# Patient Record
Sex: MALE | Race: WHITE | Employment: OTHER | URBAN - NONMETROPOLITAN AREA
[De-identification: names, ages, dates, MRNs, and addresses within clinical notes are randomized per-mention and may not be internally consistent; named-entity substitution may affect disease eponyms.]

---

## 2017-11-17 ENCOUNTER — APPOINTMENT (OUTPATIENT)
Dept: GENERAL RADIOLOGY | Age: 62
DRG: 291 | End: 2017-11-17
Payer: COMMERCIAL

## 2017-11-17 ENCOUNTER — APPOINTMENT (OUTPATIENT)
Dept: CT IMAGING | Age: 62
DRG: 291 | End: 2017-11-17
Payer: COMMERCIAL

## 2017-11-17 ENCOUNTER — HOSPITAL ENCOUNTER (INPATIENT)
Age: 62
LOS: 6 days | Discharge: HOME | DRG: 291 | End: 2017-11-23
Attending: EMERGENCY MEDICINE | Admitting: INTERNAL MEDICINE
Payer: COMMERCIAL

## 2017-11-17 DIAGNOSIS — R51.9 ACUTE NONINTRACTABLE HEADACHE, UNSPECIFIED HEADACHE TYPE: ICD-10-CM

## 2017-11-17 DIAGNOSIS — R09.02 HYPOXEMIA: Primary | ICD-10-CM

## 2017-11-17 DIAGNOSIS — R77.8 ELEVATED TROPONIN: ICD-10-CM

## 2017-11-17 DIAGNOSIS — R07.89 ATYPICAL CHEST PAIN: ICD-10-CM

## 2017-11-17 PROBLEM — J96.01 ACUTE RESPIRATORY FAILURE WITH HYPOXIA AND HYPERCAPNIA (HCC): Status: ACTIVE | Noted: 2017-11-17

## 2017-11-17 PROBLEM — J96.02 ACUTE RESPIRATORY FAILURE WITH HYPOXIA AND HYPERCAPNIA (HCC): Status: ACTIVE | Noted: 2017-11-17

## 2017-11-17 LAB
ALBUMIN SERPL-MCNC: 3.4 G/DL (ref 3.5–5.2)
ALP BLD-CCNC: 87 U/L (ref 40–130)
ALT SERPL-CCNC: 25 U/L (ref 5–41)
ANION GAP SERPL CALCULATED.3IONS-SCNC: 4 MMOL/L (ref 7–19)
APTT: 27.3 SEC (ref 26–36.2)
AST SERPL-CCNC: 23 U/L (ref 5–40)
BASE EXCESS ARTERIAL: 3.3 MMOL/L (ref -2–2)
BASOPHILS ABSOLUTE: 0.1 K/UL (ref 0–0.2)
BASOPHILS RELATIVE PERCENT: 0.5 % (ref 0–1)
BILIRUB SERPL-MCNC: <0.2 MG/DL (ref 0.2–1.2)
BUN BLDV-MCNC: 22 MG/DL (ref 8–23)
CALCIUM SERPL-MCNC: 9.4 MG/DL (ref 8.8–10.2)
CARBOXYHEMOGLOBIN ARTERIAL: 2.5 % (ref 0–5)
CHLORIDE BLD-SCNC: 96 MMOL/L (ref 98–111)
CO2: 36 MMOL/L (ref 22–29)
CREAT SERPL-MCNC: 1.3 MG/DL (ref 0.5–1.2)
EOSINOPHILS ABSOLUTE: 0.1 K/UL (ref 0–0.6)
EOSINOPHILS RELATIVE PERCENT: 0.7 % (ref 0–5)
GFR NON-AFRICAN AMERICAN: 56
GLUCOSE BLD-MCNC: 185 MG/DL (ref 74–109)
GLUCOSE BLD-MCNC: 203 MG/DL (ref 70–99)
HCO3 ARTERIAL: 34.8 MMOL/L (ref 22–26)
HCT VFR BLD CALC: 61.5 % (ref 42–52)
HEMOGLOBIN, ART, EXTENDED: 17.8 G/DL (ref 14–18)
HEMOGLOBIN: 17.9 G/DL (ref 14–18)
INR BLD: 0.93 (ref 0.88–1.18)
LYMPHOCYTES ABSOLUTE: 1.4 K/UL (ref 1.1–4.5)
LYMPHOCYTES RELATIVE PERCENT: 13.4 % (ref 20–40)
MCH RBC QN AUTO: 25.9 PG (ref 27–31)
MCHC RBC AUTO-ENTMCNC: 29.1 G/DL (ref 33–37)
MCV RBC AUTO: 88.9 FL (ref 80–94)
METHEMOGLOBIN ARTERIAL: 0.8 %
MONOCYTES ABSOLUTE: 1 K/UL (ref 0–0.9)
MONOCYTES RELATIVE PERCENT: 9.3 % (ref 0–10)
NEUTROPHILS ABSOLUTE: 7.9 K/UL (ref 1.5–7.5)
NEUTROPHILS RELATIVE PERCENT: 75.5 % (ref 50–65)
O2 CONTENT ARTERIAL: 21.1 ML/DL
O2 SAT, ARTERIAL: 84.6 %
O2 THERAPY: ABNORMAL
PCO2 ARTERIAL: 83 MMHG (ref 35–45)
PDW BLD-RTO: 18.5 % (ref 11.5–14.5)
PERFORMED ON: ABNORMAL
PH ARTERIAL: 7.23 (ref 7.35–7.45)
PLATELET # BLD: 228 K/UL (ref 130–400)
PMV BLD AUTO: 10 FL (ref 9.4–12.4)
PO2 ARTERIAL: 56 MMHG (ref 80–100)
POC TROPONIN I: 0.15 NG/ML (ref 0–0.08)
POC TROPONIN I: 0.17 NG/ML (ref 0–0.08)
POTASSIUM SERPL-SCNC: 5.1 MMOL/L (ref 3.5–5)
POTASSIUM, WHOLE BLOOD: 4.9
PRO-BNP: 1959 PG/ML (ref 0–900)
PROTHROMBIN TIME: 12.4 SEC (ref 12–14.6)
RBC # BLD: 6.92 M/UL (ref 4.7–6.1)
SODIUM BLD-SCNC: 136 MMOL/L (ref 136–145)
TOTAL PROTEIN: 6.9 G/DL (ref 6.6–8.7)
TROPONIN: 0.02 NG/ML (ref 0–0.03)
TROPONIN: 0.04 NG/ML (ref 0–0.03)
WBC # BLD: 10.5 K/UL (ref 4.8–10.8)

## 2017-11-17 PROCEDURE — 94640 AIRWAY INHALATION TREATMENT: CPT

## 2017-11-17 PROCEDURE — 99285 EMERGENCY DEPT VISIT HI MDM: CPT

## 2017-11-17 PROCEDURE — 80053 COMPREHEN METABOLIC PANEL: CPT

## 2017-11-17 PROCEDURE — 71010 XR CHEST PORTABLE: CPT

## 2017-11-17 PROCEDURE — 36415 COLL VENOUS BLD VENIPUNCTURE: CPT

## 2017-11-17 PROCEDURE — 82803 BLOOD GASES ANY COMBINATION: CPT

## 2017-11-17 PROCEDURE — 6370000000 HC RX 637 (ALT 250 FOR IP): Performed by: EMERGENCY MEDICINE

## 2017-11-17 PROCEDURE — 6370000000 HC RX 637 (ALT 250 FOR IP): Performed by: INTERNAL MEDICINE

## 2017-11-17 PROCEDURE — 85025 COMPLETE CBC W/AUTO DIFF WBC: CPT

## 2017-11-17 PROCEDURE — 83880 ASSAY OF NATRIURETIC PEPTIDE: CPT

## 2017-11-17 PROCEDURE — 94660 CPAP INITIATION&MGMT: CPT

## 2017-11-17 PROCEDURE — 2700000000 HC OXYGEN THERAPY PER DAY

## 2017-11-17 PROCEDURE — 2580000003 HC RX 258: Performed by: EMERGENCY MEDICINE

## 2017-11-17 PROCEDURE — 82948 REAGENT STRIP/BLOOD GLUCOSE: CPT

## 2017-11-17 PROCEDURE — 93005 ELECTROCARDIOGRAM TRACING: CPT

## 2017-11-17 PROCEDURE — 85610 PROTHROMBIN TIME: CPT

## 2017-11-17 PROCEDURE — 99285 EMERGENCY DEPT VISIT HI MDM: CPT | Performed by: EMERGENCY MEDICINE

## 2017-11-17 PROCEDURE — 6360000002 HC RX W HCPCS: Performed by: INTERNAL MEDICINE

## 2017-11-17 PROCEDURE — 6360000002 HC RX W HCPCS: Performed by: EMERGENCY MEDICINE

## 2017-11-17 PROCEDURE — 85730 THROMBOPLASTIN TIME PARTIAL: CPT

## 2017-11-17 PROCEDURE — 70450 CT HEAD/BRAIN W/O DYE: CPT

## 2017-11-17 PROCEDURE — 99255 IP/OBS CONSLTJ NEW/EST HI 80: CPT | Performed by: INTERNAL MEDICINE

## 2017-11-17 PROCEDURE — 96374 THER/PROPH/DIAG INJ IV PUSH: CPT

## 2017-11-17 PROCEDURE — 2580000003 HC RX 258: Performed by: INTERNAL MEDICINE

## 2017-11-17 PROCEDURE — 84132 ASSAY OF SERUM POTASSIUM: CPT

## 2017-11-17 PROCEDURE — 2100000000 HC CCU R&B

## 2017-11-17 PROCEDURE — 84484 ASSAY OF TROPONIN QUANT: CPT

## 2017-11-17 PROCEDURE — 36600 WITHDRAWAL OF ARTERIAL BLOOD: CPT

## 2017-11-17 RX ORDER — ACETAMINOPHEN 325 MG/1
650 TABLET ORAL EVERY 4 HOURS PRN
Status: DISCONTINUED | OUTPATIENT
Start: 2017-11-17 | End: 2017-11-23 | Stop reason: HOSPADM

## 2017-11-17 RX ORDER — MAGNESIUM SULFATE IN WATER 40 MG/ML
2 INJECTION, SOLUTION INTRAVENOUS ONCE
Status: COMPLETED | OUTPATIENT
Start: 2017-11-17 | End: 2017-11-17

## 2017-11-17 RX ORDER — NICOTINE POLACRILEX 4 MG
15 LOZENGE BUCCAL PRN
Status: DISCONTINUED | OUTPATIENT
Start: 2017-11-17 | End: 2017-11-23 | Stop reason: HOSPADM

## 2017-11-17 RX ORDER — FUROSEMIDE 10 MG/ML
40 INJECTION INTRAMUSCULAR; INTRAVENOUS 2 TIMES DAILY
Status: DISCONTINUED | OUTPATIENT
Start: 2017-11-17 | End: 2017-11-20

## 2017-11-17 RX ORDER — ASPIRIN 81 MG/1
81 TABLET ORAL DAILY
Status: DISCONTINUED | OUTPATIENT
Start: 2017-11-17 | End: 2017-11-17

## 2017-11-17 RX ORDER — NITROGLYCERIN 0.4 MG/1
0.4 TABLET SUBLINGUAL EVERY 5 MIN PRN
Status: DISCONTINUED | OUTPATIENT
Start: 2017-11-17 | End: 2017-11-23 | Stop reason: HOSPADM

## 2017-11-17 RX ORDER — INSULIN GLARGINE 100 [IU]/ML
0.15 INJECTION, SOLUTION SUBCUTANEOUS NIGHTLY
Status: DISCONTINUED | OUTPATIENT
Start: 2017-11-17 | End: 2017-11-23 | Stop reason: HOSPADM

## 2017-11-17 RX ORDER — IPRATROPIUM BROMIDE AND ALBUTEROL SULFATE 2.5; .5 MG/3ML; MG/3ML
1 SOLUTION RESPIRATORY (INHALATION) EVERY 4 HOURS PRN
Status: DISCONTINUED | OUTPATIENT
Start: 2017-11-17 | End: 2017-11-19

## 2017-11-17 RX ORDER — DEXTROSE MONOHYDRATE 50 MG/ML
100 INJECTION, SOLUTION INTRAVENOUS PRN
Status: DISCONTINUED | OUTPATIENT
Start: 2017-11-17 | End: 2017-11-23 | Stop reason: HOSPADM

## 2017-11-17 RX ORDER — ASPIRIN 81 MG/1
81 TABLET ORAL DAILY
Status: DISCONTINUED | OUTPATIENT
Start: 2017-11-18 | End: 2017-11-23 | Stop reason: HOSPADM

## 2017-11-17 RX ORDER — ASPIRIN 81 MG/1
324 TABLET, CHEWABLE ORAL ONCE
Status: COMPLETED | OUTPATIENT
Start: 2017-11-17 | End: 2017-11-17

## 2017-11-17 RX ORDER — 0.9 % SODIUM CHLORIDE 0.9 %
1000 INTRAVENOUS SOLUTION INTRAVENOUS ONCE
Status: COMPLETED | OUTPATIENT
Start: 2017-11-17 | End: 2017-11-17

## 2017-11-17 RX ORDER — SODIUM CHLORIDE 0.9 % (FLUSH) 0.9 %
10 SYRINGE (ML) INJECTION PRN
Status: DISCONTINUED | OUTPATIENT
Start: 2017-11-17 | End: 2017-11-23 | Stop reason: HOSPADM

## 2017-11-17 RX ORDER — DEXAMETHASONE SODIUM PHOSPHATE 10 MG/ML
10 INJECTION, SOLUTION INTRAMUSCULAR; INTRAVENOUS ONCE
Status: COMPLETED | OUTPATIENT
Start: 2017-11-17 | End: 2017-11-17

## 2017-11-17 RX ORDER — PREDNISONE 20 MG/1
40 TABLET ORAL DAILY
Status: COMPLETED | OUTPATIENT
Start: 2017-11-18 | End: 2017-11-21

## 2017-11-17 RX ORDER — ASPIRIN 81 MG/1
TABLET, CHEWABLE ORAL
Status: DISCONTINUED
Start: 2017-11-17 | End: 2017-11-17

## 2017-11-17 RX ORDER — SODIUM CHLORIDE 0.9 % (FLUSH) 0.9 %
10 SYRINGE (ML) INJECTION EVERY 12 HOURS SCHEDULED
Status: DISCONTINUED | OUTPATIENT
Start: 2017-11-17 | End: 2017-11-23 | Stop reason: HOSPADM

## 2017-11-17 RX ORDER — IPRATROPIUM BROMIDE AND ALBUTEROL SULFATE 2.5; .5 MG/3ML; MG/3ML
1 SOLUTION RESPIRATORY (INHALATION) 4 TIMES DAILY
Status: DISCONTINUED | OUTPATIENT
Start: 2017-11-17 | End: 2017-11-17

## 2017-11-17 RX ORDER — DEXTROSE MONOHYDRATE 25 G/50ML
12.5 INJECTION, SOLUTION INTRAVENOUS PRN
Status: DISCONTINUED | OUTPATIENT
Start: 2017-11-17 | End: 2017-11-23 | Stop reason: HOSPADM

## 2017-11-17 RX ORDER — PREDNISONE 20 MG/1
40 TABLET ORAL DAILY
Status: DISCONTINUED | OUTPATIENT
Start: 2017-11-17 | End: 2017-11-17

## 2017-11-17 RX ORDER — ONDANSETRON 2 MG/ML
4 INJECTION INTRAMUSCULAR; INTRAVENOUS EVERY 6 HOURS PRN
Status: DISCONTINUED | OUTPATIENT
Start: 2017-11-17 | End: 2017-11-23 | Stop reason: HOSPADM

## 2017-11-17 RX ADMIN — MAGNESIUM SULFATE HEPTAHYDRATE 2 G: 40 INJECTION, SOLUTION INTRAVENOUS at 15:39

## 2017-11-17 RX ADMIN — ENOXAPARIN SODIUM 135 MG: 150 INJECTION SUBCUTANEOUS at 17:38

## 2017-11-17 RX ADMIN — DEXAMETHASONE SODIUM PHOSPHATE 10 MG: 10 INJECTION, SOLUTION INTRAMUSCULAR; INTRAVENOUS at 15:26

## 2017-11-17 RX ADMIN — ACETAMINOPHEN 650 MG: 325 TABLET ORAL at 22:41

## 2017-11-17 RX ADMIN — Medication 10 ML: at 20:26

## 2017-11-17 RX ADMIN — FUROSEMIDE 40 MG: 10 INJECTION, SOLUTION INTRAMUSCULAR; INTRAVENOUS at 20:26

## 2017-11-17 RX ADMIN — ASPIRIN 81 MG CHEWABLE TABLET 324 MG: 81 TABLET CHEWABLE at 15:25

## 2017-11-17 RX ADMIN — INSULIN GLARGINE 20 UNITS: 100 INJECTION, SOLUTION SUBCUTANEOUS at 22:42

## 2017-11-17 RX ADMIN — SODIUM CHLORIDE 1000 ML: 9 INJECTION, SOLUTION INTRAVENOUS at 15:26

## 2017-11-17 ASSESSMENT — ENCOUNTER SYMPTOMS
NAUSEA: 0
COUGH: 0
DOUBLE VISION: 0
VOMITING: 0
SORE THROAT: 0
WHEEZING: 0
COUGH: 1
SPUTUM PRODUCTION: 1
HEARTBURN: 0
DIARRHEA: 0
ABDOMINAL DISTENTION: 0
SHORTNESS OF BREATH: 1
CONSTIPATION: 0
TROUBLE SWALLOWING: 0
BLURRED VISION: 0
RHINORRHEA: 0
CHEST TIGHTNESS: 0
HEMOPTYSIS: 0
ABDOMINAL PAIN: 0
BLOOD IN STOOL: 0
BACK PAIN: 0

## 2017-11-17 ASSESSMENT — PAIN SCALES - GENERAL
PAINLEVEL_OUTOF10: 0
PAINLEVEL_OUTOF10: 7
PAINLEVEL_OUTOF10: 0
PAINLEVEL_OUTOF10: 6

## 2017-11-17 NOTE — ED NOTES
Pt put on 4L [facility], [facility]0 [facility]  11/17/17 2333

## 2017-11-17 NOTE — ED PROVIDER NOTES
[address] UNIT  eMERGENCY dEPARTMENT eNCOUnter      Pt Name: Allie aMnuel  MRN: [id number]  [place] 1955  Date of evaluation: 11/17/2017  Provider: Haylei Francisco MD    CHIEF COMPLAINT       Chief Complaint   Patient presents with    Headache    Shoulder Pain       HISTORY OF PRESENT ILLNESS   (Location/Symptom, Timing/Onset, Context/Setting, Quality, Duration, Modifying Factors, Severity)  Note limiting factors. Allie Manuel is a 65 y.o. male who presents to the emergency department For indigestion, intermittent headaches, and shoulder numbness and arm numbness. The onset of the patient's symptoms were last night when he was trying to go to bed. States that he felt very severe indigestion. He then started having episodes of burping with his indigestion and he would have sharp intermittent headaches that would come and go on their own there were \"as if someone was hitting me with a hammer\", they would come and go on its own, they were diffuse, nothing the patient did made it better or worse. They seem to be associated with indigestion. He also states that he has had numbness going to both of his shoulders and down his left arm and every portion of his left arm but his left pinky. Patient does has a history of hypertension and diabetes and was previously on inhalers. Patient had stopped all of his medications by himself. As per his wife he has been having difficulty breathing for weeks to even months. She feels that the patient is minimizing his symptoms. Nursing Notes were reviewed. REVIEW OF SYSTEMS    (2-9 systems for level 4, 10 or more for level 5)     Review of Systems   Constitutional: Negative for activity change, appetite change, chills and fever. HENT: Negative for congestion, ear pain, nosebleeds, rhinorrhea, sore throat and trouble swallowing. Respiratory: Positive for shortness of breath. Negative for cough and chest tightness.     Cardiovascular: Negative for chest proximally distally, no dysmetria. Skin: Skin is warm and dry. No rash noted. Psychiatric: He has a normal mood and affect. His behavior is normal. Judgment and thought content normal.   Nursing note and vitals reviewed. DIAGNOSTIC RESULTS     EKG: All EKG's are interpreted by the Emergency Department Physician who either signs or Co-signs this chart in the absence of a cardiologist.    1341: Sinus tachycardia, rate 122 beats per minute, ST elevations in aVR, V1, ST depressions in V4 through V6,  ms, QRS 93 ms,  ms. Repeat EKG was normal sinus rhythm without any ST elevations or depressions. RADIOLOGY:   Non-plain film images such as CT, Ultrasound and MRI are read by the radiologist. Plain radiographic images are visualized and preliminarily interpreted by the emergency physician with the below findings:      CT Head WO Contrast   Final Result   A normal unenhanced CT scan of the head. Above findings are recorded on a digital voice clip in PACS. Signed by Dr Lizbeth Westbrook on 11/17/2017 2:57 PM      XR Chest Portable   Final Result   A limited diagnostic study due to patient's body habitus   and a soft exposure. No active infiltrate or pelvic congestion. Scarring and discoid atelectasis in the lower lung bilaterally. The above findings are recorded on a digital voice clip in PACS.    Signed by Dr Lizbeth Westbrook on 11/17/2017 2:05 PM          LABS:  Labs Reviewed   BLOOD GAS, ARTERIAL - Abnormal; Notable for the following:        Result Value    pH, Arterial 7.230 (*)     pCO2, Arterial 83.0 (*)     pO2, Arterial 56.0 (*)     HCO3, Arterial 34.8 (*)     Base Excess, Arterial 3.3 (*)     O2 Sat, Arterial 84.6 (*)     All other components within normal limits    Narrative:     CALL  [facility] tel. ,  Dr. Mark Wong, 11/17/2017 14:13, by HANNAH   TROPONIN - Abnormal; Notable for the following:     Troponin 0.04 (*)     All other components within normal limits   COMPREHENSIVE METABOLIC findings of elevations in aVR and V1 with ST depressions in V4, V5, and V6. Spoke with Dr. Isidro Garza from cardiology. States that elevations in aVR are not criteria to be a STEMI. States that hypoxia should be treated and he will come and take a look at the EKG for evaluation. 1406: Dr. Isidro Garza  in the ER evaluating patient EKG. 1457: Spoken with Dr. Isidro Garza, would recommend a dose of Lovenox for anticoagulation, recommending admission to hospitalist given the multiple medical problems going on including the pulmonary issues with the hypoxemia and the wheezing and difficulty breathing. Patient reexamined breathing better after nebulizer treatment, satting better, moving air better now with respiratory next week for a wheezes. Case discussed with Harsh Boyd from medicine for admission. History, presentation, workup and results at this point in time discussed. We'll admit patient to PCU/stepdown unit for further monitoring and care. CONSULTS:  IP CONSULT TO CARDIOLOGY  IP CONSULT TO HOSPITALIST    PROCEDURES:  Unless otherwise noted below, none     Procedures    FINAL IMPRESSION      1. Hypoxemia    2. Elevated troponin    3. Atypical chest pain    4. Acute nonintractable headache, unspecified headache type          DISPOSITION/PLAN   DISPOSITION     PATIENT REFERRED TO:  No follow-up provider specified.     DISCHARGE MEDICATIONS:  Current Discharge Medication List             (Please note that portions of this note were completed with a voice recognition program.  Efforts were made to edit the dictations but occasionally words are mis-transcribed.)    Norma Osei MD (electronically signed)  Attending Emergency Physician          Norma Osei MD  11/18/17 9760

## 2017-11-17 NOTE — CONSULTS
Cardiology Consult Note    ADMISSION DAY:  11/17/2017  1:31 PM   Consult Date:  11/17/2017 2:38 PM    Reason for Consultation: abnormal EKG    History of Present Illness:   He is a 79-year-old man who has a history of coronary artery disease and has been followed by Dr. Yue Albrecht who has not been back to our office regularly. He was last seen in our office several years ago. The note at that time said:  The patient had a subendocardial MI on 9/23/11 with subsequent drug-eluting stent placement to the obtuse marginal branch of the circumflex. The patient is no longer taking cardiac related medications other than aspirin. He was actually being seen for evaluation for hip replacement in early 2014. He has not been back to our clinic since then. He and his wife give the history. He says that he doesn't do very much and his wife says that he actually does less than he says. 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 He doesn't do any exertional activities and no real yard work or housework. He spends most of his time on the computer. Reportedly has some degree of underlying COPD and has had inhalers in the past as well as home oxygen although he uses neither now. He has been having some increased shortness of breath for months according to his wife. He also has had some issues with edema which he downplays that she thinks has been worse as well for several months. She notes that he snores loudly and has apneic episodes. He has a chronic cough productive of yellow to clear sputum that has been somewhat worse of late. He has not had fevers or chills. Back when he had his heart attack, he had severe chest pain. He has not had any of that type of symptom since that episode. He does not describe PND or orthopnea. Last night he started having problems with a headache and some joint pains particularly of both elbows and shoulders.   Those are the

## 2017-11-18 LAB
ANION GAP SERPL CALCULATED.3IONS-SCNC: 5 MMOL/L (ref 7–19)
BASOPHILS ABSOLUTE: 0 K/UL (ref 0–0.2)
BASOPHILS RELATIVE PERCENT: 0.4 % (ref 0–1)
BUN BLDV-MCNC: 20 MG/DL (ref 8–23)
CALCIUM SERPL-MCNC: 8.7 MG/DL (ref 8.8–10.2)
CHLORIDE BLD-SCNC: 91 MMOL/L (ref 98–111)
CO2: 38 MMOL/L (ref 22–29)
CREAT SERPL-MCNC: 1.2 MG/DL (ref 0.5–1.2)
EOSINOPHILS ABSOLUTE: 0 K/UL (ref 0–0.6)
EOSINOPHILS RELATIVE PERCENT: 0 % (ref 0–5)
GFR NON-AFRICAN AMERICAN: >60
GLUCOSE BLD-MCNC: 162 MG/DL (ref 70–99)
GLUCOSE BLD-MCNC: 182 MG/DL (ref 70–99)
GLUCOSE BLD-MCNC: 182 MG/DL (ref 70–99)
GLUCOSE BLD-MCNC: 183 MG/DL (ref 70–99)
GLUCOSE BLD-MCNC: 347 MG/DL (ref 74–109)
HBA1C MFR BLD: 7.9 %
HCT VFR BLD CALC: 59 % (ref 42–52)
HEMOGLOBIN: 17.3 G/DL (ref 14–18)
LV EF: 50 %
LVEF MODALITY: NORMAL
LYMPHOCYTES ABSOLUTE: 0.5 K/UL (ref 1.1–4.5)
LYMPHOCYTES RELATIVE PERCENT: 5.8 % (ref 20–40)
MCH RBC QN AUTO: 26.2 PG (ref 27–31)
MCHC RBC AUTO-ENTMCNC: 29.3 G/DL (ref 33–37)
MCV RBC AUTO: 89.4 FL (ref 80–94)
MONOCYTES ABSOLUTE: 0.3 K/UL (ref 0–0.9)
MONOCYTES RELATIVE PERCENT: 3.6 % (ref 0–10)
NEUTROPHILS ABSOLUTE: 7.6 K/UL (ref 1.5–7.5)
NEUTROPHILS RELATIVE PERCENT: 89.4 % (ref 50–65)
PDW BLD-RTO: 18.6 % (ref 11.5–14.5)
PERFORMED ON: ABNORMAL
PLATELET # BLD: 203 K/UL (ref 130–400)
PMV BLD AUTO: 10.3 FL (ref 9.4–12.4)
POTASSIUM SERPL-SCNC: 5.4 MMOL/L (ref 3.5–5)
POTASSIUM SERPL-SCNC: 5.5 MMOL/L (ref 3.5–5)
POTASSIUM SERPL-SCNC: 6.1 MMOL/L (ref 3.5–5)
RBC # BLD: 6.6 M/UL (ref 4.7–6.1)
SODIUM BLD-SCNC: 134 MMOL/L (ref 136–145)
TROPONIN: 0.02 NG/ML (ref 0–0.03)
WBC # BLD: 8.5 K/UL (ref 4.8–10.8)

## 2017-11-18 PROCEDURE — 93306 TTE W/DOPPLER COMPLETE: CPT

## 2017-11-18 PROCEDURE — 2580000003 HC RX 258: Performed by: INTERNAL MEDICINE

## 2017-11-18 PROCEDURE — 94660 CPAP INITIATION&MGMT: CPT

## 2017-11-18 PROCEDURE — 99233 SBSQ HOSP IP/OBS HIGH 50: CPT | Performed by: INTERNAL MEDICINE

## 2017-11-18 PROCEDURE — 84484 ASSAY OF TROPONIN QUANT: CPT

## 2017-11-18 PROCEDURE — 83036 HEMOGLOBIN GLYCOSYLATED A1C: CPT

## 2017-11-18 PROCEDURE — 6370000000 HC RX 637 (ALT 250 FOR IP): Performed by: INTERNAL MEDICINE

## 2017-11-18 PROCEDURE — 85025 COMPLETE CBC W/AUTO DIFF WBC: CPT

## 2017-11-18 PROCEDURE — 36415 COLL VENOUS BLD VENIPUNCTURE: CPT

## 2017-11-18 PROCEDURE — 80048 BASIC METABOLIC PNL TOTAL CA: CPT

## 2017-11-18 PROCEDURE — 82948 REAGENT STRIP/BLOOD GLUCOSE: CPT

## 2017-11-18 PROCEDURE — 6360000002 HC RX W HCPCS: Performed by: INTERNAL MEDICINE

## 2017-11-18 PROCEDURE — 84132 ASSAY OF SERUM POTASSIUM: CPT

## 2017-11-18 PROCEDURE — 2100000000 HC CCU R&B

## 2017-11-18 PROCEDURE — 2700000000 HC OXYGEN THERAPY PER DAY

## 2017-11-18 RX ORDER — HYDRALAZINE HYDROCHLORIDE 10 MG/1
10 TABLET, FILM COATED ORAL EVERY 8 HOURS SCHEDULED
Status: DISCONTINUED | OUTPATIENT
Start: 2017-11-18 | End: 2017-11-23 | Stop reason: HOSPADM

## 2017-11-18 RX ORDER — DEXTROSE MONOHYDRATE 25 G/50ML
25 INJECTION, SOLUTION INTRAVENOUS ONCE
Status: COMPLETED | OUTPATIENT
Start: 2017-11-18 | End: 2017-11-18

## 2017-11-18 RX ORDER — SODIUM POLYSTYRENE SULFONATE 15 G/60ML
30 SUSPENSION ORAL; RECTAL ONCE
Status: COMPLETED | OUTPATIENT
Start: 2017-11-18 | End: 2017-11-18

## 2017-11-18 RX ADMIN — HYDRALAZINE HYDROCHLORIDE 10 MG: 10 TABLET, FILM COATED ORAL at 14:14

## 2017-11-18 RX ADMIN — DEXTROSE MONOHYDRATE 25 G: 25 INJECTION, SOLUTION INTRAVENOUS at 14:52

## 2017-11-18 RX ADMIN — METOPROLOL TARTRATE 25 MG: 25 TABLET ORAL at 20:19

## 2017-11-18 RX ADMIN — INSULIN GLARGINE 20 UNITS: 100 INJECTION, SOLUTION SUBCUTANEOUS at 20:16

## 2017-11-18 RX ADMIN — Medication 10 ML: at 20:16

## 2017-11-18 RX ADMIN — PREDNISONE 40 MG: 20 TABLET ORAL at 08:38

## 2017-11-18 RX ADMIN — Medication 10 ML: at 08:38

## 2017-11-18 RX ADMIN — SODIUM POLYSTYRENE SULFONATE 30 G: 15 SUSPENSION ORAL; RECTAL at 14:14

## 2017-11-18 RX ADMIN — METOPROLOL TARTRATE 25 MG: 25 TABLET ORAL at 10:22

## 2017-11-18 RX ADMIN — INSULIN LISPRO 7 UNITS: 100 INJECTION, SOLUTION INTRAVENOUS; SUBCUTANEOUS at 16:48

## 2017-11-18 RX ADMIN — ASPIRIN 81 MG: 81 TABLET, COATED ORAL at 08:38

## 2017-11-18 RX ADMIN — ENOXAPARIN SODIUM 135 MG: 150 INJECTION SUBCUTANEOUS at 08:38

## 2017-11-18 RX ADMIN — FUROSEMIDE 40 MG: 10 INJECTION, SOLUTION INTRAMUSCULAR; INTRAVENOUS at 08:38

## 2017-11-18 RX ADMIN — INSULIN LISPRO 1 UNITS: 100 INJECTION, SOLUTION INTRAVENOUS; SUBCUTANEOUS at 20:17

## 2017-11-18 RX ADMIN — HYDRALAZINE HYDROCHLORIDE 10 MG: 10 TABLET, FILM COATED ORAL at 21:34

## 2017-11-18 RX ADMIN — INSULIN HUMAN 10 UNITS: 100 INJECTION, SOLUTION PARENTERAL at 14:51

## 2017-11-18 RX ADMIN — INSULIN LISPRO 7 UNITS: 100 INJECTION, SOLUTION INTRAVENOUS; SUBCUTANEOUS at 12:22

## 2017-11-18 RX ADMIN — INSULIN LISPRO 7 UNITS: 100 INJECTION, SOLUTION INTRAVENOUS; SUBCUTANEOUS at 08:38

## 2017-11-18 RX ADMIN — FUROSEMIDE 40 MG: 10 INJECTION, SOLUTION INTRAMUSCULAR; INTRAVENOUS at 17:27

## 2017-11-18 ASSESSMENT — PAIN SCALES - GENERAL
PAINLEVEL_OUTOF10: 0

## 2017-11-18 NOTE — PROGRESS NOTES
 glucagon (rDNA) injection 1 mg  1 mg Intramuscular PRN Elen Mckeon MD        dextrose 5 % solution  100 mL/hr Intravenous PRN Elen Mckeon MD        insulin glargine (LANTUS) injection vial 20 Units  0.15 Units/kg Subcutaneous Nightly Elen Mckeon MD   20 Units at 11/17/17 2242    insulin lispro (HUMALOG) injection vial 7 Units  0.05 Units/kg Subcutaneous TID  Elen Mckeon MD   7 Units at 11/18/17 1222    insulin lispro (HUMALOG) injection vial 0-6 Units  0-6 Units Subcutaneous TID  Elen Mckeon MD        insulin lispro (HUMALOG) injection vial 0-3 Units  0-3 Units Subcutaneous Nightly Elen Mckeon MD        ipratropium-albuterol (DUONEB) nebulizer solution 1 ampule  1 ampule Inhalation Q4H PRN Elen Mckeon MD        predniSONE (DELTASONE) tablet 40 mg  40 mg Oral Daily Elen Mckeon MD   40 mg at 11/18/17 5552    furosemide (LASIX) injection 40 mg  40 mg Intravenous BID Elen Mckeon MD   40 mg at 11/18/17 0838    aspirin EC tablet 81 mg  81 mg Oral Daily Elen Mckeon, MD   81 mg at 11/18/17 6845    acetaminophen (TYLENOL) tablet 650 mg  650 mg Oral Q4H PRN Elen Mckeon MD   650 mg at 11/17/17 2241        Labs:     Recent Labs      11/17/17   1343  11/18/17   0014   WBC  10.5  8.5   RBC  6.92*  6.60*   HGB  17.9  17.3   HCT  61.5*  59.0*   MCV  88.9  89.4   MCH  25.9*  26.2*   MCHC  29.1*  29.3*   PLT  228  203     Recent Labs      11/17/17   1343  11/17/17   1411  11/18/17   0014   NA  136   --   134*   K  5.1*  4.9  5.4*   ANIONGAP  4*   --   5*   CL  96*   --   91*   CO2  36*   --   38*   BUN  22   --   20   CREATININE  1.3*   --   1.2   GLUCOSE  185*   --   347*   CALCIUM  9.4   --   8.7*     No results for input(s): MG, PHOS in the last 72 hours.   Recent Labs      11/17/17   1343   AST  23   ALT  25   BILITOT  <0.2   ALKPHOS  87     ABGs:  Recent Labs      11/17/17   1411   PHART  7.230*   [id number]  83.0*   PO2ART  56.0*   [id number] ischemic w/u at some point. Defer to cards      Hypertension - bp stable      Chronic diastolic congestive heart failure ([facility] - w/ likely acute on chronic chf exacerbation. 2d echo ordered. Continue iv lasix bid at this time.        Type 2 diabetes mellitus ([facility] - getting lantus, premeal, iss      Keep in CCU at this time      Advance Directive: Full Code          Electronically signed by Helen Galloway DO on 11/18/2017 at 1:13 PM

## 2017-11-18 NOTE — PROGRESS NOTES
Pt has been dozing intermittently this morning. Have assisted to stand several time to void. Pt tolerating activity fairly well. Currently sitting on side of the bed eating lunch. No other needs at this time.

## 2017-11-18 NOTE — PROGRESS NOTES
Patient o2 sats on cpap decreased to 85%. o2 bleed into cpap had been increased to 15 litters. Patient changed back to nasel cannula with o2 at 6 litters. o2 sats improved to 90%.  Will continue to monitor

## 2017-11-18 NOTE — PROGRESS NOTES
Kanika Bender : 1955, Male, 65 y.o.     CC: CHF, troponin bump  History:  He is feeling some bettter but still having headache and finger numbness on his left hand.     [Allergies/Contraindications:  Crestor [rosuvastatin]]     Medications  Current Facility-Administered Medications   Medication Dose Route Frequency Provider Last Rate Last Dose    nitroGLYCERIN (NITROSTAT) SL tablet 0.4 mg  0.4 mg Sublingual Q5 Min PRN Krishan Sultana MD        nitroglycerin (NITRO-BID) 2 % ointment 0.5 inch  0.5 inch Topical 4 times per day Marlow Babinski, MD        sodium chloride flush 0.9 % injection 10 mL  10 mL Intravenous 2 times per day Craa Wyman MD   10 mL at 17 0953    sodium chloride flush 0.9 % injection 10 mL  10 mL Intravenous PRN Cara Wyman MD        magnesium hydroxide (MILK OF MAGNESIA) 400 MG/5ML suspension 30 mL  30 mL Oral Daily PRN Cara Wyman MD        ondansetron (ZOFRAN) injection 4 mg  4 mg Intravenous Q6H PRN Cara Wyman MD        glucose (GLUTOSE) 40 % oral gel 15 g  15 g Oral PRN Cara Wyman MD        dextrose 50 % solution 12.5 g  12.5 g Intravenous PRN Cara Wyman MD        glucagon (rDNA) injection 1 mg  1 mg Intramuscular PRN Cara Wyman MD        dextrose 5 % solution  100 mL/hr Intravenous PRN Cara Wyman MD        enoxaparin (LOVENOX) injection 135 mg  1 mg/kg Subcutaneous BID Cara Wyman MD   135 mg at 17 0838    insulin glargine (LANTUS) injection vial 20 Units  0.15 Units/kg Subcutaneous Nightly Cara Wyman MD   20 Units at 17 2242    insulin lispro (HUMALOG) injection vial 7 Units  0.05 Units/kg Subcutaneous TID ARIADNA Wyman MD   7 Units at 17 0838    insulin lispro (HUMALOG) injection vial 0-6 Units  0-6 Units Subcutaneous TID ARIADNA Wyman MD        insulin lispro (HUMALOG) injection vial 0-3 Units  0-3 Units Subcutaneous Nightly Cara Wyman he needs full dose lovenox. Katelyn Penny. Hetal Sarah M.D.   [facility]

## 2017-11-18 NOTE — PROGRESS NOTES
Pt assessment completed. Pt w/o c/o pain or distress. He does report feeling better this morning. Assisted to sit on side of bed and set up breakfast tray. No other needs at this time.

## 2017-11-18 NOTE — H&P
Lady Lin - History & Physical    [id number]  PCP: No primary care provider on file. Date of Admission: 11/17/2017   Date of Service: Pt seen/examined on11/17/2017 and Admitted to Inpatient with expected LOS greater than two midnights due to medical therapy. Chief Complaint:  Mini-headaches    History Of Present Illness: The patient is a 65 y.o. male who presented complaining of \"mini-headaches\" and b/l shoulder pain. Pt was found to be hypoxic in the ED which was his main reason for admission. Pt states he has been progressively SOB for about 1 year. Worse with minimal exertion. Pt reports he becomes SOB after walking only about 5 feet. He admits to having an occasional cough with production of clear sputum, but has not increased as of late. He is also having increased LE edema. Pt is known to the cardiology team and appears to be fairly noncompliant with his medications. At time of interview, pt reports his SOB has improved. Denies any recent sick contacts. Denies any current CP. Of note, pt has over a 35yr hx of smoking, quit in 2011 and has home O2, but is noncompliant. He denies any recent wheezing. His wife at bedside states he snores and she occasionally hears him \"stop breathing\" in his sleep. Pt also has daytime sleepiness. Review of Systems   Constitutional: Negative for chills and fever. HENT: Negative for congestion, hearing loss and sore throat. Eyes: Negative for blurred vision and double vision. Respiratory: Positive for cough, sputum production and shortness of breath. Negative for hemoptysis and wheezing. Cardiovascular: Positive for leg swelling. Negative for chest pain and palpitations. Gastrointestinal: Negative for abdominal pain, heartburn, nausea and vomiting. Genitourinary: Negative for dysuria, frequency and urgency. Musculoskeletal: Positive for joint pain. Negative for falls. Skin: Negative for itching and rash. Neurological: Positive for headaches. Negative for dizziness, sensory change, focal weakness and loss of consciousness. Endo/Heme/Allergies: Does not bruise/bleed easily. Psychiatric/Behavioral: Negative for depression. Past Medical History:        Diagnosis Date    Atherosclerosis     cardiovascular disease     CAD (coronary artery disease)     CHF (congestive heart failure) (HCC)     Degenerative joint disease     right hip    Diabetes mellitus ([facility]     Hyperlipidemia     Hypertension     Myocardial infarction     subenodcardial    Obesity     Presence of stent in coronary artery     9/23/11 BALJINDER placement 0MB of circumflex       Past Surgical History:        Procedure Laterality Date    BACK SURGERY      CARDIAC CATHETERIZATION  9/23/11    with stenting of OMB of left circumflex.  CORONARY ANGIOPLASTY WITH STENT PLACEMENT      HERNIA REPAIR      JOINT REPLACEMENT      LUMBAR LAMINECTOMY         Home Medications:  Prior to Admission medications    Medication Sig Start Date End Date Taking? Authorizing Provider   aspirin 81 MG EC tablet Take 81 mg by mouth daily. Yes Historical Provider, MD   ranitidine (ZANTAC) 150 MG tablet Take 150 mg by mouth 2 times daily. Historical Provider, MD       Allergies:    Crestor [rosuvastatin]    Social History:    The patient currently lives at home with wife  Tobacco:   reports that he quit smoking about 6 years ago. His smoking use included Cigarettes. He has never used smokeless tobacco.  Alcohol:   reports that he drinks alcohol. Illicit Drugs: denies    Family History:  History reviewed. No pertinent family history.       Physical Examination:  /72   Pulse 85   Temp 98.9 °F (37.2 °C) (Temporal)   Resp 17   Ht 5' 8\" (1.727 m)   Wt 283 lb 14.4 oz (128.8 kg)   SpO2 92%   BMI 43.17 kg/m²   General appearance: alert, cooperative and no distress  Head: Normocephalic, without obvious abnormality, atraumatic  Eyes: conjunctivae/corneas clear. PERRL, EOM's intact. Ears: normal external ears  Neck: no adenopathy, no carotid bruit, no JVD, supple, symmetrical, trachea midline   Lungs: Decreased air entry, faint coarse bibasilar bs, no wheeze or rhonchi appreciated   Heart: Difficult to assess due to body habitus, distant heat sounds, but regular rate and rhythm, S1, S2 normal, no murmur appreciated. Abdomen:soft, non-tender; distended normal bowel sounds no masses, no organomegaly  Extremities:Pedal edema 2+  Homans sign is negative, no sign of DVT  Skin: Skin color, texture, turgor normal. No rashes or lesions  Lymphatic: No palpable lymph node enlargment  Neurologic: Alert and oriented X 3, Cranial nerves:II-XII Grossly intact Sensory: normal Motor:grossly normal  Psychiatric:        Diagnostic Data:    CBC:  Recent Labs      11/17/17   1343   WBC  10.5   HGB  17.9   HCT  61.5*   PLT  228     BMP:  Recent Labs      11/17/17   1343  11/17/17   1411   NA  136   --    K  5.1*  4.9   CL  96*   --    CO2  36*   --    BUN  22   --    CREATININE  1.3*   --    CALCIUM  9.4   --      Recent Labs      11/17/17   1343   AST  23   ALT  25   BILITOT  <0.2   ALKPHOS  87     Coag Panel:   Recent Labs      11/17/17   1343   INR  0.93   PROTIME  12.4   APTT  27.3     Cardiac Enzymes:   Recent Labs      11/17/17   1343  11/17/17   1347   TROPONINI  0.04*  0.17*     ABGs:  Lab Results   Component Value Date    PHART 7.230 11/17/2017    PO2ART 56.0 11/17/2017    PCN3OTP 83.0 11/17/2017     Urinalysis:  Lab Results   Component Value Date    NITRU NEGATIVE 09/05/2015    GLUCOSEU NEGATIVE 09/05/2015     CT Head WO Contrast   Final Result   A normal unenhanced CT scan of the head.       XR Chest Portable   Final Result   A limited diagnostic study due to patient's body habitus   and a soft exposure. No active infiltrate or pelvic congestion. Scarring and discoid atelectasis in the lower lung bilaterally.          Active Hospital Problems Diagnosis Date Noted    Acute respiratory failure with hypoxia and hypercapnia (HCC) [J96.01, J96.02] 11/17/2017    Type 2 diabetes mellitus ([facility] [E11.9] 09/17/2015    Chronic diastolic congestive heart failure ([facility] [I50.32] 09/17/2015    CAD (coronary artery disease) [I25.10]     Hypertension [I10]        IMPRESSION   Principal Problem:    Acute respiratory failure with hypoxia and hypercapnia (HCC)  Active Problems:    CAD (coronary artery disease)    Hypertension    Chronic diastolic congestive heart failure (HCC)    Type 2 diabetes mellitus (HCC)      PLAN:  1) Possibly related to fluid overload state in pt with underlying COPD. No increase in sputum production will hold abx. Cont Pred and Duonebs. 2) Diuresis with lasix. Echo in am.   3) Trending trops, started on anticoagulation in ED per Cards.  Appreciate further cardiology recs  4) Lantus, lispro, and SSI with POCs  5) CPAP QHS    Elen Mckeon MD  11/17/2017

## 2017-11-18 NOTE — FLOWSHEET NOTE
11/18/17 1754   Encounter Summary   Services provided to: Patient   [facility]   Support System Unknown   Complexity of Encounter Low   Length of Encounter 15 minutes   Spiritual/Faith   Type Spiritual support   Assessment Approachable   Intervention Prayer;Explored feelings, thoughts, concerns   Outcome Expressed gratitude

## 2017-11-19 LAB
ANION GAP SERPL CALCULATED.3IONS-SCNC: 4 MMOL/L (ref 7–19)
BUN BLDV-MCNC: 24 MG/DL (ref 8–23)
CALCIUM SERPL-MCNC: 8.5 MG/DL (ref 8.8–10.2)
CHLORIDE BLD-SCNC: 94 MMOL/L (ref 98–111)
CO2: 41 MMOL/L (ref 22–29)
CREAT SERPL-MCNC: 1 MG/DL (ref 0.5–1.2)
GFR NON-AFRICAN AMERICAN: >60
GLUCOSE BLD-MCNC: 154 MG/DL (ref 70–99)
GLUCOSE BLD-MCNC: 172 MG/DL (ref 74–109)
GLUCOSE BLD-MCNC: 249 MG/DL (ref 70–99)
GLUCOSE BLD-MCNC: 263 MG/DL (ref 70–99)
HCT VFR BLD CALC: 59.9 % (ref 42–52)
HEMOGLOBIN: 17.3 G/DL (ref 14–18)
LV EF: 45 %
LVEF MODALITY: NORMAL
MAGNESIUM: 2.2 MG/DL (ref 1.6–2.4)
MCH RBC QN AUTO: 25.5 PG (ref 27–31)
MCHC RBC AUTO-ENTMCNC: 28.9 G/DL (ref 33–37)
MCV RBC AUTO: 88.3 FL (ref 80–94)
PDW BLD-RTO: 17.8 % (ref 11.5–14.5)
PERFORMED ON: ABNORMAL
PLATELET # BLD: 209 K/UL (ref 130–400)
PMV BLD AUTO: 10.1 FL (ref 9.4–12.4)
POTASSIUM SERPL-SCNC: 4.8 MMOL/L (ref 3.5–5)
RBC # BLD: 6.78 M/UL (ref 4.7–6.1)
SODIUM BLD-SCNC: 139 MMOL/L (ref 136–145)
WBC # BLD: 12 K/UL (ref 4.8–10.8)

## 2017-11-19 PROCEDURE — 6370000000 HC RX 637 (ALT 250 FOR IP): Performed by: INTERNAL MEDICINE

## 2017-11-19 PROCEDURE — 2580000003 HC RX 258: Performed by: INTERNAL MEDICINE

## 2017-11-19 PROCEDURE — 82948 REAGENT STRIP/BLOOD GLUCOSE: CPT

## 2017-11-19 PROCEDURE — 99233 SBSQ HOSP IP/OBS HIGH 50: CPT | Performed by: INTERNAL MEDICINE

## 2017-11-19 PROCEDURE — 83735 ASSAY OF MAGNESIUM: CPT

## 2017-11-19 PROCEDURE — 85027 COMPLETE CBC AUTOMATED: CPT

## 2017-11-19 PROCEDURE — 2140000000 HC CCU INTERMEDIATE R&B

## 2017-11-19 PROCEDURE — 36415 COLL VENOUS BLD VENIPUNCTURE: CPT

## 2017-11-19 PROCEDURE — 6360000002 HC RX W HCPCS: Performed by: INTERNAL MEDICINE

## 2017-11-19 PROCEDURE — 2700000000 HC OXYGEN THERAPY PER DAY

## 2017-11-19 PROCEDURE — 80048 BASIC METABOLIC PNL TOTAL CA: CPT

## 2017-11-19 PROCEDURE — 6360000004 HC RX CONTRAST MEDICATION: Performed by: INTERNAL MEDICINE

## 2017-11-19 PROCEDURE — 99232 SBSQ HOSP IP/OBS MODERATE 35: CPT | Performed by: INTERNAL MEDICINE

## 2017-11-19 PROCEDURE — 94660 CPAP INITIATION&MGMT: CPT

## 2017-11-19 PROCEDURE — 94640 AIRWAY INHALATION TREATMENT: CPT

## 2017-11-19 RX ORDER — IPRATROPIUM BROMIDE AND ALBUTEROL SULFATE 2.5; .5 MG/3ML; MG/3ML
1 SOLUTION RESPIRATORY (INHALATION)
Status: DISCONTINUED | OUTPATIENT
Start: 2017-11-19 | End: 2017-11-23 | Stop reason: HOSPADM

## 2017-11-19 RX ORDER — FAMOTIDINE 20 MG/1
20 TABLET, FILM COATED ORAL DAILY
Status: DISCONTINUED | OUTPATIENT
Start: 2017-11-19 | End: 2017-11-23 | Stop reason: HOSPADM

## 2017-11-19 RX ORDER — ALBUTEROL SULFATE 2.5 MG/3ML
2.5 SOLUTION RESPIRATORY (INHALATION) EVERY 6 HOURS PRN
Status: DISCONTINUED | OUTPATIENT
Start: 2017-11-19 | End: 2017-11-23 | Stop reason: HOSPADM

## 2017-11-19 RX ORDER — SODIUM CHLORIDE 0.9 % (FLUSH) 0.9 %
10 SYRINGE (ML) INJECTION PRN
Status: ACTIVE | OUTPATIENT
Start: 2017-11-19 | End: 2017-11-20

## 2017-11-19 RX ADMIN — FUROSEMIDE 40 MG: 10 INJECTION, SOLUTION INTRAMUSCULAR; INTRAVENOUS at 20:39

## 2017-11-19 RX ADMIN — INSULIN LISPRO 1 UNITS: 100 INJECTION, SOLUTION INTRAVENOUS; SUBCUTANEOUS at 20:38

## 2017-11-19 RX ADMIN — PREDNISONE 40 MG: 20 TABLET ORAL at 08:28

## 2017-11-19 RX ADMIN — IPRATROPIUM BROMIDE AND ALBUTEROL SULFATE 1 AMPULE: .5; 3 SOLUTION RESPIRATORY (INHALATION) at 19:35

## 2017-11-19 RX ADMIN — FUROSEMIDE 40 MG: 10 INJECTION, SOLUTION INTRAMUSCULAR; INTRAVENOUS at 08:28

## 2017-11-19 RX ADMIN — METOPROLOL TARTRATE 25 MG: 25 TABLET ORAL at 08:28

## 2017-11-19 RX ADMIN — Medication 10 ML: at 10:01

## 2017-11-19 RX ADMIN — PERFLUTREN 2.2 MG: 6.52 INJECTION, SUSPENSION INTRAVENOUS at 10:00

## 2017-11-19 RX ADMIN — INSULIN LISPRO 3 UNITS: 100 INJECTION, SOLUTION INTRAVENOUS; SUBCUTANEOUS at 17:37

## 2017-11-19 RX ADMIN — ENOXAPARIN SODIUM 40 MG: 40 INJECTION SUBCUTANEOUS at 08:40

## 2017-11-19 RX ADMIN — ASPIRIN 81 MG: 81 TABLET, COATED ORAL at 08:28

## 2017-11-19 RX ADMIN — IPRATROPIUM BROMIDE AND ALBUTEROL SULFATE 1 AMPULE: .5; 3 SOLUTION RESPIRATORY (INHALATION) at 11:17

## 2017-11-19 RX ADMIN — INSULIN LISPRO 7 UNITS: 100 INJECTION, SOLUTION INTRAVENOUS; SUBCUTANEOUS at 07:59

## 2017-11-19 RX ADMIN — INSULIN LISPRO 7 UNITS: 100 INJECTION, SOLUTION INTRAVENOUS; SUBCUTANEOUS at 17:39

## 2017-11-19 RX ADMIN — FAMOTIDINE 20 MG: 20 TABLET ORAL at 09:51

## 2017-11-19 RX ADMIN — INSULIN LISPRO 7 UNITS: 100 INJECTION, SOLUTION INTRAVENOUS; SUBCUTANEOUS at 12:07

## 2017-11-19 RX ADMIN — METOPROLOL TARTRATE 25 MG: 25 TABLET ORAL at 20:28

## 2017-11-19 RX ADMIN — Medication 10 ML: at 08:28

## 2017-11-19 RX ADMIN — HYDRALAZINE HYDROCHLORIDE 10 MG: 10 TABLET, FILM COATED ORAL at 14:05

## 2017-11-19 RX ADMIN — HYDRALAZINE HYDROCHLORIDE 10 MG: 10 TABLET, FILM COATED ORAL at 06:19

## 2017-11-19 RX ADMIN — IPRATROPIUM BROMIDE AND ALBUTEROL SULFATE 1 AMPULE: .5; 3 SOLUTION RESPIRATORY (INHALATION) at 14:54

## 2017-11-19 RX ADMIN — HYDRALAZINE HYDROCHLORIDE 10 MG: 10 TABLET, FILM COATED ORAL at 20:28

## 2017-11-19 RX ADMIN — INSULIN GLARGINE 20 UNITS: 100 INJECTION, SOLUTION SUBCUTANEOUS at 20:27

## 2017-11-19 RX ADMIN — Medication 10 ML: at 20:33

## 2017-11-19 ASSESSMENT — PAIN SCALES - GENERAL
PAINLEVEL_OUTOF10: 0

## 2017-11-19 NOTE — PROGRESS NOTES
209     Recent Labs      11/17/17   1343   11/18/17   0014  11/18/17   1321  11/18/17   1625  11/19/17   0138   NA  136   --   134*   --    --   139   K  5.1*   < >  5.4*  6.1*  5.5*  4.8   ANIONGAP  4*   --   5*   --    --   4*   CL  96*   --   91*   --    --   94*   CO2  36*   --   38*   --    --   41*   BUN  22   --   20   --    --   24*   CREATININE  1.3*   --   1.2   --    --   1.0   GLUCOSE  185*   --   347*   --    --   172*   CALCIUM  9.4   --   8.7*   --    --   8.5*    < > = values in this interval not displayed.      Recent Labs      11/19/17   0138   MG  2.2     Recent Labs      11/17/17   1343   AST  23   ALT  25   BILITOT  <0.2   ALKPHOS  87     ABGs:  Recent Labs      11/17/17   1411   PHART  7.230*   [id number]  83.0*   PO2ART  56.0*   [id number]  34.8*   BEART  3.3*   HGBAE  17.8   X2JCQYTR  84.6*   CARBOXHGBART  2.5   02THERAPY  Unknown     HgBA1c:   Recent Labs      11/18/17   0014   LABA1C  7.9*     FLP:    Lab Results   Component Value Date    TRIG 105 09/06/2015    HDL 28 09/06/2015    LDLCALC 164 09/24/2011    LDLDIRECT 85 09/06/2015     TSH:    Lab Results   Component Value Date    TSH 1.83 09/06/2015     Troponin T:   Recent Labs      11/17/17   1602  11/17/17   2015  11/18/17   0014   TROPONINI  0.15*  0.02  0.02     INR:   Recent Labs      11/17/17   1343   INR  0.93       Objective:   Vitals: BP (!) 149/92   Pulse 66   Temp 97.4 °F (36.3 °C) (Temporal)   Resp 20   Ht 5' 8\" (1.727 m)   Wt 284 lb 14.4 oz (129.2 kg)   SpO2 (!) 87%   BMI 43.32 kg/m²   24HR INTAKE/OUTPUT:      Intake/Output Summary (Last 24 hours) at 11/19/17 1136  Last data filed at 11/19/17 1039   Gross per 24 hour   Intake             1150 ml   Output             2725 ml   Net            -1575 ml     General appearance: NAD, alert and cooperative with exam  HEENT: atraumatic, PERRLA, EOMI, anicteric, trachea midline  Lungs: no increased work of breathing, tight bs today, end exp wheezing  Heart: RRR, +s1/s2, no

## 2017-11-19 NOTE — PROGRESS NOTES
At start of shift, pt on 5L NC, Spo2 93-94%. Decreased O2 to 4L NC. Will wean as tolerated. PM meds administered. Pt denies needs/complaints at this time. Will continue to monitor.  Electronically signed by Rosa Pizano on 11/18/2017 at 9:46 PM

## 2017-11-19 NOTE — PLAN OF CARE
Problem: Risk for Impaired Skin Integrity  Goal: Tissue integrity - skin and mucous membranes  Structural intactness and normal physiological function of skin and  mucous membranes.    Outcome: Met This Shift      Problem: Falls - Risk of  Goal: Absence of falls  Outcome: Met This Shift

## 2017-11-19 NOTE — PROGRESS NOTES
MD Lexie   7 Units at 11/19/17 0759    insulin lispro (HUMALOG) injection vial 0-6 Units  0-6 Units Subcutaneous TID WC Kaylan Zavala MD        insulin lispro (HUMALOG) injection vial 0-3 Units  0-3 Units Subcutaneous Nightly Kaylan Zavala MD   1 Units at 11/18/17 2017    ipratropium-albuterol (DUONEB) nebulizer solution 1 ampule  1 ampule Inhalation Q4H PRN Kaylan Zavala MD        predniSONE (DELTASONE) tablet 40 mg  40 mg Oral Daily Kaylan Zavala MD   40 mg at 11/19/17 0828    furosemide (LASIX) injection 40 mg  40 mg Intravenous BID Kaylan Zavala MD   40 mg at 11/19/17 0828    aspirin EC tablet 81 mg  81 mg Oral Daily Kaylan Zavala MD   81 mg at 11/19/17 0828    acetaminophen (TYLENOL) tablet 650 mg  650 mg Oral Q4H PRN Kaylan Zavala MD   650 mg at 11/17/17 2241      dextrose         Physical Examination  BP (!) 149/92   Pulse 66   Temp 97.4 °F (36.3 °C) (Temporal)   Resp 20   Ht 5' 8\" (1.727 m)   Wt 284 lb 14.4 oz (129.2 kg)   SpO2 (!) 87%   BMI 43.32 kg/m²       He appears comfortable sitting up in a chair with unlabored respirations. Lungs have diminished breath sounds but no wheezes or rales. Cardiovascular diminished heart sounds without murmur abdomen is protuberant and soft normal bowel sounds extremities are warm with minimal edema.      Data  Recent Labs      11/17/17   1343   11/18/17   0014  11/18/17   1321  11/18/17   1625  11/19/17   0138   NA  136   --   134*   --    --   139   K  5.1*   < >  5.4*  6.1*  5.5*  4.8   CL  96*   --   91*   --    --   94*   CO2  36*   --   38*   --    --   41*   BUN  22   --   20   --    --   24*   CREATININE  1.3*   --   1.2   --    --   1.0   GLUCOSE  185*   --   347*   --    --   172*   CALCIUM  9.4   --   8.7*   --    --   8.5*   PROT  6.9   --    --    --    --    --    LABALBU  3.4*   --    --    --    --    --    BILITOT  <0.2   --    --    --    --    --    ALKPHOS  87   --    --    --    --    --    AST 23   --    --    --    --    --    ALT  25   --    --    --    --    --     < > = values in this interval not displayed. Lab Results   Component Value Date    TROPONINI 0.02 11/18/2017       Telemetry--  Sinus rhythm with occasional PVCs  I/O last 3 completed shifts: In: 80 [P.O.:930]  Out: 3100 [Urine:3100]    Impression/Plan      Chronic dyspnea progressively worse over months  Severely hypoxic on presentation with wheezes  Coronary artery disease, prior MI and stent without clear angina presently  Small bump in troponin most likely secondary to severe stress/hypoxia  Morbid obesity  Acute on chronic diastolic heart failure  Essential hypertension  Polycythemia  Hyperkalemia, resolved     He has dramatically improved since admission. Unfortunately his echocardiographic images were suboptimal secondary to his pulmonary disease and body habitus. Nevertheless, there did not appear to be any significant valvular abnormality and left ventricular systolic function appeared to be preserved although portions of the endocardium were not seen. At this point I would conclude that he has diastolic heart failure. It's certainly possible that he's had progression of his coronary artery disease. His blood pressure has been under relatively good control since he's been here. It is reasonable to continue the current dose of Lasix, his creatinine actually has improved. I agree with his current medical regimen. Dr. Snehal Garcia will be seeing him tomorrow. At some point further evaluation of his coronary artery disease would probably be warranted. Stacey Doe. Bethena Favre, M.D.   [facility]

## 2017-11-19 NOTE — PROGRESS NOTES
Pt in new room. Sitting up in chair at this time. Call light within reach. No other needs at this time.

## 2017-11-19 NOTE — PROGRESS NOTES
Pt maintaining saturation above 90% while resting. He does exhibit dyspnea upon exertion, with saturation dropping as low as 87%; but it quickly returns to 90s when resting. Oxygen currently at 3L NC and tolerating well. Will continue to monitor.  Electronically signed by Lon Alpers on 11/19/2017 at 12:51 AM

## 2017-11-20 LAB
ANION GAP SERPL CALCULATED.3IONS-SCNC: 2 MMOL/L (ref 7–19)
BUN BLDV-MCNC: 25 MG/DL (ref 8–23)
CALCIUM SERPL-MCNC: 8.7 MG/DL (ref 8.8–10.2)
CHLORIDE BLD-SCNC: 91 MMOL/L (ref 98–111)
CO2: 49 MMOL/L (ref 22–29)
CREAT SERPL-MCNC: 1.1 MG/DL (ref 0.5–1.2)
EKG P AXIS: 65 DEGREES
EKG P AXIS: 65 DEGREES
EKG P-R INTERVAL: 136 MS
EKG P-R INTERVAL: 142 MS
EKG Q-T INTERVAL: 276 MS
EKG Q-T INTERVAL: 334 MS
EKG QRS DURATION: 92 MS
EKG QRS DURATION: 94 MS
EKG QTC CALCULATION (BAZETT): 384 MS
EKG QTC CALCULATION (BAZETT): 386 MS
EKG T AXIS: 59 DEGREES
EKG T AXIS: 63 DEGREES
GFR NON-AFRICAN AMERICAN: >60
GLUCOSE BLD-MCNC: 102 MG/DL (ref 74–109)
GLUCOSE BLD-MCNC: 125 MG/DL (ref 70–99)
GLUCOSE BLD-MCNC: 244 MG/DL (ref 70–99)
GLUCOSE BLD-MCNC: 246 MG/DL (ref 70–99)
GLUCOSE BLD-MCNC: 334 MG/DL (ref 70–99)
MAGNESIUM: 2.1 MG/DL (ref 1.6–2.4)
PERFORMED ON: ABNORMAL
POTASSIUM SERPL-SCNC: 4.5 MMOL/L (ref 3.5–5)
SODIUM BLD-SCNC: 142 MMOL/L (ref 136–145)

## 2017-11-20 PROCEDURE — 2580000003 HC RX 258: Performed by: INTERNAL MEDICINE

## 2017-11-20 PROCEDURE — 99232 SBSQ HOSP IP/OBS MODERATE 35: CPT | Performed by: INTERNAL MEDICINE

## 2017-11-20 PROCEDURE — 6370000000 HC RX 637 (ALT 250 FOR IP): Performed by: INTERNAL MEDICINE

## 2017-11-20 PROCEDURE — C8929 TTE W OR WO FOL WCON,DOPPLER: HCPCS

## 2017-11-20 PROCEDURE — 94640 AIRWAY INHALATION TREATMENT: CPT

## 2017-11-20 PROCEDURE — 6360000002 HC RX W HCPCS: Performed by: INTERNAL MEDICINE

## 2017-11-20 PROCEDURE — 83735 ASSAY OF MAGNESIUM: CPT

## 2017-11-20 PROCEDURE — 36415 COLL VENOUS BLD VENIPUNCTURE: CPT

## 2017-11-20 PROCEDURE — 2700000000 HC OXYGEN THERAPY PER DAY

## 2017-11-20 PROCEDURE — 80048 BASIC METABOLIC PNL TOTAL CA: CPT

## 2017-11-20 PROCEDURE — 82948 REAGENT STRIP/BLOOD GLUCOSE: CPT

## 2017-11-20 PROCEDURE — 99233 SBSQ HOSP IP/OBS HIGH 50: CPT | Performed by: HOSPITALIST

## 2017-11-20 PROCEDURE — 2140000000 HC CCU INTERMEDIATE R&B

## 2017-11-20 RX ORDER — SIMVASTATIN 10 MG
10 TABLET ORAL NIGHTLY
Status: DISCONTINUED | OUTPATIENT
Start: 2017-11-20 | End: 2017-11-20 | Stop reason: SDUPTHER

## 2017-11-20 RX ORDER — FUROSEMIDE 80 MG
80 TABLET ORAL DAILY
Status: DISCONTINUED | OUTPATIENT
Start: 2017-11-21 | End: 2017-11-21

## 2017-11-20 RX ORDER — SIMVASTATIN 20 MG
10 TABLET ORAL NIGHTLY
Status: DISCONTINUED | OUTPATIENT
Start: 2017-11-20 | End: 2017-11-23 | Stop reason: HOSPADM

## 2017-11-20 RX ADMIN — INSULIN LISPRO 2 UNITS: 100 INJECTION, SOLUTION INTRAVENOUS; SUBCUTANEOUS at 11:42

## 2017-11-20 RX ADMIN — INSULIN LISPRO 7 UNITS: 100 INJECTION, SOLUTION INTRAVENOUS; SUBCUTANEOUS at 08:49

## 2017-11-20 RX ADMIN — Medication 10 ML: at 20:59

## 2017-11-20 RX ADMIN — SIMVASTATIN 10 MG: 20 TABLET, FILM COATED ORAL at 20:59

## 2017-11-20 RX ADMIN — METOPROLOL TARTRATE 25 MG: 25 TABLET ORAL at 20:59

## 2017-11-20 RX ADMIN — INSULIN LISPRO 7 UNITS: 100 INJECTION, SOLUTION INTRAVENOUS; SUBCUTANEOUS at 17:20

## 2017-11-20 RX ADMIN — IPRATROPIUM BROMIDE AND ALBUTEROL SULFATE 1 AMPULE: .5; 3 SOLUTION RESPIRATORY (INHALATION) at 14:26

## 2017-11-20 RX ADMIN — FAMOTIDINE 20 MG: 20 TABLET ORAL at 08:45

## 2017-11-20 RX ADMIN — FUROSEMIDE 40 MG: 10 INJECTION, SOLUTION INTRAMUSCULAR; INTRAVENOUS at 08:45

## 2017-11-20 RX ADMIN — METOPROLOL TARTRATE 25 MG: 25 TABLET ORAL at 08:45

## 2017-11-20 RX ADMIN — INSULIN LISPRO 2 UNITS: 100 INJECTION, SOLUTION INTRAVENOUS; SUBCUTANEOUS at 21:00

## 2017-11-20 RX ADMIN — IPRATROPIUM BROMIDE AND ALBUTEROL SULFATE 1 AMPULE: .5; 3 SOLUTION RESPIRATORY (INHALATION) at 18:29

## 2017-11-20 RX ADMIN — IPRATROPIUM BROMIDE AND ALBUTEROL SULFATE 1 AMPULE: .5; 3 SOLUTION RESPIRATORY (INHALATION) at 11:07

## 2017-11-20 RX ADMIN — ASPIRIN 81 MG: 81 TABLET, COATED ORAL at 08:45

## 2017-11-20 RX ADMIN — ENOXAPARIN SODIUM 40 MG: 40 INJECTION SUBCUTANEOUS at 08:45

## 2017-11-20 RX ADMIN — INSULIN LISPRO 7 UNITS: 100 INJECTION, SOLUTION INTRAVENOUS; SUBCUTANEOUS at 11:48

## 2017-11-20 RX ADMIN — IPRATROPIUM BROMIDE AND ALBUTEROL SULFATE 1 AMPULE: .5; 3 SOLUTION RESPIRATORY (INHALATION) at 06:40

## 2017-11-20 RX ADMIN — INSULIN LISPRO 2 UNITS: 100 INJECTION, SOLUTION INTRAVENOUS; SUBCUTANEOUS at 17:11

## 2017-11-20 RX ADMIN — HYDRALAZINE HYDROCHLORIDE 10 MG: 10 TABLET, FILM COATED ORAL at 14:19

## 2017-11-20 RX ADMIN — INSULIN GLARGINE 20 UNITS: 100 INJECTION, SOLUTION SUBCUTANEOUS at 21:01

## 2017-11-20 RX ADMIN — PREDNISONE 40 MG: 20 TABLET ORAL at 08:45

## 2017-11-20 RX ADMIN — NITROGLYCERIN 0.5 INCH: 20 OINTMENT TOPICAL at 14:19

## 2017-11-20 RX ADMIN — HYDRALAZINE HYDROCHLORIDE 10 MG: 10 TABLET, FILM COATED ORAL at 21:06

## 2017-11-20 RX ADMIN — Medication 10 ML: at 08:49

## 2017-11-20 ASSESSMENT — ENCOUNTER SYMPTOMS
ABDOMINAL PAIN: 0
BLURRED VISION: 0
DOUBLE VISION: 0
HEMOPTYSIS: 0
SPUTUM PRODUCTION: 0
COUGH: 1
BACK PAIN: 1
SORE THROAT: 0
VOMITING: 0
ORTHOPNEA: 1
WHEEZING: 0
HEARTBURN: 0
SHORTNESS OF BREATH: 1
NAUSEA: 0

## 2017-11-20 ASSESSMENT — PAIN SCALES - GENERAL
PAINLEVEL_OUTOF10: 0

## 2017-11-20 NOTE — PROGRESS NOTES
systolic function but endocardial definition   was poor in apical views. Borderline criteria for diastolic dysfunction.   Partial repeat study the next day with contrast shows better endocardial   definition and no clear focal wall motion abnormality. Estimated ejection   fraction of at least 50%.     Right Atrium   Normal size.      Right Ventricle   Normal size.      Pericardial Effusion   No evidence of significant pericardial effusion is noted.      Pleural Effusion   No evidence of pleural effusion.      Miscellaneous   IVC is dilated.      ipratropium-albuterol  1 ampule Inhalation Q4H WA    famotidine  20 mg Oral Daily    hydrALAZINE  10 mg Oral 3 times per day    metoprolol tartrate  25 mg Oral BID    enoxaparin  40 mg Subcutaneous Daily    nitroglycerin  0.5 inch Topical 4 times per day    sodium chloride flush  10 mL Intravenous 2 times per day    insulin glargine  0.15 Units/kg Subcutaneous Nightly    insulin lispro  0.05 Units/kg Subcutaneous TID WC    insulin lispro  0-6 Units Subcutaneous TID WC    insulin lispro  0-3 Units Subcutaneous Nightly    predniSONE  40 mg Oral Daily    furosemide  40 mg Intravenous BID    aspirin  81 mg Oral Daily        dextrose             /64   Pulse 59   Temp 96.3 °F (35.7 °C) (Temporal)   Resp 18   Ht 5' 8\" (1.727 m)   Wt 279 lb 9.6 oz (126.8 kg)   SpO2 93%   BMI 42.51 kg/m²     Intake/Output Summary (Last 24 hours) at 11/20/17 0928  Last data filed at 11/20/17 0806   Gross per 24 hour   Intake             1020 ml   Output             3400 ml   Net            -2380 ml           Labs:   CBC:   Recent Labs      11/18/17   0014  11/19/17   0138   WBC  8.5  12.0*   HGB  17.3  17.3   HCT  59.0*  59.9*   PLT  203  209     BMP: Recent Labs      11/19/17   0138  11/20/17   0149   NA  139  142   K  4.8  4.5   CO2  41*  49*   BUN  24*  25*   CREATININE  1.0  1.1   LABGLOM  >60  >60   GLUCOSE  172*  102     BNP: No results for input(s): BNP in the last 72 hours. PT/INR:   Recent Labs      17   1343   PROTIME  12.4   INR  0.93     APTT:  Recent Labs      17   1343   APTT  27.3     CARDIAC ENZYMES:  Recent Labs      17   1602  17   0014   TROPONINI  0.15*  0.02  0.02     FASTING LIPID PANEL:  Lab Results   Component Value Date    HDL 28 2015    LDLDIRECT 85 2015    LDLCALC 164 2011    TRIG 105 2015     LIVER PROFILE:  Recent Labs      17   1343   AST  23   ALT  25   LABALBU  3.4*       NURSE:  Kelsey Black RN   Stephenie Whitley : 1955, Male, 65 y.o. History: This is a 64-year-old white male with known coronary artery disease admitted short of breath or hypoxic and hypercapnic    Nursing note and diagnostics above reviewed and evaluated    [Allergies/Contraindications:  Crestor [rosuvastatin]]     Todays status: The patient is breathing better. He describes no chest pain to suggest angina    Physical Examination    Respiratory -  the lungs revealed diminished breath sounds . Cardiovascular   the rhythm is regular  Abdominal -  Soft. Bowel sounds present. Nontender. Extremities -  there is mild bilateral lower extremity edema. Assessment/Plan     We will switch him to by mouth Lasix today and do a Lexiscan in the morning with anticipated discharge. We will add a statin and aspirin    Discussed with patient and nursing      Luis Horn M.D.   [facility]

## 2017-11-20 NOTE — PROGRESS NOTES
Hospitalist Progress Note  11/20/2017 3:00 PM  Subjective:   Admit Date: 11/17/2017  PCP: No primary care provider on file. [id number]      Chief Complaint: Still short of breath. Subjective / History of present illness:  Much improved since admission. Continues to have some shortness of breath but is improved. No chest pain. Leg swelling is improved. Still requiring oxygen, drops to low 80s at rest on RA. Hasn't walking in the hallway since admission. Cumulative hospital history:   From H&P:  The patient is a 65 y.o. male who presented complaining of \"mini-headaches\" and b/l shoulder pain. Pt was found to be hypoxic in the ED which was his main reason for admission. Pt states he has been progressively SOB for about 1 year. Worse with minimal exertion. Pt reports he becomes SOB after walking only about 5 feet. He admits to having an occasional cough with production of clear sputum, but has not increased as of late. He is also having increased LE edema. Pt is known to the cardiology team and appears to be fairly noncompliant with his medications. At time of interview, pt reports his SOB has improved. Denies any recent sick contacts. Denies any current CP. Of note, pt has over a 35yr hx of smoking, quit in 2011 and has home O2, but is noncompliant. He denies any recent wheezing. His wife at bedside states he snores and she occasionally hears him \"stop breathing\" in his sleep. Pt also has daytime sleepiness. ROS:  Review of Systems   Constitutional: Negative for chills, fever, malaise/fatigue and weight loss. HENT: Negative for congestion, hearing loss, nosebleeds and sore throat. Eyes: Negative for blurred vision and double vision. Respiratory: Positive for cough and shortness of breath. Negative for hemoptysis, sputum production and wheezing. Cardiovascular: Positive for orthopnea and leg swelling. Negative for chest pain, palpitations and claudication.    Gastrointestinal: Negative for abdominal pain, heartburn, nausea and vomiting. Genitourinary: Negative for dysuria, hematuria and urgency. Musculoskeletal: Positive for back pain. Negative for myalgias and neck pain. Skin: Negative for itching and rash. Neurological: Positive for weakness. Negative for dizziness, sensory change, focal weakness and loss of consciousness. Endo/Heme/Allergies: Does not bruise/bleed easily. Psychiatric/Behavioral: Negative for depression and hallucinations. The patient is not nervous/anxious. 14 point review of systems is negative except as specifically addressed above.     Medications:   dextrose       Current Facility-Administered Medications   Medication Dose Route Frequency Provider Last Rate Last Dose    [START ON 11/21/2017] furosemide (LASIX) tablet 80 mg  80 mg Oral Daily Roosevelt Rosario MD        simvastatin (ZOCOR) tablet 10 mg  10 mg Oral Nightly Roosevelt Rosario MD        ipratropium-albuterol (DUONEB) nebulizer solution 1 ampule  1 ampule Inhalation Q4H WA Swinkbunny Contreras DO   1 ampule at 11/20/17 1426    albuterol (PROVENTIL) nebulizer solution 2.5 mg  2.5 mg Nebulization Q6H PRN Ramakrishnabunny Contreras,         famotidine (PEPCID) tablet 20 mg  20 mg Oral Daily [facility]   20 mg at 11/20/17 0845    hydrALAZINE (APRESOLINE) tablet 10 mg  10 mg Oral 3 times per day Jim Preciado MD   10 mg at 11/20/17 1419    metoprolol tartrate (LOPRESSOR) tablet 25 mg  25 mg Oral BID Jim Preciado MD   25 mg at 11/20/17 0845    enoxaparin (LOVENOX) injection 40 mg  40 mg Subcutaneous Daily Swink Rodri Contreras, DO   40 mg at 11/20/17 0845    nitroGLYCERIN (NITROSTAT) SL tablet 0.4 mg  0.4 mg Sublingual Q5 Min PRN Westley Lyon MD        nitroglycerin (NITRO-BID) 2 % ointment 0.5 inch  0.5 inch Topical 4 times per day Jim Preciado MD   0.5 inch at 11/20/17 1419    sodium chloride flush 0.9 % injection 10 mL  10 mL Intravenous 2 times per day Elen Mckeon, MD   10 mL at 11/20/17 0849    sodium chloride flush 0.9 % injection 10 mL  10 mL Intravenous PRHENRY Cardoza MD        magnesium hydroxide (MILK OF MAGNESIA) 400 MG/5ML suspension 30 mL  30 mL Oral Daily ALVARO Cardoza MD        ondansetron (ZOFRAN) injection 4 mg  4 mg Intravenous Q6H PRHENRY Cardoza MD        glucose (GLUTOSE) 40 % oral gel 15 g  15 g Oral PRHENRY Cardoza MD        dextrose 50 % solution 12.5 g  12.5 g Intravenous PRHENRY Cardoza MD        glucagon (rDNA) injection 1 mg  1 mg Intramuscular PRN Adán Cardoza MD        dextrose 5 % solution  100 mL/hr Intravenous ALVARO Cardoza MD        insulin glargine (LANTUS) injection vial 20 Units  0.15 Units/kg Subcutaneous Nightly Adán Cardoza MD   20 Units at 11/19/17 2027    insulin lispro (HUMALOG) injection vial 7 Units  0.05 Units/kg Subcutaneous TID  Adán Cardoza MD   7 Units at 11/20/17 1148    insulin lispro (HUMALOG) injection vial 0-6 Units  0-6 Units Subcutaneous TID  Adán Cardoza MD   2 Units at 11/20/17 1142    insulin lispro (HUMALOG) injection vial 0-3 Units  0-3 Units Subcutaneous Nightly Adán Cardoza MD   1 Units at 11/18/17 2017    predniSONE (DELTASONE) tablet 40 mg  40 mg Oral Daily Adán Cardoza MD   40 mg at 11/20/17 0845    aspirin EC tablet 81 mg  81 mg Oral Daily Adán Cardoza MD   81 mg at 11/20/17 0845    acetaminophen (TYLENOL) tablet 650 mg  650 mg Oral Q4H PRN Adán Cardoza MD   650 mg at 11/17/17 2241        Objective:   Vitals: /67   Pulse 63   Temp 97.1 °F (36.2 °C) (Temporal)   Resp 18   Ht 5' 8\" (1.727 m)   Wt 279 lb 9.6 oz (126.8 kg)   SpO2 93%   BMI 42.51 kg/m²   24HR INTAKE/OUTPUT:    Intake/Output Summary (Last 24 hours) at 11/20/17 1500  Last data filed at 11/20/17 1242   Gross per 24 hour   Intake             1100 ml   Output             2975 ml   Net            -1875 ml     DIET CARDIAC;   Diet NPO, ALB, BILITOT, ALKPHOS, ALB in the last 72 hours. @LABRCNT (ABG:3)@  HgBA1c:  No components found for: HGBA1C  FLP:  Lab Results   Component Value Date    TRIG 105 09/06/2015    HDL 28 09/06/2015    LDLCALC 164 09/24/2011    LDLDIRECT 85 09/06/2015     TSH:    Lab Results   Component Value Date    TSH 1.83 09/06/2015     Troponin T:   Recent Labs      11/17/17   1602  11/17/17 2015 11/18/17   0014   TROPONINI  0.15*  0.02  0.02     INR: No results for input(s): INR in the last 72 hours. RAD:   CHEST XRAY  Impression:        A limited diagnostic study due to patient's body habitus  and a soft exposure. No active infiltrate or pelvic congestion. Scarring and discoid atelectasis in the lower lung bilaterally. The above findings are recorded on a digital voice clip in PACS. Signed by Dr Yamilet Solano on 11/17/2017 2:05 PM       EKG: sinus on telemetry    Echo:   Summary   Technically difficult examination. Parasternal images are fair but apicals   are very poor in quality.   LV chamber size and function appears to be within normal limits. No focal   wall motion abnormality appreciated.   No clear evidence of significant flow abnormality.   Unable to estimate pulmonary pressure.   This study was amended because the patient was brought back the next day   and contrast was used to help with endocardial definition. Micro: none    Impression / Plan:       Principal Problem:    Acute respiratory failure with hypoxia and hypercapnia--improved. He previously had oxygen but stopped using it on his own. Will screen for home oxygen need tomorrow, anticipate he will need it. Active Problems:    CAD--on aspirin / statin / bblocker    Hypertension--controlled    Type 2 diabetes mellitus--controlled with current regimen    Atypical chest pain--for lexiscan tomorrow    Elevated troponin--see above    Acute on chronic congestive heart failure--to po lasix today    Acute hyperkalemia--resolved    Ambulate in hallway.

## 2017-11-20 NOTE — CARE COORDINATION
Turned O2 off as a trial as patient reports not using O2 at home. On recheck sat is 83%. Oxygen has been reapplied at 3L and now reading 91%.   A request for home O2 eval will be made with the hospitalist.

## 2017-11-20 NOTE — CARE COORDINATION
Pt resides at home with his spouse and intends to dc to the same location. Pt reports already having home 02 equipment provided through [address] for nighttime use. Pt currently requires 02 at 3L and SW confirmed home 02 services provided to pt by [facility]. [facility]  ([address]: [phone]  FA: [phone]) requested referral for pt records and SW faxed, but still requires a physician's order for home 02 needs. Pt reports having all necessary DME in his home if needed, but denies use/need for DME at this time. [facility]. SW will continue to follow and assist with dc needs as necessary.

## 2017-11-20 NOTE — CARE COORDINATION
Patient walked in hallway from room 717 to room 709 without c/o shortness of breath or dizziness. He was walked with 3L of NC O2.

## 2017-11-21 ENCOUNTER — APPOINTMENT (OUTPATIENT)
Dept: NUCLEAR MEDICINE | Age: 62
DRG: 291 | End: 2017-11-21
Payer: COMMERCIAL

## 2017-11-21 PROBLEM — I50.43 ACUTE ON CHRONIC COMBINED SYSTOLIC AND DIASTOLIC CONGESTIVE HEART FAILURE (HCC): Status: ACTIVE | Noted: 2017-11-21

## 2017-11-21 LAB
ANION GAP SERPL CALCULATED.3IONS-SCNC: 1 MMOL/L (ref 7–19)
BASOPHILS ABSOLUTE: 0 K/UL (ref 0–0.2)
BASOPHILS RELATIVE PERCENT: 0.3 % (ref 0–1)
BUN BLDV-MCNC: 22 MG/DL (ref 8–23)
CALCIUM SERPL-MCNC: 9.2 MG/DL (ref 8.8–10.2)
CHLORIDE BLD-SCNC: 92 MMOL/L (ref 98–111)
CO2: 46 MMOL/L (ref 22–29)
CREAT SERPL-MCNC: 1 MG/DL (ref 0.5–1.2)
EOSINOPHILS ABSOLUTE: 0 K/UL (ref 0–0.6)
EOSINOPHILS RELATIVE PERCENT: 0.3 % (ref 0–5)
GFR NON-AFRICAN AMERICAN: >60
GLUCOSE BLD-MCNC: 111 MG/DL (ref 70–99)
GLUCOSE BLD-MCNC: 112 MG/DL (ref 74–109)
GLUCOSE BLD-MCNC: 123 MG/DL (ref 70–99)
GLUCOSE BLD-MCNC: 196 MG/DL (ref 70–99)
GLUCOSE BLD-MCNC: 357 MG/DL (ref 70–99)
HCT VFR BLD CALC: 57 % (ref 42–52)
HEMOGLOBIN: 17.1 G/DL (ref 14–18)
LYMPHOCYTES ABSOLUTE: 1.5 K/UL (ref 1.1–4.5)
LYMPHOCYTES RELATIVE PERCENT: 13.4 % (ref 20–40)
MAGNESIUM: 2.4 MG/DL (ref 1.6–2.4)
MCH RBC QN AUTO: 26.1 PG (ref 27–31)
MCHC RBC AUTO-ENTMCNC: 30 G/DL (ref 33–37)
MCV RBC AUTO: 87 FL (ref 80–94)
MONOCYTES ABSOLUTE: 1.1 K/UL (ref 0–0.9)
MONOCYTES RELATIVE PERCENT: 9.7 % (ref 0–10)
NEUTROPHILS ABSOLUTE: 8.7 K/UL (ref 1.5–7.5)
NEUTROPHILS RELATIVE PERCENT: 75.5 % (ref 50–65)
PDW BLD-RTO: 17.5 % (ref 11.5–14.5)
PERFORMED ON: ABNORMAL
PLATELET # BLD: 184 K/UL (ref 130–400)
PMV BLD AUTO: 10.2 FL (ref 9.4–12.4)
POTASSIUM SERPL-SCNC: 4.6 MMOL/L (ref 3.5–5)
RBC # BLD: 6.55 M/UL (ref 4.7–6.1)
SODIUM BLD-SCNC: 139 MMOL/L (ref 136–145)
WBC # BLD: 11.5 K/UL (ref 4.8–10.8)

## 2017-11-21 PROCEDURE — 3430000000 HC RX DIAGNOSTIC RADIOPHARMACEUTICAL: Performed by: INTERNAL MEDICINE

## 2017-11-21 PROCEDURE — 94640 AIRWAY INHALATION TREATMENT: CPT

## 2017-11-21 PROCEDURE — 93017 CV STRESS TEST TRACING ONLY: CPT

## 2017-11-21 PROCEDURE — 6370000000 HC RX 637 (ALT 250 FOR IP): Performed by: INTERNAL MEDICINE

## 2017-11-21 PROCEDURE — 2140000000 HC CCU INTERMEDIATE R&B

## 2017-11-21 PROCEDURE — 83735 ASSAY OF MAGNESIUM: CPT

## 2017-11-21 PROCEDURE — 94761 N-INVAS EAR/PLS OXIMETRY MLT: CPT

## 2017-11-21 PROCEDURE — 36415 COLL VENOUS BLD VENIPUNCTURE: CPT

## 2017-11-21 PROCEDURE — 99232 SBSQ HOSP IP/OBS MODERATE 35: CPT | Performed by: INTERNAL MEDICINE

## 2017-11-21 PROCEDURE — 80048 BASIC METABOLIC PNL TOTAL CA: CPT

## 2017-11-21 PROCEDURE — 6360000002 HC RX W HCPCS: Performed by: INTERNAL MEDICINE

## 2017-11-21 PROCEDURE — A9500 TC99M SESTAMIBI: HCPCS | Performed by: INTERNAL MEDICINE

## 2017-11-21 PROCEDURE — 82948 REAGENT STRIP/BLOOD GLUCOSE: CPT

## 2017-11-21 PROCEDURE — 78452 HT MUSCLE IMAGE SPECT MULT: CPT

## 2017-11-21 PROCEDURE — 85025 COMPLETE CBC W/AUTO DIFF WBC: CPT

## 2017-11-21 PROCEDURE — 2700000000 HC OXYGEN THERAPY PER DAY

## 2017-11-21 PROCEDURE — 2580000003 HC RX 258: Performed by: INTERNAL MEDICINE

## 2017-11-21 RX ORDER — FUROSEMIDE 40 MG/1
40 TABLET ORAL DAILY
Status: DISCONTINUED | OUTPATIENT
Start: 2017-11-22 | End: 2017-11-23 | Stop reason: HOSPADM

## 2017-11-21 RX ADMIN — INSULIN LISPRO 5 UNITS: 100 INJECTION, SOLUTION INTRAVENOUS; SUBCUTANEOUS at 17:49

## 2017-11-21 RX ADMIN — TETRAKIS(2-METHOXYISOBUTYLISOCYANIDE)COPPER(I) TETRAFLUOROBORATE 30 MILLICURIE: 1 INJECTION, POWDER, LYOPHILIZED, FOR SOLUTION INTRAVENOUS at 10:13

## 2017-11-21 RX ADMIN — HYDRALAZINE HYDROCHLORIDE 10 MG: 10 TABLET, FILM COATED ORAL at 15:18

## 2017-11-21 RX ADMIN — IPRATROPIUM BROMIDE AND ALBUTEROL SULFATE 1 AMPULE: .5; 3 SOLUTION RESPIRATORY (INHALATION) at 15:16

## 2017-11-21 RX ADMIN — TETRAKIS(2-METHOXYISOBUTYLISOCYANIDE)COPPER(I) TETRAFLUOROBORATE 10 MILLICURIE: 1 INJECTION, POWDER, LYOPHILIZED, FOR SOLUTION INTRAVENOUS at 10:13

## 2017-11-21 RX ADMIN — ASPIRIN 81 MG: 81 TABLET, COATED ORAL at 10:48

## 2017-11-21 RX ADMIN — HYDRALAZINE HYDROCHLORIDE 10 MG: 10 TABLET, FILM COATED ORAL at 21:19

## 2017-11-21 RX ADMIN — FUROSEMIDE 80 MG: 80 TABLET ORAL at 10:48

## 2017-11-21 RX ADMIN — Medication 10 ML: at 21:20

## 2017-11-21 RX ADMIN — REGADENOSON 0.4 MG: 0.08 INJECTION, SOLUTION INTRAVENOUS at 10:13

## 2017-11-21 RX ADMIN — HYDRALAZINE HYDROCHLORIDE 10 MG: 10 TABLET, FILM COATED ORAL at 06:41

## 2017-11-21 RX ADMIN — PREDNISONE 40 MG: 20 TABLET ORAL at 10:47

## 2017-11-21 RX ADMIN — METOPROLOL TARTRATE 25 MG: 25 TABLET ORAL at 21:19

## 2017-11-21 RX ADMIN — SIMVASTATIN 10 MG: 20 TABLET, FILM COATED ORAL at 21:19

## 2017-11-21 RX ADMIN — IPRATROPIUM BROMIDE AND ALBUTEROL SULFATE 1 AMPULE: .5; 3 SOLUTION RESPIRATORY (INHALATION) at 06:59

## 2017-11-21 RX ADMIN — Medication 10 ML: at 11:10

## 2017-11-21 RX ADMIN — INSULIN LISPRO 7 UNITS: 100 INJECTION, SOLUTION INTRAVENOUS; SUBCUTANEOUS at 17:48

## 2017-11-21 RX ADMIN — METOPROLOL TARTRATE 25 MG: 25 TABLET ORAL at 10:48

## 2017-11-21 RX ADMIN — IPRATROPIUM BROMIDE AND ALBUTEROL SULFATE 1 AMPULE: .5; 3 SOLUTION RESPIRATORY (INHALATION) at 19:30

## 2017-11-21 RX ADMIN — INSULIN GLARGINE 20 UNITS: 100 INJECTION, SOLUTION SUBCUTANEOUS at 21:20

## 2017-11-21 RX ADMIN — FAMOTIDINE 20 MG: 20 TABLET ORAL at 10:48

## 2017-11-21 RX ADMIN — IPRATROPIUM BROMIDE AND ALBUTEROL SULFATE 1 AMPULE: .5; 3 SOLUTION RESPIRATORY (INHALATION) at 10:52

## 2017-11-21 ASSESSMENT — PAIN SCALES - GENERAL
PAINLEVEL_OUTOF10: 0

## 2017-11-21 NOTE — CARE COORDINATION
Spoke with Jacquelin Schwartz at [address] who reports no longer in network with pt's insurance provider and cannot continue with home 02 needs unless pt private pays. Pt was agreeable to changing home 02 providers to [facility] and SW made referral, but still has no physician's order. SW has informed PCU charge RN Frances Jerome and added request to physician's sticky notes for dc needs. Will continue to follow and assist as necessary.      Home 02 referral to [facility] Ph: [phone]  Fa: [phone]

## 2017-11-21 NOTE — PLAN OF CARE
Problem: Risk for Impaired Skin Integrity  Goal: Tissue integrity - skin and mucous membranes  Structural intactness and normal physiological function of skin and  mucous membranes.    Outcome: Ongoing      Problem: Falls - Risk of  Goal: Absence of falls  Outcome: Ongoing      Problem: Breathing Pattern - Ineffective:  Goal: Ability to achieve and maintain a regular respiratory rate will improve  Ability to achieve and maintain a regular respiratory rate will improve   Outcome: Ongoing

## 2017-11-21 NOTE — PROGRESS NOTES
[facility]  Progress Note                            Date:  11/21/2017  Patient: Perico Grace  Admission:  11/17/2017  1:31 PM  Admit DX: Acute respiratory failure with hypoxia and hypercapnia (HCC) [J96.01, J96.02]  Age:  65 y.o., 1955     LOS: 4 days     Reason for evaluation:   Elevated troponin  Admitted with hypoxia,shortness of breath,edema, joint pain  History of copd and CAD      SUBJECTIVE:    The patient was seen and examined. Notes and labs reviewed. There were not complications over night. Patient's cardiac review of systems: regular rate and rhythm. The patient is  breathing better. No chest pain        OBJECTIVE:    Telemetry: Sinus  Breath sounds clear  No edema      veronique pending      Conclusions      Summary   Technically difficult examination. Parasternal images are fair but apicals   are very poor in quality.   LV chamber size and function appears to be within normal limits. No focal   wall motion abnormality appreciated.   No clear evidence of significant flow abnormality.   Unable to estimate pulmonary pressure.   This study was amended because the patient was brought back the next day   and contrast was used to help with endocardial definition.      Signature      ----------------------------------------------------------------   Electronically signed by Gustavo Sarkar MD(Interpreting   physician) on 11/19/2017 10:33 AM   ----------------------------------------------------------------      Findings      Mitral Valve   Leaflets are pliable without significant regurgitation.      Aortic Valve   Partially visualized. No clear evidence of stenosis or significant   regurgitation.      Tricuspid Valve   Not well seen.      Pulmonic Valve   Not well seen.      Left Atrium   Normal size.      Left Ventricle   Normal size, grossly normal systolic function but endocardial definition   was poor in apical views.  Borderline criteria for diastolic dysfunction.   Partial repeat study the next day with contrast shows better endocardial   definition and no clear focal wall motion abnormality. Estimated ejection   fraction of at least 50%.     Right Atrium   Normal size.      Right Ventricle   Normal size.      Pericardial Effusion   No evidence of significant pericardial effusion is noted.      Pleural Effusion   No evidence of pleural effusion.      Miscellaneous   IVC is dilated.        furosemide  80 mg Oral Daily    simvastatin  10 mg Oral Nightly    ipratropium-albuterol  1 ampule Inhalation Q4H WA    famotidine  20 mg Oral Daily    hydrALAZINE  10 mg Oral 3 times per day    metoprolol tartrate  25 mg Oral BID    enoxaparin  40 mg Subcutaneous Daily    sodium chloride flush  10 mL Intravenous 2 times per day    insulin glargine  0.15 Units/kg Subcutaneous Nightly    insulin lispro  0.05 Units/kg Subcutaneous TID WC    insulin lispro  0-6 Units Subcutaneous TID WC    insulin lispro  0-3 Units Subcutaneous Nightly    predniSONE  40 mg Oral Daily    aspirin  81 mg Oral Daily        dextrose             /61   Pulse 57   Temp 96.7 °F (35.9 °C) (Temporal)   Resp 18   Ht 5' 8\" (1.727 m)   Wt 278 lb 1.6 oz (126.1 kg)   SpO2 95%   BMI 42.28 kg/m²     Intake/Output Summary (Last 24 hours) at 11/21/17 0914  Last data filed at 11/21/17 0408   Gross per 24 hour   Intake             1775 ml   Output             2700 ml   Net             -925 ml           Labs:   CBC:   Recent Labs      11/19/17   0138  11/21/17   0142   WBC  12.0*  11.5*   HGB  17.3  17.1   HCT  59.9*  57.0*   PLT  209  184     BMP: Recent Labs      11/20/17   0149 11/21/17   0142   NA  142  139   K  4.5  4.6   CO2  49*  46*   BUN  25*  22   CREATININE  1.1  1.0   LABGLOM  >60  >60   GLUCOSE  102  112*     BNP: No results for input(s): BNP in the last 72 hours. PT/INR: No results for input(s): PROTIME, INR in the last 72 hours. APTT:No results for input(s):  APTT in the

## 2017-11-21 NOTE — CARE COORDINATION
Spoke with Benito Castillo at [facility] who states pt has no DME option for coverage through his insurance and services would be private pay. According to Benito Castillo, she spoke with pt and instructed him to resume services as private pay with [facility] as provider (since equipment in the home is provided through [facility] previously) and would be less costs than beginning new services with [facility]. Pt was noncompliant with home 02 prior to dc.

## 2017-11-22 PROBLEM — E66.2 OBESITY HYPOVENTILATION SYNDROME (HCC): Status: ACTIVE | Noted: 2017-11-22

## 2017-11-22 LAB
ANION GAP SERPL CALCULATED.3IONS-SCNC: 5 MMOL/L (ref 7–19)
BASE EXCESS ARTERIAL: 14.1 MMOL/L (ref -2–2)
BASOPHILS ABSOLUTE: 0.1 K/UL (ref 0–0.2)
BASOPHILS RELATIVE PERCENT: 0.4 % (ref 0–1)
BUN BLDV-MCNC: 24 MG/DL (ref 8–23)
CALCIUM SERPL-MCNC: 8.6 MG/DL (ref 8.8–10.2)
CARBOXYHEMOGLOBIN ARTERIAL: 3.2 % (ref 0–5)
CHLORIDE BLD-SCNC: 89 MMOL/L (ref 98–111)
CO2: 42 MMOL/L (ref 22–29)
CREAT SERPL-MCNC: 1 MG/DL (ref 0.5–1.2)
EOSINOPHILS ABSOLUTE: 0.1 K/UL (ref 0–0.6)
EOSINOPHILS RELATIVE PERCENT: 0.8 % (ref 0–5)
GFR NON-AFRICAN AMERICAN: >60
GLUCOSE BLD-MCNC: 115 MG/DL (ref 74–109)
GLUCOSE BLD-MCNC: 120 MG/DL (ref 70–99)
GLUCOSE BLD-MCNC: 138 MG/DL (ref 70–99)
GLUCOSE BLD-MCNC: 194 MG/DL (ref 70–99)
GLUCOSE BLD-MCNC: 202 MG/DL (ref 70–99)
HCO3 ARTERIAL: 44.4 MMOL/L (ref 22–26)
HCT VFR BLD CALC: 57.5 % (ref 42–52)
HEMOGLOBIN, ART, EXTENDED: 18.3 G/DL (ref 14–18)
HEMOGLOBIN: 17.6 G/DL (ref 14–18)
LYMPHOCYTES ABSOLUTE: 1.7 K/UL (ref 1.1–4.5)
LYMPHOCYTES RELATIVE PERCENT: 13.8 % (ref 20–40)
MAGNESIUM: 2.5 MG/DL (ref 1.6–2.4)
MCH RBC QN AUTO: 25.9 PG (ref 27–31)
MCHC RBC AUTO-ENTMCNC: 30.6 G/DL (ref 33–37)
MCV RBC AUTO: 84.7 FL (ref 80–94)
METHEMOGLOBIN ARTERIAL: 1.3 %
MONOCYTES ABSOLUTE: 1.1 K/UL (ref 0–0.9)
MONOCYTES RELATIVE PERCENT: 9.2 % (ref 0–10)
NEUTROPHILS ABSOLUTE: 9.1 K/UL (ref 1.5–7.5)
NEUTROPHILS RELATIVE PERCENT: 75.2 % (ref 50–65)
O2 CONTENT ARTERIAL: 23.7 ML/DL
O2 SAT, ARTERIAL: 92.2 %
O2 THERAPY: ABNORMAL
PCO2 ARTERIAL: 75 MMHG (ref 35–45)
PDW BLD-RTO: 17.4 % (ref 11.5–14.5)
PERFORMED ON: ABNORMAL
PH ARTERIAL: 7.38 (ref 7.35–7.45)
PLATELET # BLD: 191 K/UL (ref 130–400)
PMV BLD AUTO: 10.3 FL (ref 9.4–12.4)
PO2 ARTERIAL: 70 MMHG (ref 80–100)
POTASSIUM SERPL-SCNC: 4.5 MMOL/L (ref 3.5–5)
POTASSIUM, WHOLE BLOOD: 4.4
RBC # BLD: 6.79 M/UL (ref 4.7–6.1)
SODIUM BLD-SCNC: 136 MMOL/L (ref 136–145)
WBC # BLD: 12 K/UL (ref 4.8–10.8)

## 2017-11-22 PROCEDURE — 6370000000 HC RX 637 (ALT 250 FOR IP): Performed by: INTERNAL MEDICINE

## 2017-11-22 PROCEDURE — 99231 SBSQ HOSP IP/OBS SF/LOW 25: CPT | Performed by: INTERNAL MEDICINE

## 2017-11-22 PROCEDURE — 82803 BLOOD GASES ANY COMBINATION: CPT

## 2017-11-22 PROCEDURE — 83735 ASSAY OF MAGNESIUM: CPT

## 2017-11-22 PROCEDURE — 94660 CPAP INITIATION&MGMT: CPT

## 2017-11-22 PROCEDURE — 36600 WITHDRAWAL OF ARTERIAL BLOOD: CPT

## 2017-11-22 PROCEDURE — 82948 REAGENT STRIP/BLOOD GLUCOSE: CPT

## 2017-11-22 PROCEDURE — 84132 ASSAY OF SERUM POTASSIUM: CPT

## 2017-11-22 PROCEDURE — 2580000003 HC RX 258: Performed by: INTERNAL MEDICINE

## 2017-11-22 PROCEDURE — 6360000002 HC RX W HCPCS: Performed by: INTERNAL MEDICINE

## 2017-11-22 PROCEDURE — 2140000000 HC CCU INTERMEDIATE R&B

## 2017-11-22 PROCEDURE — 85025 COMPLETE CBC W/AUTO DIFF WBC: CPT

## 2017-11-22 PROCEDURE — 36415 COLL VENOUS BLD VENIPUNCTURE: CPT

## 2017-11-22 PROCEDURE — 80048 BASIC METABOLIC PNL TOTAL CA: CPT

## 2017-11-22 PROCEDURE — 2700000000 HC OXYGEN THERAPY PER DAY

## 2017-11-22 PROCEDURE — 94640 AIRWAY INHALATION TREATMENT: CPT

## 2017-11-22 RX ADMIN — ENOXAPARIN SODIUM 40 MG: 40 INJECTION SUBCUTANEOUS at 08:19

## 2017-11-22 RX ADMIN — HYDRALAZINE HYDROCHLORIDE 10 MG: 10 TABLET, FILM COATED ORAL at 05:58

## 2017-11-22 RX ADMIN — IPRATROPIUM BROMIDE AND ALBUTEROL SULFATE 1 AMPULE: .5; 3 SOLUTION RESPIRATORY (INHALATION) at 11:25

## 2017-11-22 RX ADMIN — INSULIN LISPRO 1 UNITS: 100 INJECTION, SOLUTION INTRAVENOUS; SUBCUTANEOUS at 13:10

## 2017-11-22 RX ADMIN — IPRATROPIUM BROMIDE AND ALBUTEROL SULFATE 1 AMPULE: .5; 3 SOLUTION RESPIRATORY (INHALATION) at 14:45

## 2017-11-22 RX ADMIN — HYDRALAZINE HYDROCHLORIDE 10 MG: 10 TABLET, FILM COATED ORAL at 13:11

## 2017-11-22 RX ADMIN — FAMOTIDINE 20 MG: 20 TABLET ORAL at 08:19

## 2017-11-22 RX ADMIN — METOPROLOL TARTRATE 25 MG: 25 TABLET ORAL at 20:13

## 2017-11-22 RX ADMIN — INSULIN LISPRO 7 UNITS: 100 INJECTION, SOLUTION INTRAVENOUS; SUBCUTANEOUS at 17:25

## 2017-11-22 RX ADMIN — INSULIN LISPRO 7 UNITS: 100 INJECTION, SOLUTION INTRAVENOUS; SUBCUTANEOUS at 08:20

## 2017-11-22 RX ADMIN — FUROSEMIDE 40 MG: 40 TABLET ORAL at 08:19

## 2017-11-22 RX ADMIN — Medication 10 ML: at 20:16

## 2017-11-22 RX ADMIN — INSULIN LISPRO 7 UNITS: 100 INJECTION, SOLUTION INTRAVENOUS; SUBCUTANEOUS at 13:05

## 2017-11-22 RX ADMIN — INSULIN GLARGINE 20 UNITS: 100 INJECTION, SOLUTION SUBCUTANEOUS at 20:14

## 2017-11-22 RX ADMIN — METOPROLOL TARTRATE 25 MG: 25 TABLET ORAL at 08:19

## 2017-11-22 RX ADMIN — ASPIRIN 81 MG: 81 TABLET, COATED ORAL at 08:19

## 2017-11-22 RX ADMIN — IPRATROPIUM BROMIDE AND ALBUTEROL SULFATE 1 AMPULE: .5; 3 SOLUTION RESPIRATORY (INHALATION) at 18:47

## 2017-11-22 RX ADMIN — HYDRALAZINE HYDROCHLORIDE 10 MG: 10 TABLET, FILM COATED ORAL at 20:14

## 2017-11-22 RX ADMIN — Medication 10 ML: at 08:19

## 2017-11-22 RX ADMIN — IPRATROPIUM BROMIDE AND ALBUTEROL SULFATE 1 AMPULE: .5; 3 SOLUTION RESPIRATORY (INHALATION) at 07:09

## 2017-11-22 RX ADMIN — SIMVASTATIN 10 MG: 20 TABLET, FILM COATED ORAL at 20:13

## 2017-11-22 ASSESSMENT — PAIN SCALES - GENERAL: PAINLEVEL_OUTOF10: 0

## 2017-11-22 NOTE — CARE COORDINATION
PT has Home O2 from [facility] but needs a portable. Will send when order is placed.  Electronically signed by Andrew Henderson on 11/22/2017 at 1:34 PM

## 2017-11-22 NOTE — PROGRESS NOTES
Nutrition Assessment    Type and Reason for Visit: Initial    Malnutrition Assessment:  · Malnutrition Status: No malnutrition    Nutrition Assessment:  · Subjective Assessment: Pt is on a Cardiac, CHO 4 diet at this time and tolerates without difficulty. Appetite/PO intake is good. Pt consumes % of meals. No nutritional recommendations/interventions are warranted at this time. Nutrition Risk Level   Risk Level: Low    Nutrition Intervention  Food and/or Delivery: Continue current diet  Nutrition Education/Counseling/Coordination of Care:  Continued Inpatient Monitoring    Patient assessed for nutrition risk. Deemed to be at low risk at this time. Will continue to follow patient.       Electronically signed by Yolanda Nelson RD, AUDREY on 11/22/17 at 2:12 PM    Contact Number: [phone]

## 2017-11-23 VITALS
BODY MASS INDEX: 42.37 KG/M2 | WEIGHT: 279.6 LBS | DIASTOLIC BLOOD PRESSURE: 70 MMHG | TEMPERATURE: 97.4 F | SYSTOLIC BLOOD PRESSURE: 115 MMHG | HEART RATE: 62 BPM | HEIGHT: 68 IN | OXYGEN SATURATION: 92 % | RESPIRATION RATE: 14 BRPM

## 2017-11-23 PROBLEM — I50.33 ACUTE ON CHRONIC DIASTOLIC CONGESTIVE HEART FAILURE (HCC): Status: ACTIVE | Noted: 2017-11-21

## 2017-11-23 LAB
ANION GAP SERPL CALCULATED.3IONS-SCNC: 2 MMOL/L (ref 7–19)
BASOPHILS ABSOLUTE: 0.1 K/UL (ref 0–0.2)
BASOPHILS RELATIVE PERCENT: 0.5 % (ref 0–1)
BUN BLDV-MCNC: 23 MG/DL (ref 8–23)
CALCIUM SERPL-MCNC: 8.8 MG/DL (ref 8.8–10.2)
CHLORIDE BLD-SCNC: 91 MMOL/L (ref 98–111)
CO2: 44 MMOL/L (ref 22–29)
CREAT SERPL-MCNC: 1 MG/DL (ref 0.5–1.2)
EOSINOPHILS ABSOLUTE: 0.3 K/UL (ref 0–0.6)
EOSINOPHILS RELATIVE PERCENT: 2.6 % (ref 0–5)
GFR NON-AFRICAN AMERICAN: >60
GLUCOSE BLD-MCNC: 119 MG/DL (ref 74–109)
GLUCOSE BLD-MCNC: 124 MG/DL (ref 70–99)
GLUCOSE BLD-MCNC: 165 MG/DL (ref 70–99)
HCT VFR BLD CALC: 57.5 % (ref 42–52)
HEMOGLOBIN: 17.4 G/DL (ref 14–18)
LYMPHOCYTES ABSOLUTE: 2 K/UL (ref 1.1–4.5)
LYMPHOCYTES RELATIVE PERCENT: 19.9 % (ref 20–40)
MAGNESIUM: 2.2 MG/DL (ref 1.6–2.4)
MCH RBC QN AUTO: 26 PG (ref 27–31)
MCHC RBC AUTO-ENTMCNC: 30.3 G/DL (ref 33–37)
MCV RBC AUTO: 86.1 FL (ref 80–94)
MONOCYTES ABSOLUTE: 1 K/UL (ref 0–0.9)
MONOCYTES RELATIVE PERCENT: 9.8 % (ref 0–10)
NEUTROPHILS ABSOLUTE: 6.7 K/UL (ref 1.5–7.5)
NEUTROPHILS RELATIVE PERCENT: 66.8 % (ref 50–65)
PDW BLD-RTO: 17.8 % (ref 11.5–14.5)
PERFORMED ON: ABNORMAL
PERFORMED ON: ABNORMAL
PLATELET # BLD: 173 K/UL (ref 130–400)
PMV BLD AUTO: 10.6 FL (ref 9.4–12.4)
POTASSIUM SERPL-SCNC: 4.3 MMOL/L (ref 3.5–5)
RBC # BLD: 6.68 M/UL (ref 4.7–6.1)
SODIUM BLD-SCNC: 137 MMOL/L (ref 136–145)
WBC # BLD: 10 K/UL (ref 4.8–10.8)

## 2017-11-23 PROCEDURE — 6370000000 HC RX 637 (ALT 250 FOR IP): Performed by: INTERNAL MEDICINE

## 2017-11-23 PROCEDURE — 85025 COMPLETE CBC W/AUTO DIFF WBC: CPT

## 2017-11-23 PROCEDURE — 99999 PR OFFICE/OUTPT VISIT,PROCEDURE ONLY: CPT | Performed by: INTERNAL MEDICINE

## 2017-11-23 PROCEDURE — 2580000003 HC RX 258: Performed by: INTERNAL MEDICINE

## 2017-11-23 PROCEDURE — 82948 REAGENT STRIP/BLOOD GLUCOSE: CPT

## 2017-11-23 PROCEDURE — 99239 HOSP IP/OBS DSCHRG MGMT >30: CPT | Performed by: INTERNAL MEDICINE

## 2017-11-23 PROCEDURE — 36415 COLL VENOUS BLD VENIPUNCTURE: CPT

## 2017-11-23 PROCEDURE — 83735 ASSAY OF MAGNESIUM: CPT

## 2017-11-23 PROCEDURE — 94640 AIRWAY INHALATION TREATMENT: CPT

## 2017-11-23 PROCEDURE — 80048 BASIC METABOLIC PNL TOTAL CA: CPT

## 2017-11-23 PROCEDURE — 94761 N-INVAS EAR/PLS OXIMETRY MLT: CPT

## 2017-11-23 PROCEDURE — 6360000002 HC RX W HCPCS: Performed by: INTERNAL MEDICINE

## 2017-11-23 RX ORDER — FUROSEMIDE 40 MG/1
40 TABLET ORAL DAILY
Qty: 30 TABLET | Refills: 0 | Status: SHIPPED | OUTPATIENT
Start: 2017-11-24

## 2017-11-23 RX ORDER — HYDRALAZINE HYDROCHLORIDE 10 MG/1
10 TABLET, FILM COATED ORAL EVERY 8 HOURS SCHEDULED
Qty: 90 TABLET | Refills: 0 | Status: SHIPPED | OUTPATIENT
Start: 2017-11-23

## 2017-11-23 RX ORDER — ASPIRIN 81 MG/1
81 TABLET ORAL DAILY
Qty: 30 TABLET | Refills: 0 | Status: ON HOLD | OUTPATIENT
Start: 2017-11-24 | End: 2018-02-01 | Stop reason: SDUPTHER

## 2017-11-23 RX ORDER — NITROGLYCERIN 0.4 MG/1
TABLET SUBLINGUAL
Qty: 25 TABLET | Refills: 0 | Status: SHIPPED | OUTPATIENT
Start: 2017-11-23 | End: 2020-01-01

## 2017-11-23 RX ORDER — SIMVASTATIN 10 MG
10 TABLET ORAL NIGHTLY
Qty: 30 TABLET | Refills: 0 | Status: SHIPPED | OUTPATIENT
Start: 2017-11-23

## 2017-11-23 RX ORDER — RANITIDINE 150 MG/1
150 TABLET ORAL 2 TIMES DAILY
Qty: 60 TABLET | Refills: 0 | Status: SHIPPED | OUTPATIENT
Start: 2017-11-23 | End: 2020-01-01

## 2017-11-23 RX ADMIN — FUROSEMIDE 40 MG: 40 TABLET ORAL at 08:31

## 2017-11-23 RX ADMIN — Medication 10 ML: at 08:31

## 2017-11-23 RX ADMIN — INSULIN LISPRO 7 UNITS: 100 INJECTION, SOLUTION INTRAVENOUS; SUBCUTANEOUS at 12:37

## 2017-11-23 RX ADMIN — METOPROLOL TARTRATE 25 MG: 25 TABLET ORAL at 08:31

## 2017-11-23 RX ADMIN — ASPIRIN 81 MG: 81 TABLET, COATED ORAL at 08:31

## 2017-11-23 RX ADMIN — HYDRALAZINE HYDROCHLORIDE 10 MG: 10 TABLET, FILM COATED ORAL at 06:16

## 2017-11-23 RX ADMIN — FAMOTIDINE 20 MG: 20 TABLET ORAL at 08:31

## 2017-11-23 RX ADMIN — IPRATROPIUM BROMIDE AND ALBUTEROL SULFATE 1 AMPULE: .5; 3 SOLUTION RESPIRATORY (INHALATION) at 10:41

## 2017-11-23 RX ADMIN — ENOXAPARIN SODIUM 40 MG: 40 INJECTION SUBCUTANEOUS at 08:31

## 2017-11-23 RX ADMIN — IPRATROPIUM BROMIDE AND ALBUTEROL SULFATE 1 AMPULE: .5; 3 SOLUTION RESPIRATORY (INHALATION) at 07:35

## 2017-11-23 RX ADMIN — INSULIN LISPRO 1 UNITS: 100 INJECTION, SOLUTION INTRAVENOUS; SUBCUTANEOUS at 12:37

## 2017-11-23 ASSESSMENT — PAIN SCALES - GENERAL: PAINLEVEL_OUTOF10: 0

## 2017-11-23 NOTE — DISCHARGE INSTR - DIET

## 2017-11-23 NOTE — DISCHARGE SUMMARY
doses of   Humalog insulin. Because of his history of ASCHD and past AMI, cardiac cath, PTCA and stent placement, he was seen in consultation by the cardiology service who carried out a Lexiscan to assess him for underlying critical myocardial ischemia. The Dimitry Cyphers was negative. His admission laboratory demonstrated a normal CMP with the exception of an    elevated blood glucose and a CBC, which demonstrated erythrocytosis, with a Hgb of 17.4 gm%. During the course treatment, with diuresis, his weight dropped 20 pounds between 11/17/2017 and the day of    discharge, five days later. By the day prior to discharge, repeat ABG's on room air showed a normal pH   of 7.38, a pCO2 of 75 and a pO2 of 70. I have encouraged him to discuss with his new physician   undergoing appropriate testing to obtain  long term, home BiPAP for use during   sleep. I have encouraged him to weigh daily and to check meter glucoses several times each day, either before the   first meal of the day or two hours following one of his meals and to record these weight and meter glucose   readings to share with his new primary MD at his first visit. I have prescribed a one month supply of all his medications   to allow him the opportunity to establish his care with his new primary MD. As of the time of discharge, his   admission symptoms have been controlled and he is assessed to be clinically stable and safe for discharge   and outpatient follow-up. Discharge Medications:       [address] Medication Instructions SHELLY:[id number]    Printed on:11/23/17 1008   Medication Information                      aspirin 81 MG EC tablet  Take 81 mg by mouth daily.                aspirin 81 MG EC tablet  Take 1 tablet by mouth daily             furosemide (LASIX) 40 MG tablet  Take 1 tablet by mouth daily             hydrALAZINE (APRESOLINE) 10 MG tablet  Take 1 tablet by mouth every 8 hours             metFORMIN (GLUCOPHAGE) 1000 MG

## 2017-12-18 ENCOUNTER — APPOINTMENT (OUTPATIENT)
Dept: CT IMAGING | Age: 62
End: 2017-12-18
Payer: COMMERCIAL

## 2017-12-18 ENCOUNTER — HOSPITAL ENCOUNTER (EMERGENCY)
Age: 62
Discharge: HOME | End: 2017-12-19
Attending: EMERGENCY MEDICINE
Payer: COMMERCIAL

## 2017-12-18 DIAGNOSIS — S27.321A CONTUSION OF LEFT LUNG, INITIAL ENCOUNTER: ICD-10-CM

## 2017-12-18 DIAGNOSIS — S22.41XA CLOSED FRACTURE OF MULTIPLE RIBS OF RIGHT SIDE, INITIAL ENCOUNTER: Primary | ICD-10-CM

## 2017-12-18 LAB
ANION GAP SERPL CALCULATED.3IONS-SCNC: 9 MMOL/L (ref 7–19)
APTT: 28.3 SEC (ref 26–36.2)
BASE EXCESS ARTERIAL: 6.4 MMOL/L (ref -2–2)
BUN BLDV-MCNC: 12 MG/DL (ref 8–23)
CALCIUM SERPL-MCNC: 8.6 MG/DL (ref 8.8–10.2)
CARBOXYHEMOGLOBIN ARTERIAL: 1.9 % (ref 0–5)
CHLORIDE BLD-SCNC: 99 MMOL/L (ref 98–111)
CO2: 33 MMOL/L (ref 22–29)
CREAT SERPL-MCNC: 0.8 MG/DL (ref 0.5–1.2)
GFR NON-AFRICAN AMERICAN: >60
GLUCOSE BLD-MCNC: 181 MG/DL (ref 74–109)
HCO3 ARTERIAL: 34.8 MMOL/L (ref 22–26)
HCT VFR BLD CALC: 56.5 % (ref 42–52)
HEMOGLOBIN, ART, EXTENDED: 16.9 G/DL (ref 14–18)
HEMOGLOBIN: 17.1 G/DL (ref 14–18)
INR BLD: 1.04 (ref 0.88–1.18)
MCH RBC QN AUTO: 26.3 PG (ref 27–31)
MCHC RBC AUTO-ENTMCNC: 30.3 G/DL (ref 33–37)
MCV RBC AUTO: 87.1 FL (ref 80–94)
METHEMOGLOBIN ARTERIAL: 1.2 %
O2 CONTENT ARTERIAL: 20.2 ML/DL
O2 SAT, ARTERIAL: 85.4 %
O2 THERAPY: ABNORMAL
PCO2 ARTERIAL: 63 MMHG (ref 35–45)
PDW BLD-RTO: 18.2 % (ref 11.5–14.5)
PH ARTERIAL: 7.35 (ref 7.35–7.45)
PLATELET # BLD: 143 K/UL (ref 130–400)
PMV BLD AUTO: 10.9 FL (ref 9.4–12.4)
PO2 ARTERIAL: 49 MMHG (ref 80–100)
POTASSIUM SERPL-SCNC: 4.9 MMOL/L (ref 3.5–5)
POTASSIUM, WHOLE BLOOD: 4.8
PROTHROMBIN TIME: 13.5 SEC (ref 12–14.6)
RBC # BLD: 6.49 M/UL (ref 4.7–6.1)
SODIUM BLD-SCNC: 141 MMOL/L (ref 136–145)
TROPONIN: <0.01 NG/ML (ref 0–0.03)
WBC # BLD: 9.1 K/UL (ref 4.8–10.8)

## 2017-12-18 PROCEDURE — 71250 CT THORAX DX C-: CPT

## 2017-12-18 PROCEDURE — 96365 THER/PROPH/DIAG IV INF INIT: CPT

## 2017-12-18 PROCEDURE — 85027 COMPLETE CBC AUTOMATED: CPT

## 2017-12-18 PROCEDURE — 36600 WITHDRAWAL OF ARTERIAL BLOOD: CPT

## 2017-12-18 PROCEDURE — 80048 BASIC METABOLIC PNL TOTAL CA: CPT

## 2017-12-18 PROCEDURE — 36415 COLL VENOUS BLD VENIPUNCTURE: CPT

## 2017-12-18 PROCEDURE — 93005 ELECTROCARDIOGRAM TRACING: CPT

## 2017-12-18 PROCEDURE — 84484 ASSAY OF TROPONIN QUANT: CPT

## 2017-12-18 PROCEDURE — 94640 AIRWAY INHALATION TREATMENT: CPT

## 2017-12-18 PROCEDURE — 6360000002 HC RX W HCPCS: Performed by: EMERGENCY MEDICINE

## 2017-12-18 PROCEDURE — 96375 TX/PRO/DX INJ NEW DRUG ADDON: CPT

## 2017-12-18 PROCEDURE — 82803 BLOOD GASES ANY COMBINATION: CPT

## 2017-12-18 PROCEDURE — 85730 THROMBOPLASTIN TIME PARTIAL: CPT

## 2017-12-18 PROCEDURE — 99284 EMERGENCY DEPT VISIT MOD MDM: CPT

## 2017-12-18 PROCEDURE — 85610 PROTHROMBIN TIME: CPT

## 2017-12-18 PROCEDURE — 99284 EMERGENCY DEPT VISIT MOD MDM: CPT | Performed by: EMERGENCY MEDICINE

## 2017-12-18 PROCEDURE — 84132 ASSAY OF SERUM POTASSIUM: CPT

## 2017-12-18 RX ORDER — MAGNESIUM SULFATE IN WATER 40 MG/ML
2 INJECTION, SOLUTION INTRAVENOUS ONCE
Status: COMPLETED | OUTPATIENT
Start: 2017-12-18 | End: 2017-12-18

## 2017-12-18 RX ORDER — MORPHINE SULFATE 10 MG/ML
6 INJECTION, SOLUTION INTRAMUSCULAR; INTRAVENOUS ONCE
Status: COMPLETED | OUTPATIENT
Start: 2017-12-18 | End: 2017-12-18

## 2017-12-18 RX ORDER — OXYCODONE HYDROCHLORIDE AND ACETAMINOPHEN 5; 325 MG/1; MG/1
1 TABLET ORAL EVERY 4 HOURS PRN
Qty: 20 TABLET | Refills: 0 | Status: SHIPPED | OUTPATIENT
Start: 2017-12-18 | End: 2017-12-21

## 2017-12-18 RX ORDER — LEVOFLOXACIN 500 MG/1
500 TABLET, FILM COATED ORAL DAILY
Qty: 10 TABLET | Refills: 0 | Status: SHIPPED | OUTPATIENT
Start: 2017-12-18 | End: 2017-12-28

## 2017-12-18 RX ORDER — DEXAMETHASONE SODIUM PHOSPHATE 10 MG/ML
10 INJECTION, SOLUTION INTRAMUSCULAR; INTRAVENOUS ONCE
Status: COMPLETED | OUTPATIENT
Start: 2017-12-18 | End: 2017-12-18

## 2017-12-18 RX ORDER — ALBUTEROL SULFATE 90 UG/1
2 AEROSOL, METERED RESPIRATORY (INHALATION) EVERY 4 HOURS PRN
Qty: 1 INHALER | Refills: 1 | Status: SHIPPED | OUTPATIENT
Start: 2017-12-18

## 2017-12-18 RX ORDER — KETOROLAC TROMETHAMINE 30 MG/ML
15 INJECTION, SOLUTION INTRAMUSCULAR; INTRAVENOUS ONCE
Status: COMPLETED | OUTPATIENT
Start: 2017-12-18 | End: 2017-12-18

## 2017-12-18 RX ORDER — ALBUTEROL SULFATE 2.5 MG/3ML
7.5 SOLUTION RESPIRATORY (INHALATION) ONCE
Status: COMPLETED | OUTPATIENT
Start: 2017-12-18 | End: 2017-12-18

## 2017-12-18 RX ADMIN — KETOROLAC TROMETHAMINE 15 MG: 30 INJECTION, SOLUTION INTRAMUSCULAR at 19:13

## 2017-12-18 RX ADMIN — ALBUTEROL SULFATE 7.5 MG: 2.5 SOLUTION RESPIRATORY (INHALATION) at 19:20

## 2017-12-18 RX ADMIN — MAGNESIUM SULFATE HEPTAHYDRATE 2 G: 40 INJECTION, SOLUTION INTRAVENOUS at 22:30

## 2017-12-18 RX ADMIN — DEXAMETHASONE SODIUM PHOSPHATE 10 MG: 10 INJECTION, SOLUTION INTRAMUSCULAR; INTRAVENOUS at 19:13

## 2017-12-18 RX ADMIN — MORPHINE SULFATE 6 MG: 10 INJECTION INTRAVENOUS at 19:14

## 2017-12-18 ASSESSMENT — ENCOUNTER SYMPTOMS: SHORTNESS OF BREATH: 1

## 2017-12-18 ASSESSMENT — PAIN DESCRIPTION - PAIN TYPE: TYPE: ACUTE PAIN

## 2017-12-18 ASSESSMENT — PAIN SCALES - GENERAL
PAINLEVEL_OUTOF10: 7
PAINLEVEL_OUTOF10: 7

## 2017-12-18 ASSESSMENT — PAIN DESCRIPTION - LOCATION: LOCATION: RIB CAGE;CHEST

## 2017-12-19 VITALS
OXYGEN SATURATION: 90 % | BODY MASS INDEX: 41.07 KG/M2 | TEMPERATURE: 98 F | RESPIRATION RATE: 17 BRPM | DIASTOLIC BLOOD PRESSURE: 62 MMHG | HEART RATE: 60 BPM | HEIGHT: 68 IN | WEIGHT: 271 LBS | SYSTOLIC BLOOD PRESSURE: 108 MMHG

## 2017-12-19 ASSESSMENT — PAIN SCALES - GENERAL: PAINLEVEL_OUTOF10: 5

## 2017-12-19 NOTE — ED PROVIDER NOTES
1843 12/18/17 1843 12/18/17 1843 12/18/17 1839 12/18/17 1839   136/72 98 °F (36.7 °C) Oral 66 22 (!) 86 % 5' 8\" (1.727 m) 271 lb (122.9 kg)       Physical Exam   Constitutional: He is oriented to person, place, and time. He appears well-developed and well-nourished. No distress. Obese, smells of urine   HENT:   Head: Normocephalic and atraumatic. Mouth/Throat: Oropharynx is clear and moist.   Eyes: EOM are normal. Pupils are equal, round, and reactive to light. No scleral icterus. Neck: Normal range of motion. Neck supple. No tracheal deviation present. Cardiovascular: Normal rate, regular rhythm, normal heart sounds and intact distal pulses. Exam reveals no gallop and no friction rub. No murmur heard. Pulmonary/Chest: Effort normal and breath sounds normal. No respiratory distress. He has no wheezes. He has no rales. He exhibits tenderness. Diffuse anterior chest wall tenderness bilaterally extending to the lateral areas to posterior axillary. Poor air movement, splinting, diffuse inspiratory wheezes. Abdominal: Soft. He exhibits no distension and no mass. There is no tenderness. There is no rebound and no guarding. Musculoskeletal: Normal range of motion. He exhibits no edema, tenderness or deformity. Neurological: He is alert and oriented to person, place, and time. No cranial nerve deficit. He exhibits normal muscle tone. Coordination normal.   Skin: Skin is warm and dry. No rash noted. Psychiatric: He has a normal mood and affect. His behavior is normal. Judgment and thought content normal.   Nursing note and vitals reviewed. DIAGNOSTIC RESULTS     EKG: All EKG's are interpreted by the Emergency Department Physician who either signs or Co-signs this chart in the absence of a cardiologist.    Sinus rhythm rate of 65 bpm, no ST elevations or depressions with in contiguous leads consistent with STEMI or ischemia, normal axis and intervals.  [address] 28:   Non-plain film images such as CT, Ultrasound and MRI are read by the radiologist. Plain radiographic images are visualized and preliminarily interpreted by the emergency physician with the below findings:    CT Chest WO Contrast   Final Result   1. Bilateral lower lobe atelectasis is present. There is also airspace   disease involving lingular segment of the left upper lobe either   related to a pneumonia or perhaps given the history representing a   pulmonary contusion. There is no appreciable pneumothorax or effusion. 2. Suspected fractures of the right lateral sixth and seventh ribs. The sternum and thoracic spine are intact. No evidence of mediastinal   hematoma. Mediastinal lipomatosis is present. 3. Coronary calcifications and stents in the LAD and circumflex   distribution. .   Signed by Dr Jim Perez on 12/18/2017 9:39 PM          LABS:  Labs Reviewed   BASIC METABOLIC PANEL - Abnormal; Notable for the following:        Result Value    CO2 33 (*)     Glucose 181 (*)     Calcium 8.6 (*)     All other components within normal limits   CBC - Abnormal; Notable for the following:     RBC 6.49 (*)     Hematocrit 56.5 (*)     MCH 26.3 (*)     MCHC 30.3 (*)     RDW 18.2 (*)     All other components within normal limits   BLOOD GAS, ARTERIAL - Abnormal; Notable for the following:     pCO2, Arterial 63.0 (*)     pO2, Arterial 49.0 (*)     HCO3, Arterial 34.8 (*)     Base Excess, Arterial 6.4 (*)     O2 Sat, Arterial 85.4 (*)     All other components within normal limits    Narrative:     CALL  [facility] tel. , SHADE CALDERON, 12/18/2017 19:15, by Isaías Travis   APTT   PROTIME-INR   POTASSIUM, WHOLE BLOOD   TROPONIN       All other labs were within normal range or not returned as of this dictation.     EMERGENCY DEPARTMENT COURSE and DIFFERENTIAL DIAGNOSIS/MDM:   Vitals:    Vitals:    12/18/17 1843 12/18/17 1904 12/18/17 1930 12/18/17 2132   BP: 136/72 136/72 132/70 109/63   Pulse: 66 65 62 62   Resp: 22 18 18   Temp: 98 °F (36.7 Discharge    PATIENT REFERRED TO:  Madhavi ZamoraELTON  [address] [facility]  [phone]    Schedule an appointment as soon as possible for a visit in 3 days        DISCHARGE MEDICATIONS:  New Prescriptions    ALBUTEROL SULFATE HFA (PROAIR HFA) 108 (90 BASE) MCG/ACT INHALER    Inhale 2 puffs into the lungs every 4 hours as needed for Wheezing or Shortness of Breath    LEVOFLOXACIN (LEVAQUIN) 500 MG TABLET    Take 1 tablet by mouth daily for 10 days    OXYCODONE-ACETAMINOPHEN (PERCOCET) 5-325 MG PER TABLET    Take 1 tablet by mouth every 4 hours as needed for Pain .           (Please note that portions of this note were completed with a voice recognition program.  Efforts were made to edit the dictations but occasionally words are mis-transcribed.)    Melania Christian MD (electronically signed)  Attending Emergency Physician          Melania Christian MD  12/19/17 0005

## 2017-12-19 NOTE — PROGRESS NOTES
pH, Arterial 7.350 - 7.450 7.350    pCO2, Arterial 35.0 - 45.0 mmHg 63.0     pO2, Arterial 80.0 - 100.0 mmHg 49.0     HCO3, Arterial 22.0 - 26.0 mmol/L 34.8     Base Excess, Arterial -2.0 - 2.0 mmol/L 6.4     Hemoglobin, Art, Extended 14.0 - 18.0 g/dL 16.9    O2 Sat, Arterial >92 % 85.4     Carboxyhgb, Arterial 0.0 - 5.0 % 1.9    Comments:      0.0-1.5   (Smokers 1.5-5.0)    Methemoglobin, Arterial <1.5 % 1.2    O2 Content, Arterial Not Established mL/dL 20.2      RA  RR 20  RUBEN TEST +  SITE RR

## 2017-12-19 NOTE — ED NOTES
Bed: 13  Expected date:   Expected time:   Means of arrival:   Comments:  EMS-SOB     Bob York RN  12/18/17 5816

## 2017-12-19 NOTE — ED NOTES
Patient instructed on use of incentive spirometer. Patient instructed that he must use 4L O2 nasal cannula at all times at home for next week per Dr. Jordyn Lr verbal order.      Henrique Perales RN  12/19/17 5525

## 2017-12-20 LAB
EKG P AXIS: 75 DEGREES
EKG P-R INTERVAL: 142 MS
EKG Q-T INTERVAL: 370 MS
EKG QRS DURATION: 90 MS
EKG QTC CALCULATION (BAZETT): 378 MS
EKG T AXIS: 62 DEGREES

## 2018-01-02 ENCOUNTER — TELEPHONE (OUTPATIENT)
Dept: CARDIOLOGY | Age: 63
End: 2018-01-02

## 2018-01-28 ENCOUNTER — HOSPITAL ENCOUNTER (INPATIENT)
Age: 63
LOS: 4 days | Discharge: HOME | DRG: 189 | End: 2018-02-01
Attending: EMERGENCY MEDICINE | Admitting: HOSPITALIST
Payer: COMMERCIAL

## 2018-01-28 ENCOUNTER — APPOINTMENT (OUTPATIENT)
Dept: GENERAL RADIOLOGY | Age: 63
DRG: 189 | End: 2018-01-28
Payer: COMMERCIAL

## 2018-01-28 DIAGNOSIS — J96.22 ACUTE ON CHRONIC RESPIRATORY FAILURE WITH HYPOXIA AND HYPERCAPNIA (HCC): Primary | ICD-10-CM

## 2018-01-28 DIAGNOSIS — J96.21 ACUTE ON CHRONIC RESPIRATORY FAILURE WITH HYPOXIA AND HYPERCAPNIA (HCC): Primary | ICD-10-CM

## 2018-01-28 DIAGNOSIS — J10.1 INFLUENZA A: ICD-10-CM

## 2018-01-28 DIAGNOSIS — J44.1 COPD EXACERBATION (HCC): ICD-10-CM

## 2018-01-28 DIAGNOSIS — J40 BRONCHITIS: ICD-10-CM

## 2018-01-28 LAB
ALBUMIN SERPL-MCNC: 3.4 G/DL (ref 3.5–5.2)
ALP BLD-CCNC: 87 U/L (ref 40–130)
ALT SERPL-CCNC: 17 U/L (ref 5–41)
ANION GAP SERPL CALCULATED.3IONS-SCNC: 12 MMOL/L (ref 7–19)
AST SERPL-CCNC: 18 U/L (ref 5–40)
BASE EXCESS ARTERIAL: 8.1 MMOL/L (ref -2–2)
BASE EXCESS ARTERIAL: 8.5 MMOL/L (ref -2–2)
BASE EXCESS ARTERIAL: 9.1 MMOL/L (ref -2–2)
BASOPHILS ABSOLUTE: 0 K/UL (ref 0–0.2)
BASOPHILS RELATIVE PERCENT: 0.2 % (ref 0–1)
BILIRUB SERPL-MCNC: 0.4 MG/DL (ref 0.2–1.2)
BUN BLDV-MCNC: 16 MG/DL (ref 8–23)
CALCIUM SERPL-MCNC: 8.4 MG/DL (ref 8.8–10.2)
CARBOXYHEMOGLOBIN ARTERIAL: 1.8 % (ref 0–5)
CARBOXYHEMOGLOBIN ARTERIAL: 2.1 % (ref 0–5)
CARBOXYHEMOGLOBIN ARTERIAL: 2.7 % (ref 0–5)
CHLORIDE BLD-SCNC: 88 MMOL/L (ref 98–111)
CO2: 33 MMOL/L (ref 22–29)
CREAT SERPL-MCNC: 0.7 MG/DL (ref 0.5–1.2)
EOSINOPHILS ABSOLUTE: 0 K/UL (ref 0–0.6)
EOSINOPHILS RELATIVE PERCENT: 0.2 % (ref 0–5)
GFR NON-AFRICAN AMERICAN: >60
GLUCOSE BLD-MCNC: 161 MG/DL (ref 74–109)
HCO3 ARTERIAL: 38.4 MMOL/L (ref 22–26)
HCO3 ARTERIAL: 38.9 MMOL/L (ref 22–26)
HCO3 ARTERIAL: 40.4 MMOL/L (ref 22–26)
HCT VFR BLD CALC: 53.1 % (ref 42–52)
HEMOGLOBIN, ART, EXTENDED: 16.3 G/DL (ref 14–18)
HEMOGLOBIN, ART, EXTENDED: 16.3 G/DL (ref 14–18)
HEMOGLOBIN, ART, EXTENDED: 16.4 G/DL (ref 14–18)
HEMOGLOBIN: 15.9 G/DL (ref 14–18)
LYMPHOCYTES ABSOLUTE: 0.6 K/UL (ref 1.1–4.5)
LYMPHOCYTES RELATIVE PERCENT: 10.1 % (ref 20–40)
MCH RBC QN AUTO: 26.3 PG (ref 27–31)
MCHC RBC AUTO-ENTMCNC: 29.9 G/DL (ref 33–37)
MCV RBC AUTO: 87.9 FL (ref 80–94)
METHEMOGLOBIN ARTERIAL: 0.9 %
METHEMOGLOBIN ARTERIAL: 1.4 %
METHEMOGLOBIN ARTERIAL: 1.4 %
MONOCYTES ABSOLUTE: 0.7 K/UL (ref 0–0.9)
MONOCYTES RELATIVE PERCENT: 11.7 % (ref 0–10)
NEUTROPHILS ABSOLUTE: 4.6 K/UL (ref 1.5–7.5)
NEUTROPHILS RELATIVE PERCENT: 77.5 % (ref 50–65)
O2 CONTENT ARTERIAL: 10.8 ML/DL
O2 CONTENT ARTERIAL: 20.2 ML/DL
O2 CONTENT ARTERIAL: 20.7 ML/DL
O2 SAT, ARTERIAL: 47.3 %
O2 SAT, ARTERIAL: 87.9 %
O2 SAT, ARTERIAL: 90.6 %
O2 THERAPY: ABNORMAL
PCO2 ARTERIAL: 78 MMHG (ref 35–45)
PCO2 ARTERIAL: 79 MMHG (ref 35–45)
PCO2 ARTERIAL: 86 MMHG (ref 35–45)
PDW BLD-RTO: 18.3 % (ref 11.5–14.5)
PH ARTERIAL: 7.28 (ref 7.35–7.45)
PH ARTERIAL: 7.3 (ref 7.35–7.45)
PH ARTERIAL: 7.3 (ref 7.35–7.45)
PLATELET # BLD: 146 K/UL (ref 130–400)
PMV BLD AUTO: 9.7 FL (ref 9.4–12.4)
PO2 ARTERIAL: 24 MMHG (ref 80–100)
PO2 ARTERIAL: 57 MMHG (ref 80–100)
PO2 ARTERIAL: 62 MMHG (ref 80–100)
POTASSIUM SERPL-SCNC: 4.4 MMOL/L (ref 3.5–5)
POTASSIUM, WHOLE BLOOD: 4.3
POTASSIUM, WHOLE BLOOD: 4.8
POTASSIUM, WHOLE BLOOD: 5.2
PRO-BNP: 1405 PG/ML (ref 0–900)
RAPID INFLUENZA  B AGN: NEGATIVE
RAPID INFLUENZA A AGN: POSITIVE
RBC # BLD: 6.04 M/UL (ref 4.7–6.1)
SODIUM BLD-SCNC: 133 MMOL/L (ref 136–145)
TOTAL PROTEIN: 6.8 G/DL (ref 6.6–8.7)
TROPONIN: <0.01 NG/ML (ref 0–0.03)
WBC # BLD: 5.9 K/UL (ref 4.8–10.8)

## 2018-01-28 PROCEDURE — 71046 X-RAY EXAM CHEST 2 VIEWS: CPT

## 2018-01-28 PROCEDURE — 36415 COLL VENOUS BLD VENIPUNCTURE: CPT

## 2018-01-28 PROCEDURE — 84132 ASSAY OF SERUM POTASSIUM: CPT

## 2018-01-28 PROCEDURE — 99285 EMERGENCY DEPT VISIT HI MDM: CPT

## 2018-01-28 PROCEDURE — 87804 INFLUENZA ASSAY W/OPTIC: CPT

## 2018-01-28 PROCEDURE — 36600 WITHDRAWAL OF ARTERIAL BLOOD: CPT

## 2018-01-28 PROCEDURE — 6360000002 HC RX W HCPCS: Performed by: EMERGENCY MEDICINE

## 2018-01-28 PROCEDURE — 6370000000 HC RX 637 (ALT 250 FOR IP): Performed by: EMERGENCY MEDICINE

## 2018-01-28 PROCEDURE — 6360000002 HC RX W HCPCS: Performed by: INTERNAL MEDICINE

## 2018-01-28 PROCEDURE — 93005 ELECTROCARDIOGRAM TRACING: CPT

## 2018-01-28 PROCEDURE — 2580000003 HC RX 258: Performed by: INTERNAL MEDICINE

## 2018-01-28 PROCEDURE — 80053 COMPREHEN METABOLIC PANEL: CPT

## 2018-01-28 PROCEDURE — 83880 ASSAY OF NATRIURETIC PEPTIDE: CPT

## 2018-01-28 PROCEDURE — 82803 BLOOD GASES ANY COMBINATION: CPT

## 2018-01-28 PROCEDURE — 2700000000 HC OXYGEN THERAPY PER DAY

## 2018-01-28 PROCEDURE — 85025 COMPLETE CBC W/AUTO DIFF WBC: CPT

## 2018-01-28 PROCEDURE — 99285 EMERGENCY DEPT VISIT HI MDM: CPT | Performed by: EMERGENCY MEDICINE

## 2018-01-28 PROCEDURE — 96374 THER/PROPH/DIAG INJ IV PUSH: CPT

## 2018-01-28 PROCEDURE — 84484 ASSAY OF TROPONIN QUANT: CPT

## 2018-01-28 PROCEDURE — 6370000000 HC RX 637 (ALT 250 FOR IP): Performed by: NURSE PRACTITIONER

## 2018-01-28 PROCEDURE — 94660 CPAP INITIATION&MGMT: CPT

## 2018-01-28 PROCEDURE — 1210000000 HC MED SURG R&B

## 2018-01-28 PROCEDURE — 94640 AIRWAY INHALATION TREATMENT: CPT

## 2018-01-28 PROCEDURE — 94762 N-INVAS EAR/PLS OXIMTRY CONT: CPT

## 2018-01-28 PROCEDURE — 99223 1ST HOSP IP/OBS HIGH 75: CPT | Performed by: INTERNAL MEDICINE

## 2018-01-28 RX ORDER — IPRATROPIUM BROMIDE AND ALBUTEROL SULFATE 2.5; .5 MG/3ML; MG/3ML
1 SOLUTION RESPIRATORY (INHALATION) ONCE
Status: COMPLETED | OUTPATIENT
Start: 2018-01-28 | End: 2018-01-28

## 2018-01-28 RX ORDER — OXYCODONE HYDROCHLORIDE AND ACETAMINOPHEN 5; 325 MG/1; MG/1
1 TABLET ORAL ONCE
Status: DISCONTINUED | OUTPATIENT
Start: 2018-01-28 | End: 2018-01-28

## 2018-01-28 RX ORDER — IPRATROPIUM BROMIDE AND ALBUTEROL SULFATE 2.5; .5 MG/3ML; MG/3ML
1 SOLUTION RESPIRATORY (INHALATION) ONCE
Status: DISCONTINUED | OUTPATIENT
Start: 2018-01-28 | End: 2018-01-28

## 2018-01-28 RX ORDER — LEVOFLOXACIN 5 MG/ML
750 INJECTION, SOLUTION INTRAVENOUS ONCE
Status: COMPLETED | OUTPATIENT
Start: 2018-01-28 | End: 2018-01-28

## 2018-01-28 RX ORDER — METHYLPREDNISOLONE SODIUM SUCCINATE 125 MG/2ML
125 INJECTION, POWDER, LYOPHILIZED, FOR SOLUTION INTRAMUSCULAR; INTRAVENOUS ONCE
Status: COMPLETED | OUTPATIENT
Start: 2018-01-28 | End: 2018-01-28

## 2018-01-28 RX ORDER — SODIUM CHLORIDE 9 MG/ML
INJECTION, SOLUTION INTRAVENOUS CONTINUOUS
Status: DISCONTINUED | OUTPATIENT
Start: 2018-01-28 | End: 2018-01-30

## 2018-01-28 RX ORDER — ASPIRIN 81 MG/1
81 TABLET ORAL DAILY
Status: DISCONTINUED | OUTPATIENT
Start: 2018-01-29 | End: 2018-02-01 | Stop reason: HOSPADM

## 2018-01-28 RX ORDER — NITROGLYCERIN 0.4 MG/1
0.4 TABLET SUBLINGUAL EVERY 5 MIN PRN
Status: DISCONTINUED | OUTPATIENT
Start: 2018-01-28 | End: 2018-02-01 | Stop reason: HOSPADM

## 2018-01-28 RX ORDER — OSELTAMIVIR PHOSPHATE 75 MG/1
75 CAPSULE ORAL 2 TIMES DAILY
Status: DISCONTINUED | OUTPATIENT
Start: 2018-01-28 | End: 2018-02-01 | Stop reason: HOSPADM

## 2018-01-28 RX ORDER — SIMVASTATIN 10 MG
10 TABLET ORAL NIGHTLY
Status: DISCONTINUED | OUTPATIENT
Start: 2018-01-28 | End: 2018-02-01 | Stop reason: HOSPADM

## 2018-01-28 RX ORDER — ACETAMINOPHEN 325 MG/1
650 TABLET ORAL EVERY 4 HOURS PRN
Status: DISCONTINUED | OUTPATIENT
Start: 2018-01-28 | End: 2018-02-01 | Stop reason: HOSPADM

## 2018-01-28 RX ORDER — ONDANSETRON 2 MG/ML
4 INJECTION INTRAMUSCULAR; INTRAVENOUS EVERY 6 HOURS PRN
Status: DISCONTINUED | OUTPATIENT
Start: 2018-01-28 | End: 2018-02-01 | Stop reason: HOSPADM

## 2018-01-28 RX ADMIN — METHYLPREDNISOLONE SODIUM SUCCINATE 125 MG: 125 INJECTION, POWDER, FOR SOLUTION INTRAMUSCULAR; INTRAVENOUS at 17:54

## 2018-01-28 RX ADMIN — LEVOFLOXACIN 750 MG: 5 INJECTION, SOLUTION INTRAVENOUS at 17:54

## 2018-01-28 RX ADMIN — ENOXAPARIN SODIUM 40 MG: 40 INJECTION SUBCUTANEOUS at 22:15

## 2018-01-28 RX ADMIN — SODIUM CHLORIDE: 9 INJECTION, SOLUTION INTRAVENOUS at 20:36

## 2018-01-28 RX ADMIN — OSELTAMIVIR PHOSPHATE 75 MG: 75 CAPSULE ORAL at 22:15

## 2018-01-28 RX ADMIN — IPRATROPIUM BROMIDE AND ALBUTEROL SULFATE 1 AMPULE: .5; 3 SOLUTION RESPIRATORY (INHALATION) at 15:02

## 2018-01-28 ASSESSMENT — PAIN SCALES - GENERAL
PAINLEVEL_OUTOF10: 4
PAINLEVEL_OUTOF10: 0
PAINLEVEL_OUTOF10: 4

## 2018-01-28 ASSESSMENT — ENCOUNTER SYMPTOMS
COUGH: 1
SHORTNESS OF BREATH: 1
DIARRHEA: 1
WHEEZING: 1

## 2018-01-28 NOTE — ED PROVIDER NOTES
ranitidine (ZANTAC) 150 MG tablet Take 1 tablet by mouth 2 times daily  Qty: 60 tablet, Refills: 0       !! - Potential duplicate medications found. Please discuss with provider. ALLERGIES     Crestor [rosuvastatin]    FAMILY HISTORY       Family History   Problem Relation Age of Onset    Coronary Art Dis Mother     Cancer Mother     Coronary Art Dis Father     Diabetes Father     Coronary Art Dis Sister     Depression Paternal Grandmother           SOCIAL HISTORY       Social History     Social History    Marital status:      Spouse name: N/A    Number of children: N/A    Years of education: N/A     Social History Main Topics    Smoking status: Former Smoker     Packs/day: 2.00     Years: 18.00     Types: Cigarettes     Quit date: 9/1/2011    Smokeless tobacco: Never Used    Alcohol use Yes      Comment: rare    Drug use: No    Sexual activity: Not Asked     Other Topics Concern    None     Social History Narrative    None       SCREENINGS    Bulmaro Coma Scale  Eye Opening: Spontaneous  Best Verbal Response: Oriented  Best Motor Response: Obeys commands  Trade Coma Scale Score: 15        PHYSICAL EXAM    (up to 7 for level 4, 8 or more for level 5)     ED Triage Vitals [01/28/18 1421]   BP Temp Temp Source Pulse Resp SpO2 Height Weight   110/64 98.4 °F (36.9 °C) Tympanic 116 22 (!) 86 % 5' 8\" (1.727 m) 280 lb (127 kg)       Physical Exam   Constitutional: He is oriented to person, place, and time. He appears well-nourished. Pt talks in short phrases and has some purse lipped appearing breathing. HENT:   Head: Normocephalic. Right Ear: External ear normal.   Left Ear: External ear normal.   Mouth/Throat: Oropharynx is clear and moist.   Eyes: Conjunctivae and EOM are normal. Pupils are equal, round, and reactive to light. Neck: Normal range of motion. Cardiovascular: Normal rate, regular rhythm, normal heart sounds and intact distal pulses.     Pulmonary/Chest: Effort normal. He has wheezes (bilateral expiratory ). Abdominal: Soft. Bowel sounds are normal.   Musculoskeletal: Normal range of motion. Neurological: He is alert and oriented to person, place, and time. Skin: Skin is warm and dry. Vitals reviewed. DIAGNOSTIC RESULTS     EKG: All EKG's are interpreted by the Emergency Department Physician who either signs or Co-signs this chart in the absence of a cardiologist.        RADIOLOGY:   Non-plain film images such as CT, Ultrasound and MRI are read by the radiologist. Plain radiographic images are visualized and preliminarily interpreted by the emergency physician with the below findings:        Interpretation per the Radiologist below, if available at the time of this note:    XR CHEST STANDARD (2 VW)   Final Result   1. Chronic interstitial disease with patchy basilar atelectasis. 2. No pneumothorax or pleural fluid is seen.    Signed by Dr Bria Cooper on 1/28/2018 3:02 PM            ED BEDSIDE ULTRASOUND:   Performed by ED Physician - none    LABS:  Labs Reviewed   RAPID INFLUENZA A/B ANTIGENS - Abnormal; Notable for the following:        Result Value    Rapid Influenza A Ag POSITIVE (*)     All other components within normal limits   BLOOD GAS, ARTERIAL - Abnormal; Notable for the following:     pH, Arterial 7.300 (*)     pCO2, Arterial 79.0 (*)     pO2, Arterial 24.0 (*)     HCO3, Arterial 38.9 (*)     Base Excess, Arterial 8.5 (*)     O2 Sat, Arterial 47.3 (*)     All other components within normal limits    Narrative:     CALL  [facility] tel. ,  kraig roper rn, 01/28/2018 15:16, by [facility]   BRAIN NATRIURETIC PEPTIDE - Abnormal; Notable for the following:     Pro-BNP 1,405 (*)     All other components within normal limits   CBC WITH AUTO DIFFERENTIAL - Abnormal; Notable for the following:     Hematocrit 53.1 (*)     MCH 26.3 (*)     MCHC 29.9 (*)     RDW 18.3 (*)     Neutrophils % 77.5 (*)     Lymphocytes % 10.1 (*)     Monocytes % 11.7 (*) Reggie Nelson, APRN  01/29/18 9762

## 2018-01-29 PROBLEM — J10.1 INFLUENZA A: Status: ACTIVE | Noted: 2018-01-29

## 2018-01-29 PROBLEM — E66.2 OBESITY HYPOVENTILATION SYNDROME (HCC): Chronic | Status: ACTIVE | Noted: 2017-11-22

## 2018-01-29 PROBLEM — J44.1 COPD WITH ACUTE EXACERBATION (HCC): Status: ACTIVE | Noted: 2018-01-29

## 2018-01-29 PROBLEM — I50.32 CHRONIC DIASTOLIC CONGESTIVE HEART FAILURE (HCC): Chronic | Status: ACTIVE | Noted: 2017-11-21

## 2018-01-29 LAB
ANION GAP SERPL CALCULATED.3IONS-SCNC: 6 MMOL/L (ref 7–19)
BASE EXCESS ARTERIAL: 9.3 MMOL/L (ref -2–2)
BASOPHILS ABSOLUTE: 0 K/UL (ref 0–0.2)
BASOPHILS RELATIVE PERCENT: 0 % (ref 0–1)
BUN BLDV-MCNC: 17 MG/DL (ref 8–23)
CALCIUM SERPL-MCNC: 8.5 MG/DL (ref 8.8–10.2)
CARBOXYHEMOGLOBIN ARTERIAL: 2.2 % (ref 0–5)
CHLORIDE BLD-SCNC: 93 MMOL/L (ref 98–111)
CO2: 36 MMOL/L (ref 22–29)
CREAT SERPL-MCNC: 0.8 MG/DL (ref 0.5–1.2)
EOSINOPHILS ABSOLUTE: 0 K/UL (ref 0–0.6)
EOSINOPHILS RELATIVE PERCENT: 0 % (ref 0–5)
GFR NON-AFRICAN AMERICAN: >60
GLUCOSE BLD-MCNC: 159 MG/DL (ref 70–99)
GLUCOSE BLD-MCNC: 185 MG/DL (ref 70–99)
GLUCOSE BLD-MCNC: 219 MG/DL (ref 70–99)
GLUCOSE BLD-MCNC: 245 MG/DL (ref 74–109)
HBA1C MFR BLD: 7.8 %
HCO3 ARTERIAL: 39.2 MMOL/L (ref 22–26)
HCT VFR BLD CALC: 51 % (ref 42–52)
HEMOGLOBIN, ART, EXTENDED: 15.8 G/DL (ref 14–18)
HEMOGLOBIN: 15.3 G/DL (ref 14–18)
LYMPHOCYTES ABSOLUTE: 0.4 K/UL (ref 1.1–4.5)
LYMPHOCYTES RELATIVE PERCENT: 9.2 % (ref 20–40)
MCH RBC QN AUTO: 26.4 PG (ref 27–31)
MCHC RBC AUTO-ENTMCNC: 30 G/DL (ref 33–37)
MCV RBC AUTO: 88.1 FL (ref 80–94)
METHEMOGLOBIN ARTERIAL: 1.3 %
MONOCYTES ABSOLUTE: 0.3 K/UL (ref 0–0.9)
MONOCYTES RELATIVE PERCENT: 6.7 % (ref 0–10)
NEUTROPHILS ABSOLUTE: 3.4 K/UL (ref 1.5–7.5)
NEUTROPHILS RELATIVE PERCENT: 83.4 % (ref 50–65)
O2 CONTENT ARTERIAL: 19.9 ML/DL
O2 SAT, ARTERIAL: 89.9 %
O2 THERAPY: ABNORMAL
PCO2 ARTERIAL: 76 MMHG (ref 35–45)
PDW BLD-RTO: 18.3 % (ref 11.5–14.5)
PERFORMED ON: ABNORMAL
PH ARTERIAL: 7.32 (ref 7.35–7.45)
PLATELET # BLD: 156 K/UL (ref 130–400)
PMV BLD AUTO: 10.7 FL (ref 9.4–12.4)
PO2 ARTERIAL: 60 MMHG (ref 80–100)
POTASSIUM REFLEX MAGNESIUM: 5.1 MMOL/L (ref 3.5–5)
POTASSIUM, WHOLE BLOOD: 4.6
RBC # BLD: 5.79 M/UL (ref 4.7–6.1)
SODIUM BLD-SCNC: 135 MMOL/L (ref 136–145)
WBC # BLD: 4 K/UL (ref 4.8–10.8)

## 2018-01-29 PROCEDURE — 94660 CPAP INITIATION&MGMT: CPT

## 2018-01-29 PROCEDURE — 6370000000 HC RX 637 (ALT 250 FOR IP): Performed by: NURSE PRACTITIONER

## 2018-01-29 PROCEDURE — 83036 HEMOGLOBIN GLYCOSYLATED A1C: CPT

## 2018-01-29 PROCEDURE — 82948 REAGENT STRIP/BLOOD GLUCOSE: CPT

## 2018-01-29 PROCEDURE — 6360000002 HC RX W HCPCS: Performed by: INTERNAL MEDICINE

## 2018-01-29 PROCEDURE — 94762 N-INVAS EAR/PLS OXIMTRY CONT: CPT

## 2018-01-29 PROCEDURE — 6370000000 HC RX 637 (ALT 250 FOR IP): Performed by: INTERNAL MEDICINE

## 2018-01-29 PROCEDURE — 82803 BLOOD GASES ANY COMBINATION: CPT

## 2018-01-29 PROCEDURE — 36600 WITHDRAWAL OF ARTERIAL BLOOD: CPT

## 2018-01-29 PROCEDURE — 1210000000 HC MED SURG R&B

## 2018-01-29 PROCEDURE — 99232 SBSQ HOSP IP/OBS MODERATE 35: CPT | Performed by: NURSE PRACTITIONER

## 2018-01-29 PROCEDURE — 2700000000 HC OXYGEN THERAPY PER DAY

## 2018-01-29 PROCEDURE — 6360000002 HC RX W HCPCS: Performed by: NURSE PRACTITIONER

## 2018-01-29 PROCEDURE — 6370000000 HC RX 637 (ALT 250 FOR IP): Performed by: EMERGENCY MEDICINE

## 2018-01-29 PROCEDURE — 80048 BASIC METABOLIC PNL TOTAL CA: CPT

## 2018-01-29 PROCEDURE — 84132 ASSAY OF SERUM POTASSIUM: CPT

## 2018-01-29 PROCEDURE — 36415 COLL VENOUS BLD VENIPUNCTURE: CPT

## 2018-01-29 PROCEDURE — 85025 COMPLETE CBC W/AUTO DIFF WBC: CPT

## 2018-01-29 RX ORDER — METHYLPREDNISOLONE SODIUM SUCCINATE 40 MG/ML
40 INJECTION, POWDER, LYOPHILIZED, FOR SOLUTION INTRAMUSCULAR; INTRAVENOUS EVERY 8 HOURS
Status: DISCONTINUED | OUTPATIENT
Start: 2018-01-29 | End: 2018-01-29

## 2018-01-29 RX ORDER — DEXTROSE MONOHYDRATE 25 G/50ML
12.5 INJECTION, SOLUTION INTRAVENOUS PRN
Status: DISCONTINUED | OUTPATIENT
Start: 2018-01-29 | End: 2018-02-01 | Stop reason: HOSPADM

## 2018-01-29 RX ORDER — METHYLPREDNISOLONE SODIUM SUCCINATE 40 MG/ML
40 INJECTION, POWDER, LYOPHILIZED, FOR SOLUTION INTRAMUSCULAR; INTRAVENOUS EVERY 12 HOURS
Status: DISCONTINUED | OUTPATIENT
Start: 2018-01-29 | End: 2018-01-30

## 2018-01-29 RX ORDER — NICOTINE POLACRILEX 4 MG
15 LOZENGE BUCCAL PRN
Status: DISCONTINUED | OUTPATIENT
Start: 2018-01-29 | End: 2018-02-01 | Stop reason: HOSPADM

## 2018-01-29 RX ORDER — DEXTROSE MONOHYDRATE 50 MG/ML
100 INJECTION, SOLUTION INTRAVENOUS PRN
Status: DISCONTINUED | OUTPATIENT
Start: 2018-01-29 | End: 2018-02-01 | Stop reason: HOSPADM

## 2018-01-29 RX ADMIN — OSELTAMIVIR PHOSPHATE 75 MG: 75 CAPSULE ORAL at 22:33

## 2018-01-29 RX ADMIN — METOPROLOL TARTRATE 25 MG: 25 TABLET ORAL at 22:33

## 2018-01-29 RX ADMIN — METHYLPREDNISOLONE SODIUM SUCCINATE 40 MG: 40 INJECTION, POWDER, FOR SOLUTION INTRAMUSCULAR; INTRAVENOUS at 19:22

## 2018-01-29 RX ADMIN — METOPROLOL TARTRATE 25 MG: 25 TABLET ORAL at 01:35

## 2018-01-29 RX ADMIN — SIMVASTATIN 10 MG: 10 TABLET, FILM COATED ORAL at 22:34

## 2018-01-29 RX ADMIN — INSULIN LISPRO 6 UNITS: 100 INJECTION, SOLUTION INTRAVENOUS; SUBCUTANEOUS at 17:56

## 2018-01-29 RX ADMIN — METHYLPREDNISOLONE SODIUM SUCCINATE 40 MG: 40 INJECTION, POWDER, FOR SOLUTION INTRAMUSCULAR; INTRAVENOUS at 07:18

## 2018-01-29 RX ADMIN — INSULIN LISPRO 2 UNITS: 100 INJECTION, SOLUTION INTRAVENOUS; SUBCUTANEOUS at 22:34

## 2018-01-29 RX ADMIN — OSELTAMIVIR PHOSPHATE 75 MG: 75 CAPSULE ORAL at 08:55

## 2018-01-29 RX ADMIN — ASPIRIN 81 MG: 81 TABLET, COATED ORAL at 08:55

## 2018-01-29 RX ADMIN — ENOXAPARIN SODIUM 40 MG: 40 INJECTION SUBCUTANEOUS at 22:34

## 2018-01-29 RX ADMIN — METOPROLOL TARTRATE 25 MG: 25 TABLET ORAL at 08:55

## 2018-01-29 RX ADMIN — INSULIN LISPRO 3 UNITS: 100 INJECTION, SOLUTION INTRAVENOUS; SUBCUTANEOUS at 14:28

## 2018-01-29 ASSESSMENT — ENCOUNTER SYMPTOMS
PHOTOPHOBIA: 0
VOMITING: 0
NAUSEA: 0
BLURRED VISION: 0
WHEEZING: 1
EYE PAIN: 0
SHORTNESS OF BREATH: 1
HEARTBURN: 0
COUGH: 1
DOUBLE VISION: 0
ABDOMINAL PAIN: 0

## 2018-01-29 NOTE — PROGRESS NOTES
Hospitalist Progress Note  1/29/2018 10:10 AM  Subjective:   Admit Date: 1/28/2018  PCP: ELTON Simon    Chief Complaint: Shortness of breath    Subjective: Feeling much better, tolerating BiPAP. Cumulative Hospital Course:  Mr. Ann William is a 65year old morbidly obese male who presented to the ER with complaints of non-productive cough and progressive shortness of breath. He has PMHx COPD with chronic bronchitis and had been using his nebulizer without improvement. Work-up in the ER he was found to have acute hypercapnic hypoxic respiratory failure. He also tested positive for influenza A. He was started on empiric antibiotics, along with Tamiflu. BiPAP was also initiated. He was admitted to the hospitalist service. Review of Systems:   Constitutional: Denies fever or chills. Denies change in energy level or malaise. HEENT: Denies headaches or visual disturbances. Denies difficulty swallowing or sore throat. Respiratory: Denies cough or hoarseness. Shortness of breath much better. Cardiovascular: Denies chest pain or pressure. Denies palpitations. Denies presyncope/syncope. Denies orthopnea/PND. Denies lower extremity edema. Gastrointestinal: Denies abdominal pain. Denies nausea/vomiting. Denies change in bowel habits or history of recent GI tract blood loss. Genitourinary: Denies urinary urgency or frequency. Denies dysuria, hematuria. Musculoskeletal: Denies pain or swelling in joints. Neurological: Denies paresthesias. Denies headache. Denies seizure or stroke symptoms. Behavioral/Psych: Denies problems with anxiety or depression. All other ROS negative except where stated above. DIET CARDIAC;     Intake/Output Summary (Last 24 hours) at 01/29/18 1010  Last data filed at 01/29/18 0607   Gross per 24 hour   Intake                0 ml   Output              700 ml   Net             -700 ml     Medications:   sodium chloride 30 mL/hr at 01/29/18 0855     Current Facility-Administered Medications   Medication Dose Route Frequency Provider Last Rate Last Dose    methylPREDNISolone sodium (SOLU-MEDROL) injection 40 mg  40 mg Intravenous Q12H ELTON Youngblood   40 mg at 01/29/18 6482    oseltamivir (TAMIFLU) capsule 75 mg  75 mg Oral BID Stephanie Storm MD   75 mg at 01/29/18 0855    0.9 % sodium chloride infusion   Intravenous Continuous ELTON Youngblood 30 mL/hr at 01/29/18 0855      acetaminophen (TYLENOL) tablet 650 mg  650 mg Oral Q4H PRN Emory Dsouza MD        ondansetron (ZOFRAN) injection 4 mg  4 mg Intravenous Q6H PRN Emory Dsouza MD        enoxaparin (LOVENOX) injection 40 mg  40 mg Subcutaneous Nightly Chan De León MD   40 mg at 01/28/18 2215    aspirin EC tablet 81 mg  81 mg Oral Daily Magdaleno De León MD   81 mg at 01/29/18 0855    metoprolol tartrate (LOPRESSOR) tablet 25 mg  25 mg Oral BID Magdaleno De León MD   25 mg at 01/29/18 0855    nitroGLYCERIN (NITROSTAT) SL tablet 0.4 mg  0.4 mg Sublingual Q5 Min PRN Emory Dsouza MD        simvastatin (ZOCOR) tablet 10 mg  10 mg Oral Nightly Emory Dsouza MD            Labs:     Recent Labs      01/28/18   1522  01/29/18   0503   WBC  5.9  4.0*   RBC  6.04  5.79   HGB  15.9  15.3   HCT  53.1*  51.0   MCV  87.9  88.1   MCH  26.3*  26.4*   MCHC  29.9*  30.0*   PLT  146  156     Recent Labs      01/28/18   1522   01/28/18   2242  01/29/18   0407  01/29/18   0702   NA  133*   --    --   135*   --    K  4.4   < >  5.2  5.1*  4.6   ANIONGAP  12   --    --   6*   --    CL  88*   --    --   93*   --    CO2  33*   --    --   36*   --    BUN  16   --    --   17   --    CREATININE  0.7   --    --   0.8   --    GLUCOSE  161*   --    --   245*   --    CALCIUM  8.4*   --    --   8.5*   --     < > = values in this interval not displayed.        Recent Labs      01/28/18   1522   AST  18   ALT  17   BILITOT  0.4   ALKPHOS  87       Troponin T:   Recent Labs      01/28/18   1522   TROPONINI <0.01     CXR:  1. Chronic interstitial disease with patchy basilar atelectasis. 2. No pneumothorax or pleural fluid is seen. Rapid Influenza A/B:  Rapid Influenza A Ag POSITIVE (A)     Rapid Influenza B Ag Negative           Objective:   Vitals: /80   Pulse 89   Temp 97.9 °F (36.6 °C) (Temporal)   Resp 16   Ht 5' 8\" (1.727 m)   Wt 280 lb (127 kg)   SpO2 92%   BMI 42.57 kg/m²     24HR INTAKE/OUTPUT:      Intake/Output Summary (Last 24 hours) at 01/29/18 1010  Last data filed at 01/29/18 0607   Gross per 24 hour   Intake                0 ml   Output              700 ml   Net             -700 ml     General appearance: alert and cooperative with exam  HEENT: atraumatic, eyes with clear conjunctiva and normal lids, pupils and irises normal, external ears normal, lips normal.  Neck without masses, lympadenopathy, bruit, thyroid normal  Lungs: no increased work of breathing, clear with expiratory wheezes throughout  Heart: regular rate and rhythm, S1, S2 normal, no murmur, click, rub or gallop  Abdomen: soft, non-tender; bowel sounds normal; no masses,  no organomegaly  Extremities: extremities normal, atraumatic, no cyanosis or edema  Neurologic: No focal neurologic deficits, normal sensation, alert and oriented, affect and mood appropriate. Skin: no rashes, nodules. Assessment and Plan:     Principal Problem:    COPD with acute exacerbation ([address] - Improved. On steroids, nebulizers, antibiotics, supplemental oxygen    Active Problems:    Acute respiratory failure with hypoxia and hypercapnia (HCC) - Improved. On BiPAP    Influenza A - Tamiflu    Coronary artery disease involving native coronary artery of native heart without angina pectoris - Stable.  On ASA BB, Statin    Essential hypertension - Controlled    Type 2 diabetes mellitus without complication (HCC) - Accu-checks with SSI    Chronic diastolic congestive heart failure (HCC) - No Exacerbation    Mixed hyperlipidemia - Statin    Chronic

## 2018-01-29 NOTE — H&P
CALL  [facility] tel. ,  kraig roper rn, 01/28/2018 15:16, by [address] [facility] - Abnormal; Notable for the following:     Pro-BNP 1,405 (*)     All other components within normal limits   CBC WITH AUTO DIFFERENTIAL - Abnormal; Notable for the following:     Hematocrit 53.1 (*)     MCH 26.3 (*)     MCHC 29.9 (*)     RDW 18.3 (*)     Neutrophils % 77.5 (*)     Lymphocytes % 10.1 (*)     Monocytes % 11.7 (*)     Lymphocytes # 0.6 (*)     All other components within normal limits   COMPREHENSIVE METABOLIC PANEL - Abnormal; Notable for the following:     Sodium 133 (*)     Chloride 88 (*)     CO2 33 (*)     Glucose 161 (*)     Calcium 8.4 (*)     Alb 3.4 (*)     All other components within normal limits   TROPONIN   POTASSIUM, WHOLE BLOOD   BLOOD GAS, ARTERIAL   POTASSIUM, WHOLE BLOOD          IMPRESSION:    1. COPD exacerbation  2. Hypercapnic and hypoxic respiratory failure  3. DM  4. CAD stable  5. htn      PLAN:     1. Admitted to floor  2. Continue bronchodilator  3. Iv solumedrol  4. Continue bpap and adjustment made  5. levaquin to continue  6. Review home medication.   7. bs control ss novolog      Radha Pathak MD    Internal Medicine Hospitalist

## 2018-01-29 NOTE — PROGRESS NOTES
Palliative Care: Met with pt to initiate palliative care. This is a pleasant 65 yr old male admitted with COPD exacerbation. He is alert and oriented x 3. Resting in bed watching television. Past Medical History: Atherosclerosis, CAD, CHF, COPD, DJD, DM, GERD, Hyperlipidemia, HTN, MI, Obesity, Coronary artery stent        Advance Directives:   Full code. No living will       Pain/Other Symptoms:   Denies pain or other discomfort at this time. Activity:  As enma           Psychological/Spiritual:   Pt states he belongs to [facility]. Lives at home with his spouse. Patient/Family Discussion:  Brief conversation with pt about living will status. He states he is angel to care for his ADL's. Lives at home with his spouse. Plan/expectations:  Continue current treatment,      Long term goals:   Home on dc      PC will follow for support.     Electronically signed by Sky Keller RN on 1/29/2018 at 2:04 PM

## 2018-01-30 LAB
ANION GAP SERPL CALCULATED.3IONS-SCNC: 6 MMOL/L (ref 7–19)
BUN BLDV-MCNC: 16 MG/DL (ref 8–23)
CALCIUM SERPL-MCNC: 8.6 MG/DL (ref 8.8–10.2)
CHLORIDE BLD-SCNC: 97 MMOL/L (ref 98–111)
CO2: 37 MMOL/L (ref 22–29)
CREAT SERPL-MCNC: 0.5 MG/DL (ref 0.5–1.2)
GFR NON-AFRICAN AMERICAN: >60
GLUCOSE BLD-MCNC: 132 MG/DL (ref 70–99)
GLUCOSE BLD-MCNC: 162 MG/DL (ref 74–109)
GLUCOSE BLD-MCNC: 181 MG/DL (ref 70–99)
GLUCOSE BLD-MCNC: 213 MG/DL (ref 70–99)
GLUCOSE BLD-MCNC: 255 MG/DL (ref 70–99)
HCT VFR BLD CALC: 51.1 % (ref 42–52)
HEMOGLOBIN: 15.5 G/DL (ref 14–18)
MAGNESIUM: 2.2 MG/DL (ref 1.6–2.4)
MCH RBC QN AUTO: 26.4 PG (ref 27–31)
MCHC RBC AUTO-ENTMCNC: 30.3 G/DL (ref 33–37)
MCV RBC AUTO: 86.9 FL (ref 80–94)
PDW BLD-RTO: 18 % (ref 11.5–14.5)
PERFORMED ON: ABNORMAL
PLATELET # BLD: 153 K/UL (ref 130–400)
PMV BLD AUTO: 9.9 FL (ref 9.4–12.4)
POTASSIUM SERPL-SCNC: 4.5 MMOL/L (ref 3.5–5)
RBC # BLD: 5.88 M/UL (ref 4.7–6.1)
SODIUM BLD-SCNC: 140 MMOL/L (ref 136–145)
WBC # BLD: 8.9 K/UL (ref 4.8–10.8)

## 2018-01-30 PROCEDURE — 85027 COMPLETE CBC AUTOMATED: CPT

## 2018-01-30 PROCEDURE — 6370000000 HC RX 637 (ALT 250 FOR IP): Performed by: INTERNAL MEDICINE

## 2018-01-30 PROCEDURE — 6360000002 HC RX W HCPCS: Performed by: HOSPITALIST

## 2018-01-30 PROCEDURE — 2700000000 HC OXYGEN THERAPY PER DAY

## 2018-01-30 PROCEDURE — 6370000000 HC RX 637 (ALT 250 FOR IP): Performed by: EMERGENCY MEDICINE

## 2018-01-30 PROCEDURE — 6360000002 HC RX W HCPCS: Performed by: INTERNAL MEDICINE

## 2018-01-30 PROCEDURE — 80048 BASIC METABOLIC PNL TOTAL CA: CPT

## 2018-01-30 PROCEDURE — 6370000000 HC RX 637 (ALT 250 FOR IP): Performed by: NURSE PRACTITIONER

## 2018-01-30 PROCEDURE — 94762 N-INVAS EAR/PLS OXIMTRY CONT: CPT

## 2018-01-30 PROCEDURE — 94660 CPAP INITIATION&MGMT: CPT

## 2018-01-30 PROCEDURE — 1210000000 HC MED SURG R&B

## 2018-01-30 PROCEDURE — 94640 AIRWAY INHALATION TREATMENT: CPT

## 2018-01-30 PROCEDURE — 6370000000 HC RX 637 (ALT 250 FOR IP): Performed by: HOSPITALIST

## 2018-01-30 PROCEDURE — 36415 COLL VENOUS BLD VENIPUNCTURE: CPT

## 2018-01-30 PROCEDURE — 82948 REAGENT STRIP/BLOOD GLUCOSE: CPT

## 2018-01-30 PROCEDURE — 83735 ASSAY OF MAGNESIUM: CPT

## 2018-01-30 PROCEDURE — 99233 SBSQ HOSP IP/OBS HIGH 50: CPT | Performed by: HOSPITALIST

## 2018-01-30 PROCEDURE — 6360000002 HC RX W HCPCS: Performed by: NURSE PRACTITIONER

## 2018-01-30 RX ORDER — GUAIFENESIN 600 MG/1
600 TABLET, EXTENDED RELEASE ORAL 2 TIMES DAILY
Status: DISCONTINUED | OUTPATIENT
Start: 2018-01-30 | End: 2018-02-01 | Stop reason: HOSPADM

## 2018-01-30 RX ORDER — ALBUTEROL SULFATE 2.5 MG/3ML
2.5 SOLUTION RESPIRATORY (INHALATION) 4 TIMES DAILY
Status: DISCONTINUED | OUTPATIENT
Start: 2018-01-30 | End: 2018-02-01 | Stop reason: HOSPADM

## 2018-01-30 RX ORDER — METHYLPREDNISOLONE SODIUM SUCCINATE 40 MG/ML
40 INJECTION, POWDER, LYOPHILIZED, FOR SOLUTION INTRAMUSCULAR; INTRAVENOUS DAILY
Status: DISCONTINUED | OUTPATIENT
Start: 2018-01-31 | End: 2018-02-01 | Stop reason: HOSPADM

## 2018-01-30 RX ORDER — CARBOXYMETHYLCELLULOSE SODIUM 5 MG/ML
1 SOLUTION/ DROPS OPHTHALMIC 4 TIMES DAILY
Status: DISCONTINUED | OUTPATIENT
Start: 2018-01-30 | End: 2018-02-01 | Stop reason: HOSPADM

## 2018-01-30 RX ADMIN — METHYLPREDNISOLONE SODIUM SUCCINATE 40 MG: 40 INJECTION, POWDER, FOR SOLUTION INTRAMUSCULAR; INTRAVENOUS at 08:50

## 2018-01-30 RX ADMIN — OSELTAMIVIR PHOSPHATE 75 MG: 75 CAPSULE ORAL at 21:16

## 2018-01-30 RX ADMIN — ASPIRIN 81 MG: 81 TABLET, COATED ORAL at 08:50

## 2018-01-30 RX ADMIN — CARBOXYMETHYLCELLULOSE SODIUM 1 DROP: 5 SOLUTION/ DROPS OPHTHALMIC at 21:16

## 2018-01-30 RX ADMIN — METOPROLOL TARTRATE 25 MG: 25 TABLET ORAL at 08:50

## 2018-01-30 RX ADMIN — SIMVASTATIN 10 MG: 10 TABLET, FILM COATED ORAL at 21:15

## 2018-01-30 RX ADMIN — OSELTAMIVIR PHOSPHATE 75 MG: 75 CAPSULE ORAL at 08:50

## 2018-01-30 RX ADMIN — ENOXAPARIN SODIUM 40 MG: 40 INJECTION SUBCUTANEOUS at 21:16

## 2018-01-30 RX ADMIN — GUAIFENESIN 600 MG: 600 TABLET, EXTENDED RELEASE ORAL at 21:16

## 2018-01-30 RX ADMIN — MOMETASONE FUROATE AND FORMOTEROL FUMARATE DIHYDRATE 2 PUFF: 200; 5 AEROSOL RESPIRATORY (INHALATION) at 12:01

## 2018-01-30 RX ADMIN — INSULIN LISPRO 3 UNITS: 100 INJECTION, SOLUTION INTRAVENOUS; SUBCUTANEOUS at 17:20

## 2018-01-30 RX ADMIN — INSULIN LISPRO 3 UNITS: 100 INJECTION, SOLUTION INTRAVENOUS; SUBCUTANEOUS at 21:16

## 2018-01-30 RX ADMIN — MOMETASONE FUROATE AND FORMOTEROL FUMARATE DIHYDRATE 2 PUFF: 200; 5 AEROSOL RESPIRATORY (INHALATION) at 21:17

## 2018-01-30 RX ADMIN — METOPROLOL TARTRATE 25 MG: 25 TABLET ORAL at 21:16

## 2018-01-30 RX ADMIN — ALBUTEROL SULFATE 2.5 MG: 2.5 SOLUTION RESPIRATORY (INHALATION) at 19:32

## 2018-01-30 RX ADMIN — INSULIN LISPRO 9 UNITS: 100 INJECTION, SOLUTION INTRAVENOUS; SUBCUTANEOUS at 12:01

## 2018-01-30 NOTE — PROGRESS NOTES
Hospitalist Progress Note  1/30/2018 11:13 AM  Subjective:   Admit Date: 1/28/2018  PCP: ELTON Guillermo    Chief Complaint: Shortness of breath    Subjective: Sitting up in chair, getting cleaned up. Breathing much better. Cumulative Hospital Course:  Mr. Chantal Joseph is a 65year old morbidly obese male who presented to the ER with complaints of non-productive cough and progressive shortness of breath. He has PMHx COPD with chronic bronchitis and had been using his nebulizer without improvement. Work-up in the ER he was found to have acute hypercapnic hypoxic respiratory failure. He also tested positive for influenza A. He was started on empiric antibiotics, along with Tamiflu. BiPAP was also initiated. He was admitted to the hospitalist service. Patient condition improved, able to wean oxygen and steroids. Activity increased on 01/30/2018. Review of Systems:   Constitutional: Denies fever or chills. Denies change in energy level or malaise. HEENT: Denies headaches or visual disturbances. Denies difficulty swallowing or sore throat. Respiratory: Denies cough or hoarseness. Shortness of breath much better. Cardiovascular: Denies chest pain or pressure. Denies palpitations. Denies presyncope/syncope. Denies orthopnea/PND. Denies lower extremity edema. Gastrointestinal: Denies abdominal pain. Denies nausea/vomiting. Denies change in bowel habits or history of recent GI tract blood loss. Genitourinary: Denies urinary urgency or frequency. Denies dysuria, hematuria. Musculoskeletal: Denies pain or swelling in joints. Neurological: Denies paresthesias. Denies headache. Denies seizure or stroke symptoms. Behavioral/Psych: Denies problems with anxiety or depression. All other ROS negative except where stated above. DIET CARDIAC;     Intake/Output Summary (Last 24 hours) at 01/30/18 1113  Last data filed at 01/30/18 1112   Gross per 24 hour   Intake             1437 ml   Output 1000 ml   Net              437 ml     Medications:   dextrose       Current Facility-Administered Medications   Medication Dose Route Frequency Provider Last Rate Last Dose    [START ON 1/31/2018] methylPREDNISolone sodium (SOLU-MEDROL) injection 40 mg  40 mg Intravenous Daily [facility], APRN        mometasone-formoterol (DULERA) 200-5 MCG/ACT inhaler 2 puff  2 puff Inhalation BID [facility], APRN        glucose (GLUTOSE) 40 % oral gel 15 g  15 g Oral PRN [facility], APRN        dextrose 50 % solution 12.5 g  12.5 g Intravenous PRN [facility], APRN        glucagon (rDNA) injection 1 mg  1 mg Intramuscular PRN [facility], APRN        dextrose 5 % solution  100 mL/hr Intravenous PRN [facility], APRN        insulin lispro (HUMALOG) injection vial 0-18 Units  0-18 Units Subcutaneous TID WC [facility], APRN   6 Units at 01/29/18 1756    insulin lispro (HUMALOG) injection vial 0-9 Units  0-9 Units Subcutaneous Nightly [facility], APRN   2 Units at 01/29/18 2234    oseltamivir (TAMIFLU) capsule 75 mg  75 mg Oral BID Donovan Casey MD   75 mg at 01/30/18 0850    acetaminophen (TYLENOL) tablet 650 mg  650 mg Oral Q4H PRN Magdaleno De León MD        ondansetron (ZOFRAN) injection 4 mg  4 mg Intravenous Q6H PRN Magdaleno De León MD        enoxaparin (LOVENOX) injection 40 mg  40 mg Subcutaneous Nightly Magdaleno De León MD   40 mg at 01/29/18 2234    aspirin EC tablet 81 mg  81 mg Oral Daily Magdaleno De León MD   81 mg at 01/30/18 0850    metoprolol tartrate (LOPRESSOR) tablet 25 mg  25 mg Oral BID Magdaleno De León MD   25 mg at 01/30/18 0850    nitroGLYCERIN (NITROSTAT) SL tablet 0.4 mg  0.4 mg Sublingual Q5 Min PRN Magdaleno De León MD        simvastatin (ZOCOR) tablet 10 mg  10 mg Oral Nightly Magdaleno De León MD   10 mg at 01/29/18 2234        Labs:     Recent Labs      01/28/18   1522  01/29/18   0503  01/30/18   0634   WBC  5.9  4.0*  8.9   RBC  6.04  5.79

## 2018-01-31 LAB
GLUCOSE BLD-MCNC: 100 MG/DL (ref 70–99)
GLUCOSE BLD-MCNC: 174 MG/DL (ref 70–99)
GLUCOSE BLD-MCNC: 285 MG/DL (ref 70–99)
GLUCOSE BLD-MCNC: 299 MG/DL (ref 70–99)
PERFORMED ON: ABNORMAL

## 2018-01-31 PROCEDURE — 99233 SBSQ HOSP IP/OBS HIGH 50: CPT | Performed by: HOSPITALIST

## 2018-01-31 PROCEDURE — 82948 REAGENT STRIP/BLOOD GLUCOSE: CPT

## 2018-01-31 PROCEDURE — 6370000000 HC RX 637 (ALT 250 FOR IP): Performed by: NURSE PRACTITIONER

## 2018-01-31 PROCEDURE — 94660 CPAP INITIATION&MGMT: CPT

## 2018-01-31 PROCEDURE — 2700000000 HC OXYGEN THERAPY PER DAY

## 2018-01-31 PROCEDURE — 6370000000 HC RX 637 (ALT 250 FOR IP): Performed by: INTERNAL MEDICINE

## 2018-01-31 PROCEDURE — 94761 N-INVAS EAR/PLS OXIMETRY MLT: CPT

## 2018-01-31 PROCEDURE — 6360000002 HC RX W HCPCS: Performed by: HOSPITALIST

## 2018-01-31 PROCEDURE — 6370000000 HC RX 637 (ALT 250 FOR IP): Performed by: EMERGENCY MEDICINE

## 2018-01-31 PROCEDURE — 1210000000 HC MED SURG R&B

## 2018-01-31 PROCEDURE — 94762 N-INVAS EAR/PLS OXIMTRY CONT: CPT

## 2018-01-31 PROCEDURE — 6360000002 HC RX W HCPCS: Performed by: INTERNAL MEDICINE

## 2018-01-31 PROCEDURE — 94640 AIRWAY INHALATION TREATMENT: CPT

## 2018-01-31 PROCEDURE — 6360000002 HC RX W HCPCS: Performed by: NURSE PRACTITIONER

## 2018-01-31 PROCEDURE — 6370000000 HC RX 637 (ALT 250 FOR IP): Performed by: HOSPITALIST

## 2018-01-31 RX ADMIN — INSULIN LISPRO 3 UNITS: 100 INJECTION, SOLUTION INTRAVENOUS; SUBCUTANEOUS at 12:28

## 2018-01-31 RX ADMIN — ALBUTEROL SULFATE 2.5 MG: 2.5 SOLUTION RESPIRATORY (INHALATION) at 06:24

## 2018-01-31 RX ADMIN — CARBOXYMETHYLCELLULOSE SODIUM 1 DROP: 5 SOLUTION/ DROPS OPHTHALMIC at 21:12

## 2018-01-31 RX ADMIN — CARBOXYMETHYLCELLULOSE SODIUM 1 DROP: 5 SOLUTION/ DROPS OPHTHALMIC at 17:53

## 2018-01-31 RX ADMIN — INSULIN LISPRO 9 UNITS: 100 INJECTION, SOLUTION INTRAVENOUS; SUBCUTANEOUS at 17:53

## 2018-01-31 RX ADMIN — ALBUTEROL SULFATE 2.5 MG: 2.5 SOLUTION RESPIRATORY (INHALATION) at 10:21

## 2018-01-31 RX ADMIN — METHYLPREDNISOLONE SODIUM SUCCINATE 40 MG: 40 INJECTION, POWDER, FOR SOLUTION INTRAMUSCULAR; INTRAVENOUS at 08:42

## 2018-01-31 RX ADMIN — SIMVASTATIN 10 MG: 10 TABLET, FILM COATED ORAL at 21:17

## 2018-01-31 RX ADMIN — METOPROLOL TARTRATE 25 MG: 25 TABLET ORAL at 08:42

## 2018-01-31 RX ADMIN — CARBOXYMETHYLCELLULOSE SODIUM 1 DROP: 5 SOLUTION/ DROPS OPHTHALMIC at 08:43

## 2018-01-31 RX ADMIN — ALBUTEROL SULFATE 2.5 MG: 2.5 SOLUTION RESPIRATORY (INHALATION) at 14:30

## 2018-01-31 RX ADMIN — OSELTAMIVIR PHOSPHATE 75 MG: 75 CAPSULE ORAL at 21:10

## 2018-01-31 RX ADMIN — ALBUTEROL SULFATE 2.5 MG: 2.5 SOLUTION RESPIRATORY (INHALATION) at 19:28

## 2018-01-31 RX ADMIN — ASPIRIN 81 MG: 81 TABLET, COATED ORAL at 08:42

## 2018-01-31 RX ADMIN — ENOXAPARIN SODIUM 40 MG: 40 INJECTION SUBCUTANEOUS at 21:10

## 2018-01-31 RX ADMIN — GUAIFENESIN 600 MG: 600 TABLET, EXTENDED RELEASE ORAL at 21:09

## 2018-01-31 RX ADMIN — INSULIN LISPRO 5 UNITS: 100 INJECTION, SOLUTION INTRAVENOUS; SUBCUTANEOUS at 21:10

## 2018-01-31 RX ADMIN — CARBOXYMETHYLCELLULOSE SODIUM 1 DROP: 5 SOLUTION/ DROPS OPHTHALMIC at 12:29

## 2018-01-31 RX ADMIN — GUAIFENESIN 600 MG: 600 TABLET, EXTENDED RELEASE ORAL at 08:42

## 2018-01-31 RX ADMIN — MOMETASONE FUROATE AND FORMOTEROL FUMARATE DIHYDRATE 2 PUFF: 200; 5 AEROSOL RESPIRATORY (INHALATION) at 21:13

## 2018-01-31 RX ADMIN — METOPROLOL TARTRATE 25 MG: 25 TABLET ORAL at 21:09

## 2018-01-31 RX ADMIN — MOMETASONE FUROATE AND FORMOTEROL FUMARATE DIHYDRATE 2 PUFF: 200; 5 AEROSOL RESPIRATORY (INHALATION) at 08:47

## 2018-01-31 RX ADMIN — OSELTAMIVIR PHOSPHATE 75 MG: 75 CAPSULE ORAL at 08:42

## 2018-01-31 NOTE — CARE COORDINATION
BRANDYN faxed oxygen order, respiratory note, H&P, and demographics to [facility].     Electronically signed by SUSANNE Srinivasan on 1/31/2018 at 3:04 PM

## 2018-01-31 NOTE — PROGRESS NOTES
metoprolol tartrate (LOPRESSOR) tablet 25 mg  25 mg Oral BID Glo Cordero MD   25 mg at 01/31/18 0842    nitroGLYCERIN (NITROSTAT) SL tablet 0.4 mg  0.4 mg Sublingual Q5 Min PRN Glo Cordero MD        simvastatin (ZOCOR) tablet 10 mg  10 mg Oral Nightly Magdaleno De León MD   10 mg at 01/30/18 2115        Labs:     Recent Labs      01/28/18   1522  01/29/18   0503  01/30/18   0634   WBC  5.9  4.0*  8.9   RBC  6.04  5.79  5.88   HGB  15.9  15.3  15.5   HCT  53.1*  51.0  51.1   MCV  87.9  88.1  86.9   MCH  26.3*  26.4*  26.4*   MCHC  29.9*  30.0*  30.3*   PLT  146  156  153     Recent Labs      01/28/18   1522   01/29/18   0407  01/29/18   0702  01/30/18   0634   NA  133*   --   135*   --   140   K  4.4   < >  5.1*  4.6  4.5   ANIONGAP  12   --   6*   --   6*   CL  88*   --   93*   --   97*   CO2  33*   --   36*   --   37*   BUN  16   --   17   --   16   CREATININE  0.7   --   0.8   --   0.5   GLUCOSE  161*   --   245*   --   162*   CALCIUM  8.4*   --   8.5*   --   8.6*    < > = values in this interval not displayed. Recent Labs      01/28/18   1522   AST  18   ALT  17   BILITOT  0.4   ALKPHOS  87       Troponin T:   Recent Labs      01/28/18   1522   TROPONINI  <0.01     CXR:  1. Chronic interstitial disease with patchy basilar atelectasis. 2. No pneumothorax or pleural fluid is seen.     Rapid Influenza A/B:  Rapid Influenza A Ag POSITIVE (A)     Rapid Influenza B Ag Negative           Objective:   Vitals: /75   Pulse 57   Temp 98.1 °F (36.7 °C)   Resp 18   Ht 5' 8\" (1.727 m)   Wt 270 lb (122.5 kg)   SpO2 92%   BMI 41.05 kg/m²     24HR INTAKE/OUTPUT:      Intake/Output Summary (Last 24 hours) at 01/31/18 1452  Last data filed at 01/31/18 0450   Gross per 24 hour   Intake              760 ml   Output             1450 ml   Net             -690 ml     General appearance: alert and cooperative with exam  HEENT: atraumatic, eyes with clear conjunctiva and normal lids, pupils and irises normal,

## 2018-02-01 VITALS
WEIGHT: 273.4 LBS | HEIGHT: 68 IN | HEART RATE: 59 BPM | BODY MASS INDEX: 41.43 KG/M2 | DIASTOLIC BLOOD PRESSURE: 80 MMHG | RESPIRATION RATE: 18 BRPM | OXYGEN SATURATION: 90 % | TEMPERATURE: 97.6 F | SYSTOLIC BLOOD PRESSURE: 155 MMHG

## 2018-02-01 LAB
GLUCOSE BLD-MCNC: 112 MG/DL (ref 70–99)
GLUCOSE BLD-MCNC: 260 MG/DL (ref 70–99)
PERFORMED ON: ABNORMAL
PERFORMED ON: ABNORMAL

## 2018-02-01 PROCEDURE — 6360000002 HC RX W HCPCS: Performed by: HOSPITALIST

## 2018-02-01 PROCEDURE — 94762 N-INVAS EAR/PLS OXIMTRY CONT: CPT

## 2018-02-01 PROCEDURE — 6370000000 HC RX 637 (ALT 250 FOR IP): Performed by: INTERNAL MEDICINE

## 2018-02-01 PROCEDURE — 6370000000 HC RX 637 (ALT 250 FOR IP): Performed by: NURSE PRACTITIONER

## 2018-02-01 PROCEDURE — 6360000002 HC RX W HCPCS: Performed by: NURSE PRACTITIONER

## 2018-02-01 PROCEDURE — 2700000000 HC OXYGEN THERAPY PER DAY

## 2018-02-01 PROCEDURE — 82948 REAGENT STRIP/BLOOD GLUCOSE: CPT

## 2018-02-01 PROCEDURE — 99238 HOSP IP/OBS DSCHRG MGMT 30/<: CPT | Performed by: HOSPITALIST

## 2018-02-01 PROCEDURE — 6370000000 HC RX 637 (ALT 250 FOR IP): Performed by: EMERGENCY MEDICINE

## 2018-02-01 PROCEDURE — 94640 AIRWAY INHALATION TREATMENT: CPT

## 2018-02-01 PROCEDURE — 6370000000 HC RX 637 (ALT 250 FOR IP): Performed by: HOSPITALIST

## 2018-02-01 PROCEDURE — 94660 CPAP INITIATION&MGMT: CPT

## 2018-02-01 RX ORDER — OSELTAMIVIR PHOSPHATE 75 MG/1
75 CAPSULE ORAL 2 TIMES DAILY
Qty: 10 CAPSULE | Refills: 0 | Status: SHIPPED | OUTPATIENT
Start: 2018-02-01 | End: 2018-02-06

## 2018-02-01 RX ORDER — AZITHROMYCIN 250 MG/1
TABLET, FILM COATED ORAL
Qty: 1 PACKET | Refills: 0 | Status: SHIPPED | OUTPATIENT
Start: 2018-02-01 | End: 2018-02-11

## 2018-02-01 RX ORDER — GUAIFENESIN 600 MG/1
600 TABLET, EXTENDED RELEASE ORAL 2 TIMES DAILY
COMMUNITY
Start: 2018-02-01 | End: 2018-09-11 | Stop reason: ALTCHOICE

## 2018-02-01 RX ORDER — PREDNISONE 10 MG/1
TABLET ORAL
Qty: 1 EACH | Refills: 0 | Status: SHIPPED | OUTPATIENT
Start: 2018-02-01 | End: 2018-09-11 | Stop reason: ALTCHOICE

## 2018-02-01 RX ADMIN — GUAIFENESIN 600 MG: 600 TABLET, EXTENDED RELEASE ORAL at 08:08

## 2018-02-01 RX ADMIN — CARBOXYMETHYLCELLULOSE SODIUM 1 DROP: 5 SOLUTION/ DROPS OPHTHALMIC at 12:56

## 2018-02-01 RX ADMIN — METOPROLOL TARTRATE 25 MG: 25 TABLET ORAL at 08:08

## 2018-02-01 RX ADMIN — ALBUTEROL SULFATE 2.5 MG: 2.5 SOLUTION RESPIRATORY (INHALATION) at 10:50

## 2018-02-01 RX ADMIN — ASPIRIN 81 MG: 81 TABLET, COATED ORAL at 08:08

## 2018-02-01 RX ADMIN — METHYLPREDNISOLONE SODIUM SUCCINATE 40 MG: 40 INJECTION, POWDER, FOR SOLUTION INTRAMUSCULAR; INTRAVENOUS at 08:07

## 2018-02-01 RX ADMIN — ALBUTEROL SULFATE 2.5 MG: 2.5 SOLUTION RESPIRATORY (INHALATION) at 14:58

## 2018-02-01 RX ADMIN — CARBOXYMETHYLCELLULOSE SODIUM 1 DROP: 5 SOLUTION/ DROPS OPHTHALMIC at 08:08

## 2018-02-01 RX ADMIN — INSULIN LISPRO 9 UNITS: 100 INJECTION, SOLUTION INTRAVENOUS; SUBCUTANEOUS at 12:55

## 2018-02-01 RX ADMIN — ALBUTEROL SULFATE 2.5 MG: 2.5 SOLUTION RESPIRATORY (INHALATION) at 06:44

## 2018-02-01 RX ADMIN — OSELTAMIVIR PHOSPHATE 75 MG: 75 CAPSULE ORAL at 08:08

## 2018-02-01 RX ADMIN — MOMETASONE FUROATE AND FORMOTEROL FUMARATE DIHYDRATE 2 PUFF: 200; 5 AEROSOL RESPIRATORY (INHALATION) at 08:08

## 2018-02-05 LAB
EKG P AXIS: 50 DEGREES
EKG P-R INTERVAL: 126 MS
EKG Q-T INTERVAL: 348 MS
EKG QRS DURATION: 90 MS
EKG QTC CALCULATION (BAZETT): 384 MS
EKG T AXIS: 53 DEGREES

## 2018-04-11 PROBLEM — J10.1 INFLUENZA A: Status: RESOLVED | Noted: 2018-01-29 | Resolved: 2018-04-11

## 2018-07-27 ENCOUNTER — HOSPITAL ENCOUNTER (OUTPATIENT)
Dept: NEUROLOGY | Age: 63
Discharge: HOME | End: 2018-07-27
Payer: COMMERCIAL

## 2018-07-27 PROCEDURE — 95910 NRV CNDJ TEST 7-8 STUDIES: CPT | Performed by: PSYCHIATRY & NEUROLOGY

## 2018-07-27 PROCEDURE — 95886 MUSC TEST DONE W/N TEST COMP: CPT | Performed by: PSYCHIATRY & NEUROLOGY

## 2018-07-27 PROCEDURE — 95910 NRV CNDJ TEST 7-8 STUDIES: CPT

## 2018-07-27 PROCEDURE — 95886 MUSC TEST DONE W/N TEST COMP: CPT

## 2018-09-11 ENCOUNTER — HOSPITAL ENCOUNTER (OUTPATIENT)
Dept: PREADMISSION TESTING | Age: 63
Discharge: HOME | End: 2018-09-15
Payer: COMMERCIAL

## 2018-09-11 VITALS — BODY MASS INDEX: 43.95 KG/M2 | HEIGHT: 68 IN | WEIGHT: 290 LBS

## 2018-09-11 LAB
ANION GAP SERPL CALCULATED.3IONS-SCNC: 11 MMOL/L (ref 7–19)
BASOPHILS ABSOLUTE: 0.1 K/UL (ref 0–0.2)
BASOPHILS RELATIVE PERCENT: 0.5 % (ref 0–1)
BUN BLDV-MCNC: 14 MG/DL (ref 8–23)
CALCIUM SERPL-MCNC: 9.3 MG/DL (ref 8.8–10.2)
CHLORIDE BLD-SCNC: 99 MMOL/L (ref 98–111)
CO2: 31 MMOL/L (ref 22–29)
CREAT SERPL-MCNC: 0.8 MG/DL (ref 0.5–1.2)
EKG P AXIS: 28 DEGREES
EKG P-R INTERVAL: 148 MS
EKG Q-T INTERVAL: 398 MS
EKG QRS DURATION: 90 MS
EKG QTC CALCULATION (BAZETT): 390 MS
EKG T AXIS: 60 DEGREES
EOSINOPHILS ABSOLUTE: 0.3 K/UL (ref 0–0.6)
EOSINOPHILS RELATIVE PERCENT: 3.7 % (ref 0–5)
GFR NON-AFRICAN AMERICAN: >60
GLUCOSE BLD-MCNC: 147 MG/DL (ref 74–109)
HCT VFR BLD CALC: 52.7 % (ref 42–52)
HEMOGLOBIN: 15.7 G/DL (ref 14–18)
LYMPHOCYTES ABSOLUTE: 1.5 K/UL (ref 1.1–4.5)
LYMPHOCYTES RELATIVE PERCENT: 16.1 % (ref 20–40)
MCH RBC QN AUTO: 26.9 PG (ref 27–31)
MCHC RBC AUTO-ENTMCNC: 29.8 G/DL (ref 33–37)
MCV RBC AUTO: 90.4 FL (ref 80–94)
MONOCYTES ABSOLUTE: 0.7 K/UL (ref 0–0.9)
MONOCYTES RELATIVE PERCENT: 7.8 % (ref 0–10)
NEUTROPHILS ABSOLUTE: 6.6 K/UL (ref 1.5–7.5)
NEUTROPHILS RELATIVE PERCENT: 71.4 % (ref 50–65)
PDW BLD-RTO: 17.8 % (ref 11.5–14.5)
PLATELET # BLD: 200 K/UL (ref 130–400)
PMV BLD AUTO: 10.5 FL (ref 9.4–12.4)
POTASSIUM SERPL-SCNC: 4.8 MMOL/L (ref 3.5–5)
RBC # BLD: 5.83 M/UL (ref 4.7–6.1)
SODIUM BLD-SCNC: 141 MMOL/L (ref 136–145)
WBC # BLD: 9.2 K/UL (ref 4.8–10.8)

## 2018-09-11 PROCEDURE — 85025 COMPLETE CBC W/AUTO DIFF WBC: CPT

## 2018-09-11 PROCEDURE — 80048 BASIC METABOLIC PNL TOTAL CA: CPT

## 2018-09-12 PROBLEM — G56.01 RIGHT CARPAL TUNNEL SYNDROME: Status: ACTIVE | Noted: 2018-09-12

## 2018-09-12 NOTE — OP NOTE
Patient Name: Shea France  : 1955  MRN: [id number]      DATE of SURGERY: 18    SURGEON: Vinicio Blankenship MD    ASSISTANT: NONE    PREOPERATIVE DIAGNOSIS    Left carpal tunnel syndrome.       POSTOPERATIVE DIAGNOSIS    Left carpal tunnel syndrome.       PROCEDURE PERFORMED    Left carpal tunnel release.       IMPLANTS  None.       ANESTHESIA    Leeroy block       OPERATIVE INDICATIONS    The patient is a 65 y.o. male who presented to my clinic with complaints of numbness and tingling in the hand with clinical exam and neurodiagnostic evidence of carpal tunnel syndrome.  The patient wished to proceed with surgery, understanding the risks, benefits, and alternatives.  Conservative treatment failed to improve symptoms.  The risks include, but are not limited to, that of anesthesia, bleeding, infection, pain, damage to the motor and sensory branch of the median nerve, chronic pillar pain, incomplete resolution of symptoms.       ESTIMATED BLOOD LOSS    Less than 5 mL.       SPECIMENS    None.       DRAINS  None.       COMPLICATIONS    None.       PROCEDURE IN DETAIL    The patient was seen in the preoperative holding room; once again, the informed consent form was reviewed with the patient and signed. The site of surgery was marked with the patients agreement. After being transferred to the operating room, a timeout was performed identifying the correct patient as well as the operative site. Perioperative antibiotics were administered. The tourniquet was then placed on the brachium of the operative extremity. A sterile prep and drape was performed.       A longitudinal incision was made at the base of the palm in line with the radial border of the ring finger intersecting Kaplans cardinal line. Soft tissue was dissected in line with the incision through the palmar fascia.  The transverse carpal ligament was identified and beginning distally, while protecting the superficial radial arch of vessels, it was

## 2018-09-13 ENCOUNTER — ANESTHESIA EVENT (OUTPATIENT)
Dept: OPERATING ROOM | Age: 63
End: 2018-09-13
Payer: COMMERCIAL

## 2018-09-13 ENCOUNTER — HOSPITAL ENCOUNTER (OUTPATIENT)
Age: 63
Setting detail: OUTPATIENT SURGERY
Discharge: HOME | End: 2018-09-13
Attending: ORTHOPAEDIC SURGERY | Admitting: ORTHOPAEDIC SURGERY
Payer: COMMERCIAL

## 2018-09-13 ENCOUNTER — ANESTHESIA (OUTPATIENT)
Dept: OPERATING ROOM | Age: 63
End: 2018-09-13
Payer: COMMERCIAL

## 2018-09-13 VITALS
BODY MASS INDEX: 43.95 KG/M2 | DIASTOLIC BLOOD PRESSURE: 81 MMHG | WEIGHT: 290 LBS | SYSTOLIC BLOOD PRESSURE: 135 MMHG | TEMPERATURE: 97.2 F | OXYGEN SATURATION: 96 % | HEART RATE: 68 BPM | RESPIRATION RATE: 22 BRPM | HEIGHT: 68 IN

## 2018-09-13 VITALS — SYSTOLIC BLOOD PRESSURE: 114 MMHG | DIASTOLIC BLOOD PRESSURE: 66 MMHG | OXYGEN SATURATION: 86 %

## 2018-09-13 DIAGNOSIS — G56.01 RIGHT CARPAL TUNNEL SYNDROME: Primary | ICD-10-CM

## 2018-09-13 LAB
GLUCOSE BLD-MCNC: 134 MG/DL (ref 70–99)
PERFORMED ON: ABNORMAL

## 2018-09-13 PROCEDURE — 7100000001 HC PACU RECOVERY - ADDTL 15 MIN: Performed by: ORTHOPAEDIC SURGERY

## 2018-09-13 PROCEDURE — 2500000003 HC RX 250 WO HCPCS: Performed by: ORTHOPAEDIC SURGERY

## 2018-09-13 PROCEDURE — 2500000003 HC RX 250 WO HCPCS: Performed by: NURSE ANESTHETIST, CERTIFIED REGISTERED

## 2018-09-13 PROCEDURE — 2580000003 HC RX 258: Performed by: ORTHOPAEDIC SURGERY

## 2018-09-13 PROCEDURE — 7100000010 HC PHASE II RECOVERY - FIRST 15 MIN: Performed by: ORTHOPAEDIC SURGERY

## 2018-09-13 PROCEDURE — 3600000013 HC SURGERY LEVEL 3 ADDTL 15MIN: Performed by: ORTHOPAEDIC SURGERY

## 2018-09-13 PROCEDURE — 3700000001 HC ADD 15 MINUTES (ANESTHESIA): Performed by: ORTHOPAEDIC SURGERY

## 2018-09-13 PROCEDURE — 3600000003 HC SURGERY LEVEL 3 BASE: Performed by: ORTHOPAEDIC SURGERY

## 2018-09-13 PROCEDURE — 6360000002 HC RX W HCPCS: Performed by: ORTHOPAEDIC SURGERY

## 2018-09-13 PROCEDURE — 6360000002 HC RX W HCPCS: Performed by: NURSE ANESTHETIST, CERTIFIED REGISTERED

## 2018-09-13 PROCEDURE — 2709999900 HC NON-CHARGEABLE SUPPLY: Performed by: ORTHOPAEDIC SURGERY

## 2018-09-13 PROCEDURE — 7100000000 HC PACU RECOVERY - FIRST 15 MIN: Performed by: ORTHOPAEDIC SURGERY

## 2018-09-13 PROCEDURE — 3700000000 HC ANESTHESIA ATTENDED CARE: Performed by: ORTHOPAEDIC SURGERY

## 2018-09-13 PROCEDURE — 82948 REAGENT STRIP/BLOOD GLUCOSE: CPT

## 2018-09-13 RX ORDER — LIDOCAINE HYDROCHLORIDE 10 MG/ML
1 INJECTION, SOLUTION EPIDURAL; INFILTRATION; INTRACAUDAL; PERINEURAL
Status: DISCONTINUED | OUTPATIENT
Start: 2018-09-13 | End: 2018-09-13 | Stop reason: HOSPADM

## 2018-09-13 RX ORDER — MIDAZOLAM HYDROCHLORIDE 1 MG/ML
INJECTION INTRAMUSCULAR; INTRAVENOUS PRN
Status: DISCONTINUED | OUTPATIENT
Start: 2018-09-13 | End: 2018-09-13 | Stop reason: SDUPTHER

## 2018-09-13 RX ORDER — HYDRALAZINE HYDROCHLORIDE 20 MG/ML
5 INJECTION INTRAMUSCULAR; INTRAVENOUS EVERY 10 MIN PRN
Status: DISCONTINUED | OUTPATIENT
Start: 2018-09-13 | End: 2018-09-13 | Stop reason: HOSPADM

## 2018-09-13 RX ORDER — MORPHINE SULFATE/0.9% NACL/PF 1 MG/ML
2 SYRINGE (ML) INJECTION EVERY 5 MIN PRN
Status: DISCONTINUED | OUTPATIENT
Start: 2018-09-13 | End: 2018-09-13 | Stop reason: HOSPADM

## 2018-09-13 RX ORDER — PROMETHAZINE HYDROCHLORIDE 25 MG/ML
6.25 INJECTION, SOLUTION INTRAMUSCULAR; INTRAVENOUS
Status: DISCONTINUED | OUTPATIENT
Start: 2018-09-13 | End: 2018-09-13 | Stop reason: HOSPADM

## 2018-09-13 RX ORDER — LIDOCAINE HYDROCHLORIDE 5 MG/ML
INJECTION, SOLUTION INFILTRATION; PERINEURAL PRN
Status: DISCONTINUED | OUTPATIENT
Start: 2018-09-13 | End: 2018-09-13 | Stop reason: SDUPTHER

## 2018-09-13 RX ORDER — MORPHINE SULFATE 1 MG/ML
1 INJECTION, SOLUTION EPIDURAL; INTRATHECAL; INTRAVENOUS EVERY 5 MIN PRN
Status: DISCONTINUED | OUTPATIENT
Start: 2018-09-13 | End: 2018-09-13 | Stop reason: HOSPADM

## 2018-09-13 RX ORDER — FENTANYL CITRATE 50 UG/ML
25 INJECTION, SOLUTION INTRAMUSCULAR; INTRAVENOUS
Status: DISCONTINUED | OUTPATIENT
Start: 2018-09-13 | End: 2018-09-13 | Stop reason: HOSPADM

## 2018-09-13 RX ORDER — HYDROMORPHONE HCL IN 0.9% NACL 0.5 MG/ML
0.5 SYRINGE (ML) INTRAVENOUS EVERY 5 MIN PRN
Status: DISCONTINUED | OUTPATIENT
Start: 2018-09-13 | End: 2018-09-13 | Stop reason: HOSPADM

## 2018-09-13 RX ORDER — MIDAZOLAM HYDROCHLORIDE 1 MG/ML
2 INJECTION INTRAMUSCULAR; INTRAVENOUS
Status: DISCONTINUED | OUTPATIENT
Start: 2018-09-13 | End: 2018-09-13 | Stop reason: HOSPADM

## 2018-09-13 RX ORDER — HYDROCODONE BITARTRATE AND ACETAMINOPHEN 5; 325 MG/1; MG/1
1 TABLET ORAL EVERY 4 HOURS PRN
Qty: 20 TABLET | Refills: 0 | Status: SHIPPED | OUTPATIENT
Start: 2018-09-13 | End: 2018-09-20

## 2018-09-13 RX ORDER — LIDOCAINE HYDROCHLORIDE 10 MG/ML
1 INJECTION, SOLUTION EPIDURAL; INFILTRATION; INTRACAUDAL; PERINEURAL ONCE
Status: COMPLETED | OUTPATIENT
Start: 2018-09-13 | End: 2018-09-13

## 2018-09-13 RX ORDER — OXYCODONE HYDROCHLORIDE AND ACETAMINOPHEN 5; 325 MG/1; MG/1
1 TABLET ORAL PRN
Status: DISCONTINUED | OUTPATIENT
Start: 2018-09-13 | End: 2018-09-13 | Stop reason: HOSPADM

## 2018-09-13 RX ORDER — OXYCODONE HYDROCHLORIDE AND ACETAMINOPHEN 5; 325 MG/1; MG/1
2 TABLET ORAL PRN
Status: DISCONTINUED | OUTPATIENT
Start: 2018-09-13 | End: 2018-09-13 | Stop reason: HOSPADM

## 2018-09-13 RX ORDER — ENALAPRILAT 2.5 MG/2ML
1.25 INJECTION INTRAVENOUS
Status: DISCONTINUED | OUTPATIENT
Start: 2018-09-13 | End: 2018-09-13 | Stop reason: HOSPADM

## 2018-09-13 RX ORDER — DEXAMETHASONE SODIUM PHOSPHATE 10 MG/ML
INJECTION INTRAMUSCULAR; INTRAVENOUS PRN
Status: DISCONTINUED | OUTPATIENT
Start: 2018-09-13 | End: 2018-09-13 | Stop reason: SDUPTHER

## 2018-09-13 RX ORDER — FENTANYL CITRATE 50 UG/ML
50 INJECTION, SOLUTION INTRAMUSCULAR; INTRAVENOUS
Status: DISCONTINUED | OUTPATIENT
Start: 2018-09-13 | End: 2018-09-13 | Stop reason: HOSPADM

## 2018-09-13 RX ORDER — ONDANSETRON 2 MG/ML
4 INJECTION INTRAMUSCULAR; INTRAVENOUS
Status: COMPLETED | OUTPATIENT
Start: 2018-09-13 | End: 2018-09-13

## 2018-09-13 RX ORDER — SODIUM CHLORIDE 0.9 % (FLUSH) 0.9 %
10 SYRINGE (ML) INJECTION PRN
Status: DISCONTINUED | OUTPATIENT
Start: 2018-09-13 | End: 2018-09-13 | Stop reason: HOSPADM

## 2018-09-13 RX ORDER — LABETALOL HYDROCHLORIDE 5 MG/ML
5 INJECTION, SOLUTION INTRAVENOUS EVERY 10 MIN PRN
Status: DISCONTINUED | OUTPATIENT
Start: 2018-09-13 | End: 2018-09-13 | Stop reason: HOSPADM

## 2018-09-13 RX ORDER — SODIUM CHLORIDE 0.9 % (FLUSH) 0.9 %
10 SYRINGE (ML) INJECTION EVERY 12 HOURS SCHEDULED
Status: DISCONTINUED | OUTPATIENT
Start: 2018-09-13 | End: 2018-09-13 | Stop reason: HOSPADM

## 2018-09-13 RX ORDER — SODIUM CHLORIDE, SODIUM LACTATE, POTASSIUM CHLORIDE, CALCIUM CHLORIDE 600; 310; 30; 20 MG/100ML; MG/100ML; MG/100ML; MG/100ML
INJECTION, SOLUTION INTRAVENOUS CONTINUOUS
Status: DISCONTINUED | OUTPATIENT
Start: 2018-09-13 | End: 2018-09-13 | Stop reason: HOSPADM

## 2018-09-13 RX ORDER — ONDANSETRON 2 MG/ML
INJECTION INTRAMUSCULAR; INTRAVENOUS PRN
Status: DISCONTINUED | OUTPATIENT
Start: 2018-09-13 | End: 2018-09-13 | Stop reason: SDUPTHER

## 2018-09-13 RX ORDER — HYDROMORPHONE HCL IN 0.9% NACL 0.5 MG/ML
0.25 SYRINGE (ML) INTRAVENOUS EVERY 5 MIN PRN
Status: DISCONTINUED | OUTPATIENT
Start: 2018-09-13 | End: 2018-09-13 | Stop reason: HOSPADM

## 2018-09-13 RX ORDER — MEPERIDINE HYDROCHLORIDE 50 MG/ML
12.5 INJECTION INTRAMUSCULAR; INTRAVENOUS; SUBCUTANEOUS EVERY 5 MIN PRN
Status: DISCONTINUED | OUTPATIENT
Start: 2018-09-13 | End: 2018-09-13 | Stop reason: HOSPADM

## 2018-09-13 RX ORDER — PROPOFOL 10 MG/ML
INJECTION, EMULSION INTRAVENOUS CONTINUOUS PRN
Status: DISCONTINUED | OUTPATIENT
Start: 2018-09-13 | End: 2018-09-13 | Stop reason: SDUPTHER

## 2018-09-13 RX ORDER — DIPHENHYDRAMINE HYDROCHLORIDE 50 MG/ML
12.5 INJECTION INTRAMUSCULAR; INTRAVENOUS
Status: DISCONTINUED | OUTPATIENT
Start: 2018-09-13 | End: 2018-09-13 | Stop reason: HOSPADM

## 2018-09-13 RX ADMIN — PROPOFOL 50 MCG/KG/MIN: 10 INJECTION, EMULSION INTRAVENOUS at 12:25

## 2018-09-13 RX ADMIN — DEXAMETHASONE SODIUM PHOSPHATE 10 MG: 10 INJECTION INTRAMUSCULAR; INTRAVENOUS at 12:32

## 2018-09-13 RX ADMIN — ONDANSETRON HYDROCHLORIDE 4 MG: 2 INJECTION, SOLUTION INTRAMUSCULAR; INTRAVENOUS at 12:32

## 2018-09-13 RX ADMIN — ONDANSETRON 4 MG: 2 INJECTION INTRAMUSCULAR; INTRAVENOUS at 13:52

## 2018-09-13 RX ADMIN — CEFAZOLIN SODIUM 3 G: 10 INJECTION, POWDER, FOR SOLUTION INTRAVENOUS at 12:30

## 2018-09-13 RX ADMIN — LIDOCAINE HYDROCHLORIDE 40 ML: 5 INJECTION, SOLUTION INFILTRATION; PERINEURAL at 12:30

## 2018-09-13 RX ADMIN — LIDOCAINE HYDROCHLORIDE 1 ML: 10 INJECTION, SOLUTION EPIDURAL; INFILTRATION; INTRACAUDAL; PERINEURAL at 10:07

## 2018-09-13 RX ADMIN — SODIUM CHLORIDE, POTASSIUM CHLORIDE, SODIUM LACTATE AND CALCIUM CHLORIDE: 600; 310; 30; 20 INJECTION, SOLUTION INTRAVENOUS at 10:07

## 2018-09-13 RX ADMIN — MIDAZOLAM 2 MG: 1 INJECTION INTRAMUSCULAR; INTRAVENOUS at 12:20

## 2018-09-13 RX ADMIN — HYDROMORPHONE HYDROCHLORIDE 0.5 MG: 1 INJECTION, SOLUTION INTRAMUSCULAR; INTRAVENOUS; SUBCUTANEOUS at 12:45

## 2018-09-13 ASSESSMENT — PAIN SCALES - GENERAL: PAINLEVEL_OUTOF10: 1

## 2018-09-13 ASSESSMENT — PAIN - FUNCTIONAL ASSESSMENT: PAIN_FUNCTIONAL_ASSESSMENT: 0-10

## 2018-09-13 ASSESSMENT — LIFESTYLE VARIABLES: SMOKING_STATUS: 0

## 2018-09-13 NOTE — ANESTHESIA PRE PROCEDURE
Department of Anesthesiology  Preprocedure Note       Name:  Kwaku Christine   Age:  65 y.o.  :  1955                                          MRN:  [id number]         Date:  2018      Surgeon: Bere Porter):  Dennise Ghotra MD    Procedure: Procedure(s):  CARPAL TUNNEL RELEASE    Medications prior to admission:   Prior to Admission medications    Medication Sig Start Date End Date Taking? Authorizing Provider   albuterol sulfate HFA (PROAIR HFA) 108 (90 Base) MCG/ACT inhaler Inhale 2 puffs into the lungs every 4 hours as needed for Wheezing or Shortness of Breath 17   Candace Mckeon MD   nitroGLYCERIN (NITROSTAT) 0.4 MG SL tablet up to max of 3 total doses. If no relief after 1 dose, call 911. 17   Glendy Brantley MD   metFORMIN (GLUCOPHAGE) 1000 MG tablet Take 1 tablet by mouth 2 times daily (with meals) 17   Glendy Brantley MD   simvastatin (ZOCOR) 10 MG tablet Take 1 tablet by mouth nightly  Patient taking differently: Take 10 mg by mouth every morning (before breakfast)  17   Glendy Brantley MD   hydrALAZINE (APRESOLINE) 10 MG tablet Take 1 tablet by mouth every 8 hours 17   Glendy Brantley MD   metoprolol tartrate (LOPRESSOR) 25 MG tablet Take 1 tablet by mouth 2 times daily 17   Glendy Brantley MD   furosemide (LASIX) 40 MG tablet Take 1 tablet by mouth daily 17   Glendy Brantley MD   ranitidine (ZANTAC) 150 MG tablet Take 1 tablet by mouth 2 times daily  Patient taking differently: Take 150 mg by mouth daily  17   Glendy Brantley MD   aspirin 81 MG EC tablet Take 81 mg by mouth daily.       Historical Provider, MD       Current medications:    Current Facility-Administered Medications   Medication Dose Route Frequency Provider Last Rate Last Dose    lactated ringers infusion   Intravenous Continuous Dennise Ghotra MD        lidocaine PF 1 % injection 1 mL  1 mL Intradermal Once Dennise Ghotra MD        ceFAZolin (ANCEF) Value Date    PROTIME 13.5 12/18/2017    PROTIME 10.16 09/23/2011    INR 1.04 12/18/2017    APTT 28.3 12/18/2017       HCG (If Applicable): No results found for: PREGTESTUR, PREGSERUM, HCG, HCGQUANT     ABGs:   Lab Results   Component Value Date    PHART 7.320 01/29/2018    PO2ART 60.0 01/29/2018    QJY8GJO 76.0 01/29/2018    FSV8QKV 39.2 01/29/2018    BEART 9.3 01/29/2018    L3OFXPWQ 89.9 01/29/2018        Type & Screen (If Applicable):  No results found for: LABABO, [address]    Anesthesia Evaluation  Patient summary reviewed and Nursing notes reviewed no history of anesthetic complications:   Airway: Mallampati: III  TM distance: >3 FB   Neck ROM: full  Mouth opening: > = 3 FB Dental:          Pulmonary:   (+) COPD (Home O2 continuous):      (-) not a current smoker          Patient did not smoke on day of surgery. Cardiovascular:    (+) hypertension:, past MI: > 6 months, CAD:, CABG/stent (PCI with stent in past):,     (-)  angina    ECG reviewed               Beta Blocker:  Dose within 24 Hrs         Neuro/Psych:   Negative Neuro/Psych ROS              GI/Hepatic/Renal:   (+) GERD: well controlled, morbid obesity          Endo/Other:    (+) Diabetes, . Abdominal:           Vascular:                                      Anesthesia Plan      general and MAC     ASA 3     (Decadron/Zofran Intraop)  Induction: intravenous. BIS  MIPS: Postoperative opioids intended and Prophylactic antiemetics administered. Anesthetic plan and risks discussed with patient.                     Wallace Parks MD   9/13/2018

## 2018-09-13 NOTE — ANESTHESIA POSTPROCEDURE EVALUATION
Department of Anesthesiology  Postprocedure Note    Patient: Brea Booth  MRN: [id number]  [place]: 1955  Date of evaluation: 9/13/2018  Time:  12:58 PM     Procedure Summary     Date:  09/13/18 Room / Location:  [facility] OR  / [facility] OR    Anesthesia Start:  6243 Anesthesia Stop:  0220    Procedure:  CARPAL TUNNEL RELEASE (Left ) Diagnosis:  (G56.02)    Surgeon:  Harleen Milligan MD Responsible Provider:  ELTON Melendez CRNA    Anesthesia Type:  general, MAC ASA Status:  3          Anesthesia Type: general, MAC    Mariam Phase I:      Mariam Phase II:      Last vitals: Reviewed and per EMR flowsheets.        Anesthesia Post Evaluation    Patient location during evaluation: bedside  Patient participation: complete - patient participated  Level of consciousness: awake and alert  Pain score: 0  Airway patency: patent  Nausea & Vomiting: no nausea  Complications: no  Cardiovascular status: hemodynamically stable  Respiratory status: acceptable  Hydration status: euvolemic

## 2018-09-13 NOTE — H&P
Pt Name: Corie Sanchez  MRN: [id number]  YOB: 1955  Date of evaluation: 9/13/2018    H&P including current review of systems was updated in the paper chart and/or the document previously scanned into the record. There have been no significant changes or new problems since the original evaluation. The patient's problems continue and indications for contemplated procedure have not changed.     Electronically signed by David Garcia MD on 9/13/2018 at 12:13 PM

## 2018-11-21 DIAGNOSIS — J44.9 CHRONIC OBSTRUCTIVE PULMONARY DISEASE, UNSPECIFIED COPD TYPE (HCC): Primary | ICD-10-CM

## 2019-01-01 DIAGNOSIS — J44.9 COPD, SEVERE (HCC): ICD-10-CM

## 2019-03-05 PROBLEM — J44.9 STAGE 4 VERY SEVERE COPD BY GOLD CLASSIFICATION (HCC): Status: ACTIVE | Noted: 2019-03-05

## 2019-03-07 RX ORDER — NAPROXEN SODIUM 220 MG
1 TABLET ORAL EVERY 6 HOURS PRN
COMMUNITY

## 2019-03-07 RX ORDER — HYDRALAZINE HYDROCHLORIDE 10 MG/1
10 TABLET, FILM COATED ORAL 3 TIMES DAILY
COMMUNITY
End: 2020-01-01

## 2019-03-07 RX ORDER — ASPIRIN 81 MG/1
81 TABLET, CHEWABLE ORAL DAILY
COMMUNITY

## 2019-03-07 RX ORDER — SIMVASTATIN 10 MG
10 TABLET ORAL NIGHTLY
COMMUNITY

## 2019-03-07 RX ORDER — FUROSEMIDE 40 MG/1
40 TABLET ORAL 2 TIMES DAILY PRN
COMMUNITY

## 2019-03-07 RX ORDER — RANITIDINE 150 MG/1
150 TABLET ORAL 2 TIMES DAILY
COMMUNITY
End: 2020-01-01

## 2019-03-11 ENCOUNTER — OFFICE VISIT (OUTPATIENT)
Dept: PULMONOLOGY | Facility: CLINIC | Age: 64
End: 2019-03-11

## 2019-03-11 VITALS
DIASTOLIC BLOOD PRESSURE: 80 MMHG | BODY MASS INDEX: 43.95 KG/M2 | SYSTOLIC BLOOD PRESSURE: 130 MMHG | HEIGHT: 67 IN | HEART RATE: 71 BPM | WEIGHT: 280 LBS | OXYGEN SATURATION: 88 %

## 2019-03-11 DIAGNOSIS — J96.12 CHRONIC RESPIRATORY FAILURE WITH HYPOXIA AND HYPERCAPNIA (HCC): ICD-10-CM

## 2019-03-11 DIAGNOSIS — J44.9 COPD, SEVERE (HCC): Primary | ICD-10-CM

## 2019-03-11 DIAGNOSIS — G47.33 OSA TREATED WITH BIPAP: ICD-10-CM

## 2019-03-11 DIAGNOSIS — E66.01 MORBID (SEVERE) OBESITY DUE TO EXCESS CALORIES (HCC): ICD-10-CM

## 2019-03-11 DIAGNOSIS — J96.11 CHRONIC RESPIRATORY FAILURE WITH HYPOXIA AND HYPERCAPNIA (HCC): ICD-10-CM

## 2019-03-11 LAB
FEV1/FVC: NORMAL %
FEV1: NORMAL LITERS
FVC VOL RESPIRATORY: NORMAL LITERS

## 2019-03-11 PROCEDURE — 99214 OFFICE O/P EST MOD 30 MIN: CPT | Performed by: INTERNAL MEDICINE

## 2019-03-11 PROCEDURE — 94010 BREATHING CAPACITY TEST: CPT | Performed by: INTERNAL MEDICINE

## 2019-03-11 NOTE — PATIENT INSTRUCTIONS
I did advise the patient that his flow volume loop showed some improvement from previous.  Samples of the TrelegyEllipta were provided and I will see him back in 6 months.  I discussed possible pulmonary rehab referral with him and he would like to hold off at this time.

## 2019-03-12 NOTE — PROGRESS NOTES
Subjective   Tramaine Perera is a 63 y.o. male.     Chief Complaint   Patient presents with   • Shortness of Breath   • Wheezing   • Cough      No My Sticky Note on file.    History of Present Illness   The patient returns today for follow-up to include spirometry.  He states the Trelegy Ellipta has helped him immensely.  His major complaint at this time is actually back pain.  I discussed the possibility of a pulmonary rehab referral with him he states he just could not exercise with his back issues at present but will consider this for the future.    Medical/Family/Social History   has a past medical history of Arthritis, Back pain, COPD (chronic obstructive pulmonary disease) (CMS/[facility]), GERD (gastroesophageal reflux disease), and Hypertension.   has a past surgical history that includes Hip surgery; Back surgery; and Cardiac valve replacement.  family history includes Diabetes in his father; Hypertension in his father and mother.   reports that he has quit smoking. he has never used smokeless tobacco. He reports that he drinks alcohol. He reports that he does not use drugs.  Allergies   Allergen Reactions   • Crestor [Rosuvastatin Calcium] Other (See Comments)     unknown     Medications    Current Outpatient Medications:   •  ALBUTEROL IN, Inhale., Disp: , Rfl:   •  aspirin 81 MG chewable tablet, Chew 81 mg Daily., Disp: , Rfl:   •  fluticasone-salmeterol (ADVAIR) 100-50 MCG/DOSE DISKUS, Inhale 2 (Two) Times a Day., Disp: , Rfl:   •  Fluticasone-Umeclidin-Vilant (TRELEGY ELLIPTA) 100-62.5-25 MCG/INH aerosol powder , Inhale 1 each Daily. Rinse mouth out after using., Disp: 4 each, Rfl: 0  •  furosemide (LASIX) 40 MG tablet, Take 40 mg by mouth 2 (Two) Times a Day., Disp: , Rfl:   •  hydrALAZINE (APRESOLINE) 10 MG tablet, Take 10 mg by mouth 3 (Three) Times a Day., Disp: , Rfl:   •  ipratropium-albuterol (COMBIVENT RESPIMAT)  MCG/ACT inhaler, Inhale 1 puff 4 (Four) Times a Day As Needed for Wheezing., Disp:  ", Rfl:   •  metFORMIN (GLUCOPHAGE) 1000 MG tablet, Take 1,000 mg by mouth 2 (Two) Times a Day With Meals., Disp: , Rfl:   •  metoprolol tartrate (LOPRESSOR) 25 MG tablet, , Disp: , Rfl:   •  Naproxen Sodium (ALEVE PO), Take  by mouth., Disp: , Rfl:   •  raNITIdine (ZANTAC) 150 MG tablet, Take 150 mg by mouth 2 (Two) Times a Day., Disp: , Rfl:   •  simvastatin (ZOCOR) 10 MG tablet, Take 10 mg by mouth Every Night., Disp: , Rfl:     Review of Systems   Constitutional: Positive for fatigue. Negative for chills and fever.   HENT: Negative for congestion.    Eyes: Positive for pain and itching. Negative for visual disturbance.   Respiratory: Positive for shortness of breath. Negative for cough.    Cardiovascular: Negative for chest pain.   Gastrointestinal: Negative for diarrhea, nausea and vomiting.   Genitourinary: Negative for difficulty urinating.   Musculoskeletal: Positive for arthralgias and back pain.   Skin: Negative for rash.   Neurological: Negative for dizziness and speech difficulty.   Hematological: Negative for adenopathy. Bruises/bleeds easily.   Psychiatric/Behavioral: Positive for sleep disturbance. The patient is not nervous/anxious.      ------------------------------------  Objective   /80   Pulse 71   Ht 170.2 cm (67\")   Wt 127 kg (280 lb)   SpO2 (!) 88% Comment: 3l  BMI 43.85 kg/m²   Physical Exam   Constitutional: He is oriented to person, place, and time.   He is a morbidly obese white male.   HENT:   Nasal oxygen is in place.  There is crowding of the posterior pharynx.   Eyes: EOM are normal. Pupils are equal, round, and reactive to light.   Neck:   His neck is thick but there is no JVD noted.   Cardiovascular: Normal rate, regular rhythm and normal heart sounds.   Pulmonary/Chest: Effort normal.   Breath sounds are diminished with a prolonged expiratory phase.   Abdominal:   His abdomen is obese but soft.   Musculoskeletal: He exhibits edema.   He does have 2+ edema of the calves. "   Lymphadenopathy:   No adenopathy is palpated.   Neurological: He is alert and oriented to person, place, and time.   Skin: Skin is warm and dry. There is erythema.   Erythema of the calves is present consistent with some chronic venous stasis changes.   Psychiatric: He has a normal mood and affect.   Nursing note and vitals reviewed.          Pulmonary Functions Testing Results:  FEV1   Date Value Ref Range Status   03/11/2019 30% liters Final     FVC   Date Value Ref Range Status   03/11/2019 41% liters Final     FEV1/FVC   Date Value Ref Range Status   03/11/2019 59.51% % Final      My interpretation of PFT: Spirometry is performed and the patient has evidence of a severe bordering on very severe obstructive ventilatory defect.  This however shows significant improvement compared to studies done last March in both his FVC and FEV1.  Assessment/Plan   Tramaine was seen today for shortness of breath, wheezing and cough.    Diagnoses and all orders for this visit:    COPD, severe (CMS/HCC)  -     Pulmonary Function Test  -     Fluticasone-Umeclidin-Vilant (TRELEGY ELLIPTA) 100-62.5-25 MCG/INH aerosol powder ; Inhale 1 each Daily. Rinse mouth out after using.    RAGHAV treated with BiPAP    Chronic respiratory failure with hypoxia and hypercapnia (CMS/HCC)    Morbid (severe) obesity due to excess calories (CMS/HCC)      Patient's Body mass index is 43.85 kg/m². BMI is above normal parameters. Recommendations include: referral to primary care.    Samples the Trelegy Ellipta were provided and I will see him back in 6 months.

## 2020-01-01 ENCOUNTER — HOSPITAL ENCOUNTER (EMERGENCY)
Age: 65
Discharge: ANOTHER HOSPITAL | End: 2020-01-25
Attending: EMERGENCY MEDICINE
Payer: COMMERCIAL

## 2020-01-01 ENCOUNTER — APPOINTMENT (OUTPATIENT)
Dept: ULTRASOUND IMAGING | Age: 65
DRG: 180 | End: 2020-01-01
Payer: COMMERCIAL

## 2020-01-01 ENCOUNTER — APPOINTMENT (OUTPATIENT)
Dept: GENERAL RADIOLOGY | Age: 65
DRG: 167 | End: 2020-01-01
Payer: COMMERCIAL

## 2020-01-01 ENCOUNTER — HOSPITAL ENCOUNTER (OUTPATIENT)
Dept: GENERAL RADIOLOGY | Age: 65
Discharge: HOME | End: 2020-06-05
Payer: MEDICARE

## 2020-01-01 ENCOUNTER — HOSPITAL ENCOUNTER (OUTPATIENT)
Dept: INFUSION THERAPY | Age: 65
End: 2020-01-01
Payer: COMMERCIAL

## 2020-01-01 ENCOUNTER — TELEPHONE (OUTPATIENT)
Dept: CARDIOLOGY | Age: 65
End: 2020-01-01

## 2020-01-01 ENCOUNTER — TELEPHONE (OUTPATIENT)
Dept: RADIATION ONCOLOGY | Facility: HOSPITAL | Age: 65
End: 2020-01-01

## 2020-01-01 ENCOUNTER — APPOINTMENT (OUTPATIENT)
Dept: GENERAL RADIOLOGY | Age: 65
DRG: 180 | End: 2020-01-01
Payer: COMMERCIAL

## 2020-01-01 ENCOUNTER — DOCUMENTATION (OUTPATIENT)
Dept: RADIATION ONCOLOGY | Facility: HOSPITAL | Age: 65
End: 2020-01-01

## 2020-01-01 ENCOUNTER — LAB REQUISITION (OUTPATIENT)
Dept: LAB | Facility: HOSPITAL | Age: 65
End: 2020-01-01

## 2020-01-01 ENCOUNTER — OUTSIDE FACILITY SERVICE (OUTPATIENT)
Dept: PULMONOLOGY | Facility: CLINIC | Age: 65
End: 2020-01-01

## 2020-01-01 ENCOUNTER — OFFICE VISIT (OUTPATIENT)
Dept: HEMATOLOGY | Age: 65
End: 2020-01-01
Payer: MEDICARE

## 2020-01-01 ENCOUNTER — APPOINTMENT (OUTPATIENT)
Dept: CT IMAGING | Age: 65
DRG: 167 | End: 2020-01-01
Payer: COMMERCIAL

## 2020-01-01 ENCOUNTER — HOSPITAL ENCOUNTER (OUTPATIENT)
Dept: INFUSION THERAPY | Age: 65
Discharge: HOME | End: 2020-06-05
Payer: MEDICARE

## 2020-01-01 ENCOUNTER — TELEPHONE (OUTPATIENT)
Dept: INFUSION THERAPY | Age: 65
End: 2020-01-01

## 2020-01-01 ENCOUNTER — CONSULT (OUTPATIENT)
Dept: RADIATION ONCOLOGY | Facility: HOSPITAL | Age: 65
End: 2020-01-01

## 2020-01-01 ENCOUNTER — OFFICE VISIT (OUTPATIENT)
Dept: SURGERY | Age: 65
End: 2020-01-01
Payer: MEDICARE

## 2020-01-01 ENCOUNTER — APPOINTMENT (OUTPATIENT)
Dept: ULTRASOUND IMAGING | Age: 65
DRG: 167 | End: 2020-01-01
Payer: COMMERCIAL

## 2020-01-01 ENCOUNTER — APPOINTMENT (OUTPATIENT)
Dept: CT IMAGING | Age: 65
DRG: 180 | End: 2020-01-01
Payer: COMMERCIAL

## 2020-01-01 ENCOUNTER — TELEPHONE (OUTPATIENT)
Dept: HEMATOLOGY | Age: 65
End: 2020-01-01

## 2020-01-01 ENCOUNTER — HOSPITAL ENCOUNTER (OUTPATIENT)
Dept: RADIATION ONCOLOGY | Facility: HOSPITAL | Age: 65
Setting detail: RADIATION/ONCOLOGY SERIES
End: 2020-01-01

## 2020-01-01 ENCOUNTER — HOSPITAL ENCOUNTER (OUTPATIENT)
Dept: RADIATION ONCOLOGY | Facility: HOSPITAL | Age: 65
Setting detail: RADIATION/ONCOLOGY SERIES
Discharge: HOME | End: 2020-02-07

## 2020-01-01 ENCOUNTER — ANESTHESIA (OUTPATIENT)
Dept: OPERATING ROOM | Age: 65
DRG: 167 | End: 2020-01-01
Payer: COMMERCIAL

## 2020-01-01 ENCOUNTER — APPOINTMENT (OUTPATIENT)
Dept: CT IMAGING | Age: 65
End: 2020-01-01
Payer: COMMERCIAL

## 2020-01-01 ENCOUNTER — HOSPITAL ENCOUNTER (INPATIENT)
Age: 65
LOS: 12 days | Discharge: HOME HEALTH | DRG: 167 | End: 2020-02-27
Attending: EMERGENCY MEDICINE | Admitting: FAMILY MEDICINE
Payer: COMMERCIAL

## 2020-01-01 ENCOUNTER — HOSPITAL ENCOUNTER (OUTPATIENT)
Dept: INFUSION THERAPY | Age: 65
Discharge: HOME | End: 2020-06-15
Payer: MEDICARE

## 2020-01-01 ENCOUNTER — ANESTHESIA EVENT (OUTPATIENT)
Dept: OPERATING ROOM | Age: 65
DRG: 167 | End: 2020-01-01
Payer: COMMERCIAL

## 2020-01-01 ENCOUNTER — HOSPITAL ENCOUNTER (INPATIENT)
Age: 65
LOS: 7 days | Discharge: NURSING, REHAB OR LONG-TERM CARE | DRG: 180 | End: 2020-03-25
Attending: EMERGENCY MEDICINE | Admitting: INTERNAL MEDICINE
Payer: COMMERCIAL

## 2020-01-01 ENCOUNTER — CLINICAL DOCUMENTATION (OUTPATIENT)
Dept: HEMATOLOGY | Age: 65
End: 2020-01-01

## 2020-01-01 VITALS
HEIGHT: 67 IN | DIASTOLIC BLOOD PRESSURE: 64 MMHG | RESPIRATION RATE: 16 BRPM | WEIGHT: 219.4 LBS | OXYGEN SATURATION: 90 % | HEART RATE: 78 BPM | SYSTOLIC BLOOD PRESSURE: 102 MMHG | TEMPERATURE: 97.3 F | BODY MASS INDEX: 34.44 KG/M2

## 2020-01-01 VITALS
BODY MASS INDEX: 32.02 KG/M2 | WEIGHT: 204 LBS | HEART RATE: 112 BPM | HEIGHT: 67 IN | TEMPERATURE: 98.5 F | OXYGEN SATURATION: 94 %

## 2020-01-01 VITALS
WEIGHT: 204 LBS | SYSTOLIC BLOOD PRESSURE: 90 MMHG | HEART RATE: 130 BPM | RESPIRATION RATE: 28 BRPM | OXYGEN SATURATION: 83 % | BODY MASS INDEX: 32.02 KG/M2 | DIASTOLIC BLOOD PRESSURE: 62 MMHG | HEIGHT: 67 IN

## 2020-01-01 VITALS
TEMPERATURE: 98.2 F | BODY MASS INDEX: 31.98 KG/M2 | HEIGHT: 68 IN | HEART RATE: 110 BPM | WEIGHT: 211 LBS | OXYGEN SATURATION: 98 %

## 2020-01-01 VITALS
WEIGHT: 230.6 LBS | OXYGEN SATURATION: 97 % | SYSTOLIC BLOOD PRESSURE: 100 MMHG | TEMPERATURE: 97 F | HEIGHT: 68 IN | RESPIRATION RATE: 18 BRPM | HEART RATE: 110 BPM | DIASTOLIC BLOOD PRESSURE: 63 MMHG | BODY MASS INDEX: 34.95 KG/M2

## 2020-01-01 VITALS
HEIGHT: 67 IN | OXYGEN SATURATION: 96 % | TEMPERATURE: 98.2 F | HEART RATE: 68 BPM | WEIGHT: 211.2 LBS | BODY MASS INDEX: 33.15 KG/M2

## 2020-01-01 VITALS
HEIGHT: 68 IN | TEMPERATURE: 98.4 F | SYSTOLIC BLOOD PRESSURE: 127 MMHG | OXYGEN SATURATION: 90 % | WEIGHT: 280 LBS | HEART RATE: 100 BPM | BODY MASS INDEX: 42.44 KG/M2 | RESPIRATION RATE: 18 BRPM | DIASTOLIC BLOOD PRESSURE: 79 MMHG

## 2020-01-01 VITALS
WEIGHT: 238 LBS | BODY MASS INDEX: 37.35 KG/M2 | HEIGHT: 67 IN | SYSTOLIC BLOOD PRESSURE: 118 MMHG | DIASTOLIC BLOOD PRESSURE: 73 MMHG

## 2020-01-01 VITALS — OXYGEN SATURATION: 95 % | TEMPERATURE: 97.6 F | RESPIRATION RATE: 1 BRPM

## 2020-01-01 DIAGNOSIS — Z00.00 ENCOUNTER FOR GENERAL ADULT MEDICAL EXAMINATION WITHOUT ABNORMAL FINDINGS: ICD-10-CM

## 2020-01-01 DIAGNOSIS — J96.12 CHRONIC RESPIRATORY FAILURE WITH HYPOXIA AND HYPERCAPNIA (HCC): ICD-10-CM

## 2020-01-01 DIAGNOSIS — C34.82 MALIGNANT NEOPLASM OF OVERLAPPING SITES OF LEFT LUNG (HCC): Primary | ICD-10-CM

## 2020-01-01 DIAGNOSIS — Z99.81 OXYGEN DEPENDENT: ICD-10-CM

## 2020-01-01 DIAGNOSIS — C34.90 MALIGNANT NEOPLASM OF LUNG, UNSPECIFIED LATERALITY, UNSPECIFIED PART OF LUNG (HCC): Primary | ICD-10-CM

## 2020-01-01 DIAGNOSIS — R04.2 HEMOPTYSIS: ICD-10-CM

## 2020-01-01 DIAGNOSIS — Z87.891 FORMER SMOKER: ICD-10-CM

## 2020-01-01 DIAGNOSIS — R53.83 OTHER FATIGUE: ICD-10-CM

## 2020-01-01 DIAGNOSIS — R53.83 FATIGUE, UNSPECIFIED TYPE: ICD-10-CM

## 2020-01-01 DIAGNOSIS — J96.11 CHRONIC RESPIRATORY FAILURE WITH HYPOXIA AND HYPERCAPNIA (HCC): ICD-10-CM

## 2020-01-01 DIAGNOSIS — J44.9 STAGE 4 VERY SEVERE COPD BY GOLD CLASSIFICATION (HCC): ICD-10-CM

## 2020-01-01 DIAGNOSIS — C34.90 MALIGNANT NEOPLASM OF LUNG, UNSPECIFIED LATERALITY, UNSPECIFIED PART OF LUNG (HCC): ICD-10-CM

## 2020-01-01 LAB
ABO/RH: NORMAL
AFB CULTURE (MYCOBACTERIA): NORMAL
AFB SMEAR: NORMAL
ALBUMIN SERPL-MCNC: 1.8 G/DL (ref 3.5–5.2)
ALBUMIN SERPL-MCNC: 2 G/DL (ref 3.5–5.2)
ALBUMIN SERPL-MCNC: 2.1 G/DL (ref 3.5–5.2)
ALBUMIN SERPL-MCNC: 2.2 G/DL (ref 3.5–5.2)
ALBUMIN SERPL-MCNC: 2.3 G/DL (ref 3.5–5.2)
ALBUMIN SERPL-MCNC: 2.4 G/DL (ref 3.5–5.2)
ALBUMIN SERPL-MCNC: 2.5 G/DL (ref 3.5–5.2)
ALBUMIN SERPL-MCNC: 2.6 G/DL (ref 3.5–5.2)
ALBUMIN SERPL-MCNC: 2.9 G/DL (ref 3.5–5.2)
ALBUMIN SERPL-MCNC: 3.5 G/DL (ref 3.5–5.2)
ALDOSTERONE: 4.2 NG/DL
ALDOSTERONE: 7.7 NG/DL
ALP BLD-CCNC: 111 U/L (ref 40–130)
ALP BLD-CCNC: 111 U/L (ref 40–130)
ALP BLD-CCNC: 125 U/L (ref 40–130)
ALP BLD-CCNC: 135 U/L (ref 40–130)
ALP BLD-CCNC: 140 U/L (ref 40–130)
ALP BLD-CCNC: 141 U/L (ref 40–130)
ALP BLD-CCNC: 144 U/L (ref 40–130)
ALP BLD-CCNC: 146 U/L (ref 40–130)
ALP BLD-CCNC: 155 U/L (ref 40–130)
ALP BLD-CCNC: 158 U/L (ref 40–130)
ALP BLD-CCNC: 159 U/L (ref 40–130)
ALP BLD-CCNC: 169 U/L (ref 40–130)
ALP BLD-CCNC: 173 U/L (ref 40–130)
ALP BLD-CCNC: 188 U/L (ref 40–130)
ALP BLD-CCNC: 97 U/L (ref 40–130)
ALP SERPL-CCNC: 147 U/L (ref 39–117)
ALT SERPL W P-5'-P-CCNC: 30 U/L (ref 1–41)
ALT SERPL-CCNC: 10 U/L (ref 5–41)
ALT SERPL-CCNC: 12 U/L (ref 21–72)
ALT SERPL-CCNC: 12 U/L (ref 5–41)
ALT SERPL-CCNC: 12 U/L (ref 5–41)
ALT SERPL-CCNC: 13 U/L (ref 5–41)
ALT SERPL-CCNC: 14 U/L (ref 5–41)
ALT SERPL-CCNC: 15 U/L (ref 5–41)
ALT SERPL-CCNC: 15 U/L (ref 5–41)
ALT SERPL-CCNC: 18 U/L (ref 5–41)
ALT SERPL-CCNC: 20 U/L (ref 5–41)
ALT SERPL-CCNC: 20 U/L (ref 5–41)
ALT SERPL-CCNC: 21 U/L (ref 5–41)
ALT SERPL-CCNC: 25 U/L (ref 5–41)
ALT SERPL-CCNC: 31 U/L (ref 5–41)
ALT SERPL-CCNC: 33 U/L (ref 5–41)
AMMONIA: 27 UMOL/L (ref 16–60)
ANAEROBIC CULTURE: NORMAL
ANION GAP SERPL CALCULATED.3IONS-SCNC: 10 MMOL/L (ref 7–19)
ANION GAP SERPL CALCULATED.3IONS-SCNC: 10 MMOL/L (ref 7–19)
ANION GAP SERPL CALCULATED.3IONS-SCNC: 12 MMOL/L (ref 7–19)
ANION GAP SERPL CALCULATED.3IONS-SCNC: 13 MMOL/L (ref 5–15)
ANION GAP SERPL CALCULATED.3IONS-SCNC: 13 MMOL/L (ref 7–19)
ANION GAP SERPL CALCULATED.3IONS-SCNC: 14 MMOL/L (ref 7–19)
ANION GAP SERPL CALCULATED.3IONS-SCNC: 15 MMOL/L (ref 7–19)
ANION GAP SERPL CALCULATED.3IONS-SCNC: 7 MMOL/L (ref 7–19)
ANION GAP SERPL CALCULATED.3IONS-SCNC: 8 MMOL/L (ref 7–19)
ANION GAP SERPL CALCULATED.3IONS-SCNC: 8 MMOL/L (ref 7–19)
ANION GAP SERPL CALCULATED.3IONS-SCNC: 9 MMOL/L (ref 7–19)
ANTIBODY SCREEN: NORMAL
APPEARANCE FLUID: NORMAL
APTT: 25.2 SEC (ref 26–36.2)
APTT: 29.1 SEC (ref 26–36.2)
APTT: 33.7 SEC (ref 26–36.2)
AST SERPL-CCNC: 11 U/L (ref 5–40)
AST SERPL-CCNC: 13 U/L (ref 5–40)
AST SERPL-CCNC: 16 U/L (ref 5–40)
AST SERPL-CCNC: 16 U/L (ref 5–40)
AST SERPL-CCNC: 17 U/L (ref 5–40)
AST SERPL-CCNC: 18 U/L (ref 5–40)
AST SERPL-CCNC: 18 U/L (ref 5–40)
AST SERPL-CCNC: 19 U/L (ref 17–59)
AST SERPL-CCNC: 19 U/L (ref 1–40)
AST SERPL-CCNC: 19 U/L (ref 5–40)
AST SERPL-CCNC: 20 U/L (ref 5–40)
AST SERPL-CCNC: 24 U/L (ref 5–40)
AST SERPL-CCNC: 25 U/L (ref 5–40)
AST SERPL-CCNC: 31 U/L (ref 5–40)
BACTERIA: NEGATIVE /HPF
BASE EXCESS ARTERIAL: 3.7 MMOL/L (ref -2–2)
BASE EXCESS ARTERIAL: 30 MMOL/L (ref -2–2)
BASOPHILS # BLD AUTO: 0.07 10*3/MM3 (ref 0–0.2)
BASOPHILS ABSOLUTE: 0 K/UL (ref 0–0.2)
BASOPHILS ABSOLUTE: 0.03 K/UL (ref 0.01–0.08)
BASOPHILS ABSOLUTE: 0.04 K/UL (ref 0.01–0.08)
BASOPHILS ABSOLUTE: 0.1 K/UL (ref 0–0.2)
BASOPHILS NFR BLD AUTO: 0.7 % (ref 0–1.5)
BASOPHILS RELATIVE PERCENT: 0.1 % (ref 0.1–1.2)
BASOPHILS RELATIVE PERCENT: 0.2 % (ref 0.1–1.2)
BASOPHILS RELATIVE PERCENT: 0.2 % (ref 0–1)
BASOPHILS RELATIVE PERCENT: 0.3 % (ref 0–1)
BASOPHILS RELATIVE PERCENT: 0.4 % (ref 0–1)
BASOPHILS RELATIVE PERCENT: 0.5 % (ref 0–1)
BASOPHILS RELATIVE PERCENT: 0.5 % (ref 0–1)
BASOPHILS RELATIVE PERCENT: 0.6 % (ref 0–1)
BASOPHILS RELATIVE PERCENT: 0.6 % (ref 0–1)
BASOPHILS RELATIVE PERCENT: 0.7 % (ref 0–1)
BASOPHILS RELATIVE PERCENT: 0.8 % (ref 0–1)
BASOPHILS RELATIVE PERCENT: 0.9 % (ref 0–1)
BASOPHILS RELATIVE PERCENT: 1 % (ref 0–1)
BASOPHILS RELATIVE PERCENT: 1 % (ref 0–1)
BILIRUB CONJ SERPL-MCNC: <0.2 MG/DL (ref 0.2–0.3)
BILIRUB INDIRECT SERPL-MCNC: ABNORMAL MG/DL
BILIRUB SERPL-MCNC: 0.2 MG/DL (ref 0.2–1.2)
BILIRUB SERPL-MCNC: 0.3 MG/DL (ref 0.2–1.2)
BILIRUB SERPL-MCNC: 0.4 MG/DL (ref 0.2–1.2)
BILIRUB SERPL-MCNC: 0.5 MG/DL (ref 0.2–1.2)
BILIRUB SERPL-MCNC: 0.5 MG/DL (ref 0.2–1.2)
BILIRUB SERPL-MCNC: 0.6 MG/DL (ref 0.2–1.3)
BILIRUB SERPL-MCNC: 0.7 MG/DL (ref 0.2–1.2)
BILIRUB SERPL-MCNC: <0.2 MG/DL (ref 0.2–1.2)
BILIRUBIN URINE: ABNORMAL
BLOOD CULTURE, ROUTINE: NORMAL
BLOOD, URINE: NEGATIVE
BODY FLUID CULTURE, STERILE: NORMAL
BUN BLD-MCNC: 8 MG/DL (ref 8–23)
BUN BLDV-MCNC: 10 MG/DL (ref 8–23)
BUN BLDV-MCNC: 11 MG/DL (ref 8–23)
BUN BLDV-MCNC: 14 MG/DL (ref 8–23)
BUN BLDV-MCNC: 17 MG/DL (ref 8–23)
BUN BLDV-MCNC: 17 MG/DL (ref 8–23)
BUN BLDV-MCNC: 18 MG/DL (ref 8–23)
BUN BLDV-MCNC: 18 MG/DL (ref 8–23)
BUN BLDV-MCNC: 20 MG/DL (ref 8–23)
BUN BLDV-MCNC: 23 MG/DL (ref 8–23)
BUN BLDV-MCNC: 26 MG/DL (ref 8–23)
BUN BLDV-MCNC: 30 MG/DL (ref 8–23)
BUN BLDV-MCNC: 33 MG/DL (ref 8–23)
BUN BLDV-MCNC: 8 MG/DL (ref 8–23)
BUN BLDV-MCNC: 8 MG/DL (ref 9–20)
BUN BLDV-MCNC: 9 MG/DL (ref 8–23)
BUN BLDV-MCNC: 9 MG/DL (ref 8–23)
BUN/CREAT SERPL: 10.5 (ref 7–25)
C DIFF TOX GENS STL QL NAA+PROBE: NEGATIVE
C DIFF TOXIN/ANTIGEN: NORMAL
C. DIFFICILE TOXIN MOLECULAR: NORMAL
CALCIUM SERPL-MCNC: 6.8 MG/DL (ref 8.8–10.2)
CALCIUM SERPL-MCNC: 7.1 MG/DL (ref 8.4–10.2)
CALCIUM SERPL-MCNC: 7.1 MG/DL (ref 8.8–10.2)
CALCIUM SERPL-MCNC: 7.4 MG/DL (ref 8.8–10.2)
CALCIUM SERPL-MCNC: 7.7 MG/DL (ref 8.8–10.2)
CALCIUM SERPL-MCNC: 8 MG/DL (ref 8.8–10.2)
CALCIUM SERPL-MCNC: 8 MG/DL (ref 8.8–10.2)
CALCIUM SERPL-MCNC: 8.1 MG/DL (ref 8.8–10.2)
CALCIUM SERPL-MCNC: 8.1 MG/DL (ref 8.8–10.2)
CALCIUM SERPL-MCNC: 8.2 MG/DL (ref 8.8–10.2)
CALCIUM SERPL-MCNC: 8.3 MG/DL (ref 8.8–10.2)
CALCIUM SERPL-MCNC: 8.3 MG/DL (ref 8.8–10.2)
CALCIUM SERPL-MCNC: 8.4 MG/DL (ref 8.8–10.2)
CALCIUM SERPL-MCNC: 8.4 MG/DL (ref 8.8–10.2)
CALCIUM SERPL-MCNC: 8.6 MG/DL (ref 8.8–10.2)
CALCIUM SERPL-MCNC: 8.6 MG/DL (ref 8.8–10.2)
CALCIUM SERPL-MCNC: 8.7 MG/DL (ref 8.8–10.2)
CALCIUM SERPL-MCNC: 8.8 MG/DL (ref 8.8–10.2)
CALCIUM SERPL-MCNC: 8.9 MG/DL (ref 8.8–10.2)
CALCIUM SERPL-MCNC: 9 MG/DL (ref 8.8–10.2)
CALCIUM SERPL-MCNC: 9.2 MG/DL (ref 8.8–10.2)
CALCIUM SERPL-MCNC: 9.3 MG/DL (ref 8.8–10.2)
CALCIUM SERPL-MCNC: 9.4 MG/DL (ref 8.8–10.2)
CALCIUM SPEC-SCNC: 8.6 MG/DL (ref 8.6–10.5)
CARBOXYHEMOGLOBIN ARTERIAL: 2.7 % (ref 0–5)
CARBOXYHEMOGLOBIN ARTERIAL: 3.1 % (ref 0–5)
CELL COUNT FLUID TYPE: NORMAL
CHLORIDE BLD-SCNC: 79 MMOL/L (ref 98–111)
CHLORIDE BLD-SCNC: 81 MMOL/L (ref 98–111)
CHLORIDE BLD-SCNC: 82 MMOL/L (ref 98–111)
CHLORIDE BLD-SCNC: 83 MMOL/L (ref 98–111)
CHLORIDE BLD-SCNC: 84 MMOL/L (ref 98–111)
CHLORIDE BLD-SCNC: 85 MMOL/L (ref 98–111)
CHLORIDE BLD-SCNC: 86 MMOL/L (ref 98–111)
CHLORIDE BLD-SCNC: 88 MMOL/L (ref 98–111)
CHLORIDE BLD-SCNC: 89 MMOL/L (ref 98–111)
CHLORIDE BLD-SCNC: 89 MMOL/L (ref 98–111)
CHLORIDE BLD-SCNC: 90 MMOL/L (ref 98–111)
CHLORIDE BLD-SCNC: 91 MMOL/L (ref 98–111)
CHLORIDE BLD-SCNC: 91 MMOL/L (ref 98–111)
CHLORIDE BLD-SCNC: 93 MMOL/L (ref 98–111)
CHLORIDE BLD-SCNC: 94 MMOL/L (ref 98–111)
CHLORIDE SERPL-SCNC: 95 MMOL/L (ref 98–107)
CHOLEST SERPL-MCNC: 128 MG/DL (ref 0–200)
CLARITY: CLEAR
CLOT EVALUATION: NORMAL
CO2 SERPL-SCNC: 29 MMOL/L (ref 22–29)
CO2: 28 MMOL/L (ref 22–29)
CO2: 28 MMOL/L (ref 22–29)
CO2: 29 MMOL/L (ref 22–29)
CO2: 30 MMOL/L (ref 22–29)
CO2: 30 MMOL/L (ref 22–29)
CO2: 31 MMOL/L (ref 22–29)
CO2: 32 MMOL/L (ref 22–29)
CO2: 32 MMOL/L (ref 22–29)
CO2: 33 MMOL/L (ref 22–29)
CO2: 33 MMOL/L (ref 22–29)
CO2: 34 MMOL/L (ref 22–29)
CO2: 35 MMOL/L (ref 22–29)
CO2: 39 MMOL/L (ref 22–29)
CO2: 40 MMOL/L (ref 22–29)
CO2: 40 MMOL/L (ref 22–29)
CO2: 41 MMOL/L (ref 22–29)
CO2: 42 MMOL/L (ref 22–29)
CO2: 44 MMOL/L (ref 22–29)
COLOR FLUID: NORMAL
COLOR: YELLOW
CREAT BLD-MCNC: 0.76 MG/DL (ref 0.76–1.27)
CREAT SERPL-MCNC: 0.5 MG/DL (ref 0.5–1.2)
CREAT SERPL-MCNC: 0.5 MG/DL (ref 0.5–1.2)
CREAT SERPL-MCNC: 0.6 MG/DL (ref 0.5–1.2)
CREAT SERPL-MCNC: 0.7 MG/DL (ref 0.5–1.2)
CREAT SERPL-MCNC: 0.8 MG/DL (ref 0.5–1.2)
CREAT SERPL-MCNC: 0.9 MG/DL (ref 0.5–1.2)
CREAT SERPL-MCNC: 1.1 MG/DL (ref 0.5–1.2)
CREAT SERPL-MCNC: <0.5 MG/DL (ref 0.6–1.2)
CULTURE, BLOOD 2: NORMAL
DEPRECATED RDW RBC AUTO: 56.8 FL (ref 37–54)
DIGOXIN LEVEL: 1.1 NG/ML (ref 0.6–1.2)
DIGOXIN SERPL-MCNC: 0.6 NG/ML (ref 0.6–1.2)
DIGOXIN SERPL-MCNC: 0.8 NG/ML (ref 0.6–1.2)
EKG P AXIS: NORMAL DEGREES
EKG P-R INTERVAL: NORMAL MS
EKG Q-T INTERVAL: 252 MS
EKG Q-T INTERVAL: 258 MS
EKG Q-T INTERVAL: 276 MS
EKG Q-T INTERVAL: 358 MS
EKG Q-T INTERVAL: 378 MS
EKG Q-T INTERVAL: 476 MS
EKG QRS DURATION: 102 MS
EKG QRS DURATION: 118 MS
EKG QRS DURATION: 82 MS
EKG QRS DURATION: 84 MS
EKG QRS DURATION: 84 MS
EKG QRS DURATION: 90 MS
EKG QTC CALCULATION (BAZETT): 372 MS
EKG QTC CALCULATION (BAZETT): 389 MS
EKG QTC CALCULATION (BAZETT): 402 MS
EKG QTC CALCULATION (BAZETT): 406 MS
EKG QTC CALCULATION (BAZETT): 412 MS
EKG QTC CALCULATION (BAZETT): 484 MS
EKG T AXIS: -103 DEGREES
EKG T AXIS: -114 DEGREES
EKG T AXIS: -133 DEGREES
EKG T AXIS: -139 DEGREES
EKG T AXIS: -159 DEGREES
EKG T AXIS: -162 DEGREES
EOSINOPHIL # BLD AUTO: 0.16 10*3/MM3 (ref 0–0.4)
EOSINOPHIL NFR BLD AUTO: 1.6 % (ref 0.3–6.2)
EOSINOPHILS ABSOLUTE: 0 K/UL (ref 0–0.6)
EOSINOPHILS ABSOLUTE: 0.07 K/UL (ref 0.04–0.54)
EOSINOPHILS ABSOLUTE: 0.1 K/UL (ref 0.04–0.54)
EOSINOPHILS ABSOLUTE: 0.1 K/UL (ref 0–0.6)
EOSINOPHILS ABSOLUTE: 0.2 K/UL (ref 0–0.6)
EOSINOPHILS ABSOLUTE: 0.3 K/UL (ref 0–0.6)
EOSINOPHILS ABSOLUTE: 0.3 K/UL (ref 0–0.6)
EOSINOPHILS RELATIVE PERCENT: 0 % (ref 0–5)
EOSINOPHILS RELATIVE PERCENT: 0.1 % (ref 0–5)
EOSINOPHILS RELATIVE PERCENT: 0.2 % (ref 0–5)
EOSINOPHILS RELATIVE PERCENT: 0.2 % (ref 0–5)
EOSINOPHILS RELATIVE PERCENT: 0.3 % (ref 0–5)
EOSINOPHILS RELATIVE PERCENT: 0.4 % (ref 0.7–7)
EOSINOPHILS RELATIVE PERCENT: 0.5 % (ref 0.7–7)
EOSINOPHILS RELATIVE PERCENT: 0.6 % (ref 0–5)
EOSINOPHILS RELATIVE PERCENT: 1 % (ref 0–5)
EOSINOPHILS RELATIVE PERCENT: 1.4 % (ref 0–5)
EOSINOPHILS RELATIVE PERCENT: 1.6 % (ref 0–5)
EOSINOPHILS RELATIVE PERCENT: 1.6 % (ref 0–5)
EOSINOPHILS RELATIVE PERCENT: 1.7 % (ref 0–5)
EOSINOPHILS RELATIVE PERCENT: 1.8 % (ref 0–5)
EOSINOPHILS RELATIVE PERCENT: 1.9 % (ref 0–5)
EOSINOPHILS RELATIVE PERCENT: 2.2 % (ref 0–5)
EOSINOPHILS RELATIVE PERCENT: 2.4 % (ref 0–5)
EOSINOPHILS RELATIVE PERCENT: 2.5 % (ref 0–5)
EOSINOPHILS RELATIVE PERCENT: 2.7 % (ref 0–5)
EOSINOPHILS RELATIVE PERCENT: 3 % (ref 0–5)
EPITHELIAL CELLS, UA: 3 /HPF (ref 0–5)
ERYTHROCYTE [DISTWIDTH] IN BLOOD BY AUTOMATED COUNT: 18.4 % (ref 12.3–15.4)
FLUID TYPE: NORMAL
FUNGUS (MYCOLOGY) CULTURE: NORMAL
GFR NON-AFRICAN AMERICAN: >60
GFR SERPL CREATININE-BSD FRML MDRD: 104 ML/MIN/1.73
GLOBULIN: 3.1 G/DL
GLUCOSE BLD-MCNC: 101 MG/DL (ref 74–106)
GLUCOSE BLD-MCNC: 110 MG/DL (ref 70–99)
GLUCOSE BLD-MCNC: 112 MG/DL (ref 70–99)
GLUCOSE BLD-MCNC: 115 MG/DL (ref 65–99)
GLUCOSE BLD-MCNC: 115 MG/DL (ref 70–99)
GLUCOSE BLD-MCNC: 119 MG/DL (ref 70–99)
GLUCOSE BLD-MCNC: 119 MG/DL (ref 74–109)
GLUCOSE BLD-MCNC: 119 MG/DL (ref 74–109)
GLUCOSE BLD-MCNC: 123 MG/DL (ref 70–99)
GLUCOSE BLD-MCNC: 124 MG/DL (ref 70–99)
GLUCOSE BLD-MCNC: 125 MG/DL (ref 70–99)
GLUCOSE BLD-MCNC: 125 MG/DL (ref 70–99)
GLUCOSE BLD-MCNC: 126 MG/DL (ref 70–99)
GLUCOSE BLD-MCNC: 126 MG/DL (ref 74–109)
GLUCOSE BLD-MCNC: 128 MG/DL (ref 70–99)
GLUCOSE BLD-MCNC: 128 MG/DL (ref 74–109)
GLUCOSE BLD-MCNC: 128 MG/DL (ref 74–109)
GLUCOSE BLD-MCNC: 129 MG/DL (ref 70–99)
GLUCOSE BLD-MCNC: 130 MG/DL (ref 74–109)
GLUCOSE BLD-MCNC: 131 MG/DL (ref 70–99)
GLUCOSE BLD-MCNC: 133 MG/DL (ref 70–99)
GLUCOSE BLD-MCNC: 133 MG/DL (ref 70–99)
GLUCOSE BLD-MCNC: 134 MG/DL (ref 70–99)
GLUCOSE BLD-MCNC: 135 MG/DL (ref 70–99)
GLUCOSE BLD-MCNC: 135 MG/DL (ref 70–99)
GLUCOSE BLD-MCNC: 135 MG/DL (ref 74–109)
GLUCOSE BLD-MCNC: 138 MG/DL (ref 70–99)
GLUCOSE BLD-MCNC: 138 MG/DL (ref 70–99)
GLUCOSE BLD-MCNC: 140 MG/DL (ref 70–99)
GLUCOSE BLD-MCNC: 141 MG/DL (ref 70–99)
GLUCOSE BLD-MCNC: 141 MG/DL (ref 70–99)
GLUCOSE BLD-MCNC: 142 MG/DL (ref 70–99)
GLUCOSE BLD-MCNC: 143 MG/DL (ref 74–109)
GLUCOSE BLD-MCNC: 144 MG/DL (ref 70–99)
GLUCOSE BLD-MCNC: 145 MG/DL (ref 70–99)
GLUCOSE BLD-MCNC: 146 MG/DL (ref 70–99)
GLUCOSE BLD-MCNC: 147 MG/DL (ref 70–99)
GLUCOSE BLD-MCNC: 147 MG/DL (ref 70–99)
GLUCOSE BLD-MCNC: 148 MG/DL (ref 70–99)
GLUCOSE BLD-MCNC: 148 MG/DL (ref 70–99)
GLUCOSE BLD-MCNC: 149 MG/DL (ref 70–99)
GLUCOSE BLD-MCNC: 149 MG/DL (ref 74–109)
GLUCOSE BLD-MCNC: 150 MG/DL (ref 70–99)
GLUCOSE BLD-MCNC: 150 MG/DL (ref 74–109)
GLUCOSE BLD-MCNC: 151 MG/DL (ref 70–99)
GLUCOSE BLD-MCNC: 153 MG/DL (ref 70–99)
GLUCOSE BLD-MCNC: 153 MG/DL (ref 74–109)
GLUCOSE BLD-MCNC: 154 MG/DL (ref 70–99)
GLUCOSE BLD-MCNC: 154 MG/DL (ref 70–99)
GLUCOSE BLD-MCNC: 155 MG/DL (ref 70–99)
GLUCOSE BLD-MCNC: 155 MG/DL (ref 70–99)
GLUCOSE BLD-MCNC: 155 MG/DL (ref 74–109)
GLUCOSE BLD-MCNC: 159 MG/DL (ref 70–99)
GLUCOSE BLD-MCNC: 159 MG/DL (ref 74–109)
GLUCOSE BLD-MCNC: 160 MG/DL (ref 70–99)
GLUCOSE BLD-MCNC: 160 MG/DL (ref 70–99)
GLUCOSE BLD-MCNC: 160 MG/DL (ref 74–109)
GLUCOSE BLD-MCNC: 161 MG/DL (ref 70–99)
GLUCOSE BLD-MCNC: 164 MG/DL (ref 70–99)
GLUCOSE BLD-MCNC: 165 MG/DL (ref 70–99)
GLUCOSE BLD-MCNC: 166 MG/DL (ref 70–99)
GLUCOSE BLD-MCNC: 166 MG/DL (ref 74–109)
GLUCOSE BLD-MCNC: 168 MG/DL (ref 74–109)
GLUCOSE BLD-MCNC: 169 MG/DL (ref 70–99)
GLUCOSE BLD-MCNC: 170 MG/DL (ref 74–109)
GLUCOSE BLD-MCNC: 171 MG/DL (ref 70–99)
GLUCOSE BLD-MCNC: 171 MG/DL (ref 70–99)
GLUCOSE BLD-MCNC: 172 MG/DL (ref 70–99)
GLUCOSE BLD-MCNC: 173 MG/DL (ref 70–99)
GLUCOSE BLD-MCNC: 174 MG/DL (ref 70–99)
GLUCOSE BLD-MCNC: 177 MG/DL (ref 70–99)
GLUCOSE BLD-MCNC: 180 MG/DL (ref 70–99)
GLUCOSE BLD-MCNC: 184 MG/DL (ref 70–99)
GLUCOSE BLD-MCNC: 185 MG/DL (ref 70–99)
GLUCOSE BLD-MCNC: 185 MG/DL (ref 74–109)
GLUCOSE BLD-MCNC: 186 MG/DL (ref 70–99)
GLUCOSE BLD-MCNC: 187 MG/DL (ref 70–99)
GLUCOSE BLD-MCNC: 188 MG/DL (ref 70–99)
GLUCOSE BLD-MCNC: 190 MG/DL (ref 70–99)
GLUCOSE BLD-MCNC: 192 MG/DL (ref 70–99)
GLUCOSE BLD-MCNC: 193 MG/DL (ref 74–109)
GLUCOSE BLD-MCNC: 194 MG/DL (ref 74–109)
GLUCOSE BLD-MCNC: 196 MG/DL (ref 70–99)
GLUCOSE BLD-MCNC: 196 MG/DL (ref 74–109)
GLUCOSE BLD-MCNC: 198 MG/DL (ref 70–99)
GLUCOSE BLD-MCNC: 201 MG/DL (ref 70–99)
GLUCOSE BLD-MCNC: 202 MG/DL (ref 70–99)
GLUCOSE BLD-MCNC: 202 MG/DL (ref 70–99)
GLUCOSE BLD-MCNC: 203 MG/DL (ref 70–99)
GLUCOSE BLD-MCNC: 213 MG/DL (ref 74–109)
GLUCOSE BLD-MCNC: 214 MG/DL (ref 70–99)
GLUCOSE BLD-MCNC: 227 MG/DL (ref 70–99)
GLUCOSE BLD-MCNC: 236 MG/DL (ref 70–99)
GLUCOSE BLD-MCNC: 239 MG/DL (ref 70–99)
GLUCOSE BLD-MCNC: 248 MG/DL (ref 70–99)
GLUCOSE URINE: NEGATIVE MG/DL
GLUCOSE, FLUID: 163
GRAM STAIN RESULT: NORMAL
HBA1C MFR BLD: 5.9 % (ref 4.8–5.6)
HCO3 ARTERIAL: 29.7 MMOL/L (ref 22–26)
HCO3 ARTERIAL: 56.8 MMOL/L (ref 22–26)
HCT VFR BLD AUTO: 32.4 % (ref 37.5–51)
HCT VFR BLD CALC: 29.5 % (ref 40.1–51)
HCT VFR BLD CALC: 30.3 % (ref 42–52)
HCT VFR BLD CALC: 30.4 % (ref 42–52)
HCT VFR BLD CALC: 30.6 % (ref 42–52)
HCT VFR BLD CALC: 31.7 % (ref 42–52)
HCT VFR BLD CALC: 32.3 % (ref 42–52)
HCT VFR BLD CALC: 32.8 % (ref 42–52)
HCT VFR BLD CALC: 33.3 % (ref 42–52)
HCT VFR BLD CALC: 34.2 % (ref 42–52)
HCT VFR BLD CALC: 36.1 % (ref 40.1–51)
HCT VFR BLD CALC: 36.8 % (ref 42–52)
HCT VFR BLD CALC: 37.3 % (ref 42–52)
HCT VFR BLD CALC: 40 % (ref 42–52)
HCT VFR BLD CALC: 40 % (ref 42–52)
HCT VFR BLD CALC: 40.8 % (ref 42–52)
HCT VFR BLD CALC: 42.4 % (ref 42–52)
HCT VFR BLD CALC: 42.4 % (ref 42–52)
HCT VFR BLD CALC: 42.5 % (ref 42–52)
HCT VFR BLD CALC: 44.1 % (ref 42–52)
HCT VFR BLD CALC: 44.5 % (ref 42–52)
HCT VFR BLD CALC: 45.2 % (ref 42–52)
HCT VFR BLD CALC: 45.8 % (ref 42–52)
HCT VFR BLD CALC: 46.9 % (ref 42–52)
HCT VFR BLD CALC: 49.3 % (ref 42–52)
HDLC SERPL-MCNC: 39 MG/DL (ref 40–60)
HEMOGLOBIN, ART, EXTENDED: 13.9 G/DL (ref 14–18)
HEMOGLOBIN, ART, EXTENDED: 9.9 G/DL (ref 14–18)
HEMOGLOBIN: 10.2 G/DL (ref 13.7–17.5)
HEMOGLOBIN: 10.5 G/DL (ref 14–18)
HEMOGLOBIN: 10.9 G/DL (ref 14–18)
HEMOGLOBIN: 11.5 G/DL (ref 14–18)
HEMOGLOBIN: 12.1 G/DL (ref 14–18)
HEMOGLOBIN: 12.2 G/DL (ref 14–18)
HEMOGLOBIN: 12.3 G/DL (ref 14–18)
HEMOGLOBIN: 12.8 G/DL (ref 14–18)
HEMOGLOBIN: 12.9 G/DL (ref 14–18)
HEMOGLOBIN: 13.3 G/DL (ref 14–18)
HEMOGLOBIN: 13.3 G/DL (ref 14–18)
HEMOGLOBIN: 13.5 G/DL (ref 14–18)
HEMOGLOBIN: 13.7 G/DL (ref 14–18)
HEMOGLOBIN: 14.1 G/DL (ref 14–18)
HEMOGLOBIN: 14.1 G/DL (ref 14–18)
HEMOGLOBIN: 14.5 G/DL (ref 14–18)
HEMOGLOBIN: 8.9 G/DL (ref 13.7–17.5)
HEMOGLOBIN: 9.1 G/DL (ref 14–18)
HEMOGLOBIN: 9.2 G/DL (ref 14–18)
HEMOGLOBIN: 9.4 G/DL (ref 14–18)
HEMOGLOBIN: 9.6 G/DL (ref 14–18)
HEMOGLOBIN: 9.7 G/DL (ref 14–18)
HEMOGLOBIN: 9.7 G/DL (ref 14–18)
HEMOGLOBIN: 9.9 G/DL (ref 14–18)
HGB BLD-MCNC: 9.8 G/DL (ref 13–17.7)
HYALINE CASTS: 8 /HPF (ref 0–8)
IMM GRANULOCYTES # BLD AUTO: 0.23 10*3/MM3 (ref 0–0.05)
IMM GRANULOCYTES NFR BLD AUTO: 2.4 % (ref 0–0.5)
IMMATURE GRANULOCYTES #: 0 K/UL
IMMATURE GRANULOCYTES #: 0.1 K/UL
IMMATURE GRANULOCYTES #: 0.2 K/UL
IMMATURE GRANULOCYTES #: 0.3 K/UL
IMMATURE GRANULOCYTES #: 0.4 K/UL
IMMATURE GRANULOCYTES #: 0.5 K/UL
IMMATURE GRANULOCYTES #: 0.6 K/UL
IMMATURE GRANULOCYTES #: 0.7 K/UL
IMMATURE GRANULOCYTES #: 0.8 K/UL
INR BLD: 1.05 (ref 0.88–1.18)
INR BLD: 1.09 (ref 0.88–1.18)
INR BLD: 1.15 (ref 0.88–1.18)
INR BLD: 1.19 (ref 0.88–1.18)
INTERPRETATION: NORMAL
KETONES, URINE: ABNORMAL MG/DL
KOH PREP: NORMAL
LACTATE DEHYDROGENASE: 204 U/L (ref 91–215)
LACTIC ACID: 1.6 MMOL/L (ref 0.5–1.9)
LACTIC ACID: 3 MMOL/L (ref 0.5–1.9)
LD, FLUID: 139 U/L
LDLC SERPL CALC-MCNC: 66 MG/DL (ref 0–100)
LDLC/HDLC SERPL: 1.7 {RATIO}
LEUKOCYTE ESTERASE, URINE: ABNORMAL
LIPASE: 15 U/L (ref 13–60)
LV EF: 60 %
LVEF MODALITY: NORMAL
LYMPHOCYTES # BLD AUTO: 1.05 10*3/MM3 (ref 0.7–3.1)
LYMPHOCYTES ABSOLUTE: 0.5 K/UL (ref 1.1–4.5)
LYMPHOCYTES ABSOLUTE: 0.5 K/UL (ref 1.1–4.5)
LYMPHOCYTES ABSOLUTE: 0.6 K/UL (ref 1.18–3.74)
LYMPHOCYTES ABSOLUTE: 0.6 K/UL (ref 1.1–4.5)
LYMPHOCYTES ABSOLUTE: 0.69 K/UL (ref 1.18–3.74)
LYMPHOCYTES ABSOLUTE: 0.7 K/UL (ref 1.1–4.5)
LYMPHOCYTES ABSOLUTE: 0.7 K/UL (ref 1.1–4.5)
LYMPHOCYTES ABSOLUTE: 0.8 K/UL (ref 1.1–4.5)
LYMPHOCYTES ABSOLUTE: 0.8 K/UL (ref 1.1–4.5)
LYMPHOCYTES ABSOLUTE: 0.9 K/UL (ref 1.1–4.5)
LYMPHOCYTES ABSOLUTE: 1 K/UL (ref 1.1–4.5)
LYMPHOCYTES ABSOLUTE: 1.1 K/UL (ref 1.1–4.5)
LYMPHOCYTES NFR BLD AUTO: 10.8 % (ref 19.6–45.3)
LYMPHOCYTES RELATIVE PERCENT: 11 % (ref 20–40)
LYMPHOCYTES RELATIVE PERCENT: 11.2 % (ref 20–40)
LYMPHOCYTES RELATIVE PERCENT: 11.7 % (ref 20–40)
LYMPHOCYTES RELATIVE PERCENT: 12.5 % (ref 20–40)
LYMPHOCYTES RELATIVE PERCENT: 12.7 % (ref 20–40)
LYMPHOCYTES RELATIVE PERCENT: 12.8 % (ref 20–40)
LYMPHOCYTES RELATIVE PERCENT: 13.4 % (ref 20–40)
LYMPHOCYTES RELATIVE PERCENT: 2.4 % (ref 20–40)
LYMPHOCYTES RELATIVE PERCENT: 2.7 % (ref 20–40)
LYMPHOCYTES RELATIVE PERCENT: 2.8 % (ref 19.3–53.1)
LYMPHOCYTES RELATIVE PERCENT: 2.8 % (ref 20–40)
LYMPHOCYTES RELATIVE PERCENT: 3.9 % (ref 19.3–53.1)
LYMPHOCYTES RELATIVE PERCENT: 3.9 % (ref 20–40)
LYMPHOCYTES RELATIVE PERCENT: 4.3 % (ref 20–40)
LYMPHOCYTES RELATIVE PERCENT: 4.4 % (ref 20–40)
LYMPHOCYTES RELATIVE PERCENT: 4.4 % (ref 20–40)
LYMPHOCYTES RELATIVE PERCENT: 4.6 % (ref 20–40)
LYMPHOCYTES RELATIVE PERCENT: 5.1 % (ref 20–40)
LYMPHOCYTES RELATIVE PERCENT: 5.5 % (ref 20–40)
LYMPHOCYTES RELATIVE PERCENT: 8.7 % (ref 20–40)
LYMPHOCYTES RELATIVE PERCENT: 9.8 % (ref 20–40)
LYMPHOCYTES RELATIVE PERCENT: 9.9 % (ref 20–40)
LYMPHOCYTES, BODY FLUID: 83 %
MAGNESIUM: 0.9 MG/DL (ref 1.6–2.4)
MAGNESIUM: 1.4 MG/DL (ref 1.6–2.4)
MAGNESIUM: 1.6 MG/DL (ref 1.6–2.4)
MAGNESIUM: 1.7 MG/DL (ref 1.6–2.4)
MAGNESIUM: 1.7 MG/DL (ref 1.6–2.4)
MAGNESIUM: 1.8 MG/DL (ref 1.6–2.4)
MAGNESIUM: 1.8 MG/DL (ref 1.6–2.4)
MCH RBC QN AUTO: 25.4 PG (ref 25.7–32.2)
MCH RBC QN AUTO: 25.9 PG (ref 25.7–32.2)
MCH RBC QN AUTO: 26.5 PG (ref 27–31)
MCH RBC QN AUTO: 26.5 PG (ref 27–31)
MCH RBC QN AUTO: 26.6 PG (ref 26.6–33)
MCH RBC QN AUTO: 26.6 PG (ref 27–31)
MCH RBC QN AUTO: 26.7 PG (ref 27–31)
MCH RBC QN AUTO: 26.8 PG (ref 27–31)
MCH RBC QN AUTO: 26.8 PG (ref 27–31)
MCH RBC QN AUTO: 26.9 PG (ref 27–31)
MCH RBC QN AUTO: 27 PG (ref 27–31)
MCH RBC QN AUTO: 27.1 PG (ref 27–31)
MCH RBC QN AUTO: 27.3 PG (ref 27–31)
MCHC RBC AUTO-ENTMCNC: 28.3 G/DL (ref 32.3–36.5)
MCHC RBC AUTO-ENTMCNC: 29.4 G/DL (ref 33–37)
MCHC RBC AUTO-ENTMCNC: 29.6 G/DL (ref 33–37)
MCHC RBC AUTO-ENTMCNC: 29.6 G/DL (ref 33–37)
MCHC RBC AUTO-ENTMCNC: 29.7 G/DL (ref 33–37)
MCHC RBC AUTO-ENTMCNC: 30.1 G/DL (ref 33–37)
MCHC RBC AUTO-ENTMCNC: 30.2 G/DL (ref 31.5–35.7)
MCHC RBC AUTO-ENTMCNC: 30.2 G/DL (ref 32.3–36.5)
MCHC RBC AUTO-ENTMCNC: 30.2 G/DL (ref 33–37)
MCHC RBC AUTO-ENTMCNC: 30.3 G/DL (ref 33–37)
MCHC RBC AUTO-ENTMCNC: 30.4 G/DL (ref 33–37)
MCHC RBC AUTO-ENTMCNC: 30.5 G/DL (ref 33–37)
MCHC RBC AUTO-ENTMCNC: 30.6 G/DL (ref 33–37)
MCHC RBC AUTO-ENTMCNC: 30.7 G/DL (ref 33–37)
MCHC RBC AUTO-ENTMCNC: 30.8 G/DL (ref 33–37)
MCHC RBC AUTO-ENTMCNC: 30.8 G/DL (ref 33–37)
MCHC RBC AUTO-ENTMCNC: 31 G/DL (ref 33–37)
MCHC RBC AUTO-ENTMCNC: 31.4 G/DL (ref 33–37)
MCV RBC AUTO: 84 FL (ref 79–92.2)
MCV RBC AUTO: 85.8 FL (ref 80–94)
MCV RBC AUTO: 86.8 FL (ref 80–94)
MCV RBC AUTO: 87.3 FL (ref 80–94)
MCV RBC AUTO: 87.4 FL (ref 80–94)
MCV RBC AUTO: 87.6 FL (ref 80–94)
MCV RBC AUTO: 87.8 FL (ref 79–97)
MCV RBC AUTO: 87.9 FL (ref 80–94)
MCV RBC AUTO: 88 FL (ref 80–94)
MCV RBC AUTO: 88.1 FL (ref 80–94)
MCV RBC AUTO: 88.3 FL (ref 80–94)
MCV RBC AUTO: 88.4 FL (ref 80–94)
MCV RBC AUTO: 88.5 FL (ref 80–94)
MCV RBC AUTO: 88.7 FL (ref 80–94)
MCV RBC AUTO: 89.1 FL (ref 80–94)
MCV RBC AUTO: 89.1 FL (ref 80–94)
MCV RBC AUTO: 89.3 FL (ref 80–94)
MCV RBC AUTO: 89.5 FL (ref 80–94)
MCV RBC AUTO: 89.7 FL (ref 80–94)
MCV RBC AUTO: 90 FL (ref 80–94)
MCV RBC AUTO: 90.1 FL (ref 80–94)
MCV RBC AUTO: 90.4 FL (ref 80–94)
MCV RBC AUTO: 90.8 FL (ref 80–94)
MCV RBC AUTO: 91 FL (ref 80–94)
MCV RBC AUTO: 91.6 FL (ref 79–92.2)
METHEMOGLOBIN ARTERIAL: 0.9 %
METHEMOGLOBIN ARTERIAL: 1.2 %
MONOCYTE, FLUID: 5 %
MONOCYTES # BLD AUTO: 0.91 10*3/MM3 (ref 0.1–0.9)
MONOCYTES ABSOLUTE: 0.6 K/UL (ref 0–0.9)
MONOCYTES ABSOLUTE: 0.6 K/UL (ref 0–0.9)
MONOCYTES ABSOLUTE: 0.7 K/UL (ref 0–0.9)
MONOCYTES ABSOLUTE: 0.8 K/UL (ref 0–0.9)
MONOCYTES ABSOLUTE: 0.8 K/UL (ref 0–0.9)
MONOCYTES ABSOLUTE: 0.9 K/UL (ref 0–0.9)
MONOCYTES ABSOLUTE: 1.03 K/UL (ref 0.24–0.82)
MONOCYTES ABSOLUTE: 1.1 K/UL (ref 0–0.9)
MONOCYTES ABSOLUTE: 1.2 K/UL (ref 0–0.9)
MONOCYTES ABSOLUTE: 1.3 K/UL (ref 0–0.9)
MONOCYTES ABSOLUTE: 1.31 K/UL (ref 0.24–0.82)
MONOCYTES ABSOLUTE: 1.4 K/UL (ref 0–0.9)
MONOCYTES ABSOLUTE: 1.5 K/UL (ref 0–0.9)
MONOCYTES ABSOLUTE: 1.7 K/UL (ref 0–0.9)
MONOCYTES NFR BLD AUTO: 9.4 % (ref 5–12)
MONOCYTES RELATIVE PERCENT: 11.4 % (ref 0–10)
MONOCYTES RELATIVE PERCENT: 4.8 % (ref 4.7–12.5)
MONOCYTES RELATIVE PERCENT: 5.6 % (ref 0–10)
MONOCYTES RELATIVE PERCENT: 6.9 % (ref 0–10)
MONOCYTES RELATIVE PERCENT: 7.3 % (ref 0–10)
MONOCYTES RELATIVE PERCENT: 7.5 % (ref 0–10)
MONOCYTES RELATIVE PERCENT: 7.5 % (ref 4.7–12.5)
MONOCYTES RELATIVE PERCENT: 8.1 % (ref 0–10)
MONOCYTES RELATIVE PERCENT: 8.2 % (ref 0–10)
MONOCYTES RELATIVE PERCENT: 8.6 % (ref 0–10)
MONOCYTES RELATIVE PERCENT: 8.7 % (ref 0–10)
MONOCYTES RELATIVE PERCENT: 8.7 % (ref 0–10)
MONOCYTES RELATIVE PERCENT: 8.8 % (ref 0–10)
MONOCYTES RELATIVE PERCENT: 8.8 % (ref 0–10)
MONOCYTES RELATIVE PERCENT: 8.9 % (ref 0–10)
MONOCYTES RELATIVE PERCENT: 9 % (ref 0–10)
MONOCYTES RELATIVE PERCENT: 9.1 % (ref 0–10)
MONOCYTES RELATIVE PERCENT: 9.3 % (ref 0–10)
MONOCYTES RELATIVE PERCENT: 9.3 % (ref 0–10)
MONOCYTES RELATIVE PERCENT: 9.4 % (ref 0–10)
MONOCYTES RELATIVE PERCENT: 9.6 % (ref 0–10)
MONOCYTES RELATIVE PERCENT: 9.9 % (ref 0–10)
NEUTROPHIL, FLUID: 12 %
NEUTROPHILS # BLD AUTO: 7.3 10*3/MM3 (ref 1.7–7)
NEUTROPHILS ABSOLUTE: 12.2 K/UL (ref 1.5–7.5)
NEUTROPHILS ABSOLUTE: 12.9 K/UL (ref 1.5–7.5)
NEUTROPHILS ABSOLUTE: 14.1 K/UL (ref 1.5–7.5)
NEUTROPHILS ABSOLUTE: 15.38 K/UL (ref 1.56–6.13)
NEUTROPHILS ABSOLUTE: 15.6 K/UL (ref 1.5–7.5)
NEUTROPHILS ABSOLUTE: 16 K/UL (ref 1.5–7.5)
NEUTROPHILS ABSOLUTE: 16.5 K/UL (ref 1.5–7.5)
NEUTROPHILS ABSOLUTE: 18 K/UL (ref 1.5–7.5)
NEUTROPHILS ABSOLUTE: 19.57 K/UL (ref 1.56–6.13)
NEUTROPHILS ABSOLUTE: 21.5 K/UL (ref 1.5–7.5)
NEUTROPHILS ABSOLUTE: 4.8 K/UL (ref 1.5–7.5)
NEUTROPHILS ABSOLUTE: 5 K/UL (ref 1.5–7.5)
NEUTROPHILS ABSOLUTE: 5.3 K/UL (ref 1.5–7.5)
NEUTROPHILS ABSOLUTE: 5.4 K/UL (ref 1.5–7.5)
NEUTROPHILS ABSOLUTE: 5.5 K/UL (ref 1.5–7.5)
NEUTROPHILS ABSOLUTE: 6 K/UL (ref 1.5–7.5)
NEUTROPHILS ABSOLUTE: 6.2 K/UL (ref 1.5–7.5)
NEUTROPHILS ABSOLUTE: 6.4 K/UL (ref 1.5–7.5)
NEUTROPHILS ABSOLUTE: 8.1 K/UL (ref 1.5–7.5)
NEUTROPHILS ABSOLUTE: 8.3 K/UL (ref 1.5–7.5)
NEUTROPHILS ABSOLUTE: 9.3 K/UL (ref 1.5–7.5)
NEUTROPHILS ABSOLUTE: 9.4 K/UL (ref 1.5–7.5)
NEUTROPHILS NFR BLD AUTO: 75.1 % (ref 42.7–76)
NEUTROPHILS RELATIVE PERCENT: 68.4 % (ref 50–65)
NEUTROPHILS RELATIVE PERCENT: 69.1 % (ref 50–65)
NEUTROPHILS RELATIVE PERCENT: 70.1 % (ref 50–65)
NEUTROPHILS RELATIVE PERCENT: 70.6 % (ref 50–65)
NEUTROPHILS RELATIVE PERCENT: 71.9 % (ref 50–65)
NEUTROPHILS RELATIVE PERCENT: 72.3 % (ref 50–65)
NEUTROPHILS RELATIVE PERCENT: 72.8 % (ref 50–65)
NEUTROPHILS RELATIVE PERCENT: 73.6 % (ref 50–65)
NEUTROPHILS RELATIVE PERCENT: 76.4 % (ref 50–65)
NEUTROPHILS RELATIVE PERCENT: 78.2 % (ref 50–65)
NEUTROPHILS RELATIVE PERCENT: 81.8 % (ref 50–65)
NEUTROPHILS RELATIVE PERCENT: 82.6 % (ref 50–65)
NEUTROPHILS RELATIVE PERCENT: 83.8 % (ref 50–65)
NEUTROPHILS RELATIVE PERCENT: 84 % (ref 50–65)
NEUTROPHILS RELATIVE PERCENT: 84.3 % (ref 50–65)
NEUTROPHILS RELATIVE PERCENT: 84.4 % (ref 50–65)
NEUTROPHILS RELATIVE PERCENT: 85.8 % (ref 50–65)
NEUTROPHILS RELATIVE PERCENT: 86.8 % (ref 50–65)
NEUTROPHILS RELATIVE PERCENT: 88 % (ref 34–71.1)
NEUTROPHILS RELATIVE PERCENT: 88.7 % (ref 50–65)
NEUTROPHILS RELATIVE PERCENT: 89.3 % (ref 50–65)
NEUTROPHILS RELATIVE PERCENT: 91.8 % (ref 34–71.1)
NITRITE, URINE: NEGATIVE
NRBC BLD AUTO-RTO: 0 /100 WBC (ref 0–0.2)
NUCLEATED CELLS FLUID: 1760 /CUMM
NUMBER OF CELLS COUNTED FLUID: 100
NUMBER OF MARKERS: 26 MARKERS
O2 CONTENT ARTERIAL: 13.4 ML/DL
O2 CONTENT ARTERIAL: 18 ML/DL
O2 SAT, ARTERIAL: 91.9 %
O2 SAT, ARTERIAL: 95.4 %
O2 THERAPY: ABNORMAL
O2 THERAPY: ABNORMAL
PCO2 ARTERIAL: 49 MMHG (ref 35–45)
PCO2 ARTERIAL: 68 MMHG (ref 35–45)
PDW BLD-RTO: 15.8 % (ref 11.5–14.5)
PDW BLD-RTO: 15.8 % (ref 11.5–14.5)
PDW BLD-RTO: 15.9 % (ref 11.5–14.5)
PDW BLD-RTO: 15.9 % (ref 11.5–14.5)
PDW BLD-RTO: 16 % (ref 11.5–14.5)
PDW BLD-RTO: 16 % (ref 11.5–14.5)
PDW BLD-RTO: 16.1 % (ref 11.5–14.5)
PDW BLD-RTO: 16.2 % (ref 11.5–14.5)
PDW BLD-RTO: 16.3 % (ref 11.5–14.5)
PDW BLD-RTO: 16.5 % (ref 11.5–14.5)
PDW BLD-RTO: 16.6 % (ref 11.5–14.5)
PDW BLD-RTO: 16.7 % (ref 11.5–14.5)
PDW BLD-RTO: 16.8 % (ref 11.5–14.5)
PDW BLD-RTO: 16.8 % (ref 11.5–14.5)
PDW BLD-RTO: 17.2 % (ref 11.5–14.5)
PDW BLD-RTO: 17.5 % (ref 11.5–14.5)
PDW BLD-RTO: 17.8 % (ref 11.6–14.4)
PDW BLD-RTO: 18 % (ref 11.5–14.5)
PDW BLD-RTO: 18 % (ref 11.6–14.4)
PDW BLD-RTO: 18.3 % (ref 11.5–14.5)
PERFORMED ON: ABNORMAL
PH ARTERIAL: 7.39 (ref 7.35–7.45)
PH ARTERIAL: 7.53 (ref 7.35–7.45)
PH UA: 7.5 (ref 5–8)
PHOSPHORUS: 2.3 MG/DL (ref 2.5–4.5)
PHOSPHORUS: 2.9 MG/DL (ref 2.5–4.5)
PHOSPHORUS: 3.4 MG/DL (ref 2.5–4.5)
PLATELET # BLD AUTO: 367 10*3/MM3 (ref 140–450)
PLATELET # BLD: 201 K/UL (ref 130–400)
PLATELET # BLD: 290 K/UL (ref 130–400)
PLATELET # BLD: 302 K/UL (ref 130–400)
PLATELET # BLD: 305 K/UL (ref 130–400)
PLATELET # BLD: 321 K/UL (ref 130–400)
PLATELET # BLD: 321 K/UL (ref 130–400)
PLATELET # BLD: 329 K/UL (ref 130–400)
PLATELET # BLD: 330 K/UL (ref 130–400)
PLATELET # BLD: 330 K/UL (ref 130–400)
PLATELET # BLD: 334 K/UL (ref 130–400)
PLATELET # BLD: 337 K/UL (ref 130–400)
PLATELET # BLD: 337 K/UL (ref 130–400)
PLATELET # BLD: 338 K/UL (ref 130–400)
PLATELET # BLD: 338 K/UL (ref 163–337)
PLATELET # BLD: 339 K/UL (ref 130–400)
PLATELET # BLD: 350 K/UL (ref 130–400)
PLATELET # BLD: 356 K/UL (ref 130–400)
PLATELET # BLD: 356 K/UL (ref 163–337)
PLATELET # BLD: 367 K/UL (ref 130–400)
PLATELET # BLD: 367 K/UL (ref 130–400)
PLATELET # BLD: 376 K/UL (ref 130–400)
PLATELET # BLD: 387 K/UL (ref 130–400)
PLATELET # BLD: 440 K/UL (ref 130–400)
PLATELET # BLD: 464 K/UL (ref 130–400)
PMV BLD AUTO: 10.5 FL (ref 9.4–12.4)
PMV BLD AUTO: 8.3 FL (ref 7.4–10.4)
PMV BLD AUTO: 8.5 FL (ref 7.4–10.4)
PMV BLD AUTO: 8.9 FL (ref 9.4–12.4)
PMV BLD AUTO: 8.9 FL (ref 9.4–12.4)
PMV BLD AUTO: 9 FL (ref 6–12)
PMV BLD AUTO: 9 FL (ref 9.4–12.4)
PMV BLD AUTO: 9.1 FL (ref 9.4–12.4)
PMV BLD AUTO: 9.2 FL (ref 9.4–12.4)
PMV BLD AUTO: 9.3 FL (ref 9.4–12.4)
PMV BLD AUTO: 9.4 FL (ref 9.4–12.4)
PMV BLD AUTO: 9.5 FL (ref 9.4–12.4)
PMV BLD AUTO: 9.5 FL (ref 9.4–12.4)
PMV BLD AUTO: 9.7 FL (ref 9.4–12.4)
PMV BLD AUTO: 9.7 FL (ref 9.4–12.4)
PMV BLD AUTO: 9.8 FL (ref 9.4–12.4)
PMV BLD AUTO: 9.8 FL (ref 9.4–12.4)
PMV BLD AUTO: 9.9 FL (ref 9.4–12.4)
PMV BLD AUTO: 9.9 FL (ref 9.4–12.4)
PO2 ARTERIAL: 63 MMHG (ref 80–100)
PO2 ARTERIAL: 81 MMHG (ref 80–100)
POTASSIUM BLD-SCNC: 4.5 MMOL/L (ref 3.5–5.2)
POTASSIUM REFLEX MAGNESIUM: 2.3 MMOL/L (ref 3.5–5)
POTASSIUM REFLEX MAGNESIUM: 2.7 MMOL/L (ref 3.5–5)
POTASSIUM REFLEX MAGNESIUM: 4.9 MMOL/L (ref 3.5–5)
POTASSIUM REFLEX MAGNESIUM: 5.1 MMOL/L (ref 3.5–5)
POTASSIUM REFLEX MAGNESIUM: 5.1 MMOL/L (ref 3.5–5)
POTASSIUM REFLEX MAGNESIUM: 5.5 MMOL/L (ref 3.5–5)
POTASSIUM SERPL-SCNC: 2.3 MMOL/L (ref 3.5–5)
POTASSIUM SERPL-SCNC: 3.2 MMOL/L (ref 3.5–5)
POTASSIUM SERPL-SCNC: 3.2 MMOL/L (ref 3.5–5)
POTASSIUM SERPL-SCNC: 3.2 MMOL/L (ref 3.5–5.1)
POTASSIUM SERPL-SCNC: 3.4 MMOL/L (ref 3.5–5)
POTASSIUM SERPL-SCNC: 3.8 MMOL/L (ref 3.5–5)
POTASSIUM SERPL-SCNC: 4.5 MMOL/L (ref 3.5–5)
POTASSIUM SERPL-SCNC: 4.6 MMOL/L (ref 3.5–5)
POTASSIUM SERPL-SCNC: 4.7 MMOL/L (ref 3.5–5)
POTASSIUM SERPL-SCNC: 4.7 MMOL/L (ref 3.5–5)
POTASSIUM SERPL-SCNC: 4.8 MMOL/L (ref 3.5–5)
POTASSIUM SERPL-SCNC: 4.8 MMOL/L (ref 3.5–5)
POTASSIUM SERPL-SCNC: 4.9 MMOL/L (ref 3.5–5)
POTASSIUM SERPL-SCNC: 5 MMOL/L (ref 3.5–5)
POTASSIUM SERPL-SCNC: 5.1 MMOL/L (ref 3.5–5)
POTASSIUM SERPL-SCNC: 5.1 MMOL/L (ref 3.5–5)
POTASSIUM SERPL-SCNC: 5.2 MMOL/L (ref 3.5–5)
POTASSIUM SERPL-SCNC: 5.3 MMOL/L (ref 3.5–5)
POTASSIUM SERPL-SCNC: 5.4 MMOL/L (ref 3.5–5)
POTASSIUM SERPL-SCNC: 5.5 MMOL/L (ref 3.5–5)
POTASSIUM SERPL-SCNC: 5.7 MMOL/L (ref 3.5–5)
POTASSIUM, UR: 11.34 MMOL/L
POTASSIUM, WHOLE BLOOD: 3.8
POTASSIUM, WHOLE BLOOD: 3.8
PREALB SERPL-MCNC: 9.2 MG/DL (ref 20–40)
PRO-BNP: 4186 PG/ML (ref 0–900)
PRO-BNP: 4868 PG/ML (ref 0–900)
PROF LEUK/LYMPH PHENO 16 OR MORE MARKERS: NORMAL
PROF LEUK/LYMPH PHENO 26 MARKERS: NORMAL
PROT SERPL-MCNC: 6.2 G/DL (ref 6–8.5)
PROTEIN FLUID: 2.9 G/DL
PROTEIN UA: ABNORMAL MG/DL
PROTHROMBIN TIME: 13.5 SEC (ref 12–14.6)
PROTHROMBIN TIME: 13.6 SEC (ref 12–14.6)
PROTHROMBIN TIME: 14.1 SEC (ref 12–14.6)
PROTHROMBIN TIME: 14.5 SEC (ref 12–14.6)
RAPID INFLUENZA  B AGN: NEGATIVE
RAPID INFLUENZA A AGN: NEGATIVE
RBC # BLD AUTO: 3.69 10*6/MM3 (ref 4.14–5.8)
RBC # BLD: 3.4 M/UL (ref 4.7–6.1)
RBC # BLD: 3.41 M/UL (ref 4.7–6.1)
RBC # BLD: 3.49 M/UL (ref 4.7–6.1)
RBC # BLD: 3.51 M/UL (ref 4.63–6.08)
RBC # BLD: 3.58 M/UL (ref 4.7–6.1)
RBC # BLD: 3.6 M/UL (ref 4.7–6.1)
RBC # BLD: 3.63 M/UL (ref 4.7–6.1)
RBC # BLD: 3.66 M/UL (ref 4.7–6.1)
RBC # BLD: 3.89 M/UL (ref 4.7–6.1)
RBC # BLD: 3.94 M/UL (ref 4.63–6.08)
RBC # BLD: 4.12 M/UL (ref 4.7–6.1)
RBC # BLD: 4.27 M/UL (ref 4.7–6.1)
RBC # BLD: 4.44 M/UL (ref 4.7–6.1)
RBC # BLD: 4.53 M/UL (ref 4.7–6.1)
RBC # BLD: 4.56 M/UL (ref 4.7–6.1)
RBC # BLD: 4.81 M/UL (ref 4.7–6.1)
RBC # BLD: 4.82 M/UL (ref 4.7–6.1)
RBC # BLD: 4.94 M/UL (ref 4.7–6.1)
RBC # BLD: 4.95 M/UL (ref 4.7–6.1)
RBC # BLD: 5.1 M/UL (ref 4.7–6.1)
RBC # BLD: 5.13 M/UL (ref 4.7–6.1)
RBC # BLD: 5.23 M/UL (ref 4.7–6.1)
RBC # BLD: 5.29 M/UL (ref 4.7–6.1)
RBC # BLD: 5.43 M/UL (ref 4.7–6.1)
RBC FLUID: 5760 /CUMM
RBC UA: 3 /HPF (ref 0–4)
SODIUM BLD-SCNC: 129 MMOL/L (ref 136–145)
SODIUM BLD-SCNC: 129 MMOL/L (ref 136–145)
SODIUM BLD-SCNC: 130 MMOL/L (ref 136–145)
SODIUM BLD-SCNC: 131 MMOL/L (ref 136–145)
SODIUM BLD-SCNC: 132 MMOL/L (ref 136–145)
SODIUM BLD-SCNC: 132 MMOL/L (ref 136–145)
SODIUM BLD-SCNC: 133 MMOL/L (ref 136–145)
SODIUM BLD-SCNC: 133 MMOL/L (ref 136–145)
SODIUM BLD-SCNC: 134 MMOL/L (ref 136–145)
SODIUM BLD-SCNC: 134 MMOL/L (ref 136–145)
SODIUM BLD-SCNC: 135 MMOL/L (ref 136–145)
SODIUM BLD-SCNC: 136 MMOL/L (ref 136–145)
SODIUM BLD-SCNC: 137 MMOL/L (ref 136–145)
SODIUM BLD-SCNC: 137 MMOL/L (ref 137–145)
SODIUM BLD-SCNC: 138 MMOL/L (ref 136–145)
SOURCE: NORMAL
SPECIFIC GRAVITY UA: 1.02 (ref 1–1.03)
TOTAL PROTEIN: 4.7 G/DL (ref 6.6–8.7)
TOTAL PROTEIN: 5.2 G/DL (ref 6.6–8.7)
TOTAL PROTEIN: 5.3 G/DL (ref 6.3–8.2)
TOTAL PROTEIN: 5.3 G/DL (ref 6.6–8.7)
TOTAL PROTEIN: 5.3 G/DL (ref 6.6–8.7)
TOTAL PROTEIN: 5.4 G/DL (ref 6.6–8.7)
TOTAL PROTEIN: 5.6 G/DL (ref 6.6–8.7)
TOTAL PROTEIN: 5.8 G/DL (ref 6.6–8.7)
TOTAL PROTEIN: 5.9 G/DL (ref 6.6–8.7)
TOTAL PROTEIN: 6 G/DL (ref 6.6–8.7)
TOTAL PROTEIN: 6.3 G/DL (ref 6.6–8.7)
TOTAL PROTEIN: 6.4 G/DL (ref 6.6–8.7)
TOTAL PROTEIN: 6.4 G/DL (ref 6.6–8.7)
TOTAL PROTEIN: 7.1 G/DL (ref 6.6–8.7)
TOTAL PROTEIN: 7.4 G/DL (ref 6.6–8.7)
TRIGL SERPL-MCNC: 114 MG/DL (ref 0–150)
TROPONIN: 0.01 NG/ML (ref 0–0.03)
TROPONIN: 0.03 NG/ML (ref 0–0.03)
TROPONIN: 0.04 NG/ML (ref 0–0.03)
TROPONIN: <0.01 NG/ML (ref 0–0.03)
TSH SERPL DL<=0.05 MIU/L-ACNC: 0.96 UIU/ML (ref 0.27–4.2)
TSH SERPL DL<=0.05 MIU/L-ACNC: 1.8 UIU/ML (ref 0.27–4.2)
TSH SERPL DL<=0.05 MIU/L-ACNC: 2.32 UIU/ML (ref 0.47–4.68)
UROBILINOGEN, URINE: 1 E.U./DL
VIT B12 BLD-MCNC: 406 PG/ML (ref 211–946)
VLDLC SERPL-MCNC: 22.8 MG/DL
WBC # BLD: 10.6 K/UL (ref 4.8–10.8)
WBC # BLD: 10.6 K/UL (ref 4.8–10.8)
WBC # BLD: 11.3 K/UL (ref 4.8–10.8)
WBC # BLD: 11.4 K/UL (ref 4.8–10.8)
WBC # BLD: 11.6 K/UL (ref 4.8–10.8)
WBC # BLD: 12 K/UL (ref 4.8–10.8)
WBC # BLD: 14.5 K/UL (ref 4.8–10.8)
WBC # BLD: 15.3 K/UL (ref 4.8–10.8)
WBC # BLD: 16.7 K/UL (ref 4.8–10.8)
WBC # BLD: 17.49 K/UL (ref 4.23–9.07)
WBC # BLD: 17.5 K/UL (ref 4.8–10.8)
WBC # BLD: 18.9 K/UL (ref 4.8–10.8)
WBC # BLD: 19.2 K/UL (ref 4.8–10.8)
WBC # BLD: 20.8 K/UL (ref 4.8–10.8)
WBC # BLD: 21.33 K/UL (ref 4.23–9.07)
WBC # BLD: 24.1 K/UL (ref 4.8–10.8)
WBC # BLD: 6.6 K/UL (ref 4.8–10.8)
WBC # BLD: 7 K/UL (ref 4.8–10.8)
WBC # BLD: 7.2 K/UL (ref 4.8–10.8)
WBC # BLD: 7.5 K/UL (ref 4.8–10.8)
WBC # BLD: 7.7 K/UL (ref 4.8–10.8)
WBC # BLD: 8.7 K/UL (ref 4.8–10.8)
WBC # BLD: 8.8 K/UL (ref 4.8–10.8)
WBC # BLD: 9.3 K/UL (ref 4.8–10.8)
WBC NRBC COR # BLD: 9.72 10*3/MM3 (ref 3.4–10.8)
WBC UA: 4 /HPF (ref 0–5)

## 2020-01-01 PROCEDURE — 6360000004 HC RX CONTRAST MEDICATION: Performed by: EMERGENCY MEDICINE

## 2020-01-01 PROCEDURE — 2500000003 HC RX 250 WO HCPCS: Performed by: FAMILY MEDICINE

## 2020-01-01 PROCEDURE — 82948 REAGENT STRIP/BLOOD GLUCOSE: CPT

## 2020-01-01 PROCEDURE — 82945 GLUCOSE OTHER FLUID: CPT

## 2020-01-01 PROCEDURE — 6370000000 HC RX 637 (ALT 250 FOR IP): Performed by: INTERNAL MEDICINE

## 2020-01-01 PROCEDURE — 76882 US LMTD JT/FCL EVL NVASC XTR: CPT

## 2020-01-01 PROCEDURE — 2580000003 HC RX 258: Performed by: FAMILY MEDICINE

## 2020-01-01 PROCEDURE — 86901 BLOOD TYPING SEROLOGIC RH(D): CPT

## 2020-01-01 PROCEDURE — 6370000000 HC RX 637 (ALT 250 FOR IP): Performed by: FAMILY MEDICINE

## 2020-01-01 PROCEDURE — 85025 COMPLETE CBC W/AUTO DIFF WBC: CPT | Performed by: INTERNAL MEDICINE

## 2020-01-01 PROCEDURE — 94640 AIRWAY INHALATION TREATMENT: CPT

## 2020-01-01 PROCEDURE — 2580000003 HC RX 258: Performed by: HOSPITALIST

## 2020-01-01 PROCEDURE — 36415 COLL VENOUS BLD VENIPUNCTURE: CPT

## 2020-01-01 PROCEDURE — 80076 HEPATIC FUNCTION PANEL: CPT | Performed by: INTERNAL MEDICINE

## 2020-01-01 PROCEDURE — 97166 OT EVAL MOD COMPLEX 45 MIN: CPT

## 2020-01-01 PROCEDURE — 6370000000 HC RX 637 (ALT 250 FOR IP): Performed by: HOSPITALIST

## 2020-01-01 PROCEDURE — 2140000000 HC CCU INTERMEDIATE R&B

## 2020-01-01 PROCEDURE — 80053 COMPREHEN METABOLIC PANEL: CPT

## 2020-01-01 PROCEDURE — 85025 COMPLETE CBC W/AUTO DIFF WBC: CPT

## 2020-01-01 PROCEDURE — 99233 SBSQ HOSP IP/OBS HIGH 50: CPT | Performed by: NURSE PRACTITIONER

## 2020-01-01 PROCEDURE — 81001 URINALYSIS AUTO W/SCOPE: CPT

## 2020-01-01 PROCEDURE — 96413 CHEMO IV INFUSION 1 HR: CPT

## 2020-01-01 PROCEDURE — 82947 ASSAY GLUCOSE BLOOD QUANT: CPT

## 2020-01-01 PROCEDURE — 2580000003 HC RX 258: Performed by: PHYSICIAN ASSISTANT

## 2020-01-01 PROCEDURE — 82088 ASSAY OF ALDOSTERONE: CPT

## 2020-01-01 PROCEDURE — 99231 SBSQ HOSP IP/OBS SF/LOW 25: CPT | Performed by: INTERNAL MEDICINE

## 2020-01-01 PROCEDURE — 88305 TISSUE EXAM BY PATHOLOGIST: CPT

## 2020-01-01 PROCEDURE — 99232 SBSQ HOSP IP/OBS MODERATE 35: CPT | Performed by: INTERNAL MEDICINE

## 2020-01-01 PROCEDURE — 99224 PR SBSQ OBSERVATION CARE/DAY 15 MINUTES: CPT | Performed by: INTERNAL MEDICINE

## 2020-01-01 PROCEDURE — 71045 X-RAY EXAM CHEST 1 VIEW: CPT

## 2020-01-01 PROCEDURE — 82607 VITAMIN B-12: CPT | Performed by: INTERNAL MEDICINE

## 2020-01-01 PROCEDURE — 99285 EMERGENCY DEPT VISIT HI MDM: CPT

## 2020-01-01 PROCEDURE — G0378 HOSPITAL OBSERVATION PER HR: HCPCS

## 2020-01-01 PROCEDURE — 2700000000 HC OXYGEN THERAPY PER DAY

## 2020-01-01 PROCEDURE — 83605 ASSAY OF LACTIC ACID: CPT

## 2020-01-01 PROCEDURE — 97110 THERAPEUTIC EXERCISES: CPT

## 2020-01-01 PROCEDURE — 97116 GAIT TRAINING THERAPY: CPT

## 2020-01-01 PROCEDURE — 82803 BLOOD GASES ANY COMBINATION: CPT

## 2020-01-01 PROCEDURE — 97530 THERAPEUTIC ACTIVITIES: CPT

## 2020-01-01 PROCEDURE — 77290 THER RAD SIMULAJ FIELD CPLX: CPT | Performed by: RADIOLOGY

## 2020-01-01 PROCEDURE — 87070 CULTURE OTHR SPECIMN AEROBIC: CPT

## 2020-01-01 PROCEDURE — 84132 ASSAY OF SERUM POTASSIUM: CPT

## 2020-01-01 PROCEDURE — 77334 RADIATION TREATMENT AID(S): CPT | Performed by: RADIOLOGY

## 2020-01-01 PROCEDURE — 6360000002 HC RX W HCPCS: Performed by: INTERNAL MEDICINE

## 2020-01-01 PROCEDURE — 93005 ELECTROCARDIOGRAM TRACING: CPT | Performed by: EMERGENCY MEDICINE

## 2020-01-01 PROCEDURE — 6360000002 HC RX W HCPCS: Performed by: FAMILY MEDICINE

## 2020-01-01 PROCEDURE — 99222 1ST HOSP IP/OBS MODERATE 55: CPT | Performed by: INTERNAL MEDICINE

## 2020-01-01 PROCEDURE — 6360000004 HC RX CONTRAST MEDICATION: Performed by: FAMILY MEDICINE

## 2020-01-01 PROCEDURE — 80162 ASSAY OF DIGOXIN TOTAL: CPT | Performed by: FAMILY MEDICINE

## 2020-01-01 PROCEDURE — 3600000014 HC SURGERY LEVEL 4 ADDTL 15MIN: Performed by: THORACIC SURGERY (CARDIOTHORACIC VASCULAR SURGERY)

## 2020-01-01 PROCEDURE — 87324 CLOSTRIDIUM AG IA: CPT

## 2020-01-01 PROCEDURE — 85730 THROMBOPLASTIN TIME PARTIAL: CPT

## 2020-01-01 PROCEDURE — 99233 SBSQ HOSP IP/OBS HIGH 50: CPT | Performed by: INTERNAL MEDICINE

## 2020-01-01 PROCEDURE — C9113 INJ PANTOPRAZOLE SODIUM, VIA: HCPCS | Performed by: FAMILY MEDICINE

## 2020-01-01 PROCEDURE — 36415 COLL VENOUS BLD VENIPUNCTURE: CPT | Performed by: FAMILY MEDICINE

## 2020-01-01 PROCEDURE — 99253 IP/OBS CNSLTJ NEW/EST LOW 45: CPT | Performed by: NURSE PRACTITIONER

## 2020-01-01 PROCEDURE — 84484 ASSAY OF TROPONIN QUANT: CPT

## 2020-01-01 PROCEDURE — 99232 SBSQ HOSP IP/OBS MODERATE 35: CPT | Performed by: NURSE PRACTITIONER

## 2020-01-01 PROCEDURE — 88185 FLOWCYTOMETRY/TC ADD-ON: CPT

## 2020-01-01 PROCEDURE — 1036F TOBACCO NON-USER: CPT | Performed by: PHYSICIAN ASSISTANT

## 2020-01-01 PROCEDURE — 96361 HYDRATE IV INFUSION ADD-ON: CPT

## 2020-01-01 PROCEDURE — 87493 C DIFF AMPLIFIED PROBE: CPT

## 2020-01-01 PROCEDURE — 36415 COLL VENOUS BLD VENIPUNCTURE: CPT | Performed by: INTERNAL MEDICINE

## 2020-01-01 PROCEDURE — 2500000003 HC RX 250 WO HCPCS: Performed by: HOSPITALIST

## 2020-01-01 PROCEDURE — 6360000002 HC RX W HCPCS: Performed by: NURSE PRACTITIONER

## 2020-01-01 PROCEDURE — 96366 THER/PROPH/DIAG IV INF ADDON: CPT

## 2020-01-01 PROCEDURE — 80048 BASIC METABOLIC PNL TOTAL CA: CPT

## 2020-01-01 PROCEDURE — 86900 BLOOD TYPING SEROLOGIC ABO: CPT

## 2020-01-01 PROCEDURE — 84157 ASSAY OF PROTEIN OTHER: CPT

## 2020-01-01 PROCEDURE — 80048 BASIC METABOLIC PNL TOTAL CA: CPT | Performed by: INTERNAL MEDICINE

## 2020-01-01 PROCEDURE — 99253 IP/OBS CNSLTJ NEW/EST LOW 45: CPT | Performed by: INTERNAL MEDICINE

## 2020-01-01 PROCEDURE — 6360000002 HC RX W HCPCS: Performed by: PHYSICIAN ASSISTANT

## 2020-01-01 PROCEDURE — 84100 ASSAY OF PHOSPHORUS: CPT

## 2020-01-01 PROCEDURE — 6370000000 HC RX 637 (ALT 250 FOR IP): Performed by: PHYSICIAN ASSISTANT

## 2020-01-01 PROCEDURE — 96376 TX/PRO/DX INJ SAME DRUG ADON: CPT

## 2020-01-01 PROCEDURE — 6360000002 HC RX W HCPCS: Performed by: HOSPITALIST

## 2020-01-01 PROCEDURE — 84443 ASSAY THYROID STIM HORMONE: CPT

## 2020-01-01 PROCEDURE — 1036F TOBACCO NON-USER: CPT | Performed by: INTERNAL MEDICINE

## 2020-01-01 PROCEDURE — 88184 FLOWCYTOMETRY/ TC 1 MARKER: CPT

## 2020-01-01 PROCEDURE — 94760 N-INVAS EAR/PLS OXIMETRY 1: CPT

## 2020-01-01 PROCEDURE — 71260 CT THORAX DX C+: CPT

## 2020-01-01 PROCEDURE — 7100000001 HC PACU RECOVERY - ADDTL 15 MIN: Performed by: THORACIC SURGERY (CARDIOTHORACIC VASCULAR SURGERY)

## 2020-01-01 PROCEDURE — 97162 PT EVAL MOD COMPLEX 30 MIN: CPT

## 2020-01-01 PROCEDURE — 93010 ELECTROCARDIOGRAM REPORT: CPT | Performed by: INTERNAL MEDICINE

## 2020-01-01 PROCEDURE — 87075 CULTR BACTERIA EXCEPT BLOOD: CPT

## 2020-01-01 PROCEDURE — 6360000002 HC RX W HCPCS: Performed by: EMERGENCY MEDICINE

## 2020-01-01 PROCEDURE — 88112 CYTOPATH CELL ENHANCE TECH: CPT

## 2020-01-01 PROCEDURE — 2580000003 HC RX 258: Performed by: EMERGENCY MEDICINE

## 2020-01-01 PROCEDURE — C9113 INJ PANTOPRAZOLE SODIUM, VIA: HCPCS | Performed by: INTERNAL MEDICINE

## 2020-01-01 PROCEDURE — G8427 DOCREV CUR MEDS BY ELIG CLIN: HCPCS | Performed by: INTERNAL MEDICINE

## 2020-01-01 PROCEDURE — 93970 EXTREMITY STUDY: CPT

## 2020-01-01 PROCEDURE — 96365 THER/PROPH/DIAG IV INF INIT: CPT

## 2020-01-01 PROCEDURE — 7100000000 HC PACU RECOVERY - FIRST 15 MIN: Performed by: THORACIC SURGERY (CARDIOTHORACIC VASCULAR SURGERY)

## 2020-01-01 PROCEDURE — 3600000004 HC SURGERY LEVEL 4 BASE: Performed by: THORACIC SURGERY (CARDIOTHORACIC VASCULAR SURGERY)

## 2020-01-01 PROCEDURE — 99243 OFF/OP CNSLTJ NEW/EST LOW 30: CPT | Performed by: INTERNAL MEDICINE

## 2020-01-01 PROCEDURE — 87804 INFLUENZA ASSAY W/OPTIC: CPT

## 2020-01-01 PROCEDURE — 97161 PT EVAL LOW COMPLEX 20 MIN: CPT

## 2020-01-01 PROCEDURE — 83690 ASSAY OF LIPASE: CPT

## 2020-01-01 PROCEDURE — 6370000000 HC RX 637 (ALT 250 FOR IP): Performed by: EMERGENCY MEDICINE

## 2020-01-01 PROCEDURE — 80162 ASSAY OF DIGOXIN TOTAL: CPT

## 2020-01-01 PROCEDURE — 71046 X-RAY EXAM CHEST 2 VIEWS: CPT

## 2020-01-01 PROCEDURE — 87102 FUNGUS ISOLATION CULTURE: CPT

## 2020-01-01 PROCEDURE — 82140 ASSAY OF AMMONIA: CPT

## 2020-01-01 PROCEDURE — 3700000001 HC ADD 15 MINUTES (ANESTHESIA): Performed by: THORACIC SURGERY (CARDIOTHORACIC VASCULAR SURGERY)

## 2020-01-01 PROCEDURE — 2500000003 HC RX 250 WO HCPCS: Performed by: NURSE PRACTITIONER

## 2020-01-01 PROCEDURE — 2500000003 HC RX 250 WO HCPCS: Performed by: EMERGENCY MEDICINE

## 2020-01-01 PROCEDURE — 83735 ASSAY OF MAGNESIUM: CPT

## 2020-01-01 PROCEDURE — 99244 OFF/OP CNSLTJ NEW/EST MOD 40: CPT | Performed by: NURSE PRACTITIONER

## 2020-01-01 PROCEDURE — 72131 CT LUMBAR SPINE W/O DYE: CPT

## 2020-01-01 PROCEDURE — 99255 IP/OBS CONSLTJ NEW/EST HI 80: CPT | Performed by: INTERNAL MEDICINE

## 2020-01-01 PROCEDURE — 3017F COLORECTAL CA SCREEN DOC REV: CPT | Performed by: INTERNAL MEDICINE

## 2020-01-01 PROCEDURE — 74018 RADEX ABDOMEN 1 VIEW: CPT

## 2020-01-01 PROCEDURE — 71275 CT ANGIOGRAPHY CHEST: CPT

## 2020-01-01 PROCEDURE — 77300 RADIATION THERAPY DOSE PLAN: CPT | Performed by: RADIOLOGY

## 2020-01-01 PROCEDURE — 0W9B40Z DRAINAGE OF LEFT PLEURAL CAVITY WITH DRAINAGE DEVICE, PERCUTANEOUS ENDOSCOPIC APPROACH: ICD-10-PCS | Performed by: THORACIC SURGERY (CARDIOTHORACIC VASCULAR SURGERY)

## 2020-01-01 PROCEDURE — 87205 SMEAR GRAM STAIN: CPT

## 2020-01-01 PROCEDURE — 85610 PROTHROMBIN TIME: CPT

## 2020-01-01 PROCEDURE — 2720000010 HC SURG SUPPLY STERILE: Performed by: THORACIC SURGERY (CARDIOTHORACIC VASCULAR SURGERY)

## 2020-01-01 PROCEDURE — 77295 3-D RADIOTHERAPY PLAN: CPT | Performed by: RADIOLOGY

## 2020-01-01 PROCEDURE — 83036 HEMOGLOBIN GLYCOSYLATED A1C: CPT | Performed by: INTERNAL MEDICINE

## 2020-01-01 PROCEDURE — 6360000002 HC RX W HCPCS: Performed by: NURSE ANESTHETIST, CERTIFIED REGISTERED

## 2020-01-01 PROCEDURE — 87015 SPECIMEN INFECT AGNT CONCNTJ: CPT

## 2020-01-01 PROCEDURE — 86850 RBC ANTIBODY SCREEN: CPT

## 2020-01-01 PROCEDURE — 97535 SELF CARE MNGMENT TRAINING: CPT

## 2020-01-01 PROCEDURE — 0W9B3ZX DRAINAGE OF LEFT PLEURAL CAVITY, PERCUTANEOUS APPROACH, DIAGNOSTIC: ICD-10-PCS | Performed by: RADIOLOGY

## 2020-01-01 PROCEDURE — C1729 CATH, DRAINAGE: HCPCS

## 2020-01-01 PROCEDURE — 36600 WITHDRAWAL OF ARTERIAL BLOOD: CPT

## 2020-01-01 PROCEDURE — 87206 SMEAR FLUORESCENT/ACID STAI: CPT

## 2020-01-01 PROCEDURE — 96375 TX/PRO/DX INJ NEW DRUG ADDON: CPT

## 2020-01-01 PROCEDURE — 93005 ELECTROCARDIOGRAM TRACING: CPT | Performed by: HOSPITALIST

## 2020-01-01 PROCEDURE — 87040 BLOOD CULTURE FOR BACTERIA: CPT

## 2020-01-01 PROCEDURE — 83615 LACTATE (LD) (LDH) ENZYME: CPT

## 2020-01-01 PROCEDURE — 84133 ASSAY OF URINE POTASSIUM: CPT

## 2020-01-01 PROCEDURE — 85027 COMPLETE CBC AUTOMATED: CPT

## 2020-01-01 PROCEDURE — 2580000003 HC RX 258: Performed by: NURSE ANESTHETIST, CERTIFIED REGISTERED

## 2020-01-01 PROCEDURE — 51702 INSERT TEMP BLADDER CATH: CPT

## 2020-01-01 PROCEDURE — 99215 OFFICE O/P EST HI 40 MIN: CPT | Performed by: INTERNAL MEDICINE

## 2020-01-01 PROCEDURE — 3700000000 HC ANESTHESIA ATTENDED CARE: Performed by: THORACIC SURGERY (CARDIOTHORACIC VASCULAR SURGERY)

## 2020-01-01 PROCEDURE — 80162 ASSAY OF DIGOXIN TOTAL: CPT | Performed by: INTERNAL MEDICINE

## 2020-01-01 PROCEDURE — 89051 BODY FLUID CELL COUNT: CPT

## 2020-01-01 PROCEDURE — 84134 ASSAY OF PREALBUMIN: CPT | Performed by: INTERNAL MEDICINE

## 2020-01-01 PROCEDURE — 2500000003 HC RX 250 WO HCPCS: Performed by: NURSE ANESTHETIST, CERTIFIED REGISTERED

## 2020-01-01 PROCEDURE — 84443 ASSAY THYROID STIM HORMONE: CPT | Performed by: INTERNAL MEDICINE

## 2020-01-01 PROCEDURE — C9290 INJ, BUPIVACAINE LIPOSOME: HCPCS | Performed by: THORACIC SURGERY (CARDIOTHORACIC VASCULAR SURGERY)

## 2020-01-01 PROCEDURE — 83880 ASSAY OF NATRIURETIC PEPTIDE: CPT

## 2020-01-01 PROCEDURE — 80061 LIPID PANEL: CPT | Performed by: INTERNAL MEDICINE

## 2020-01-01 PROCEDURE — 6360000002 HC RX W HCPCS: Performed by: ANESTHESIOLOGY

## 2020-01-01 PROCEDURE — G8427 DOCREV CUR MEDS BY ELIG CLIN: HCPCS | Performed by: PHYSICIAN ASSISTANT

## 2020-01-01 PROCEDURE — 87449 NOS EACH ORGANISM AG IA: CPT

## 2020-01-01 PROCEDURE — 99212 OFFICE O/P EST SF 10 MIN: CPT

## 2020-01-01 PROCEDURE — 99202 OFFICE O/P NEW SF 15 MIN: CPT | Performed by: PHYSICIAN ASSISTANT

## 2020-01-01 PROCEDURE — 2580000003 HC RX 258: Performed by: INTERNAL MEDICINE

## 2020-01-01 PROCEDURE — 99254 IP/OBS CNSLTJ NEW/EST MOD 60: CPT | Performed by: INTERNAL MEDICINE

## 2020-01-01 PROCEDURE — 99205 OFFICE O/P NEW HI 60 MIN: CPT | Performed by: INTERNAL MEDICINE

## 2020-01-01 PROCEDURE — 2709999900 HC NON-CHARGEABLE SUPPLY: Performed by: THORACIC SURGERY (CARDIOTHORACIC VASCULAR SURGERY)

## 2020-01-01 PROCEDURE — 97165 OT EVAL LOW COMPLEX 30 MIN: CPT

## 2020-01-01 PROCEDURE — 93005 ELECTROCARDIOGRAM TRACING: CPT | Performed by: INTERNAL MEDICINE

## 2020-01-01 PROCEDURE — G8417 CALC BMI ABV UP PARAM F/U: HCPCS | Performed by: PHYSICIAN ASSISTANT

## 2020-01-01 PROCEDURE — 87493 C DIFF AMPLIFIED PROBE: CPT | Performed by: FAMILY MEDICINE

## 2020-01-01 PROCEDURE — 3017F COLORECTAL CA SCREEN DOC REV: CPT | Performed by: PHYSICIAN ASSISTANT

## 2020-01-01 PROCEDURE — 87116 MYCOBACTERIA CULTURE: CPT

## 2020-01-01 PROCEDURE — 6360000002 HC RX W HCPCS: Performed by: THORACIC SURGERY (CARDIOTHORACIC VASCULAR SURGERY)

## 2020-01-01 PROCEDURE — 93306 TTE W/DOPPLER COMPLETE: CPT

## 2020-01-01 PROCEDURE — 72128 CT CHEST SPINE W/O DYE: CPT

## 2020-01-01 PROCEDURE — 96374 THER/PROPH/DIAG INJ IV PUSH: CPT

## 2020-01-01 PROCEDURE — 74177 CT ABD & PELVIS W/CONTRAST: CPT

## 2020-01-01 PROCEDURE — G8417 CALC BMI ABV UP PARAM F/U: HCPCS | Performed by: INTERNAL MEDICINE

## 2020-01-01 PROCEDURE — C1729 CATH, DRAINAGE: HCPCS | Performed by: THORACIC SURGERY (CARDIOTHORACIC VASCULAR SURGERY)

## 2020-01-01 PROCEDURE — 93005 ELECTROCARDIOGRAM TRACING: CPT | Performed by: NURSE PRACTITIONER

## 2020-01-01 PROCEDURE — 99252 IP/OBS CONSLTJ NEW/EST SF 35: CPT | Performed by: SURGERY

## 2020-01-01 RX ORDER — DIGOXIN 125 MCG
125 TABLET ORAL DAILY
Qty: 30 TABLET | Refills: 3 | Status: SHIPPED | OUTPATIENT
Start: 2020-01-01

## 2020-01-01 RX ORDER — ESCITALOPRAM OXALATE 10 MG/1
10 TABLET ORAL DAILY
COMMUNITY
Start: 2020-01-01

## 2020-01-01 RX ORDER — DILTIAZEM HYDROCHLORIDE 5 MG/ML
10 INJECTION INTRAVENOUS ONCE
Status: COMPLETED | OUTPATIENT
Start: 2020-01-01 | End: 2020-01-01

## 2020-01-01 RX ORDER — ACETAMINOPHEN 500 MG
1000 TABLET ORAL NIGHTLY PRN
Status: DISCONTINUED | OUTPATIENT
Start: 2020-01-01 | End: 2020-01-01 | Stop reason: HOSPADM

## 2020-01-01 RX ORDER — GUAIFENESIN 600 MG/1
600 TABLET, EXTENDED RELEASE ORAL 2 TIMES DAILY
COMMUNITY
Start: 2020-01-01 | End: 2021-02-04

## 2020-01-01 RX ORDER — DEXTROSE MONOHYDRATE 25 G/50ML
12.5 INJECTION, SOLUTION INTRAVENOUS PRN
Status: DISCONTINUED | OUTPATIENT
Start: 2020-01-01 | End: 2020-01-01 | Stop reason: HOSPADM

## 2020-01-01 RX ORDER — SODIUM POLYSTYRENE SULFONATE 15 G/60ML
15 SUSPENSION ORAL; RECTAL ONCE
Status: COMPLETED | OUTPATIENT
Start: 2020-01-01 | End: 2020-01-01

## 2020-01-01 RX ORDER — DIGOXIN 125 MCG
125 TABLET ORAL DAILY
Status: DISCONTINUED | OUTPATIENT
Start: 2020-01-01 | End: 2020-01-01 | Stop reason: HOSPADM

## 2020-01-01 RX ORDER — MORPHINE SULFATE 4 MG/ML
4 INJECTION, SOLUTION INTRAMUSCULAR; INTRAVENOUS EVERY 5 MIN PRN
Status: DISCONTINUED | OUTPATIENT
Start: 2020-01-01 | End: 2020-01-01 | Stop reason: HOSPADM

## 2020-01-01 RX ORDER — POTASSIUM CHLORIDE 20 MEQ/1
40 TABLET, EXTENDED RELEASE ORAL PRN
Status: DISCONTINUED | OUTPATIENT
Start: 2020-01-01 | End: 2020-01-01 | Stop reason: SDUPTHER

## 2020-01-01 RX ORDER — ACETAMINOPHEN 325 MG/1
650 TABLET ORAL EVERY 6 HOURS PRN
Status: DISCONTINUED | OUTPATIENT
Start: 2020-01-01 | End: 2020-01-01 | Stop reason: HOSPADM

## 2020-01-01 RX ORDER — PANTOPRAZOLE SODIUM 40 MG/10ML
40 INJECTION, POWDER, LYOPHILIZED, FOR SOLUTION INTRAVENOUS DAILY
Status: DISCONTINUED | OUTPATIENT
Start: 2020-01-01 | End: 2020-01-01

## 2020-01-01 RX ORDER — ISOSORBIDE MONONITRATE 30 MG/1
30 TABLET, EXTENDED RELEASE ORAL DAILY
Qty: 30 TABLET | Refills: 0 | Status: SHIPPED | OUTPATIENT
Start: 2020-01-01 | End: 2020-01-01

## 2020-01-01 RX ORDER — DILTIAZEM HYDROCHLORIDE 120 MG/1
120 CAPSULE, COATED, EXTENDED RELEASE ORAL 2 TIMES DAILY
Status: DISCONTINUED | OUTPATIENT
Start: 2020-01-01 | End: 2020-01-01 | Stop reason: HOSPADM

## 2020-01-01 RX ORDER — LACTULOSE 10 G/15ML
20 SOLUTION ORAL 3 TIMES DAILY
Status: DISCONTINUED | OUTPATIENT
Start: 2020-01-01 | End: 2020-01-01 | Stop reason: HOSPADM

## 2020-01-01 RX ORDER — DEXTROSE MONOHYDRATE 50 MG/ML
100 INJECTION, SOLUTION INTRAVENOUS PRN
Status: DISCONTINUED | OUTPATIENT
Start: 2020-01-01 | End: 2020-01-01 | Stop reason: HOSPADM

## 2020-01-01 RX ORDER — DEXTROSE MONOHYDRATE 25 G/50ML
12.5 INJECTION, SOLUTION INTRAVENOUS PRN
Status: DISCONTINUED | OUTPATIENT
Start: 2020-01-01 | End: 2020-01-01 | Stop reason: SDUPTHER

## 2020-01-01 RX ORDER — SODIUM CHLORIDE 0.9 % (FLUSH) 0.9 %
10 SYRINGE (ML) INJECTION PRN
Status: CANCELLED | OUTPATIENT
Start: 2020-01-01

## 2020-01-01 RX ORDER — DIPHENHYDRAMINE HYDROCHLORIDE 50 MG/ML
12.5 INJECTION INTRAMUSCULAR; INTRAVENOUS
Status: DISCONTINUED | OUTPATIENT
Start: 2020-01-01 | End: 2020-01-01 | Stop reason: HOSPADM

## 2020-01-01 RX ORDER — PANTOPRAZOLE SODIUM 20 MG/1
TABLET, DELAYED RELEASE ORAL DAILY
COMMUNITY
Start: 2020-01-01

## 2020-01-01 RX ORDER — LABETALOL 20 MG/4 ML (5 MG/ML) INTRAVENOUS SYRINGE
5 EVERY 10 MIN PRN
Status: DISCONTINUED | OUTPATIENT
Start: 2020-01-01 | End: 2020-01-01 | Stop reason: HOSPADM

## 2020-01-01 RX ORDER — FUROSEMIDE 10 MG/ML
40 INJECTION INTRAMUSCULAR; INTRAVENOUS ONCE
Status: COMPLETED | OUTPATIENT
Start: 2020-01-01 | End: 2020-01-01

## 2020-01-01 RX ORDER — IPRATROPIUM BROMIDE AND ALBUTEROL SULFATE 2.5; .5 MG/3ML; MG/3ML
1 SOLUTION RESPIRATORY (INHALATION)
Status: DISCONTINUED | OUTPATIENT
Start: 2020-01-01 | End: 2020-01-01 | Stop reason: HOSPADM

## 2020-01-01 RX ORDER — MORPHINE SULFATE 4 MG/ML
2 INJECTION, SOLUTION INTRAMUSCULAR; INTRAVENOUS EVERY 4 HOURS PRN
Status: DISCONTINUED | OUTPATIENT
Start: 2020-01-01 | End: 2020-01-01 | Stop reason: HOSPADM

## 2020-01-01 RX ORDER — SPIRONOLACTONE 50 MG/1
50 TABLET, FILM COATED ORAL DAILY
Status: DISCONTINUED | OUTPATIENT
Start: 2020-01-01 | End: 2020-01-01

## 2020-01-01 RX ORDER — MEPERIDINE HYDROCHLORIDE 50 MG/ML
12.5 INJECTION INTRAMUSCULAR; INTRAVENOUS; SUBCUTANEOUS EVERY 5 MIN PRN
Status: DISCONTINUED | OUTPATIENT
Start: 2020-01-01 | End: 2020-01-01 | Stop reason: HOSPADM

## 2020-01-01 RX ORDER — ESCITALOPRAM OXALATE 10 MG/1
10 TABLET ORAL DAILY
Status: DISCONTINUED | OUTPATIENT
Start: 2020-01-01 | End: 2020-01-01

## 2020-01-01 RX ORDER — ESCITALOPRAM OXALATE 10 MG/1
10 TABLET ORAL DAILY
COMMUNITY
Start: 2020-01-01 | End: 2020-01-01

## 2020-01-01 RX ORDER — DILTIAZEM HYDROCHLORIDE 5 MG/ML
10 INJECTION INTRAVENOUS EVERY 30 MIN PRN
Status: DISCONTINUED | OUTPATIENT
Start: 2020-01-01 | End: 2020-01-01 | Stop reason: HOSPADM

## 2020-01-01 RX ORDER — UREA 10 %
2 LOTION (ML) TOPICAL NIGHTLY PRN
Status: DISCONTINUED | OUTPATIENT
Start: 2020-01-01 | End: 2020-01-01 | Stop reason: HOSPADM

## 2020-01-01 RX ORDER — NITROGLYCERIN 0.4 MG/1
0.4 TABLET SUBLINGUAL EVERY 5 MIN PRN
Status: DISCONTINUED | OUTPATIENT
Start: 2020-01-01 | End: 2020-01-01 | Stop reason: HOSPADM

## 2020-01-01 RX ORDER — DIGOXIN 0.25 MG/ML
500 INJECTION INTRAMUSCULAR; INTRAVENOUS ONCE
Status: COMPLETED | OUTPATIENT
Start: 2020-01-01 | End: 2020-01-01

## 2020-01-01 RX ORDER — MAGNESIUM HYDROXIDE/ALUMINUM HYDROXICE/SIMETHICONE 120; 1200; 1200 MG/30ML; MG/30ML; MG/30ML
30 SUSPENSION ORAL EVERY 6 HOURS PRN
Status: DISCONTINUED | OUTPATIENT
Start: 2020-01-01 | End: 2020-01-01 | Stop reason: HOSPADM

## 2020-01-01 RX ORDER — HYDROXYZINE PAMOATE 25 MG/1
25 CAPSULE ORAL 2 TIMES DAILY PRN
Status: DISCONTINUED | OUTPATIENT
Start: 2020-01-01 | End: 2020-01-01 | Stop reason: HOSPADM

## 2020-01-01 RX ORDER — EPINEPHRINE 1 MG/ML
0.3 INJECTION, SOLUTION, CONCENTRATE INTRAVENOUS PRN
Status: CANCELLED | OUTPATIENT
Start: 2020-01-01

## 2020-01-01 RX ORDER — HYDROXYZINE HYDROCHLORIDE 25 MG/1
25 TABLET, FILM COATED ORAL 2 TIMES DAILY
COMMUNITY

## 2020-01-01 RX ORDER — DIGOXIN 125 MCG
125 TABLET ORAL
COMMUNITY
Start: 2020-01-01

## 2020-01-01 RX ORDER — HEPARIN SODIUM (PORCINE) LOCK FLUSH IV SOLN 100 UNIT/ML 100 UNIT/ML
500 SOLUTION INTRAVENOUS PRN
Status: CANCELLED | OUTPATIENT
Start: 2020-01-01

## 2020-01-01 RX ORDER — ONDANSETRON 2 MG/ML
INJECTION INTRAMUSCULAR; INTRAVENOUS PRN
Status: DISCONTINUED | OUTPATIENT
Start: 2020-01-01 | End: 2020-01-01 | Stop reason: SDUPTHER

## 2020-01-01 RX ORDER — PANTOPRAZOLE SODIUM 40 MG/1
40 TABLET, DELAYED RELEASE ORAL
Status: DISCONTINUED | OUTPATIENT
Start: 2020-01-01 | End: 2020-01-01 | Stop reason: HOSPADM

## 2020-01-01 RX ORDER — ROPINIROLE 0.5 MG/1
0.5 TABLET, FILM COATED ORAL NIGHTLY
COMMUNITY
Start: 2020-01-01

## 2020-01-01 RX ORDER — POTASSIUM CHLORIDE 20 MEQ/1
40 TABLET, EXTENDED RELEASE ORAL PRN
Status: DISCONTINUED | OUTPATIENT
Start: 2020-01-01 | End: 2020-01-01 | Stop reason: HOSPADM

## 2020-01-01 RX ORDER — ONDANSETRON 2 MG/ML
4 INJECTION INTRAMUSCULAR; INTRAVENOUS EVERY 6 HOURS PRN
Status: DISCONTINUED | OUTPATIENT
Start: 2020-01-01 | End: 2020-01-01 | Stop reason: HOSPADM

## 2020-01-01 RX ORDER — SUCCINYLCHOLINE CHLORIDE 20 MG/ML
INJECTION INTRAMUSCULAR; INTRAVENOUS PRN
Status: DISCONTINUED | OUTPATIENT
Start: 2020-01-01 | End: 2020-01-01 | Stop reason: SDUPTHER

## 2020-01-01 RX ORDER — METHYLPREDNISOLONE SODIUM SUCCINATE 125 MG/2ML
125 INJECTION, POWDER, LYOPHILIZED, FOR SOLUTION INTRAMUSCULAR; INTRAVENOUS ONCE
Status: COMPLETED | OUTPATIENT
Start: 2020-01-01 | End: 2020-01-01

## 2020-01-01 RX ORDER — SODIUM CHLORIDE 1000 MG
1 TABLET, SOLUBLE MISCELLANEOUS
Status: DISCONTINUED | OUTPATIENT
Start: 2020-01-01 | End: 2020-01-01 | Stop reason: HOSPADM

## 2020-01-01 RX ORDER — ESCITALOPRAM OXALATE 10 MG/1
10 TABLET ORAL DAILY
Status: DISCONTINUED | OUTPATIENT
Start: 2020-01-01 | End: 2020-01-01 | Stop reason: HOSPADM

## 2020-01-01 RX ORDER — HYDROXYZINE HYDROCHLORIDE 25 MG/1
25 TABLET, FILM COATED ORAL 2 TIMES DAILY PRN
Status: DISCONTINUED | OUTPATIENT
Start: 2020-01-01 | End: 2020-01-01 | Stop reason: HOSPADM

## 2020-01-01 RX ORDER — POTASSIUM CHLORIDE 750 MG/1
10 TABLET, EXTENDED RELEASE ORAL DAILY
Status: DISCONTINUED | OUTPATIENT
Start: 2020-01-01 | End: 2020-01-01

## 2020-01-01 RX ORDER — PROMETHAZINE HYDROCHLORIDE 25 MG/ML
6.25 INJECTION, SOLUTION INTRAMUSCULAR; INTRAVENOUS
Status: DISCONTINUED | OUTPATIENT
Start: 2020-01-01 | End: 2020-01-01 | Stop reason: HOSPADM

## 2020-01-01 RX ORDER — METHYLPREDNISOLONE SODIUM SUCCINATE 125 MG/2ML
125 INJECTION, POWDER, LYOPHILIZED, FOR SOLUTION INTRAMUSCULAR; INTRAVENOUS ONCE
Status: CANCELLED | OUTPATIENT
Start: 2020-01-01

## 2020-01-01 RX ORDER — MEPERIDINE HYDROCHLORIDE 50 MG/ML
12.5 INJECTION INTRAMUSCULAR; INTRAVENOUS; SUBCUTANEOUS ONCE
Status: CANCELLED | OUTPATIENT
Start: 2020-01-01

## 2020-01-01 RX ORDER — HYDROCODONE BITARTRATE AND ACETAMINOPHEN 5; 325 MG/1; MG/1
1 TABLET ORAL EVERY 6 HOURS PRN
Status: DISCONTINUED | OUTPATIENT
Start: 2020-01-01 | End: 2020-01-01

## 2020-01-01 RX ORDER — SODIUM CHLORIDE 9 MG/ML
INJECTION, SOLUTION INTRAVENOUS CONTINUOUS
Status: CANCELLED | OUTPATIENT
Start: 2020-01-01

## 2020-01-01 RX ORDER — SODIUM CHLORIDE 0.9 % (FLUSH) 0.9 %
10 SYRINGE (ML) INJECTION PRN
Status: DISCONTINUED | OUTPATIENT
Start: 2020-01-01 | End: 2020-01-01 | Stop reason: HOSPADM

## 2020-01-01 RX ORDER — SODIUM CHLORIDE, SODIUM LACTATE, POTASSIUM CHLORIDE, CALCIUM CHLORIDE 600; 310; 30; 20 MG/100ML; MG/100ML; MG/100ML; MG/100ML
INJECTION, SOLUTION INTRAVENOUS CONTINUOUS PRN
Status: DISCONTINUED | OUTPATIENT
Start: 2020-01-01 | End: 2020-01-01 | Stop reason: SDUPTHER

## 2020-01-01 RX ORDER — ALBUTEROL SULFATE 2.5 MG/3ML
2.5 SOLUTION RESPIRATORY (INHALATION) EVERY 4 HOURS PRN
Status: DISCONTINUED | OUTPATIENT
Start: 2020-01-01 | End: 2020-01-01 | Stop reason: HOSPADM

## 2020-01-01 RX ORDER — ISOSORBIDE MONONITRATE 30 MG/1
30 TABLET, EXTENDED RELEASE ORAL DAILY
Status: DISCONTINUED | OUTPATIENT
Start: 2020-01-01 | End: 2020-01-01 | Stop reason: HOSPADM

## 2020-01-01 RX ORDER — ENALAPRILAT 2.5 MG/2ML
1.25 INJECTION INTRAVENOUS
Status: DISCONTINUED | OUTPATIENT
Start: 2020-01-01 | End: 2020-01-01 | Stop reason: HOSPADM

## 2020-01-01 RX ORDER — ALBUTEROL SULFATE 90 UG/1
2 AEROSOL, METERED RESPIRATORY (INHALATION) EVERY 4 HOURS PRN
Status: DISCONTINUED | OUTPATIENT
Start: 2020-01-01 | End: 2020-01-01 | Stop reason: CLARIF

## 2020-01-01 RX ORDER — LANOLIN ALCOHOL/MO/W.PET/CERES
400 CREAM (GRAM) TOPICAL DAILY
Status: DISCONTINUED | OUTPATIENT
Start: 2020-01-01 | End: 2020-01-01 | Stop reason: HOSPADM

## 2020-01-01 RX ORDER — LIDOCAINE HYDROCHLORIDE 10 MG/ML
INJECTION, SOLUTION EPIDURAL; INFILTRATION; INTRACAUDAL; PERINEURAL PRN
Status: DISCONTINUED | OUTPATIENT
Start: 2020-01-01 | End: 2020-01-01 | Stop reason: SDUPTHER

## 2020-01-01 RX ORDER — POTASSIUM CHLORIDE 7.45 MG/ML
10 INJECTION INTRAVENOUS PRN
Status: DISCONTINUED | OUTPATIENT
Start: 2020-01-01 | End: 2020-01-01 | Stop reason: HOSPADM

## 2020-01-01 RX ORDER — PROMETHAZINE HYDROCHLORIDE 12.5 MG/1
25 TABLET ORAL EVERY 6 HOURS PRN
Status: DISCONTINUED | OUTPATIENT
Start: 2020-01-01 | End: 2020-01-01 | Stop reason: HOSPADM

## 2020-01-01 RX ORDER — FUROSEMIDE 40 MG/1
40 TABLET ORAL DAILY
Status: DISCONTINUED | OUTPATIENT
Start: 2020-01-01 | End: 2020-01-01 | Stop reason: HOSPADM

## 2020-01-01 RX ORDER — DEXTROSE MONOHYDRATE 50 MG/ML
100 INJECTION, SOLUTION INTRAVENOUS PRN
Status: DISCONTINUED | OUTPATIENT
Start: 2020-01-01 | End: 2020-01-01 | Stop reason: SDUPTHER

## 2020-01-01 RX ORDER — SODIUM CHLORIDE 9 MG/ML
20 INJECTION, SOLUTION INTRAVENOUS ONCE
Status: CANCELLED | OUTPATIENT
Start: 2020-01-01

## 2020-01-01 RX ORDER — IPRATROPIUM BROMIDE AND ALBUTEROL SULFATE 2.5; .5 MG/3ML; MG/3ML
1 SOLUTION RESPIRATORY (INHALATION) ONCE
Status: COMPLETED | OUTPATIENT
Start: 2020-01-01 | End: 2020-01-01

## 2020-01-01 RX ORDER — ALBUTEROL SULFATE 90 UG/1
2 AEROSOL, METERED RESPIRATORY (INHALATION) EVERY 4 HOURS PRN
COMMUNITY

## 2020-01-01 RX ORDER — ACETAMINOPHEN 325 MG/1
650 TABLET ORAL EVERY 4 HOURS PRN
Status: DISCONTINUED | OUTPATIENT
Start: 2020-01-01 | End: 2020-01-01 | Stop reason: HOSPADM

## 2020-01-01 RX ORDER — DILTIAZEM HYDROCHLORIDE 120 MG/1
120 CAPSULE, COATED, EXTENDED RELEASE ORAL DAILY
COMMUNITY
Start: 2020-01-01

## 2020-01-01 RX ORDER — MAGNESIUM SULFATE IN WATER 40 MG/ML
2 INJECTION, SOLUTION INTRAVENOUS PRN
Status: DISCONTINUED | OUTPATIENT
Start: 2020-01-01 | End: 2020-01-01 | Stop reason: HOSPADM

## 2020-01-01 RX ORDER — KETOROLAC TROMETHAMINE 30 MG/ML
30 INJECTION, SOLUTION INTRAMUSCULAR; INTRAVENOUS EVERY 6 HOURS PRN
Status: DISPENSED | OUTPATIENT
Start: 2020-01-01 | End: 2020-01-01

## 2020-01-01 RX ORDER — MIDAZOLAM HYDROCHLORIDE 1 MG/ML
2 INJECTION INTRAMUSCULAR; INTRAVENOUS ONCE
Status: COMPLETED | OUTPATIENT
Start: 2020-01-01 | End: 2020-01-01

## 2020-01-01 RX ORDER — SODIUM CHLORIDE 0.9 % (FLUSH) 0.9 %
5 SYRINGE (ML) INJECTION PRN
Status: CANCELLED | OUTPATIENT
Start: 2020-01-01

## 2020-01-01 RX ORDER — ATORVASTATIN CALCIUM 10 MG/1
10 TABLET, FILM COATED ORAL NIGHTLY
Status: DISCONTINUED | OUTPATIENT
Start: 2020-01-01 | End: 2020-01-01 | Stop reason: HOSPADM

## 2020-01-01 RX ORDER — NICOTINE POLACRILEX 4 MG
15 LOZENGE BUCCAL PRN
Status: DISCONTINUED | OUTPATIENT
Start: 2020-01-01 | End: 2020-01-01 | Stop reason: HOSPADM

## 2020-01-01 RX ORDER — DILTIAZEM HYDROCHLORIDE 120 MG/1
120 CAPSULE, COATED, EXTENDED RELEASE ORAL 2 TIMES DAILY
Qty: 30 CAPSULE | Refills: 3 | Status: SHIPPED | OUTPATIENT
Start: 2020-01-01

## 2020-01-01 RX ORDER — PROPOFOL 10 MG/ML
INJECTION, EMULSION INTRAVENOUS PRN
Status: DISCONTINUED | OUTPATIENT
Start: 2020-01-01 | End: 2020-01-01 | Stop reason: SDUPTHER

## 2020-01-01 RX ORDER — POTASSIUM CHLORIDE 7.45 MG/ML
10 INJECTION INTRAVENOUS PRN
Status: DISCONTINUED | OUTPATIENT
Start: 2020-01-01 | End: 2020-01-01 | Stop reason: SDUPTHER

## 2020-01-01 RX ORDER — SODIUM CHLORIDE 0.9 % (FLUSH) 0.9 %
10 SYRINGE (ML) INJECTION EVERY 12 HOURS SCHEDULED
Status: DISCONTINUED | OUTPATIENT
Start: 2020-01-01 | End: 2020-01-01 | Stop reason: HOSPADM

## 2020-01-01 RX ORDER — HYDROXYZINE HYDROCHLORIDE 25 MG/1
25 TABLET, FILM COATED ORAL 2 TIMES DAILY PRN
COMMUNITY

## 2020-01-01 RX ORDER — ASPIRIN 81 MG/1
81 TABLET ORAL DAILY
Status: DISCONTINUED | OUTPATIENT
Start: 2020-01-01 | End: 2020-01-01 | Stop reason: HOSPADM

## 2020-01-01 RX ORDER — FUROSEMIDE 40 MG/1
40 TABLET ORAL DAILY
Status: DISCONTINUED | OUTPATIENT
Start: 2020-01-01 | End: 2020-01-01

## 2020-01-01 RX ORDER — POTASSIUM CHLORIDE 7.45 MG/ML
10 INJECTION INTRAVENOUS ONCE
Status: COMPLETED | OUTPATIENT
Start: 2020-01-01 | End: 2020-01-01

## 2020-01-01 RX ORDER — DIPHENHYDRAMINE HCL 25 MG
50 TABLET ORAL NIGHTLY PRN
Status: DISCONTINUED | OUTPATIENT
Start: 2020-01-01 | End: 2020-01-01

## 2020-01-01 RX ORDER — ACETAMINOPHEN 650 MG/1
650 SUPPOSITORY RECTAL EVERY 6 HOURS PRN
Status: DISCONTINUED | OUTPATIENT
Start: 2020-01-01 | End: 2020-01-01 | Stop reason: HOSPADM

## 2020-01-01 RX ORDER — ESCITALOPRAM OXALATE 10 MG/1
10 TABLET ORAL DAILY
COMMUNITY

## 2020-01-01 RX ORDER — AMOXICILLIN AND CLAVULANATE POTASSIUM 500; 125 MG/1; MG/1
1 TABLET, FILM COATED ORAL EVERY 8 HOURS SCHEDULED
Status: DISCONTINUED | OUTPATIENT
Start: 2020-01-01 | End: 2020-01-01 | Stop reason: HOSPADM

## 2020-01-01 RX ORDER — DEXTROSE MONOHYDRATE 25 G/50ML
25 INJECTION, SOLUTION INTRAVENOUS ONCE
Status: COMPLETED | OUTPATIENT
Start: 2020-01-01 | End: 2020-01-01

## 2020-01-01 RX ORDER — LEVOFLOXACIN 750 MG/1
750 TABLET ORAL
COMMUNITY
Start: 2020-01-01 | End: 2020-01-01

## 2020-01-01 RX ORDER — NICOTINE POLACRILEX 4 MG
15 LOZENGE BUCCAL PRN
Status: DISCONTINUED | OUTPATIENT
Start: 2020-01-01 | End: 2020-01-01 | Stop reason: SDUPTHER

## 2020-01-01 RX ORDER — SODIUM POLYSTYRENE SULFONATE 15 G/60ML
15 SUSPENSION ORAL; RECTAL ONCE
Status: DISCONTINUED | OUTPATIENT
Start: 2020-01-01 | End: 2020-01-01

## 2020-01-01 RX ORDER — MORPHINE SULFATE 4 MG/ML
4 INJECTION, SOLUTION INTRAMUSCULAR; INTRAVENOUS ONCE
Status: COMPLETED | OUTPATIENT
Start: 2020-01-01 | End: 2020-01-01

## 2020-01-01 RX ORDER — POTASSIUM CHLORIDE 20 MEQ/1
20 TABLET, EXTENDED RELEASE ORAL 2 TIMES DAILY
Status: DISCONTINUED | OUTPATIENT
Start: 2020-01-01 | End: 2020-01-01

## 2020-01-01 RX ORDER — MAGNESIUM SULFATE IN WATER 40 MG/ML
2 INJECTION, SOLUTION INTRAVENOUS ONCE
Status: COMPLETED | OUTPATIENT
Start: 2020-01-01 | End: 2020-01-01

## 2020-01-01 RX ORDER — 0.9 % SODIUM CHLORIDE 0.9 %
1000 INTRAVENOUS SOLUTION INTRAVENOUS ONCE
Status: COMPLETED | OUTPATIENT
Start: 2020-01-01 | End: 2020-01-01

## 2020-01-01 RX ORDER — OXYCODONE AND ACETAMINOPHEN 10; 325 MG/1; MG/1
1 TABLET ORAL EVERY 6 HOURS PRN
Status: DISCONTINUED | OUTPATIENT
Start: 2020-01-01 | End: 2020-01-01 | Stop reason: HOSPADM

## 2020-01-01 RX ORDER — HYDROXYZINE PAMOATE 25 MG/1
25 CAPSULE ORAL 2 TIMES DAILY
COMMUNITY
End: 2020-01-01 | Stop reason: SDUPTHER

## 2020-01-01 RX ORDER — LEVOFLOXACIN 750 MG/1
750 TABLET ORAL DAILY
Qty: 7 TABLET | Refills: 0 | Status: SHIPPED | OUTPATIENT
Start: 2020-01-01 | End: 2020-01-01

## 2020-01-01 RX ORDER — PROMETHAZINE HYDROCHLORIDE 25 MG/1
12.5 TABLET ORAL EVERY 6 HOURS PRN
Status: DISCONTINUED | OUTPATIENT
Start: 2020-01-01 | End: 2020-01-01 | Stop reason: HOSPADM

## 2020-01-01 RX ORDER — SODIUM POLYSTYRENE SULFONATE 15 G/60ML
30 SUSPENSION ORAL; RECTAL ONCE
Status: COMPLETED | OUTPATIENT
Start: 2020-01-01 | End: 2020-01-01

## 2020-01-01 RX ORDER — PROMETHAZINE HYDROCHLORIDE 25 MG/ML
12.5 INJECTION, SOLUTION INTRAMUSCULAR; INTRAVENOUS ONCE
Status: COMPLETED | OUTPATIENT
Start: 2020-01-01 | End: 2020-01-01

## 2020-01-01 RX ORDER — POLYETHYLENE GLYCOL 3350 17 G/17G
17 POWDER, FOR SOLUTION ORAL DAILY PRN
Status: DISCONTINUED | OUTPATIENT
Start: 2020-01-01 | End: 2020-01-01 | Stop reason: HOSPADM

## 2020-01-01 RX ORDER — DEXAMETHASONE SODIUM PHOSPHATE 10 MG/ML
INJECTION, SOLUTION INTRAMUSCULAR; INTRAVENOUS PRN
Status: DISCONTINUED | OUTPATIENT
Start: 2020-01-01 | End: 2020-01-01 | Stop reason: SDUPTHER

## 2020-01-01 RX ORDER — MAGNESIUM OXIDE 400 MG/1
400 TABLET ORAL DAILY
Status: DISCONTINUED | OUTPATIENT
Start: 2020-01-01 | End: 2020-01-01 | Stop reason: SDUPTHER

## 2020-01-01 RX ORDER — AMOXICILLIN AND CLAVULANATE POTASSIUM 500; 125 MG/1; MG/1
1 TABLET, FILM COATED ORAL EVERY 8 HOURS SCHEDULED
Qty: 15 TABLET | Refills: 0 | Status: SHIPPED | OUTPATIENT
Start: 2020-01-01 | End: 2020-01-01

## 2020-01-01 RX ORDER — ALBUTEROL SULFATE 2.5 MG/3ML
2.5 SOLUTION RESPIRATORY (INHALATION) EVERY 4 HOURS PRN
COMMUNITY

## 2020-01-01 RX ORDER — ROCURONIUM BROMIDE 10 MG/ML
INJECTION, SOLUTION INTRAVENOUS PRN
Status: DISCONTINUED | OUTPATIENT
Start: 2020-01-01 | End: 2020-01-01 | Stop reason: SDUPTHER

## 2020-01-01 RX ORDER — FENTANYL CITRATE 50 UG/ML
INJECTION, SOLUTION INTRAMUSCULAR; INTRAVENOUS PRN
Status: DISCONTINUED | OUTPATIENT
Start: 2020-01-01 | End: 2020-01-01 | Stop reason: SDUPTHER

## 2020-01-01 RX ORDER — CHLORHEXIDINE GLUCONATE 4 G/100ML
SOLUTION TOPICAL SEE ADMIN INSTRUCTIONS
Status: DISCONTINUED | OUTPATIENT
Start: 2020-01-01 | End: 2020-01-01 | Stop reason: HOSPADM

## 2020-01-01 RX ORDER — ALBUTEROL SULFATE 2.5 MG/3ML
2.5 SOLUTION RESPIRATORY (INHALATION) EVERY 6 HOURS PRN
COMMUNITY

## 2020-01-01 RX ORDER — BISACODYL 10 MG
10 SUPPOSITORY, RECTAL RECTAL ONCE
Status: COMPLETED | OUTPATIENT
Start: 2020-01-01 | End: 2020-01-01

## 2020-01-01 RX ORDER — METOCLOPRAMIDE HYDROCHLORIDE 5 MG/ML
10 INJECTION INTRAMUSCULAR; INTRAVENOUS
Status: DISCONTINUED | OUTPATIENT
Start: 2020-01-01 | End: 2020-01-01 | Stop reason: HOSPADM

## 2020-01-01 RX ORDER — DIPHENHYDRAMINE HYDROCHLORIDE 50 MG/ML
50 INJECTION INTRAMUSCULAR; INTRAVENOUS ONCE
Status: CANCELLED | OUTPATIENT
Start: 2020-01-01

## 2020-01-01 RX ORDER — ESCITALOPRAM OXALATE 5 MG/1
5 TABLET ORAL DAILY
Status: DISCONTINUED | OUTPATIENT
Start: 2020-01-01 | End: 2020-01-01 | Stop reason: HOSPADM

## 2020-01-01 RX ORDER — MORPHINE SULFATE 4 MG/ML
2 INJECTION, SOLUTION INTRAMUSCULAR; INTRAVENOUS EVERY 5 MIN PRN
Status: DISCONTINUED | OUTPATIENT
Start: 2020-01-01 | End: 2020-01-01 | Stop reason: HOSPADM

## 2020-01-01 RX ORDER — HYDRALAZINE HYDROCHLORIDE 20 MG/ML
5 INJECTION INTRAMUSCULAR; INTRAVENOUS EVERY 10 MIN PRN
Status: DISCONTINUED | OUTPATIENT
Start: 2020-01-01 | End: 2020-01-01 | Stop reason: HOSPADM

## 2020-01-01 RX ORDER — SODIUM CHLORIDE 9 MG/ML
10 INJECTION INTRAVENOUS DAILY
Status: DISCONTINUED | OUTPATIENT
Start: 2020-01-01 | End: 2020-01-01

## 2020-01-01 RX ORDER — HYDROXYZINE PAMOATE 25 MG/1
25 CAPSULE ORAL 2 TIMES DAILY PRN
Qty: 28 CAPSULE | Refills: 0 | Status: SHIPPED | OUTPATIENT
Start: 2020-01-01 | End: 2020-01-01

## 2020-01-01 RX ADMIN — POTASSIUM CHLORIDE 10 MEQ: 10 INJECTION, SOLUTION INTRAVENOUS at 01:08

## 2020-01-01 RX ADMIN — ACETAMINOPHEN 650 MG: 325 TABLET ORAL at 01:57

## 2020-01-01 RX ADMIN — ONDANSETRON 4 MG: 2 INJECTION INTRAMUSCULAR; INTRAVENOUS at 09:29

## 2020-01-01 RX ADMIN — DILTIAZEM HYDROCHLORIDE 10 MG: 5 INJECTION INTRAVENOUS at 17:01

## 2020-01-01 RX ADMIN — TIOTROPIUM BROMIDE INHALATION SPRAY 2 PUFF: 3.12 SPRAY, METERED RESPIRATORY (INHALATION) at 08:27

## 2020-01-01 RX ADMIN — MEROPENEM 1 G: 1 INJECTION, POWDER, FOR SOLUTION INTRAVENOUS at 16:25

## 2020-01-01 RX ADMIN — ALBUTEROL SULFATE 2.5 MG: 2.5 SOLUTION RESPIRATORY (INHALATION) at 07:06

## 2020-01-01 RX ADMIN — NIFEDIPINE 120 MG: 10 CAPSULE ORAL at 08:47

## 2020-01-01 RX ADMIN — ESCITALOPRAM OXALATE 10 MG: 10 TABLET ORAL at 09:36

## 2020-01-01 RX ADMIN — IPRATROPIUM BROMIDE AND ALBUTEROL SULFATE 1 AMPULE: .5; 3 SOLUTION RESPIRATORY (INHALATION) at 07:51

## 2020-01-01 RX ADMIN — IPRATROPIUM BROMIDE AND ALBUTEROL SULFATE 1 AMPULE: .5; 3 SOLUTION RESPIRATORY (INHALATION) at 10:09

## 2020-01-01 RX ADMIN — Medication 400 MG: at 08:27

## 2020-01-01 RX ADMIN — ESCITALOPRAM OXALATE 10 MG: 10 TABLET ORAL at 09:26

## 2020-01-01 RX ADMIN — MEROPENEM 1 G: 1 INJECTION, POWDER, FOR SOLUTION INTRAVENOUS at 01:28

## 2020-01-01 RX ADMIN — INSULIN LISPRO 1 UNITS: 100 INJECTION, SOLUTION INTRAVENOUS; SUBCUTANEOUS at 12:25

## 2020-01-01 RX ADMIN — INSULIN LISPRO 2 UNITS: 100 INJECTION, SOLUTION INTRAVENOUS; SUBCUTANEOUS at 12:38

## 2020-01-01 RX ADMIN — DIGOXIN 125 MCG: 125 TABLET ORAL at 10:31

## 2020-01-01 RX ADMIN — DILTIAZEM HYDROCHLORIDE 10 MG: 5 INJECTION INTRAVENOUS at 19:37

## 2020-01-01 RX ADMIN — Medication 1 G: at 09:09

## 2020-01-01 RX ADMIN — IOPAMIDOL 90 ML: 755 INJECTION, SOLUTION INTRAVENOUS at 18:40

## 2020-01-01 RX ADMIN — Medication 10 ML: at 01:46

## 2020-01-01 RX ADMIN — ROCURONIUM BROMIDE 5 MG: 10 SOLUTION INTRAVENOUS at 07:35

## 2020-01-01 RX ADMIN — POTASSIUM CHLORIDE 10 MEQ: 10 INJECTION, SOLUTION INTRAVENOUS at 21:27

## 2020-01-01 RX ADMIN — DIPHENHYDRAMINE HCL 50 MG: 25 TABLET ORAL at 22:38

## 2020-01-01 RX ADMIN — MEROPENEM 1 G: 1 INJECTION, POWDER, FOR SOLUTION INTRAVENOUS at 01:10

## 2020-01-01 RX ADMIN — SODIUM POLYSTYRENE SULFONATE 15 G: 15 SUSPENSION ORAL; RECTAL at 18:28

## 2020-01-01 RX ADMIN — AMOXICILLIN AND CLAVULANATE POTASSIUM 1 TABLET: 500; 125 TABLET, FILM COATED ORAL at 09:42

## 2020-01-01 RX ADMIN — ACETAMINOPHEN 1000 MG: 500 TABLET, FILM COATED ORAL at 22:38

## 2020-01-01 RX ADMIN — NIFEDIPINE 120 MG: 10 CAPSULE ORAL at 09:26

## 2020-01-01 RX ADMIN — PROMETHAZINE HYDROCHLORIDE 12.5 MG: 25 INJECTION INTRAMUSCULAR; INTRAVENOUS at 09:15

## 2020-01-01 RX ADMIN — NIFEDIPINE 120 MG: 10 CAPSULE ORAL at 09:43

## 2020-01-01 RX ADMIN — Medication: at 08:34

## 2020-01-01 RX ADMIN — MIDAZOLAM 2 MG: 1 INJECTION INTRAMUSCULAR; INTRAVENOUS at 07:04

## 2020-01-01 RX ADMIN — MEROPENEM 1 G: 1 INJECTION, POWDER, FOR SOLUTION INTRAVENOUS at 00:01

## 2020-01-01 RX ADMIN — MORPHINE SULFATE 4 MG: 4 INJECTION, SOLUTION INTRAMUSCULAR; INTRAVENOUS at 16:35

## 2020-01-01 RX ADMIN — MEROPENEM 1 G: 1 INJECTION, POWDER, FOR SOLUTION INTRAVENOUS at 09:26

## 2020-01-01 RX ADMIN — ENOXAPARIN SODIUM 40 MG: 40 INJECTION SUBCUTANEOUS at 09:26

## 2020-01-01 RX ADMIN — NITROGLYCERIN 1 INCH: 20 OINTMENT TOPICAL at 05:55

## 2020-01-01 RX ADMIN — Medication 1 G: at 17:07

## 2020-01-01 RX ADMIN — MEROPENEM 1 G: 1 INJECTION, POWDER, FOR SOLUTION INTRAVENOUS at 22:40

## 2020-01-01 RX ADMIN — INSULIN LISPRO 4 UNITS: 100 INJECTION, SOLUTION INTRAVENOUS; SUBCUTANEOUS at 16:55

## 2020-01-01 RX ADMIN — FENTANYL CITRATE 50 MCG: 50 INJECTION INTRAMUSCULAR; INTRAVENOUS at 07:35

## 2020-01-01 RX ADMIN — IPRATROPIUM BROMIDE AND ALBUTEROL SULFATE 1 AMPULE: .5; 3 SOLUTION RESPIRATORY (INHALATION) at 19:25

## 2020-01-01 RX ADMIN — Medication 1 G: at 13:36

## 2020-01-01 RX ADMIN — TIOTROPIUM BROMIDE INHALATION SPRAY 2 PUFF: 3.12 SPRAY, METERED RESPIRATORY (INHALATION) at 09:15

## 2020-01-01 RX ADMIN — NIFEDIPINE 120 MG: 10 CAPSULE ORAL at 09:29

## 2020-01-01 RX ADMIN — IPRATROPIUM BROMIDE AND ALBUTEROL SULFATE 1 AMPULE: .5; 3 SOLUTION RESPIRATORY (INHALATION) at 15:32

## 2020-01-01 RX ADMIN — LACTULOSE 20 G: 20 SOLUTION ORAL at 09:36

## 2020-01-01 RX ADMIN — SPIRONOLACTONE 50 MG: 50 TABLET ORAL at 16:44

## 2020-01-01 RX ADMIN — Medication: at 01:13

## 2020-01-01 RX ADMIN — DIGOXIN 125 MCG: 125 TABLET ORAL at 08:14

## 2020-01-01 RX ADMIN — ONDANSETRON 4 MG: 2 INJECTION INTRAMUSCULAR; INTRAVENOUS at 08:42

## 2020-01-01 RX ADMIN — INSULIN LISPRO 1 UNITS: 100 INJECTION, SOLUTION INTRAVENOUS; SUBCUTANEOUS at 21:16

## 2020-01-01 RX ADMIN — MEROPENEM 1 G: 1 INJECTION, POWDER, FOR SOLUTION INTRAVENOUS at 08:56

## 2020-01-01 RX ADMIN — Medication 10 ML: at 21:46

## 2020-01-01 RX ADMIN — Medication 400 MG: at 09:42

## 2020-01-01 RX ADMIN — SODIUM CHLORIDE, PRESERVATIVE FREE 10 ML: 5 INJECTION INTRAVENOUS at 09:44

## 2020-01-01 RX ADMIN — ISOSORBIDE MONONITRATE 30 MG: 30 TABLET, EXTENDED RELEASE ORAL at 08:14

## 2020-01-01 RX ADMIN — Medication 10 ML: at 09:27

## 2020-01-01 RX ADMIN — Medication 1 G: at 18:00

## 2020-01-01 RX ADMIN — PANTOPRAZOLE SODIUM 40 MG: 40 INJECTION, POWDER, FOR SOLUTION INTRAVENOUS at 09:25

## 2020-01-01 RX ADMIN — IPRATROPIUM BROMIDE AND ALBUTEROL SULFATE 1 AMPULE: .5; 3 SOLUTION RESPIRATORY (INHALATION) at 15:07

## 2020-01-01 RX ADMIN — NITROGLYCERIN 1 INCH: 20 OINTMENT TOPICAL at 16:36

## 2020-01-01 RX ADMIN — NIFEDIPINE 120 MG: 10 CAPSULE ORAL at 21:16

## 2020-01-01 RX ADMIN — NIFEDIPINE 120 MG: 10 CAPSULE ORAL at 09:36

## 2020-01-01 RX ADMIN — PANTOPRAZOLE SODIUM 40 MG: 40 INJECTION, POWDER, FOR SOLUTION INTRAVENOUS at 09:09

## 2020-01-01 RX ADMIN — MORPHINE SULFATE 2 MG: 4 INJECTION, SOLUTION INTRAMUSCULAR; INTRAVENOUS at 09:25

## 2020-01-01 RX ADMIN — NIFEDIPINE 120 MG: 10 CAPSULE ORAL at 20:32

## 2020-01-01 RX ADMIN — DILTIAZEM HYDROCHLORIDE 5 MG/HR: 5 INJECTION INTRAVENOUS at 20:09

## 2020-01-01 RX ADMIN — ESCITALOPRAM OXALATE 10 MG: 10 TABLET ORAL at 10:31

## 2020-01-01 RX ADMIN — NIFEDIPINE 120 MG: 10 CAPSULE ORAL at 09:05

## 2020-01-01 RX ADMIN — LACTULOSE 20 G: 20 SOLUTION ORAL at 09:28

## 2020-01-01 RX ADMIN — AMOXICILLIN AND CLAVULANATE POTASSIUM 1 TABLET: 500; 125 TABLET, FILM COATED ORAL at 16:09

## 2020-01-01 RX ADMIN — IPRATROPIUM BROMIDE AND ALBUTEROL SULFATE 1 AMPULE: .5; 3 SOLUTION RESPIRATORY (INHALATION) at 20:27

## 2020-01-01 RX ADMIN — ALBUTEROL SULFATE 2.5 MG: 2.5 SOLUTION RESPIRATORY (INHALATION) at 19:18

## 2020-01-01 RX ADMIN — ALUMINUM HYDROXIDE, MAGNESIUM HYDROXIDE, AND SIMETHICONE 30 ML: 200; 200; 20 SUSPENSION ORAL at 10:09

## 2020-01-01 RX ADMIN — ACETAMINOPHEN 650 MG: 325 TABLET ORAL at 16:57

## 2020-01-01 RX ADMIN — IPRATROPIUM BROMIDE AND ALBUTEROL SULFATE 1 AMPULE: .5; 3 SOLUTION RESPIRATORY (INHALATION) at 15:14

## 2020-01-01 RX ADMIN — HYDROXYZINE PAMOATE 25 MG: 25 CAPSULE ORAL at 13:25

## 2020-01-01 RX ADMIN — Medication 10 ML: at 09:11

## 2020-01-01 RX ADMIN — NIFEDIPINE 120 MG: 10 CAPSULE ORAL at 21:51

## 2020-01-01 RX ADMIN — POTASSIUM CHLORIDE 10 MEQ: 10 INJECTION, SOLUTION INTRAVENOUS at 15:13

## 2020-01-01 RX ADMIN — SODIUM CHLORIDE, PRESERVATIVE FREE 10 ML: 5 INJECTION INTRAVENOUS at 09:10

## 2020-01-01 RX ADMIN — DILTIAZEM HYDROCHLORIDE 10 MG: 5 INJECTION INTRAVENOUS at 21:17

## 2020-01-01 RX ADMIN — NIFEDIPINE 120 MG: 10 CAPSULE ORAL at 20:22

## 2020-01-01 RX ADMIN — IPRATROPIUM BROMIDE AND ALBUTEROL SULFATE 1 AMPULE: .5; 3 SOLUTION RESPIRATORY (INHALATION) at 18:22

## 2020-01-01 RX ADMIN — FUROSEMIDE 40 MG: 40 TABLET ORAL at 08:59

## 2020-01-01 RX ADMIN — HYDROCODONE BITARTRATE AND ACETAMINOPHEN 1 TABLET: 5; 325 TABLET ORAL at 21:46

## 2020-01-01 RX ADMIN — Medication 400 MG: at 16:44

## 2020-01-01 RX ADMIN — ASPIRIN 81 MG: 81 TABLET, COATED ORAL at 09:29

## 2020-01-01 RX ADMIN — Medication 2 MG: at 20:46

## 2020-01-01 RX ADMIN — IPRATROPIUM BROMIDE AND ALBUTEROL SULFATE 1 AMPULE: .5; 3 SOLUTION RESPIRATORY (INHALATION) at 10:57

## 2020-01-01 RX ADMIN — INSULIN LISPRO 2 UNITS: 100 INJECTION, SOLUTION INTRAVENOUS; SUBCUTANEOUS at 16:57

## 2020-01-01 RX ADMIN — AMOXICILLIN AND CLAVULANATE POTASSIUM 1 TABLET: 500; 125 TABLET, FILM COATED ORAL at 06:33

## 2020-01-01 RX ADMIN — METOPROLOL TARTRATE 25 MG: 25 TABLET ORAL at 09:36

## 2020-01-01 RX ADMIN — Medication 10 ML: at 23:10

## 2020-01-01 RX ADMIN — ASPIRIN 81 MG: 81 TABLET, COATED ORAL at 08:14

## 2020-01-01 RX ADMIN — MORPHINE SULFATE 2 MG: 4 INJECTION, SOLUTION INTRAMUSCULAR; INTRAVENOUS at 01:27

## 2020-01-01 RX ADMIN — ALBUTEROL SULFATE 2.5 MG: 2.5 SOLUTION RESPIRATORY (INHALATION) at 15:07

## 2020-01-01 RX ADMIN — Medication 400 MG: at 08:47

## 2020-01-01 RX ADMIN — NIFEDIPINE 120 MG: 10 CAPSULE ORAL at 08:10

## 2020-01-01 RX ADMIN — ATORVASTATIN CALCIUM 10 MG: 10 TABLET, FILM COATED ORAL at 20:32

## 2020-01-01 RX ADMIN — IPRATROPIUM BROMIDE AND ALBUTEROL SULFATE 1 AMPULE: .5; 3 SOLUTION RESPIRATORY (INHALATION) at 14:25

## 2020-01-01 RX ADMIN — FUROSEMIDE 40 MG: 40 TABLET ORAL at 09:29

## 2020-01-01 RX ADMIN — INSULIN LISPRO 1 UNITS: 100 INJECTION, SOLUTION INTRAVENOUS; SUBCUTANEOUS at 14:03

## 2020-01-01 RX ADMIN — PANTOPRAZOLE SODIUM 40 MG: 40 INJECTION, POWDER, FOR SOLUTION INTRAVENOUS at 12:06

## 2020-01-01 RX ADMIN — NIFEDIPINE 120 MG: 10 CAPSULE ORAL at 20:48

## 2020-01-01 RX ADMIN — METOPROLOL TARTRATE 25 MG: 25 TABLET ORAL at 21:51

## 2020-01-01 RX ADMIN — IPRATROPIUM BROMIDE AND ALBUTEROL SULFATE 1 AMPULE: .5; 3 SOLUTION RESPIRATORY (INHALATION) at 14:41

## 2020-01-01 RX ADMIN — ISOSORBIDE MONONITRATE 30 MG: 30 TABLET, EXTENDED RELEASE ORAL at 16:44

## 2020-01-01 RX ADMIN — Medication 1 G: at 12:23

## 2020-01-01 RX ADMIN — IPRATROPIUM BROMIDE AND ALBUTEROL SULFATE 1 AMPULE: .5; 3 SOLUTION RESPIRATORY (INHALATION) at 11:40

## 2020-01-01 RX ADMIN — NIFEDIPINE 120 MG: 10 CAPSULE ORAL at 08:36

## 2020-01-01 RX ADMIN — ENOXAPARIN SODIUM 40 MG: 40 INJECTION SUBCUTANEOUS at 09:43

## 2020-01-01 RX ADMIN — METOPROLOL TARTRATE 25 MG: 25 TABLET ORAL at 21:55

## 2020-01-01 RX ADMIN — NIFEDIPINE 120 MG: 10 CAPSULE ORAL at 22:01

## 2020-01-01 RX ADMIN — ONDANSETRON 4 MG: 2 INJECTION INTRAMUSCULAR; INTRAVENOUS at 19:41

## 2020-01-01 RX ADMIN — ASPIRIN 81 MG: 81 TABLET, COATED ORAL at 09:42

## 2020-01-01 RX ADMIN — DIGOXIN 125 MCG: 125 TABLET ORAL at 09:36

## 2020-01-01 RX ADMIN — NIFEDIPINE 120 MG: 10 CAPSULE ORAL at 18:43

## 2020-01-01 RX ADMIN — METOPROLOL TARTRATE 12.5 MG: 25 TABLET ORAL at 08:27

## 2020-01-01 RX ADMIN — SODIUM CHLORIDE, PRESERVATIVE FREE 10 ML: 5 INJECTION INTRAVENOUS at 09:29

## 2020-01-01 RX ADMIN — Medication 10 ML: at 09:43

## 2020-01-01 RX ADMIN — ISOSORBIDE MONONITRATE 30 MG: 30 TABLET, EXTENDED RELEASE ORAL at 09:06

## 2020-01-01 RX ADMIN — FUROSEMIDE 40 MG: 10 INJECTION, SOLUTION INTRAMUSCULAR; INTRAVENOUS at 08:43

## 2020-01-01 RX ADMIN — INSULIN LISPRO 1 UNITS: 100 INJECTION, SOLUTION INTRAVENOUS; SUBCUTANEOUS at 13:55

## 2020-01-01 RX ADMIN — NIFEDIPINE 120 MG: 10 CAPSULE ORAL at 23:09

## 2020-01-01 RX ADMIN — NITROGLYCERIN 1 INCH: 20 OINTMENT TOPICAL at 00:08

## 2020-01-01 RX ADMIN — ENOXAPARIN SODIUM 40 MG: 40 INJECTION SUBCUTANEOUS at 09:28

## 2020-01-01 RX ADMIN — MEROPENEM 1 G: 1 INJECTION, POWDER, FOR SOLUTION INTRAVENOUS at 16:38

## 2020-01-01 RX ADMIN — ASPIRIN 81 MG: 81 TABLET, COATED ORAL at 09:06

## 2020-01-01 RX ADMIN — METOPROLOL TARTRATE 25 MG: 25 TABLET ORAL at 19:45

## 2020-01-01 RX ADMIN — INSULIN LISPRO 1 UNITS: 100 INJECTION, SOLUTION INTRAVENOUS; SUBCUTANEOUS at 21:41

## 2020-01-01 RX ADMIN — IPRATROPIUM BROMIDE AND ALBUTEROL SULFATE 1 AMPULE: .5; 3 SOLUTION RESPIRATORY (INHALATION) at 14:34

## 2020-01-01 RX ADMIN — SPIRONOLACTONE 50 MG: 50 TABLET ORAL at 09:14

## 2020-01-01 RX ADMIN — ASPIRIN 81 MG: 81 TABLET, COATED ORAL at 08:47

## 2020-01-01 RX ADMIN — IPRATROPIUM BROMIDE AND ALBUTEROL SULFATE 1 AMPULE: .5; 3 SOLUTION RESPIRATORY (INHALATION) at 06:36

## 2020-01-01 RX ADMIN — AMOXICILLIN AND CLAVULANATE POTASSIUM 1 TABLET: 500; 125 TABLET, FILM COATED ORAL at 21:51

## 2020-01-01 RX ADMIN — NITROGLYCERIN 1 INCH: 20 OINTMENT TOPICAL at 12:41

## 2020-01-01 RX ADMIN — ASPIRIN 81 MG: 81 TABLET, COATED ORAL at 08:16

## 2020-01-01 RX ADMIN — INSULIN LISPRO 1 UNITS: 100 INJECTION, SOLUTION INTRAVENOUS; SUBCUTANEOUS at 12:12

## 2020-01-01 RX ADMIN — ATORVASTATIN CALCIUM 10 MG: 10 TABLET, FILM COATED ORAL at 21:24

## 2020-01-01 RX ADMIN — INSULIN HUMAN 10 UNITS: 100 INJECTION, SOLUTION PARENTERAL at 09:22

## 2020-01-01 RX ADMIN — PHENYLEPHRINE HYDROCHLORIDE 100 MCG: 10 INJECTION INTRAVENOUS at 07:55

## 2020-01-01 RX ADMIN — SUGAMMADEX 300 MG: 100 INJECTION, SOLUTION INTRAVENOUS at 08:37

## 2020-01-01 RX ADMIN — ASPIRIN 81 MG: 81 TABLET, COATED ORAL at 08:27

## 2020-01-01 RX ADMIN — MAGNESIUM SULFATE HEPTAHYDRATE 2 G: 40 INJECTION, SOLUTION INTRAVENOUS at 13:14

## 2020-01-01 RX ADMIN — Medication 1 G: at 12:38

## 2020-01-01 RX ADMIN — HYDROXYZINE PAMOATE 25 MG: 25 CAPSULE ORAL at 00:43

## 2020-01-01 RX ADMIN — IPRATROPIUM BROMIDE AND ALBUTEROL SULFATE 1 AMPULE: .5; 3 SOLUTION RESPIRATORY (INHALATION) at 19:29

## 2020-01-01 RX ADMIN — KETOROLAC TROMETHAMINE 30 MG: 30 INJECTION, SOLUTION INTRAMUSCULAR at 18:58

## 2020-01-01 RX ADMIN — Medication 10 ML: at 08:37

## 2020-01-01 RX ADMIN — Medication 10 ML: at 20:47

## 2020-01-01 RX ADMIN — KETOROLAC TROMETHAMINE 30 MG: 30 INJECTION, SOLUTION INTRAMUSCULAR at 12:14

## 2020-01-01 RX ADMIN — POTASSIUM CHLORIDE 10 MEQ: 10 INJECTION, SOLUTION INTRAVENOUS at 16:44

## 2020-01-01 RX ADMIN — METOPROLOL TARTRATE 25 MG: 25 TABLET ORAL at 10:31

## 2020-01-01 RX ADMIN — NIFEDIPINE 120 MG: 10 CAPSULE ORAL at 08:35

## 2020-01-01 RX ADMIN — NIFEDIPINE 120 MG: 10 CAPSULE ORAL at 21:54

## 2020-01-01 RX ADMIN — IPRATROPIUM BROMIDE AND ALBUTEROL SULFATE 1 AMPULE: .5; 3 SOLUTION RESPIRATORY (INHALATION) at 19:58

## 2020-01-01 RX ADMIN — LIDOCAINE HYDROCHLORIDE: 20 SOLUTION ORAL; TOPICAL at 11:41

## 2020-01-01 RX ADMIN — METOPROLOL TARTRATE 12.5 MG: 25 TABLET ORAL at 23:09

## 2020-01-01 RX ADMIN — NITROGLYCERIN 1 INCH: 20 OINTMENT TOPICAL at 12:25

## 2020-01-01 RX ADMIN — Medication 400 MG: at 09:14

## 2020-01-01 RX ADMIN — Medication 10 ML: at 09:14

## 2020-01-01 RX ADMIN — HYDROCODONE BITARTRATE AND ACETAMINOPHEN 1 TABLET: 5; 325 TABLET ORAL at 00:11

## 2020-01-01 RX ADMIN — ENOXAPARIN SODIUM 40 MG: 40 INJECTION SUBCUTANEOUS at 09:10

## 2020-01-01 RX ADMIN — IOPAMIDOL 90 ML: 755 INJECTION, SOLUTION INTRAVENOUS at 21:11

## 2020-01-01 RX ADMIN — LACTULOSE 20 G: 20 SOLUTION ORAL at 21:51

## 2020-01-01 RX ADMIN — INSULIN LISPRO 2 UNITS: 100 INJECTION, SOLUTION INTRAVENOUS; SUBCUTANEOUS at 12:08

## 2020-01-01 RX ADMIN — HYDROCODONE BITARTRATE AND ACETAMINOPHEN 1 TABLET: 5; 325 TABLET ORAL at 13:25

## 2020-01-01 RX ADMIN — ESCITALOPRAM OXALATE 10 MG: 10 TABLET ORAL at 11:41

## 2020-01-01 RX ADMIN — Medication 400 MG: at 08:14

## 2020-01-01 RX ADMIN — DILTIAZEM HYDROCHLORIDE 10 MG: 5 INJECTION INTRAVENOUS at 21:32

## 2020-01-01 RX ADMIN — LACTULOSE 20 G: 20 SOLUTION ORAL at 08:43

## 2020-01-01 RX ADMIN — DIGOXIN 125 MCG: 125 TABLET ORAL at 09:43

## 2020-01-01 RX ADMIN — FUROSEMIDE 40 MG: 40 TABLET ORAL at 08:10

## 2020-01-01 RX ADMIN — METOPROLOL TARTRATE 25 MG: 25 TABLET ORAL at 08:10

## 2020-01-01 RX ADMIN — FUROSEMIDE 40 MG: 40 TABLET ORAL at 09:26

## 2020-01-01 RX ADMIN — IPRATROPIUM BROMIDE AND ALBUTEROL SULFATE 1 AMPULE: .5; 3 SOLUTION RESPIRATORY (INHALATION) at 06:37

## 2020-01-01 RX ADMIN — Medication 10 ML: at 21:56

## 2020-01-01 RX ADMIN — DEXTROSE 50 % IN WATER (D50W) INTRAVENOUS SYRINGE 25 G: at 09:33

## 2020-01-01 RX ADMIN — ATORVASTATIN CALCIUM 10 MG: 10 TABLET, FILM COATED ORAL at 21:40

## 2020-01-01 RX ADMIN — NITROGLYCERIN 1 INCH: 20 OINTMENT TOPICAL at 05:27

## 2020-01-01 RX ADMIN — LACTULOSE 20 G: 20 SOLUTION ORAL at 15:00

## 2020-01-01 RX ADMIN — LACTULOSE 20 G: 20 SOLUTION ORAL at 10:32

## 2020-01-01 RX ADMIN — POTASSIUM BICARBONATE 40 MEQ: 782 TABLET, EFFERVESCENT ORAL at 12:40

## 2020-01-01 RX ADMIN — INSULIN LISPRO 1 UNITS: 100 INJECTION, SOLUTION INTRAVENOUS; SUBCUTANEOUS at 21:56

## 2020-01-01 RX ADMIN — POTASSIUM CHLORIDE 10 MEQ: 10 INJECTION, SOLUTION INTRAVENOUS at 20:06

## 2020-01-01 RX ADMIN — DILTIAZEM HYDROCHLORIDE 5 MG/HR: 5 INJECTION INTRAVENOUS at 21:53

## 2020-01-01 RX ADMIN — ATORVASTATIN CALCIUM 10 MG: 10 TABLET, FILM COATED ORAL at 22:01

## 2020-01-01 RX ADMIN — METOPROLOL TARTRATE 25 MG: 25 TABLET ORAL at 11:41

## 2020-01-01 RX ADMIN — LACTULOSE 20 G: 20 SOLUTION ORAL at 21:45

## 2020-01-01 RX ADMIN — NIFEDIPINE 120 MG: 10 CAPSULE ORAL at 20:41

## 2020-01-01 RX ADMIN — Medication 1 G: at 09:28

## 2020-01-01 RX ADMIN — KETOROLAC TROMETHAMINE 30 MG: 30 INJECTION, SOLUTION INTRAMUSCULAR at 19:48

## 2020-01-01 RX ADMIN — Medication 10 ML: at 10:32

## 2020-01-01 RX ADMIN — SODIUM CHLORIDE 200 MG: 900 INJECTION, SOLUTION INTRAVENOUS at 10:50

## 2020-01-01 RX ADMIN — INSULIN LISPRO 2 UNITS: 100 INJECTION, SOLUTION INTRAVENOUS; SUBCUTANEOUS at 18:43

## 2020-01-01 RX ADMIN — DIGOXIN 125 MCG: 125 TABLET ORAL at 08:32

## 2020-01-01 RX ADMIN — MEROPENEM 1 G: 1 INJECTION, POWDER, FOR SOLUTION INTRAVENOUS at 00:23

## 2020-01-01 RX ADMIN — Medication 2 MG: at 21:16

## 2020-01-01 RX ADMIN — MAGNESIUM SULFATE HEPTAHYDRATE 2 G: 40 INJECTION, SOLUTION INTRAVENOUS at 22:52

## 2020-01-01 RX ADMIN — Medication 10 ML: at 09:29

## 2020-01-01 RX ADMIN — Medication 10 ML: at 20:38

## 2020-01-01 RX ADMIN — METOPROLOL TARTRATE 25 MG: 25 TABLET ORAL at 21:16

## 2020-01-01 RX ADMIN — DIGOXIN 125 MCG: 125 TABLET ORAL at 09:28

## 2020-01-01 RX ADMIN — INSULIN LISPRO 2 UNITS: 100 INJECTION, SOLUTION INTRAVENOUS; SUBCUTANEOUS at 12:23

## 2020-01-01 RX ADMIN — NIFEDIPINE 120 MG: 10 CAPSULE ORAL at 20:25

## 2020-01-01 RX ADMIN — IPRATROPIUM BROMIDE AND ALBUTEROL SULFATE 1 AMPULE: .5; 3 SOLUTION RESPIRATORY (INHALATION) at 10:17

## 2020-01-01 RX ADMIN — IPRATROPIUM BROMIDE AND ALBUTEROL SULFATE 1 AMPULE: .5; 3 SOLUTION RESPIRATORY (INHALATION) at 15:41

## 2020-01-01 RX ADMIN — IPRATROPIUM BROMIDE AND ALBUTEROL SULFATE 1 AMPULE: .5; 3 SOLUTION RESPIRATORY (INHALATION) at 07:10

## 2020-01-01 RX ADMIN — OXYCODONE HYDROCHLORIDE AND ACETAMINOPHEN 1 TABLET: 10; 325 TABLET ORAL at 09:43

## 2020-01-01 RX ADMIN — Medication 10 ML: at 21:17

## 2020-01-01 RX ADMIN — IPRATROPIUM BROMIDE AND ALBUTEROL SULFATE 1 AMPULE: .5; 3 SOLUTION RESPIRATORY (INHALATION) at 06:42

## 2020-01-01 RX ADMIN — SODIUM POLYSTYRENE SULFONATE 30 G: 15 SUSPENSION ORAL; RECTAL at 09:28

## 2020-01-01 RX ADMIN — DIGOXIN 500 MCG: 0.25 INJECTION INTRAMUSCULAR; INTRAVENOUS at 08:41

## 2020-01-01 RX ADMIN — METOPROLOL TARTRATE 12.5 MG: 25 TABLET ORAL at 08:37

## 2020-01-01 RX ADMIN — Medication 10 ML: at 21:40

## 2020-01-01 RX ADMIN — FUROSEMIDE 40 MG: 40 TABLET ORAL at 09:28

## 2020-01-01 RX ADMIN — ROCURONIUM BROMIDE 45 MG: 10 SOLUTION INTRAVENOUS at 07:39

## 2020-01-01 RX ADMIN — POTASSIUM CHLORIDE 20 MEQ: 1500 TABLET, EXTENDED RELEASE ORAL at 12:12

## 2020-01-01 RX ADMIN — IPRATROPIUM BROMIDE AND ALBUTEROL SULFATE 1 AMPULE: .5; 3 SOLUTION RESPIRATORY (INHALATION) at 10:39

## 2020-01-01 RX ADMIN — ALBUTEROL SULFATE 2.5 MG: 2.5 SOLUTION RESPIRATORY (INHALATION) at 06:29

## 2020-01-01 RX ADMIN — AMOXICILLIN AND CLAVULANATE POTASSIUM 1 TABLET: 500; 125 TABLET, FILM COATED ORAL at 05:50

## 2020-01-01 RX ADMIN — DIGOXIN 125 MCG: 125 TABLET ORAL at 08:59

## 2020-01-01 RX ADMIN — POTASSIUM & SODIUM PHOSPHATES POWDER PACK 280-160-250 MG 250 MG: 280-160-250 PACK at 21:41

## 2020-01-01 RX ADMIN — INSULIN LISPRO 1 UNITS: 100 INJECTION, SOLUTION INTRAVENOUS; SUBCUTANEOUS at 08:42

## 2020-01-01 RX ADMIN — ESCITALOPRAM OXALATE 10 MG: 10 TABLET ORAL at 08:16

## 2020-01-01 RX ADMIN — POTASSIUM CHLORIDE 10 MEQ: 10 INJECTION, SOLUTION INTRAVENOUS at 13:45

## 2020-01-01 RX ADMIN — SODIUM CHLORIDE, PRESERVATIVE FREE 10 ML: 5 INJECTION INTRAVENOUS at 08:59

## 2020-01-01 RX ADMIN — NIFEDIPINE 120 MG: 10 CAPSULE ORAL at 08:33

## 2020-01-01 RX ADMIN — ESCITALOPRAM OXALATE 10 MG: 10 TABLET ORAL at 08:59

## 2020-01-01 RX ADMIN — KETOROLAC TROMETHAMINE 30 MG: 30 INJECTION, SOLUTION INTRAMUSCULAR at 00:42

## 2020-01-01 RX ADMIN — ALBUTEROL SULFATE 2.5 MG: 2.5 SOLUTION RESPIRATORY (INHALATION) at 11:39

## 2020-01-01 RX ADMIN — Medication 400 MG: at 09:05

## 2020-01-01 RX ADMIN — Medication 400 MG: at 08:37

## 2020-01-01 RX ADMIN — IPRATROPIUM BROMIDE AND ALBUTEROL SULFATE 1 AMPULE: .5; 3 SOLUTION RESPIRATORY (INHALATION) at 06:56

## 2020-01-01 RX ADMIN — MEROPENEM 1 G: 1 INJECTION, POWDER, FOR SOLUTION INTRAVENOUS at 01:46

## 2020-01-01 RX ADMIN — NITROGLYCERIN 1 INCH: 20 OINTMENT TOPICAL at 19:30

## 2020-01-01 RX ADMIN — METOPROLOL TARTRATE 12.5 MG: 25 TABLET ORAL at 20:24

## 2020-01-01 RX ADMIN — ATORVASTATIN CALCIUM 10 MG: 10 TABLET, FILM COATED ORAL at 19:45

## 2020-01-01 RX ADMIN — IPRATROPIUM BROMIDE AND ALBUTEROL SULFATE 1 AMPULE: .5; 3 SOLUTION RESPIRATORY (INHALATION) at 21:39

## 2020-01-01 RX ADMIN — DIGOXIN 125 MCG: 125 TABLET ORAL at 09:14

## 2020-01-01 RX ADMIN — LACTULOSE 20 G: 20 SOLUTION ORAL at 14:53

## 2020-01-01 RX ADMIN — Medication 10 ML: at 20:45

## 2020-01-01 RX ADMIN — INSULIN LISPRO 2 UNITS: 100 INJECTION, SOLUTION INTRAVENOUS; SUBCUTANEOUS at 18:00

## 2020-01-01 RX ADMIN — INSULIN LISPRO 1 UNITS: 100 INJECTION, SOLUTION INTRAVENOUS; SUBCUTANEOUS at 20:32

## 2020-01-01 RX ADMIN — ESCITALOPRAM OXALATE 10 MG: 10 TABLET ORAL at 09:29

## 2020-01-01 RX ADMIN — Medication 10 ML: at 09:01

## 2020-01-01 RX ADMIN — ALBUTEROL SULFATE 2.5 MG: 2.5 SOLUTION RESPIRATORY (INHALATION) at 06:27

## 2020-01-01 RX ADMIN — AMOXICILLIN AND CLAVULANATE POTASSIUM 1 TABLET: 500; 125 TABLET, FILM COATED ORAL at 21:54

## 2020-01-01 RX ADMIN — AMOXICILLIN AND CLAVULANATE POTASSIUM 1 TABLET: 500; 125 TABLET, FILM COATED ORAL at 14:55

## 2020-01-01 RX ADMIN — LACTULOSE 20 G: 20 SOLUTION ORAL at 08:10

## 2020-01-01 RX ADMIN — NIFEDIPINE 120 MG: 10 CAPSULE ORAL at 08:17

## 2020-01-01 RX ADMIN — METOPROLOL TARTRATE 25 MG: 25 TABLET ORAL at 20:46

## 2020-01-01 RX ADMIN — ALUMINUM HYDROXIDE, MAGNESIUM HYDROXIDE, AND SIMETHICONE 30 ML: 200; 200; 20 SUSPENSION ORAL at 21:25

## 2020-01-01 RX ADMIN — ATORVASTATIN CALCIUM 10 MG: 10 TABLET, FILM COATED ORAL at 23:10

## 2020-01-01 RX ADMIN — METOPROLOL TARTRATE 25 MG: 25 TABLET ORAL at 09:29

## 2020-01-01 RX ADMIN — TIOTROPIUM BROMIDE INHALATION SPRAY 2 PUFF: 3.12 SPRAY, METERED RESPIRATORY (INHALATION) at 08:36

## 2020-01-01 RX ADMIN — IPRATROPIUM BROMIDE AND ALBUTEROL SULFATE 1 AMPULE: .5; 3 SOLUTION RESPIRATORY (INHALATION) at 19:26

## 2020-01-01 RX ADMIN — POTASSIUM CHLORIDE 40 MEQ: 1500 TABLET, EXTENDED RELEASE ORAL at 23:03

## 2020-01-01 RX ADMIN — Medication 10 ML: at 21:49

## 2020-01-01 RX ADMIN — INSULIN LISPRO 1 UNITS: 100 INJECTION, SOLUTION INTRAVENOUS; SUBCUTANEOUS at 20:15

## 2020-01-01 RX ADMIN — ONDANSETRON 4 MG: 2 INJECTION INTRAMUSCULAR; INTRAVENOUS at 19:11

## 2020-01-01 RX ADMIN — NITROGLYCERIN 1 INCH: 20 OINTMENT TOPICAL at 18:12

## 2020-01-01 RX ADMIN — ISOSORBIDE MONONITRATE 30 MG: 30 TABLET, EXTENDED RELEASE ORAL at 08:26

## 2020-01-01 RX ADMIN — INSULIN LISPRO 2 UNITS: 100 INJECTION, SOLUTION INTRAVENOUS; SUBCUTANEOUS at 17:49

## 2020-01-01 RX ADMIN — MEROPENEM 1 G: 1 INJECTION, POWDER, FOR SOLUTION INTRAVENOUS at 16:55

## 2020-01-01 RX ADMIN — TIOTROPIUM BROMIDE INHALATION SPRAY 2 PUFF: 3.12 SPRAY, METERED RESPIRATORY (INHALATION) at 08:17

## 2020-01-01 RX ADMIN — FUROSEMIDE 40 MG: 40 TABLET ORAL at 09:36

## 2020-01-01 RX ADMIN — DILTIAZEM HYDROCHLORIDE 15 MG/HR: 5 INJECTION INTRAVENOUS at 14:10

## 2020-01-01 RX ADMIN — LIDOCAINE HYDROCHLORIDE 50 MG: 10 INJECTION, SOLUTION EPIDURAL; INFILTRATION; INTRACAUDAL; PERINEURAL at 07:35

## 2020-01-01 RX ADMIN — METHYLPREDNISOLONE SODIUM SUCCINATE 125 MG: 125 INJECTION, POWDER, FOR SOLUTION INTRAMUSCULAR; INTRAVENOUS at 21:42

## 2020-01-01 RX ADMIN — DIGOXIN 125 MCG: 125 TABLET ORAL at 08:37

## 2020-01-01 RX ADMIN — NIFEDIPINE 120 MG: 10 CAPSULE ORAL at 21:46

## 2020-01-01 RX ADMIN — METOPROLOL TARTRATE 25 MG: 25 TABLET ORAL at 09:26

## 2020-01-01 RX ADMIN — ESCITALOPRAM OXALATE 10 MG: 10 TABLET ORAL at 08:27

## 2020-01-01 RX ADMIN — IPRATROPIUM BROMIDE AND ALBUTEROL SULFATE 1 AMPULE: .5; 3 SOLUTION RESPIRATORY (INHALATION) at 07:27

## 2020-01-01 RX ADMIN — NITROGLYCERIN 1 INCH: 20 OINTMENT TOPICAL at 17:19

## 2020-01-01 RX ADMIN — POTASSIUM CHLORIDE 10 MEQ: 10 INJECTION, SOLUTION INTRAVENOUS at 02:37

## 2020-01-01 RX ADMIN — Medication 10 ML: at 08:42

## 2020-01-01 RX ADMIN — IPRATROPIUM BROMIDE AND ALBUTEROL SULFATE 1 AMPULE: .5; 3 SOLUTION RESPIRATORY (INHALATION) at 18:32

## 2020-01-01 RX ADMIN — IPRATROPIUM BROMIDE AND ALBUTEROL SULFATE 1 AMPULE: .5; 3 SOLUTION RESPIRATORY (INHALATION) at 06:41

## 2020-01-01 RX ADMIN — NIFEDIPINE 120 MG: 10 CAPSULE ORAL at 21:55

## 2020-01-01 RX ADMIN — Medication 10 ML: at 20:10

## 2020-01-01 RX ADMIN — METOPROLOL TARTRATE 25 MG: 25 TABLET ORAL at 21:54

## 2020-01-01 RX ADMIN — INSULIN LISPRO 2 UNITS: 100 INJECTION, SOLUTION INTRAVENOUS; SUBCUTANEOUS at 11:42

## 2020-01-01 RX ADMIN — ENOXAPARIN SODIUM 40 MG: 40 INJECTION SUBCUTANEOUS at 10:31

## 2020-01-01 RX ADMIN — NIFEDIPINE 120 MG: 10 CAPSULE ORAL at 19:45

## 2020-01-01 RX ADMIN — DIGOXIN 125 MCG: 125 TABLET ORAL at 09:26

## 2020-01-01 RX ADMIN — INSULIN LISPRO 1 UNITS: 100 INJECTION, SOLUTION INTRAVENOUS; SUBCUTANEOUS at 16:36

## 2020-01-01 RX ADMIN — ENOXAPARIN SODIUM 40 MG: 40 INJECTION SUBCUTANEOUS at 12:03

## 2020-01-01 RX ADMIN — ALBUTEROL SULFATE 2.5 MG: 2.5 SOLUTION RESPIRATORY (INHALATION) at 18:27

## 2020-01-01 RX ADMIN — Medication 1 G: at 13:23

## 2020-01-01 RX ADMIN — ACETAMINOPHEN 650 MG: 325 TABLET ORAL at 15:19

## 2020-01-01 RX ADMIN — POTASSIUM CHLORIDE 10 MEQ: 10 INJECTION, SOLUTION INTRAVENOUS at 12:15

## 2020-01-01 RX ADMIN — SODIUM CHLORIDE, PRESERVATIVE FREE 10 ML: 5 INJECTION INTRAVENOUS at 10:34

## 2020-01-01 RX ADMIN — IPRATROPIUM BROMIDE AND ALBUTEROL SULFATE 1 AMPULE: .5; 3 SOLUTION RESPIRATORY (INHALATION) at 15:01

## 2020-01-01 RX ADMIN — DIGOXIN 125 MCG: 125 TABLET ORAL at 08:41

## 2020-01-01 RX ADMIN — SPIRONOLACTONE 50 MG: 50 TABLET ORAL at 08:17

## 2020-01-01 RX ADMIN — MEROPENEM 1 G: 1 INJECTION, POWDER, FOR SOLUTION INTRAVENOUS at 08:41

## 2020-01-01 RX ADMIN — Medication 400 MG: at 08:16

## 2020-01-01 RX ADMIN — PANTOPRAZOLE SODIUM 40 MG: 40 INJECTION, POWDER, FOR SOLUTION INTRAVENOUS at 10:32

## 2020-01-01 RX ADMIN — Medication 10 ML: at 21:29

## 2020-01-01 RX ADMIN — DIGOXIN 125 MCG: 125 TABLET ORAL at 08:10

## 2020-01-01 RX ADMIN — METOPROLOL TARTRATE 12.5 MG: 25 TABLET ORAL at 20:38

## 2020-01-01 RX ADMIN — FUROSEMIDE 40 MG: 40 TABLET ORAL at 10:31

## 2020-01-01 RX ADMIN — IPRATROPIUM BROMIDE AND ALBUTEROL SULFATE 1 AMPULE: .5; 3 SOLUTION RESPIRATORY (INHALATION) at 14:40

## 2020-01-01 RX ADMIN — Medication 10 ML: at 20:23

## 2020-01-01 RX ADMIN — NITROGLYCERIN 1 INCH: 20 OINTMENT TOPICAL at 12:12

## 2020-01-01 RX ADMIN — NIFEDIPINE 120 MG: 10 CAPSULE ORAL at 08:15

## 2020-01-01 RX ADMIN — HYDROXYZINE PAMOATE 25 MG: 25 CAPSULE ORAL at 08:10

## 2020-01-01 RX ADMIN — NIFEDIPINE 120 MG: 10 CAPSULE ORAL at 08:59

## 2020-01-01 RX ADMIN — Medication 10 ML: at 21:51

## 2020-01-01 RX ADMIN — NITROGLYCERIN 1 INCH: 20 OINTMENT TOPICAL at 05:30

## 2020-01-01 RX ADMIN — ONDANSETRON 4 MG: 2 INJECTION INTRAMUSCULAR; INTRAVENOUS at 06:11

## 2020-01-01 RX ADMIN — ESCITALOPRAM OXALATE 10 MG: 10 TABLET ORAL at 09:06

## 2020-01-01 RX ADMIN — ALUMINUM HYDROXIDE, MAGNESIUM HYDROXIDE, AND SIMETHICONE 30 ML: 200; 200; 20 SUSPENSION ORAL at 14:16

## 2020-01-01 RX ADMIN — Medication 1 G: at 17:50

## 2020-01-01 RX ADMIN — ESCITALOPRAM OXALATE 10 MG: 10 TABLET ORAL at 08:36

## 2020-01-01 RX ADMIN — SPIRONOLACTONE 50 MG: 50 TABLET ORAL at 08:37

## 2020-01-01 RX ADMIN — DIGOXIN 125 MCG: 125 TABLET ORAL at 12:02

## 2020-01-01 RX ADMIN — ESCITALOPRAM OXALATE 10 MG: 10 TABLET ORAL at 09:28

## 2020-01-01 RX ADMIN — DILTIAZEM HYDROCHLORIDE 5 MG/HR: 5 INJECTION INTRAVENOUS at 18:44

## 2020-01-01 RX ADMIN — ATORVASTATIN CALCIUM 10 MG: 10 TABLET, FILM COATED ORAL at 20:25

## 2020-01-01 RX ADMIN — Medication: at 22:58

## 2020-01-01 RX ADMIN — ONDANSETRON 4 MG: 2 INJECTION INTRAMUSCULAR; INTRAVENOUS at 20:09

## 2020-01-01 RX ADMIN — POTASSIUM CHLORIDE 20 MEQ: 1500 TABLET, EXTENDED RELEASE ORAL at 21:24

## 2020-01-01 RX ADMIN — NIFEDIPINE 120 MG: 10 CAPSULE ORAL at 10:31

## 2020-01-01 RX ADMIN — PROMETHAZINE HYDROCHLORIDE 25 MG: 12.5 TABLET ORAL at 01:14

## 2020-01-01 RX ADMIN — INSULIN LISPRO 2 UNITS: 100 INJECTION, SOLUTION INTRAVENOUS; SUBCUTANEOUS at 07:46

## 2020-01-01 RX ADMIN — PROMETHAZINE HYDROCHLORIDE 25 MG: 12.5 TABLET ORAL at 09:35

## 2020-01-01 RX ADMIN — SPIRONOLACTONE 50 MG: 50 TABLET ORAL at 11:00

## 2020-01-01 RX ADMIN — DIGOXIN 125 MCG: 125 TABLET ORAL at 09:06

## 2020-01-01 RX ADMIN — ALBUTEROL SULFATE 2.5 MG: 2.5 SOLUTION RESPIRATORY (INHALATION) at 15:12

## 2020-01-01 RX ADMIN — NITROGLYCERIN 1 INCH: 20 OINTMENT TOPICAL at 01:20

## 2020-01-01 RX ADMIN — POTASSIUM CHLORIDE 10 MEQ: 10 INJECTION, SOLUTION INTRAVENOUS at 09:29

## 2020-01-01 RX ADMIN — ALUMINUM HYDROXIDE, MAGNESIUM HYDROXIDE, AND SIMETHICONE 30 ML: 200; 200; 20 SUSPENSION ORAL at 09:33

## 2020-01-01 RX ADMIN — ONDANSETRON HYDROCHLORIDE 4 MG: 2 INJECTION, SOLUTION INTRAMUSCULAR; INTRAVENOUS at 08:25

## 2020-01-01 RX ADMIN — IPRATROPIUM BROMIDE AND ALBUTEROL SULFATE 1 AMPULE: .5; 3 SOLUTION RESPIRATORY (INHALATION) at 10:33

## 2020-01-01 RX ADMIN — MEROPENEM 1 G: 1 INJECTION, POWDER, FOR SOLUTION INTRAVENOUS at 17:07

## 2020-01-01 RX ADMIN — Medication 10 ML: at 08:15

## 2020-01-01 RX ADMIN — IPRATROPIUM BROMIDE AND ALBUTEROL SULFATE 1 AMPULE: .5; 3 SOLUTION RESPIRATORY (INHALATION) at 10:23

## 2020-01-01 RX ADMIN — AMOXICILLIN AND CLAVULANATE POTASSIUM 1 TABLET: 500; 125 TABLET, FILM COATED ORAL at 14:53

## 2020-01-01 RX ADMIN — POTASSIUM CHLORIDE 40 MEQ: 1500 TABLET, EXTENDED RELEASE ORAL at 05:19

## 2020-01-01 RX ADMIN — SUCCINYLCHOLINE CHLORIDE 140 MG: 20 INJECTION, SOLUTION INTRAMUSCULAR; INTRAVENOUS at 07:35

## 2020-01-01 RX ADMIN — IPRATROPIUM BROMIDE AND ALBUTEROL SULFATE 1 AMPULE: .5; 3 SOLUTION RESPIRATORY (INHALATION) at 11:13

## 2020-01-01 RX ADMIN — ATORVASTATIN CALCIUM 10 MG: 10 TABLET, FILM COATED ORAL at 20:41

## 2020-01-01 RX ADMIN — Medication 10 ML: at 20:14

## 2020-01-01 RX ADMIN — Medication 10 ML: at 09:16

## 2020-01-01 RX ADMIN — BISACODYL 10 MG: 10 SUPPOSITORY RECTAL at 16:50

## 2020-01-01 RX ADMIN — Medication 10 ML: at 21:16

## 2020-01-01 RX ADMIN — PANTOPRAZOLE SODIUM 40 MG: 40 TABLET, DELAYED RELEASE ORAL at 07:17

## 2020-01-01 RX ADMIN — Medication 2 MG: at 01:02

## 2020-01-01 RX ADMIN — MEROPENEM 1 G: 1 INJECTION, POWDER, FOR SOLUTION INTRAVENOUS at 00:21

## 2020-01-01 RX ADMIN — PANTOPRAZOLE SODIUM 40 MG: 40 INJECTION, POWDER, FOR SOLUTION INTRAVENOUS at 08:31

## 2020-01-01 RX ADMIN — Medication 1 G: at 16:50

## 2020-01-01 RX ADMIN — MEROPENEM 1 G: 1 INJECTION, POWDER, FOR SOLUTION INTRAVENOUS at 18:44

## 2020-01-01 RX ADMIN — Medication 10 ML: at 09:45

## 2020-01-01 RX ADMIN — TIOTROPIUM BROMIDE INHALATION SPRAY 2 PUFF: 3.12 SPRAY, METERED RESPIRATORY (INHALATION) at 08:50

## 2020-01-01 RX ADMIN — METOPROLOL TARTRATE 25 MG: 25 TABLET ORAL at 08:59

## 2020-01-01 RX ADMIN — METOPROLOL TARTRATE 12.5 MG: 25 TABLET ORAL at 21:50

## 2020-01-01 RX ADMIN — Medication 1 G: at 14:55

## 2020-01-01 RX ADMIN — IPRATROPIUM BROMIDE AND ALBUTEROL SULFATE 1 AMPULE: .5; 3 SOLUTION RESPIRATORY (INHALATION) at 07:30

## 2020-01-01 RX ADMIN — DILTIAZEM HYDROCHLORIDE 15 MG/HR: 5 INJECTION INTRAVENOUS at 22:39

## 2020-01-01 RX ADMIN — MEROPENEM 1 G: 1 INJECTION, POWDER, FOR SOLUTION INTRAVENOUS at 10:30

## 2020-01-01 RX ADMIN — ALBUTEROL SULFATE 2.5 MG: 2.5 SOLUTION RESPIRATORY (INHALATION) at 18:38

## 2020-01-01 RX ADMIN — METOPROLOL TARTRATE 12.5 MG: 25 TABLET ORAL at 21:41

## 2020-01-01 RX ADMIN — TIOTROPIUM BROMIDE INHALATION SPRAY 2 PUFF: 3.12 SPRAY, METERED RESPIRATORY (INHALATION) at 09:08

## 2020-01-01 RX ADMIN — TIOTROPIUM BROMIDE INHALATION SPRAY 2 PUFF: 3.12 SPRAY, METERED RESPIRATORY (INHALATION) at 08:14

## 2020-01-01 RX ADMIN — ENOXAPARIN SODIUM 40 MG: 40 INJECTION SUBCUTANEOUS at 08:10

## 2020-01-01 RX ADMIN — INSULIN LISPRO 4 UNITS: 100 INJECTION, SOLUTION INTRAVENOUS; SUBCUTANEOUS at 13:31

## 2020-01-01 RX ADMIN — IPRATROPIUM BROMIDE AND ALBUTEROL SULFATE 1 AMPULE: .5; 3 SOLUTION RESPIRATORY (INHALATION) at 19:36

## 2020-01-01 RX ADMIN — LACTULOSE 20 G: 20 SOLUTION ORAL at 01:45

## 2020-01-01 RX ADMIN — METOPROLOL TARTRATE 12.5 MG: 25 TABLET ORAL at 08:16

## 2020-01-01 RX ADMIN — DIGOXIN 125 MCG: 125 TABLET ORAL at 08:47

## 2020-01-01 RX ADMIN — POTASSIUM CHLORIDE 10 MEQ: 10 TABLET, EXTENDED RELEASE ORAL at 08:36

## 2020-01-01 RX ADMIN — INSULIN LISPRO 1 UNITS: 100 INJECTION, SOLUTION INTRAVENOUS; SUBCUTANEOUS at 17:37

## 2020-01-01 RX ADMIN — ESCITALOPRAM OXALATE 10 MG: 10 TABLET ORAL at 09:43

## 2020-01-01 RX ADMIN — MEROPENEM 1 G: 1 INJECTION, POWDER, FOR SOLUTION INTRAVENOUS at 16:52

## 2020-01-01 RX ADMIN — INSULIN LISPRO 4 UNITS: 100 INJECTION, SOLUTION INTRAVENOUS; SUBCUTANEOUS at 12:36

## 2020-01-01 RX ADMIN — INSULIN LISPRO 1 UNITS: 100 INJECTION, SOLUTION INTRAVENOUS; SUBCUTANEOUS at 12:42

## 2020-01-01 RX ADMIN — POTASSIUM CHLORIDE 10 MEQ: 10 TABLET, EXTENDED RELEASE ORAL at 08:17

## 2020-01-01 RX ADMIN — ISOSORBIDE MONONITRATE 30 MG: 30 TABLET, EXTENDED RELEASE ORAL at 08:47

## 2020-01-01 RX ADMIN — Medication 1 G: at 10:30

## 2020-01-01 RX ADMIN — Medication 2 G: at 07:41

## 2020-01-01 RX ADMIN — ONDANSETRON 4 MG: 2 INJECTION INTRAMUSCULAR; INTRAVENOUS at 07:07

## 2020-01-01 RX ADMIN — ENOXAPARIN SODIUM 40 MG: 40 INJECTION SUBCUTANEOUS at 08:59

## 2020-01-01 RX ADMIN — PROPOFOL 180 MG: 10 INJECTION, EMULSION INTRAVENOUS at 07:35

## 2020-01-01 RX ADMIN — NIFEDIPINE 120 MG: 10 CAPSULE ORAL at 12:02

## 2020-01-01 RX ADMIN — IOPAMIDOL 90 ML: 755 INJECTION, SOLUTION INTRAVENOUS at 17:55

## 2020-01-01 RX ADMIN — IPRATROPIUM BROMIDE AND ALBUTEROL SULFATE 1 AMPULE: .5; 3 SOLUTION RESPIRATORY (INHALATION) at 11:07

## 2020-01-01 RX ADMIN — Medication 10 ML: at 08:27

## 2020-01-01 RX ADMIN — ESCITALOPRAM OXALATE 10 MG: 10 TABLET ORAL at 09:09

## 2020-01-01 RX ADMIN — DIGOXIN 125 MCG: 125 TABLET ORAL at 08:35

## 2020-01-01 RX ADMIN — SODIUM CHLORIDE, SODIUM LACTATE, POTASSIUM CHLORIDE, AND CALCIUM CHLORIDE: 600; 310; 30; 20 INJECTION, SOLUTION INTRAVENOUS at 07:28

## 2020-01-01 RX ADMIN — IPRATROPIUM BROMIDE AND ALBUTEROL SULFATE 1 AMPULE: .5; 3 SOLUTION RESPIRATORY (INHALATION) at 21:28

## 2020-01-01 RX ADMIN — ESCITALOPRAM OXALATE 10 MG: 10 TABLET ORAL at 08:47

## 2020-01-01 RX ADMIN — Medication 10 ML: at 08:48

## 2020-01-01 RX ADMIN — POTASSIUM CHLORIDE 10 MEQ: 10 INJECTION, SOLUTION INTRAVENOUS at 04:05

## 2020-01-01 RX ADMIN — ESCITALOPRAM OXALATE 10 MG: 10 TABLET ORAL at 08:14

## 2020-01-01 RX ADMIN — METOPROLOL TARTRATE 25 MG: 25 TABLET ORAL at 22:01

## 2020-01-01 RX ADMIN — POTASSIUM CHLORIDE 10 MEQ: 10 INJECTION, SOLUTION INTRAVENOUS at 12:40

## 2020-01-01 RX ADMIN — Medication 50 MEQ: at 09:22

## 2020-01-01 RX ADMIN — MEROPENEM 1 G: 1 INJECTION, POWDER, FOR SOLUTION INTRAVENOUS at 07:42

## 2020-01-01 RX ADMIN — POTASSIUM CHLORIDE 10 MEQ: 10 INJECTION, SOLUTION INTRAVENOUS at 10:51

## 2020-01-01 RX ADMIN — DIGOXIN 125 MCG: 125 TABLET ORAL at 08:43

## 2020-01-01 RX ADMIN — MEROPENEM 1 G: 1 INJECTION, POWDER, FOR SOLUTION INTRAVENOUS at 01:02

## 2020-01-01 RX ADMIN — ATORVASTATIN CALCIUM 10 MG: 10 TABLET, FILM COATED ORAL at 21:49

## 2020-01-01 RX ADMIN — DEXAMETHASONE SODIUM PHOSPHATE 10 MG: 10 INJECTION, SOLUTION INTRAMUSCULAR; INTRAVENOUS at 07:47

## 2020-01-01 RX ADMIN — ESCITALOPRAM OXALATE 10 MG: 10 TABLET ORAL at 08:10

## 2020-01-01 RX ADMIN — Medication 1 G: at 16:25

## 2020-01-01 RX ADMIN — NIFEDIPINE 120 MG: 10 CAPSULE ORAL at 20:46

## 2020-01-01 RX ADMIN — POTASSIUM CHLORIDE 10 MEQ: 10 TABLET, EXTENDED RELEASE ORAL at 09:01

## 2020-01-01 RX ADMIN — FUROSEMIDE 40 MG: 40 TABLET ORAL at 09:09

## 2020-01-01 RX ADMIN — METOPROLOL TARTRATE 25 MG: 25 TABLET ORAL at 20:48

## 2020-01-01 RX ADMIN — INSULIN LISPRO 1 UNITS: 100 INJECTION, SOLUTION INTRAVENOUS; SUBCUTANEOUS at 18:15

## 2020-01-01 RX ADMIN — POTASSIUM CHLORIDE 10 MEQ: 10 INJECTION, SOLUTION INTRAVENOUS at 23:19

## 2020-01-01 RX ADMIN — METOPROLOL TARTRATE 12.5 MG: 25 TABLET ORAL at 20:32

## 2020-01-01 RX ADMIN — AMOXICILLIN AND CLAVULANATE POTASSIUM 1 TABLET: 500; 125 TABLET, FILM COATED ORAL at 21:16

## 2020-01-01 RX ADMIN — PANTOPRAZOLE SODIUM 40 MG: 40 INJECTION, POWDER, FOR SOLUTION INTRAVENOUS at 09:26

## 2020-01-01 RX ADMIN — IPRATROPIUM BROMIDE AND ALBUTEROL SULFATE 1 AMPULE: .5; 3 SOLUTION RESPIRATORY (INHALATION) at 19:28

## 2020-01-01 RX ADMIN — AMOXICILLIN AND CLAVULANATE POTASSIUM 1 TABLET: 500; 125 TABLET, FILM COATED ORAL at 06:25

## 2020-01-01 RX ADMIN — Medication 1 G: at 12:55

## 2020-01-01 RX ADMIN — ACETAMINOPHEN 650 MG: 500 TABLET, FILM COATED ORAL at 06:20

## 2020-01-01 RX ADMIN — Medication 1 G: at 08:10

## 2020-01-01 RX ADMIN — TIOTROPIUM BROMIDE INHALATION SPRAY 2 PUFF: 3.12 SPRAY, METERED RESPIRATORY (INHALATION) at 09:50

## 2020-01-01 RX ADMIN — MEROPENEM 1 G: 1 INJECTION, POWDER, FOR SOLUTION INTRAVENOUS at 08:31

## 2020-01-01 RX ADMIN — SODIUM CHLORIDE 1000 ML: 9 INJECTION, SOLUTION INTRAVENOUS at 20:09

## 2020-01-01 RX ADMIN — NIFEDIPINE 120 MG: 10 CAPSULE ORAL at 21:40

## 2020-01-01 RX ADMIN — ESCITALOPRAM OXALATE 10 MG: 10 TABLET ORAL at 09:14

## 2020-01-01 RX ADMIN — HYDROCODONE BITARTRATE AND ACETAMINOPHEN 1 TABLET: 5; 325 TABLET ORAL at 21:16

## 2020-01-01 RX ADMIN — ASPIRIN 81 MG: 81 TABLET, COATED ORAL at 08:37

## 2020-01-01 RX ADMIN — Medication 10 ML: at 20:25

## 2020-01-01 RX ADMIN — IPRATROPIUM BROMIDE AND ALBUTEROL SULFATE 1 AMPULE: .5; 3 SOLUTION RESPIRATORY (INHALATION) at 18:10

## 2020-01-01 RX ADMIN — Medication 10 ML: at 08:54

## 2020-01-01 RX ADMIN — MEROPENEM 1 G: 1 INJECTION, POWDER, FOR SOLUTION INTRAVENOUS at 06:19

## 2020-01-01 RX ADMIN — MEROPENEM 1 G: 1 INJECTION, POWDER, FOR SOLUTION INTRAVENOUS at 16:19

## 2020-01-01 RX ADMIN — PANTOPRAZOLE SODIUM 40 MG: 40 INJECTION, POWDER, FOR SOLUTION INTRAVENOUS at 08:56

## 2020-01-01 RX ADMIN — Medication 10 ML: at 20:37

## 2020-01-01 RX ADMIN — IPRATROPIUM BROMIDE AND ALBUTEROL SULFATE 1 AMPULE: .5; 3 SOLUTION RESPIRATORY (INHALATION) at 10:32

## 2020-01-01 RX ADMIN — IPRATROPIUM BROMIDE AND ALBUTEROL SULFATE 1 AMPULE: .5; 3 SOLUTION RESPIRATORY (INHALATION) at 07:20

## 2020-01-01 RX ADMIN — SODIUM POLYSTYRENE SULFONATE 15 G: 15 SUSPENSION ORAL; RECTAL at 12:39

## 2020-01-01 RX ADMIN — ONDANSETRON 4 MG: 2 INJECTION INTRAMUSCULAR; INTRAVENOUS at 12:14

## 2020-01-01 RX ADMIN — INSULIN LISPRO 1 UNITS: 100 INJECTION, SOLUTION INTRAVENOUS; SUBCUTANEOUS at 20:25

## 2020-01-01 RX ADMIN — ONDANSETRON 4 MG: 2 INJECTION INTRAMUSCULAR; INTRAVENOUS at 21:26

## 2020-01-01 RX ADMIN — DIGOXIN 125 MCG: 125 TABLET ORAL at 08:16

## 2020-01-01 RX ADMIN — DIGOXIN 125 MCG: 125 TABLET ORAL at 09:29

## 2020-01-01 RX ADMIN — Medication 10 ML: at 08:17

## 2020-01-01 RX ADMIN — ISOSORBIDE MONONITRATE 30 MG: 30 TABLET, EXTENDED RELEASE ORAL at 09:42

## 2020-01-01 RX ADMIN — ENOXAPARIN SODIUM 40 MG: 40 INJECTION SUBCUTANEOUS at 08:31

## 2020-01-01 RX ADMIN — PROMETHAZINE HYDROCHLORIDE 25 MG: 12.5 TABLET ORAL at 21:21

## 2020-01-01 RX ADMIN — ONDANSETRON 4 MG: 2 INJECTION INTRAMUSCULAR; INTRAVENOUS at 16:56

## 2020-01-01 RX ADMIN — MEROPENEM 1 G: 1 INJECTION, POWDER, FOR SOLUTION INTRAVENOUS at 00:00

## 2020-01-01 RX ADMIN — TIOTROPIUM BROMIDE INHALATION SPRAY 2 PUFF: 3.12 SPRAY, METERED RESPIRATORY (INHALATION) at 09:29

## 2020-01-01 RX ADMIN — ENOXAPARIN SODIUM 40 MG: 40 INJECTION SUBCUTANEOUS at 08:42

## 2020-01-01 RX ADMIN — NIFEDIPINE 120 MG: 10 CAPSULE ORAL at 21:24

## 2020-01-01 RX ADMIN — METOPROLOL TARTRATE 12.5 MG: 25 TABLET ORAL at 08:14

## 2020-01-01 RX ADMIN — DILTIAZEM HYDROCHLORIDE 15 MG/HR: 5 INJECTION INTRAVENOUS at 04:22

## 2020-01-01 RX ADMIN — ISOSORBIDE MONONITRATE 30 MG: 30 TABLET, EXTENDED RELEASE ORAL at 08:37

## 2020-01-01 RX ADMIN — FUROSEMIDE 40 MG: 40 TABLET ORAL at 11:41

## 2020-01-01 RX ADMIN — FENTANYL CITRATE 50 MCG: 50 INJECTION INTRAMUSCULAR; INTRAVENOUS at 08:05

## 2020-01-01 RX ADMIN — NITROGLYCERIN 1 INCH: 20 OINTMENT TOPICAL at 05:48

## 2020-01-01 RX ADMIN — MEROPENEM 1 G: 1 INJECTION, POWDER, FOR SOLUTION INTRAVENOUS at 08:43

## 2020-01-01 RX ADMIN — PANTOPRAZOLE SODIUM 40 MG: 40 INJECTION, POWDER, FOR SOLUTION INTRAVENOUS at 09:29

## 2020-01-01 RX ADMIN — ASPIRIN 81 MG: 81 TABLET, COATED ORAL at 09:14

## 2020-01-01 RX ADMIN — METOPROLOL TARTRATE 25 MG: 25 TABLET ORAL at 09:28

## 2020-01-01 RX ADMIN — ISOSORBIDE MONONITRATE 30 MG: 30 TABLET, EXTENDED RELEASE ORAL at 08:16

## 2020-01-01 RX ADMIN — MEROPENEM 1 G: 1 INJECTION, POWDER, FOR SOLUTION INTRAVENOUS at 17:36

## 2020-01-01 RX ADMIN — Medication 10 ML: at 21:23

## 2020-01-01 RX ADMIN — DIPHENHYDRAMINE HCL 50 MG: 25 TABLET ORAL at 20:08

## 2020-01-01 RX ADMIN — NIFEDIPINE 120 MG: 10 CAPSULE ORAL at 21:49

## 2020-01-01 ASSESSMENT — PAIN DESCRIPTION - PROGRESSION
CLINICAL_PROGRESSION: GRADUALLY IMPROVING
CLINICAL_PROGRESSION: GRADUALLY IMPROVING
CLINICAL_PROGRESSION: GRADUALLY WORSENING
CLINICAL_PROGRESSION: GRADUALLY IMPROVING
CLINICAL_PROGRESSION: GRADUALLY WORSENING

## 2020-01-01 ASSESSMENT — ENCOUNTER SYMPTOMS
ABDOMINAL PAIN: 1
VOICE CHANGE: 0
VOMITING: 0
BACK PAIN: 1
SORE THROAT: 0
ABDOMINAL PAIN: 0
ABDOMINAL DISTENTION: 0
COLOR CHANGE: 0
CONSTIPATION: 0
BACK PAIN: 1
COUGH: 0
GASTROINTESTINAL NEGATIVE: 1
COLOR CHANGE: 0
DIARRHEA: 0
NAUSEA: 0
EYES NEGATIVE: 1
SORE THROAT: 0
DIARRHEA: 0
CONSTIPATION: 1
ABDOMINAL PAIN: 0
TROUBLE SWALLOWING: 0
ABDOMINAL DISTENTION: 0
COUGH: 0
NAUSEA: 0
SHORTNESS OF BREATH: 1
COUGH: 0
VOMITING: 0
CONSTIPATION: 0
EYE DISCHARGE: 0
SORE THROAT: 0
COUGH: 0
PHOTOPHOBIA: 0
BACK PAIN: 1
VOMITING: 0
COLOR CHANGE: 0
ABDOMINAL DISTENTION: 0
COUGH: 0
SHORTNESS OF BREATH: 1
TROUBLE SWALLOWING: 0
BACK PAIN: 1
STRIDOR: 0
COUGH: 0
SHORTNESS OF BREATH: 1
VOMITING: 0
COUGH: 1
SHORTNESS OF BREATH: 1
CONSTIPATION: 0
GASTROINTESTINAL NEGATIVE: 1
GASTROINTESTINAL NEGATIVE: 1
WHEEZING: 0
DIARRHEA: 0
SHORTNESS OF BREATH: 1
BACK PAIN: 1
VOICE CHANGE: 0
NAUSEA: 0
BACK PAIN: 0
EYES NEGATIVE: 1
BLOOD IN STOOL: 0
SHORTNESS OF BREATH: 1
VOMITING: 0
SHORTNESS OF BREATH: 1
WHEEZING: 0
NAUSEA: 0
SHORTNESS OF BREATH: 0
ABDOMINAL DISTENTION: 0
SORE THROAT: 0
NAUSEA: 0
BLOOD IN STOOL: 0
PHOTOPHOBIA: 0
SORE THROAT: 0
STRIDOR: 0
WHEEZING: 0
BLOOD IN STOOL: 0
TROUBLE SWALLOWING: 0
ABDOMINAL DISTENTION: 0
CONSTIPATION: 0
TROUBLE SWALLOWING: 0
COLOR CHANGE: 0
ABDOMINAL DISTENTION: 0
NAUSEA: 0
WHEEZING: 0
SHORTNESS OF BREATH: 1
DIARRHEA: 0
ABDOMINAL PAIN: 0
VOMITING: 0
VOICE CHANGE: 0
ABDOMINAL PAIN: 0
EYE ITCHING: 0
COUGH: 1
SORE THROAT: 0
ABDOMINAL PAIN: 0
ABDOMINAL PAIN: 0
SORE THROAT: 0
DIARRHEA: 0
ABDOMINAL DISTENTION: 0
BLOOD IN STOOL: 0
ABDOMINAL PAIN: 0
DIARRHEA: 0
BLOOD IN STOOL: 0
VOMITING: 0
VOICE CHANGE: 0
ALLERGIC/IMMUNOLOGIC NEGATIVE: 1
CONSTIPATION: 0
ABDOMINAL PAIN: 0
CONSTIPATION: 0
BLOOD IN STOOL: 0
WHEEZING: 0
VOMITING: 0
BACK PAIN: 0
WHEEZING: 0
DIARRHEA: 0
COUGH: 0
RHINORRHEA: 0
WHEEZING: 0
APNEA: 0
COLOR CHANGE: 0
NAUSEA: 0
DIARRHEA: 0
DIARRHEA: 0
VOICE CHANGE: 0
ABDOMINAL PAIN: 0
BACK PAIN: 1
COLOR CHANGE: 0
SORE THROAT: 0
NAUSEA: 0
TROUBLE SWALLOWING: 0
TROUBLE SWALLOWING: 0
VOMITING: 0
TROUBLE SWALLOWING: 0
EYES NEGATIVE: 1
VOICE CHANGE: 0
WHEEZING: 0
SHORTNESS OF BREATH: 1
BACK PAIN: 1
DIARRHEA: 0
RHINORRHEA: 0
COUGH: 0
CHEST TIGHTNESS: 1
EYE REDNESS: 0
COUGH: 0
NAUSEA: 0
SHORTNESS OF BREATH: 1
TROUBLE SWALLOWING: 0
NAUSEA: 0
SORE THROAT: 0
CONSTIPATION: 0
BACK PAIN: 0

## 2020-01-01 ASSESSMENT — PAIN SCALES - GENERAL
PAINLEVEL_OUTOF10: 0
PAINLEVEL_OUTOF10: 3
PAINLEVEL_OUTOF10: 3
PAINLEVEL_OUTOF10: 0
PAINLEVEL_OUTOF10: 1
PAINLEVEL_OUTOF10: 5
PAINLEVEL_OUTOF10: 0
PAINLEVEL_OUTOF10: 5
PAINLEVEL_OUTOF10: 0
PAINLEVEL_OUTOF10: 10
PAINLEVEL_OUTOF10: 0
PAINLEVEL_OUTOF10: 2
PAINLEVEL_OUTOF10: 3
PAINLEVEL_OUTOF10: 0
PAINLEVEL_OUTOF10: 10
PAINLEVEL_OUTOF10: 0
PAINLEVEL_OUTOF10: 3
PAINLEVEL_OUTOF10: 0
PAINLEVEL_OUTOF10: 0
PAINLEVEL_OUTOF10: 6
PAINLEVEL_OUTOF10: 5
PAINLEVEL_OUTOF10: 0
PAINLEVEL_OUTOF10: 5
PAINLEVEL_OUTOF10: 0
PAINLEVEL_OUTOF10: 5
PAINLEVEL_OUTOF10: 0
PAINLEVEL_OUTOF10: 8
PAINLEVEL_OUTOF10: 10
PAINLEVEL_OUTOF10: 0
PAINLEVEL_OUTOF10: 3
PAINLEVEL_OUTOF10: 10
PAINLEVEL_OUTOF10: 0
PAINLEVEL_OUTOF10: 4
PAINLEVEL_OUTOF10: 0
PAINLEVEL_OUTOF10: 0
PAINLEVEL_OUTOF10: 8
PAINLEVEL_OUTOF10: 10
PAINLEVEL_OUTOF10: 7
PAINLEVEL_OUTOF10: 0
PAINLEVEL_OUTOF10: 6
PAINLEVEL_OUTOF10: 0
PAINLEVEL_OUTOF10: 7
PAINLEVEL_OUTOF10: 0
PAINLEVEL_OUTOF10: 6
PAINLEVEL_OUTOF10: 0

## 2020-01-01 ASSESSMENT — PAIN DESCRIPTION - DIRECTION
RADIATING_TOWARDS: NO

## 2020-01-01 ASSESSMENT — PAIN DESCRIPTION - LOCATION
LOCATION: BUTTOCKS
LOCATION: SHOULDER
LOCATION: HEAD
LOCATION: BACK
LOCATION: RIB CAGE
LOCATION: BACK
LOCATION: RIB CAGE
LOCATION: ABDOMEN
LOCATION: RIB CAGE
LOCATION: LEG
LOCATION: LEG
LOCATION: HEAD
LOCATION: BACK
LOCATION: ABDOMEN
LOCATION: RIB CAGE
LOCATION: RIB CAGE
LOCATION: ABDOMEN
LOCATION: ABDOMEN
LOCATION: CHEST;RIB CAGE

## 2020-01-01 ASSESSMENT — PAIN DESCRIPTION - ORIENTATION
ORIENTATION: RIGHT;LEFT;MID;LOWER
ORIENTATION: LEFT
ORIENTATION: RIGHT
ORIENTATION: RIGHT;LEFT;MID;LOWER
ORIENTATION: LEFT
ORIENTATION: RIGHT;LEFT
ORIENTATION: LEFT

## 2020-01-01 ASSESSMENT — PAIN DESCRIPTION - PAIN TYPE
TYPE: ACUTE PAIN
TYPE: ACUTE PAIN;CHRONIC PAIN
TYPE: ACUTE PAIN
TYPE: CHRONIC PAIN
TYPE: ACUTE PAIN
TYPE: CHRONIC PAIN
TYPE: ACUTE PAIN

## 2020-01-01 ASSESSMENT — PAIN SCALES - WONG BAKER: WONGBAKER_NUMERICALRESPONSE: 2

## 2020-01-01 ASSESSMENT — PAIN DESCRIPTION - FREQUENCY
FREQUENCY: INTERMITTENT
FREQUENCY: INTERMITTENT
FREQUENCY: CONTINUOUS
FREQUENCY: INTERMITTENT
FREQUENCY: INTERMITTENT

## 2020-01-01 ASSESSMENT — PAIN DESCRIPTION - ONSET
ONSET: ON-GOING

## 2020-01-01 ASSESSMENT — PAIN DESCRIPTION - DESCRIPTORS
DESCRIPTORS: ACHING
DESCRIPTORS: HEADACHE;DISCOMFORT
DESCRIPTORS: DISCOMFORT
DESCRIPTORS: ACHING
DESCRIPTORS: DISCOMFORT;CONSTANT

## 2020-01-01 ASSESSMENT — PAIN - FUNCTIONAL ASSESSMENT
PAIN_FUNCTIONAL_ASSESSMENT: PREVENTS OR INTERFERES SOME ACTIVE ACTIVITIES AND ADLS
PAIN_FUNCTIONAL_ASSESSMENT: ACTIVITIES ARE NOT PREVENTED
PAIN_FUNCTIONAL_ASSESSMENT: PREVENTS OR INTERFERES SOME ACTIVE ACTIVITIES AND ADLS
PAIN_FUNCTIONAL_ASSESSMENT: PREVENTS OR INTERFERES SOME ACTIVE ACTIVITIES AND ADLS

## 2020-01-26 NOTE — ED PROVIDER NOTES
is negative except as noted above in the HPI. PAST MEDICAL HISTORY     Past Medical History:   Diagnosis Date    Atherosclerosis     cardiovascular disease     Blood circulation, collateral     4 fingers left hand tingly    CAD (coronary artery disease)     saw dr. Kyra Romeroical tunnel syndrome     CHF (congestive heart failure) (HCC)     COPD [facility] obstructive [facility] disease) ([facility]     Degenerative joint disease     right hip    Diabetes mellitus ([facility]     Diabetic x 1-2 years   Paulette Steiner FH: chronic lung disease requiring oxygen     sees dr. Lynnette Zelaya GERD (gastroesophageal reflux disease)     Hyperlipidemia     Hypertension     Myocardial infarction ([facility] [address] [facility]     subenodcardial    Obesity     Presence of stent in coronary artery     9/23/11 BALJINDER placement 0MB of circumflex    Right carpal tunnel syndrome 9/12/2018         SURGICAL HISTORY       Past Surgical History:   Procedure Laterality Date    BACK SURGERY      in 1980's    CARDIAC CATHETERIZATION  9/23/11    with stenting of OMB of left circumflex.  CORONARY ANGIOPLASTY WITH STENT PLACEMENT      HERNIA REPAIR      57 years ago    JOINT REPLACEMENT      Right hip replacement 4-6 years ago    LUMBAR LAMINECTOMY      IL REVISE MEDIAN N/CARPAL TUNNEL SURG Left 9/13/2018    CARPAL TUNNEL RELEASE performed by Isaías Kasper MD at [facility] OR         CURRENT MEDICATIONS       Previous Medications    ALBUTEROL SULFATE HFA (PROAIR HFA) 108 (90 BASE) MCG/ACT INHALER    Inhale 2 puffs into the lungs every 4 hours as needed for Wheezing or Shortness of Breath    ASPIRIN 81 MG EC TABLET    Take 81 mg by mouth daily.       FUROSEMIDE (LASIX) 40 MG TABLET    Take 1 tablet by mouth daily    HYDRALAZINE (APRESOLINE) 10 MG TABLET    Take 1 tablet by mouth every 8 hours    METFORMIN (GLUCOPHAGE) 1000 MG TABLET    Take 1 tablet by mouth 2 times daily (with meals)    METOPROLOL TARTRATE (LOPRESSOR) 25 MG TABLET    Take 1 tablet by mouth 2 times daily images are visualized and preliminarily interpreted by the emergency physician with the below findings:      Interpretation per the Radiologist below, if available at the time of this note:    CTA PULMONARY W CONTRAST   Final Result   1. There is complete collapse of the left lung secondary to an   occluded left main stem bronchus. This could be due to mucous   plugging, however endobronchial neoplasm must also be considered. Moderate size left pleural effusion. 2. Mild cardiomegaly. Trace pericardial fluid. 3. No pulmonary emboli. 4. No thoracic aortic aneurysm or dissection. Signed by Dr Donovan Simmons on 1/25/2020 9:27 PM      VL DUP LOWER EXTREMITY VENOUS BILATERAL           Vascular Lab Prelim  Duplex venous scan of B/L LE shows no evidence for dvt, svt, reflux noted at this time.  Final report pending    ED BEDSIDE ULTRASOUND:   Performed by ED Physician - none    LABS:  Labs Reviewed   CBC WITH AUTO DIFFERENTIAL - Abnormal; Notable for the following components:       Result Value    MCH 26.7 (*)     MCHC 29.4 (*)     RDW 16.1 (*)     Neutrophils % 78.2 (*)     Lymphocytes % 9.9 (*)     Neutrophils Absolute 8.3 (*)     All other components within normal limits   COMPREHENSIVE METABOLIC PANEL - Abnormal; Notable for the following components:    Chloride 94 (*)     CO2 30 (*)     Glucose 196 (*)     All other components within normal limits   BRAIN NATRIURETIC PEPTIDE - Abnormal; Notable for the following components:    Pro-BNP 4,186 (*)     All other components within normal limits   BLOOD GAS, ARTERIAL - Abnormal; Notable for the following components:    pCO2, Arterial 49.0 (*)     pO2, Arterial 63.0 (*)     HCO3, Arterial 29.7 (*)     Base Excess, Arterial 3.7 (*)     Hemoglobin, Art, Extended 13.9 (*)     All other components within normal limits   LACTIC ACID, PLASMA - Abnormal; Notable for the following components:    Lactic Acid 3.0 (*)     All other components within normal limits    Narrative: the dictations butoccasionally words are mis-transcribed.)    Isis Velásquez MD (electronically signed)  AttendingEmergency Physician         Isis Velásquez MD  01/25/20 3060       Isis Velásquez MD  01/25/20 6827

## 2020-01-26 NOTE — ED NOTES
Notified [facility] about transfer to [facility]. Shift Marian Morrow is going to call me back.       Nicholas Branch, SHADE  01/25/20 2080

## 2020-01-26 NOTE — ED NOTES
Patient placed on cardiac monitor, continuous pulse oximeter, and NIBP monitor. Monitor alarms on.          Tamera Fleischer, RN  01/25/20 2644

## 2020-02-03 PROBLEM — C34.82 MALIGNANT NEOPLASM OF OVERLAPPING SITES OF LEFT LUNG (HCC): Status: ACTIVE | Noted: 2020-01-01

## 2020-02-06 PROBLEM — C34.92 SQUAMOUS CELL LUNG CANCER, LEFT (HCC): Status: ACTIVE | Noted: 2020-01-01

## 2020-02-06 PROBLEM — E11.9 T2DM (TYPE 2 DIABETES MELLITUS) (HCC): Status: ACTIVE | Noted: 2020-01-01

## 2020-02-06 PROBLEM — J90 PLEURAL EFFUSION: Status: ACTIVE | Noted: 2020-01-01

## 2020-02-06 PROBLEM — I10 ESSENTIAL HYPERTENSION: Status: ACTIVE | Noted: 2020-01-01

## 2020-02-06 PROBLEM — E78.2 MIXED HYPERLIPIDEMIA: Status: ACTIVE | Noted: 2020-01-01

## 2020-02-06 PROBLEM — I50.20 HFREF (HEART FAILURE WITH REDUCED EJECTION FRACTION) (HCC): Status: ACTIVE | Noted: 2020-01-01

## 2020-02-06 PROBLEM — I21.9 MYOCARDIAL INFARCTION (HCC): Status: ACTIVE | Noted: 2020-01-01

## 2020-02-06 PROBLEM — I48.0 PAROXYSMAL ATRIAL FIBRILLATION (HCC): Status: ACTIVE | Noted: 2020-01-01

## 2020-02-06 PROBLEM — I25.10 CAD (CORONARY ARTERY DISEASE): Status: ACTIVE | Noted: 2020-01-01

## 2020-02-06 PROBLEM — G56.01 RIGHT CARPAL TUNNEL SYNDROME: Status: ACTIVE | Noted: 2018-09-12

## 2020-02-06 PROBLEM — I50.32 CHRONIC DIASTOLIC CONGESTIVE HEART FAILURE (HCC): Status: ACTIVE | Noted: 2017-11-21

## 2020-02-06 PROBLEM — Z87.891 FORMER SMOKER: Status: ACTIVE | Noted: 2020-01-01

## 2020-02-06 NOTE — PROGRESS NOTES
[facility]  MD Yolette Ramos BSN, PA-C  ____________________________________________________________               [facility]  Department of Radiation Oncology  [address] [place] [id number]  Office:  [phone]  Fax: [phone]    DATE:  02/07/2020  PATIENT:  Tramaine Perera 1955                                 MEDICAL RECORD #: [id number]                                                    REASON FOR CONSULTATION:  Tramaine Perera is a very pleasant 64 y.o. male that has been referred to our clinic by Miguel A Majano MD to discuss radiotherapy recommendations for newly diagnosed Squamous Cell Carcinoma of the LEFT lung causing complete collapse.            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  -Thoracentesis at [facility] [place] on 01/27/2020 was negative for malignancy.   -Underwent bronchoscopy and left main bronchus stenting on 01/29/2020, revealed a proximal large, bloody and friable tumor with 90% invasion/obstruction.   -PET scan on 02/01/2020 revealed FDG uptake within a ill-defined left hilar mass as well as the left upper and left lower lobes with interval placement of left main bronchus stent which is occluded distally with complete collapse of left lung.   -Discharged home to follow with medical oncology, Dr. Valenzuela and radiation oncology and recommendations for possible outpatient bronchoscopy to evaluate the lymphadenopathy given it wasn't avid on PET CT. FEV1 30% and O2 dependent.     01/25/2020 - Presented to the [facility] emergency department coughing up blood. The patient states it started as blood-streaked sputum this morning  and the last 2 episodes have been completely bloody, possible clots. Now coming out of his nose. Scan ordered for further evaluation. Chest x-ray demonstrated collapsed lung and likely new lung mass.     01/25/2020 - CTA Lung- [facility]  · Large left effusion contained within the entire left hemithorax.   · Fluid measures simple fluid.   · There is cut off of the proximal left main stem bronchus.   · Inferiorly to the bronchus cut off there is a 37 mm x 49 mm infrahilar soft tissue mass likely indicating neoplasm.   · There is compressive atelectasis. No right effusion. No pneumothorax. Lung is clear. Trachea is midline.  · There is evidence of right heart strain. Pulmonary artery is mildly dilated measuring 2.2 cm. No pulmonary embolism.   · Thoracic aorta is normal caliber.   · Heart size is normal. No pericardial effusion.   Subcarinal space there is potentially a 15 mm enlarged lymph node.    01/25/2020 - Transferred to [facility] for a higher level of care.     1/26/2020 X-ray Chest Portable- [facility]  Complete whiteout of the left lung with mild deviation of the trachea to the left. Findings are consistent with complete collapse of the left lung. Underlying infection or mass or effusion cannot be excluded     01/26/2020 Failed bedside attempt at Thoracentesis, referred to pulmonary services    01/26/2020 - In patient with  (Pulmonary at [facility]):  • Anticipate diagnostic thoracentesis tomorrow AM; no need for NPO or other precautions  • Consider PET scan to help with staging, vs discussing with onc/rad onc as well once tissue available. Doubt he is a surgical candidate given co-morbidities and poor baseline pulmonary function (FEV1 30% and O2 dependent prior to this illness) even if staging is early enough.  • Will discuss potential advanced airway interventions with IP   • Consider gentle diuresis and echocardiogram for ? RV dysfunction/PH     1/26/2020 [facility] CT Interpretation Of Outside  Films  · Left infrahilar mass with a likely endobronchial extension resulting in Cut off of the proximal left mainstem bronchus with a large left pleural effusion and total collapse of the left lung. Bronchoscopy may be useful to determine a pathologic diagnosis. Likewise thoracentesis with cytology may be useful as well.   · Negative for pulmonary embolism   · There is evidence of right heart strain, while the potential that this is related to the postobstructive pneumonitis and collapse of the LEFT lung is substantial. The enlargement of the RIGHT pulmonary artery may indicate pre-existing chronic pulmonary hypertension.   · Please note outside report was not available time of dictation, please follow up for discrepancies when outside report is available.     01/27/2020 Transthoracic Echo  TTE Conclusions:   LVEF greater than 55%. Unable to comment on diastolic filling pattern due to atrial fibrillation.  RV mildly dilated. Mildly depressed RV systolic function.  The left atrium is mild to moderately dilated. RA mildly dilated.  Mild tricuspid regurgitation.  The RV systolic pressure is calculated at 63 mmHg.    01/27/2020 - THORACENTESIS FLUID WITH CELL BLOCK:      Protein: 2.5  Cell Counts: 18% Neutrophils, 50% Lymph, 16% large Cell   Culture: Gram Stain negative  Albumin 1.6  Flow: Polytypic B cells and CD4-skewed T cells  Cytology:  REACTIVE MESOTHELIAL CELLS, HISTIOCYTES, AND LYMPHOCYTES; NEGATIVE FOR MALIGNANCY     01/28/2020 PORTABLE CXR  · There continues to be complete opacification of the left lung related to known large left pleural effusion and collapse of left lung.   · The right lung as no new consolidation.   · There is minimal atelectasis the right lung base.   · The cardiac shadow is obscured.   · No new osseous abnormality is seen.  · No significant interval change.    01/28/2020 - In patient with  ([place]):  • left main obstruction, reviewed CT chest, obstruction appears  to be related to mass.   • plan for bronchoscopy Wednesday with possible debulking, dilation and stent placement  • NPO at midnight Tuesday  • hold AC   • discussed with team, repeat CXR after thora.    01/29/2020 - In patient with  ([place]):  • TTE--Flexible, Possible rigid and stenting, possible EBUS for tissue   • Will need to avoid hypercarbia, hypoxia, and metabolic derangements given significant comorbid conditions.   • The risks, benefits, and alternatives of rigid bronchoscopy with possible rigid dilatation, coring, hot and cold therapies, as well as stent placement have also been discussed. These risks include, but are not limited to chipped tooth, lip laceration, vocal cord injury, infection, airway tear, bleeding, anesthetic complication, airway fire, respiratory failure, stroke, heart attack and death. This procedure has been fully reviewed with the patient and written informed consent has been obtained.    01/29/2020 - Rigid Bronchoscopy with dilation of LMB, insertion stents with biopsy in [place], Dr Talavera  FINDINGS:  • Demonstrated a partially obstructing (about 90% obstructed) mass was found in the middle portion in the left mainstem bronchus.   • The mass was large and bloody and friable.   PATHOLOGY:  LUNG, MAINSTEM BRONCHUS, BIOPSY:  INVASIVE SQUAMOUS CELL CARCINOMA, KERATINIZING TYPE.   LUNG, LEFT, BRONCHIAL WASHING:  ATYPICAL CELLS, REACTIVE BRONCHIAL CELLS, ALVEOLAR MACROPHAGES, AND INFLAMMATION; SUSPICIOUS FOR SQUAMOUS CELL CARCINOMA     01/29/2020  Portable chest radiograph, HISTORY: Post bronchoscopy; bronchial studies. COMPARISON: CT from the same day and chest radiograph from 1/28/2020  · The left hemithorax is completely opacified as a result of atelectasis, and there is leftward shift the mediastinum.   · Right lung mild prominence of the bronchovascular markings, but no new focal consolidations or overt pulmonary.   · No pneumothorax.   · Persistent left lung  atelectasis and underlying pleural effusion, size is difficult to assess due to atelectasis.     01/30/2020 Portable chest radiograph, History: s/p bronch for left lung mass wtih concomitant lung collapse; daily ; Lung Ca Staging, Small Nodule With Nsc On Perc Bx.  Comparison: Chest radiograph and CT dated 1/29/2020   · The left hemithorax remains completely opacified due to known endobronchial mass lesion seen on yesterday's CT.   · There has been no increase in the mild leftward mediastinal shift.   · There is low inspiratory effort on the right with basilar atelectasis. Otherwise, no significant interval change since the prior study.   · On the right, there is no focal consolidation, pleural effusion or pneumothorax.   · The cardiomediastinal silhouette is obscured.    01/31/2020 - In patient with  (Oncology at [facility]):  • Tramaine Perera is a 64 y.o. male with history of HTN, HLD, T2DM, CAD s/p PCI, HFrEF, COPD on home 4L of O2 who was admitted for complete collapse of left lung with imaging findings concerning for new obstructive lung mass. He underwent biopsy of the lung mass which revealed squamous cell carcinoma. Oncology consulted for further recommendations.   • Squamous Cell Carcinoma of Lung: with concern for possible adrenal mets. Agree with PET/CT and MRI brain. If there is increased FDG uptake of the adrenal glands, then this would warrant a biopsy to establish metastatic disease. Agree with radiation oncology consult for possible radiation to the left lung given collapse and inability to place stent by interventional pulmonology   • Recommendations: Await results of PET/CT and MRI brain. If concern for metastatic disease, would need biopsy to establish that. Agree with radiation oncology consult. He wants to follow in [place] for medical oncology, therefore case management can place referral to closest cancer center in [place]    01/31/2020 - In patient with  (Radiation Oncology at  [facility]):  • Tramaine Perera is a 64 y.o. year-old male with a newly diagnosed NSCLC (SqCC) of the LEFT lung causing complete collapse. Bronchoscopy with biopsy shows a proximal tumor with invasion/obstruction of the LMB which has been stented; pleural effusion was tapped x1 and negative for malignancy.   • Symptomatically he is at baseline O2 needs still with scant L lung breath sounds.   • We discussed the biopsy results and that with his early imaging (CT scans), this appears to be a localized process but that complete staging would be necessary to offer a formal treatment plan.   • We discussed two possible routes: if the disease has spread beyond his lungs, a short course of palliative radiotherapy to the chest may reduce some of the airway compression and improve breathing, though as he is currently at baseline O2 needs this may not be noticeable; if the tumor is localized to the lung, he may be a candidate for curative therapy involving resection or a long course of chemoradiation. If the latter, a short course of radiotherapy now may prevent an optimal plan for curative intent after completion of staging. We discussed his current living situation in [place], and that should he decide to pursue treatment here would need a caretaker for 6 weeks while staying at the [facility].   • Alternatively, we could refer him locally to Dr. Keron Gandhi in [facility].   • Imaging update: staging PET shows localized disease, would be candidate for definitive therapy; will discuss w/patient to see if he desires care at [facility] or locally; if locally, will refer to Dr. Alexei Gandhi on Monday.   PLAN:  •   Will continue to follow and update plan accordingly with imaging results  •   Tentative CT sim scheduled for Monday pending staging and patient's desire for location of care    02/01/2020 - Staging PET Scan- [facility]:  • Intense FDG uptake within a ill-defined left hilar mass as well as the left upper and left lower lobes.  "Interval placement of a left main bronchus stent which is occluded distally with complete collapse  of the left lung. FDG uptake within the left upper and lower lobes may be related to infection versus malignancy. Small left pleural effusion.  • No FDG uptake is present within the subcarinal Lymphadenopathy.    02/01/2020 - Staging MRI Brain- [facility]:  • No findings of intracranial metastatic disease.  • Mild chronic small vessel ischemic white matter disease and tiny old right cerebellar infarcts.    02/04/2020 - Discharged from [facility] with follow up appointment scheduled.   Hospital Course copied from [facility] discharge note:   Mr. Perera is a 63 Yo male with a PMHx significant for HTN, HLD, T2DM, CAD s/p PCI, HFrEF, COPD on home 4L of O2 who was admitted for complete collapse of left lung concerning for new obstructive lung mass on CT.    Patient reports he has felt short of breath for \"quite some time\" however this acutely worsened over the past week PTA. Also had increased frequency of cough and blood streaked sputum, so he initially presented to an OSH. He was found to be in A fib with RVR and CXR demonstrated collapsed left lung and likely new lung mass. He was subsequently transferred to [facility] for a higher level of care.    In the ED he was AF, HR 60-90s on dilt gtt, BP 100s/70s, on 4L NC. Work up significant for WBC 9.6, Hct 44, , CXR demonstrated complete collapse of left lung. He was admitted to medicine for further work up. CT read here significant for large obstructive mass as well as large pleural effusion. Pulmonary was consulted who performed a thoracentesis. Pleural fluid studies demonstrated a lymphocytic predominant effusion, but cytology negative for malignancy. A CT C/A/P w + w/o contrast was done for further work up of malignancy which demonstrated L lung mass with post-obstructive collapse of the entire L lung and mediastinal shift, bulky subcarinal adenopathy, and nodular " bilateral adrenal glands. PET CT Intense FDG uptake within a ill-defined left hilar mass as well as the left upper and left lower lobes. Interval placement of a left main bronchus stent which is occluded distally with complete collapse of the left lung. FDG uptake within the left upper and lower lobes may be related to infection versus malignancy. Small left pleural effusion. No FDG uptake is present within the subcarinal lymphadenopathy. MRI brain with/without negative for metastatic disease.   On 1/29 he underwent Bronchoscopy with Interventional Pulmonary which demonstrated a partially obstructing (about 90% obstructed) mass was found in the middle portion in the left mainstem bronchus. The mass was large and bloody and friable. Bronchoscopy was c/b bleeding requiring TXA and a 10 x 40 Aero stent was placed with good results. He was initiated on hypertonic saline nebulizers. Radiation oncology was consulted and recommended outpatient initiation of therapy since patient may qualify for definitive treatment if cancer is localized. Oncology recommends possible outpatient bronchoscopy to evaluate the lymphadenopathy given it wasn't avid on PET CT, which may be performed by his specialists in [place].     Instructed pt to F/u with PCP, oncology ([facility] at [phone]), and radiation oncology (Dr. Gandhi on 2/7)- Consider outpatient bronchoscopy for evaluation of lymphadenopathy, Consider starting anticoagulation for Afib after patient undergoes radiation therapy             History obtained from  PATIENT, FAMILY and CHART    PAST MEDICAL HISTORY  Past Medical History:   Diagnosis Date   • Arthritis    • Back pain    • GERD (gastroesophageal reflux disease)    • Hypertension       PAST SURGICAL HISTORY  Past Surgical History:   Procedure Laterality Date   • BACK SURGERY     • CARDIAC VALVE REPLACEMENT     • HIP SURGERY        FAMILY HISTORY  family history includes Diabetes in his father;  Hypertension in his father and mother.     SOCIAL HISTORY  Social History     Tobacco Use   • Smoking status: Former Smoker     Packs/day: 2.00     Years: 30.00     Pack years: 60.00     Types: Cigarettes     Last attempt to quit: 2009     Years since quittin.1   • Smokeless tobacco: Never Used   Substance Use Topics   • Alcohol use: Yes   • Drug use: No      ALLERGIES  Crestor [rosuvastatin calcium]      MEDICATIONS  Current Outpatient Medications   Medication Sig Dispense Refill   • albuterol (PROVENTIL) (2.5 MG/3ML) 0.083% nebulizer solution Take 2.5 mg by nebulization Every 4 (Four) Hours As Needed for Wheezing.     • albuterol sulfate  (90 Base) MCG/ACT inhaler Inhale 2 puffs Every 4 (Four) Hours As Needed for Wheezing.     • aspirin 81 MG chewable tablet Chew 81 mg Daily.     • escitalopram (LEXAPRO) 10 MG tablet Take 10 mg by mouth Daily.     • furosemide (LASIX) 40 MG tablet Take 40 mg by mouth 2 (Two) Times a Day.     • guaiFENesin (MUCINEX) 600 MG 12 hr tablet Take 600 mg by mouth.     • metFORMIN (GLUCOPHAGE) 1000 MG tablet Take 1,000 mg by mouth 2 (Two) Times a Day With Meals.     • metoprolol tartrate (LOPRESSOR) 25 MG tablet      • Naproxen Sodium (ALEVE PO) Take  by mouth.     • raNITIdine (ZANTAC) 150 MG tablet Take 150 mg by mouth 2 (Two) Times a Day.     • simvastatin (ZOCOR) 10 MG tablet Take 10 mg by mouth Every Night.       No current facility-administered medications for this visit.       Current outpatient and discharge medications have been reconciled for the patient.  Reviewed by: Alexei Gandhi III, MD    The following portions of the patient's history were reviewed and updated as appropriate: allergies, current medications, past family history, past medical history, past social history, past surgical history and problem list.    REVIEW OF SYSTEMS  Review of Systems   Constitutional: Positive for fatigue. Negative for activity change, appetite change, chills, diaphoresis, fever  "and unexpected weight change.   HENT: Negative.    Eyes: Negative.    Respiratory: Positive for cough, shortness of breath and wheezing. Negative for apnea, choking, chest tightness and stridor.         Oxygen 4 lpm nasal cannula  Nebulizer  Inhalers   Cardiovascular: Negative.         History of cardiac stents   Gastrointestinal: Negative.         GERD   Endocrine: Negative.    Genitourinary: Negative.    Musculoskeletal: Negative.         Arthritis   Skin: Negative.    Allergic/Immunologic: Negative.    Neurological: Negative.    Hematological: Negative.    Psychiatric/Behavioral: Negative.         Anxious     PHYSICAL EXAM  VITAL SIGNS:   Vitals:    02/07/20 1353 02/07/20 1401   BP:  118/73   Weight:  108 kg (238 lb)   Height: 170.2 cm (67\") 170.2 cm (67\")   PainSc:  0-No pain     General:  Alert and oriented, no acute distress, frail appearing, Vitals reviewed.  Head:  Normocephalic, without obvious abnormality    Nose/Sinuses:  Nares normal externally, no sinus tenderness. Oxygen by nasal cannula on.  Mouth/Throat:  Mucosa moist, pharynx without erythema  Neck:  supple, No evidence of adenopathy in the cervical or supraclavicular areas.  Eyes: No gross abnormalities, glasses on  Ears: Ears intact with no external abnormalities noted  Chest:  Respiratory efforts are normal, labored if not on oxygen, dyspnea on exertion, minimal breath sounds on left.  Cardiovascular: Regular rate and rhythm without murmurs, rubs, or gallops.   Abdomen:  Soft, non-tender, normal bowel sounds; no CVA tenderness   Extremities:  DC well, warm to touch, uses wheelchair for assistance.  Skin: No suspicious lesions or rashes of concern  Neurologic:  Alert and oriented, non focal exam  Psych: Mood and affect are appropriate    Performance Status: ECOG (2) Ambulatory and capable of self care, unable to carry out work activity, up and about > 50% or waking hours    Clinical Quality Measures  -Pain Documented by Standardized Tool, FPS " Tramaine Perera reports a pain score of 0.  Given his pain assessment as noted, treatment options were discussed and the following options were decided upon as a follow-up plan to address the patient's pain: no pain, no plan given.     Pain Medications             aspirin 81 MG chewable tablet Chew 81 mg Daily.    escitalopram (LEXAPRO) 10 MG tablet Take 10 mg by mouth Daily.    Naproxen Sodium (ALEVE PO) Take  by mouth.        -Advanced Care Planning  -Body Mass Index Screening and Follow-Up Plan Patient's Body mass index is 37.28 kg/m². BMI is above normal parameters. Recommendations include: educational material.  -Tobacco Use: Screening and Cessation Intervention Social History    Tobacco Use      Smoking status: Former Smoker        Packs/day: 2.00        Years: 30.00        Pack years: 60        Types: Cigarettes        Quit date:         Years since quittin.1      Smokeless tobacco: Never Used    ASSESSMENT AND PLAN  1. Malignant neoplasm of overlapping sites of left lung (CMS/HCC)    2. Stage 4 very severe COPD by GOLD classification (CMS/HCC)    3. Chronic respiratory failure with hypoxia and hypercapnia (CMS/HCC)    4. Former smoker    5. Oxygen dependent    6. Hemoptysis      Orders Placed This Encounter   Procedures   • Ambulatory Referral to Hematology / Oncology     Referral Priority:   Urgent     Referred to Provider:   Michoacano Holbrook MD     Requested Specialty:   Hematology and Oncology     Number of Visits Requested:   1     RECOMMENDATIONS: Tramaine Perera presented to the [facility] emergency department coughing up blood, states it started as blood-streaked sputum in the morning progressing to completely bloody, possible clots and coming out of his nose on arrival to ER. Chest x-ray demonstrated collapsed lung and likely new lung mass, he was ultimately sent to [facility] for higher level of care due to this combined with multiple co-morbidities. FEV1 30% and O2 dependent 24 hrs a  day, his normal state is only at night. Biopsy confirmed Squamous Cell Carcinoma.     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   -Thoracentesis at [facility] on 01/27/2020 was negative for malignancy.   -Underwent bronchoscopy and left main bronchus stenting on 01/29/2020, revealed a proximal large, bloody and friable tumor with 90% invasion/obstruction.   -PET scan on 02/01/2020 revealed FDG uptake within a ill-defined left hilar mass as well as the left upper and left lower lobes with interval placement of left main bronchus stent which is occluded distally with continued complete collapse of left lung.   -Discharged home to follow with medical oncology, radiation oncology and recommendations for possible outpatient bronchoscopy to evaluate the lymphadenopathy given it wasn't avid on PET CT.     The indications and rationale of a definitive course of radiation therapy according to the NCCN Guidelines to the lung has been discussed today. I discussed the need for systemic concurrent chemotherapy as well.  I have extensively reviewed the risks, benefits and alternatives of therapy with this diagnosis. The risks of radiation therapy includes but is not limited to radiation induced pulmonary fibrosis, progression of disease in spite of therapy with either local or systemic failure. I have seen, examined and reviewed this patient's medication list, appropriate labs and imaging studies as well as other physician notes.  We discussed the goals and plans of care with the patient and family and answered all questions. Following this discussion and in consideration of the diagnostic data/evaluation of the patient, I am recommending to the radiation therapy to left lung and nodes.     Regarding the  lymphadenopathy, referral for outpatient bronchoscopy referral will only postpone treatment in this patient with a collapsed lung and history of significant hemoptysis with this lesion, we will simulation treatment fields today to the lung and to the nodes to facilitate the planning process. I anticipate a course of 6600 cGy over 33 fractions. I have staged him as IIIA (T2N2M0) due to bulky subcarinal adenopathy, and plan definitive chemoradiation.    [facility] discharge note states a medical oncology referral had been made although there is not an appt made with either office, Mr. Perera requests Dr. Holbrook. Urgent referral placed so that he may evaluate him for systemic therapy concurrent with radiation. We anticipate beginning radiation next week.      Thank you for allowing me to assist in this patients care.     Todays appointment time was spent in counseling and coordination of care as follows: diagnosis, intent of treatment discussing radiation therapy specifics: logistics, possible and probable side effects and after effects, staging of cancer, standard of care in for this stage of this cancer and treatment options  Alexei Gandhi III, MD  02/07/2020

## 2020-02-07 PROBLEM — R04.2 HEMOPTYSIS: Status: ACTIVE | Noted: 2020-01-01

## 2020-02-07 PROBLEM — Z99.81 OXYGEN DEPENDENT: Status: ACTIVE | Noted: 2020-01-01

## 2020-02-07 NOTE — TELEPHONE ENCOUNTER
Called Dr. Holbrook's office to set up an appt for pt to be seen as a new consult. Faxed medical records and referral over per their request. Received a call back and set up appt for pt for Thurs, 2/13 @ 10:15 AM Edwin. Called pt and provided appt time and advised they would be mailing out a new consult packet to his address, he understood.

## 2020-02-07 NOTE — PATIENT INSTRUCTIONS
Lung Cancer  Lung cancer is an abnormal growth of cancerous cells that forms a mass (malignant tumor) in a lung. There are several types of lung cancer. The types are based on the appearance of the tumor cells. The two most common types are:  · Non-small cell lung cancer. This type of lung cancer is the most common type. Non-small cell lung cancers include squamous cell carcinoma, adenocarcinoma, and large cell carcinoma.  · Small cell lung cancer. In this type of lung cancer, abnormal cells are smaller than those of non-small cell lung cancer. Small cell lung cancer gets worse (progresses) faster than non-small cell lung cancer.  What are the causes?  The most common cause of lung cancer is smoking tobacco. The second most common cause is exposure to a chemical called radon.  What increases the risk?  You are more likely to develop this condition if:  · You smoke tobacco.  · You have been exposed to:  ? Secondhand tobacco smoke.  ? Radon gas.  ? Uranium.  ? Asbestos.  ? Arsenic in drinking water.  ? Air pollution.  · You have a family or personal history of lung cancer.  · You have had lung radiation therapy in the past.  · You are older than age 65.  What are the signs or symptoms?  In the early stages, you may not have any symptoms. As the cancer progresses, symptoms may include:  · A lasting cough, possibly with blood.  · Fatigue.  · Unexplained weight loss.  · Shortness of breath.  · Loud breathing (wheezing).  · Chest pain.  · Loss of appetite.  Symptoms of advanced lung cancer include:  · Hoarseness.  · Bone or joint pain.  · Weakness.  · Change in the structure of the fingernails (clubbing), so that the nail looks like an upside-down spoon.  · Swelling of the face or arms.  · Inability to move the face (paralysis).  · Drooping eyelids.  How is this diagnosed?  This condition may be diagnosed based on:  · Your symptoms and medical history.  · A physical exam.  · A chest X-ray.  · A CT scan.  · Blood  tests.  · Sputum tests.  · Removal of a sample of lung tissue (lung biopsy) for testing.  Your cancer will be assessed (staged) to determine how severe it is and how much it has spread (metastasized).  How is this treated?  Treatment depends on the type and stage of your cancer. Treatment may include one or more of the following:  · Surgery to remove as much of the cancer as possible. Lymph nodes in the area may be removed and tested for cancer as well.  · Medicines that kill cancer cells (chemotherapy).  · High-energy rays that kill cancer cells (radiation therapy).  · Chemotherapy. This treatment uses medicines to destroy cancer cells.  · Targeted therapy. This targets specific parts of cancer cells and the area around them to block the growth and spread of the cancer. Targeted therapy can help limit the damage to healthy cells.  Follow these instructions at home:  Eating and drinking  · Some of your treatments might affect your appetite. If you are having problems eating, or if you do not have an appetite, meet with a dietitian.  · If you have side effects that affect your appetite, it may help to:  ? Eat smaller meals and snacks often.  ? Drink high-nutrition and high-calorie shakes or supplements.  ? Eat bland and soft foods that are easy to eat.  ? Avoid eating foods that are hot, spicy, or hard to swallow.  General instructions    · Do not use any products that contain nicotine or tobacco, such as cigarettes and e-cigarettes. If you need help quitting, ask your health care provider.  · Do not drink alcohol.  · If you are admitted to the hospital, make sure your cancer specialist (oncologist) is aware. Your cancer may affect your treatment for other conditions.  · Take over-the-counter and prescription medicines only as told by your health care provider.  · Consider joining a support group for people who have been diagnosed with lung cancer.  · Work with your health care provider to manage any side effects of  treatment.  · Keep all follow-up visits as told by your health care provider. This is important.  Where to find more information  · American Cancer Society: https://www.cancer.org  · National [employer] (NCI): https://www.cancer.gov  Contact a health care provider if you:  · Lose weight without trying.  · Have a persistent cough and wheezing.  · Feel short of breath.  · Get tired easily.  · Have bone or joint pain.  · Have difficulty swallowing.  · Notice that your voice is changing or getting hoarse.  · Have pain that does not get better with medicine.  Get help right away if you:  · Cough up blood.  · Have new breathing problems.  · Have chest pain.  · Have a fever.  · Have swelling in an ankle, leg, or arm, or the face or neck.  · Have paralysis in your face.  · Are very confused.  · Have a drooping eyelid.  Summary  · Lung cancer is an abnormal growth of cancerous cells that forms a mass (malignant tumor) in a lung.  · There are several types of lung cancer. The types are based on the appearance of the tumor cells. The two most common types are non-small cell and small cell.  · The most common cause of lung cancer is smoking tobacco.  · Early symptoms include a lasting cough, possibly with blood, and fatigue, unexplained weight loss, and shortness of breath.  · After diagnosis, treatment depends on the type and stage of your cancer.  This information is not intended to replace advice given to you by your health care provider. Make sure you discuss any questions you have with your health care provider.  Document Released: 03/26/2002 Document Revised: 10/25/2018 Document Reviewed: 10/25/2018  [employer] Interactive Patient Education © 2019 [employer]    External Beam Radiation Therapy  External beam radiation therapy is a type of radiation treatment. This type of radiation therapy can deliver radiation to a fairly large area. This is the most common type of radiation therapy for cancer. This therapy may be  done to:  · Treat cancer by:  ? Destroying cancer cells. Radiation delivered during the treatment damages cancer cells. It may also damage normal cells, but normal cells have the DNA to repair themselves while cancer cells do not.  ? Helping with symptoms of your cancer.  ? Stopping the growth of any remaining cancer cells after surgery.  ? Preventing cancer cells from growing in areas that do not have cancer (prophylactic radiation therapy).  · Treat or shrink a tumor.  · Reduce pain (palliative therapy).  The amount of radiation you will receive and the length of therapy depend on your medical condition. You should not feel the radiation being delivered or any pain during your therapy.  Let your health care provider know about:  · Any allergies you have.  · All medicines you are taking, including vitamins, herbs, eye drops, creams, and over-the-counter medicines.  · Any problems you or family members have had with anesthetic medicines.  · Any blood disorders you have.  · Any surgeries you have had.  · Any medical conditions you have.  · Whether you are pregnant or may be pregnant.  What are the risks?  Generally, this is a safe procedure. However, radiation therapy can place a person at a higher risk for a second cancer later in life.  Most people experience side effects from the therapy. Side effects depend on the amount of radiation and the part of the body that was exposed to radiation. The most common side effects include:  · Skin changes.  · Hair loss.  · Fatigue.  · Nausea and vomiting.  What happens before the procedure?  · There will be a planning session (simulation). During the session:  ? Your health care provider will plan exactly where the radiation will be delivered (treatment field).  ? You will be positioned for your therapy. The goal is to have a position that can be reproduced for each therapy session.  ? Temporary marks may be drawn on your body. Permanent marks may also be drawn on your body in  order for you to be positioned the same way for each therapy session.  ? A tool that holds a body part in place (immobilization device) may be used to keep the area of treatment in the correct position.  · Follow instructions from your health care provider about eating or drinking restrictions.  · Ask your health care provider about changing or stopping your regular medicines. This is especially important if you are taking diabetes medicines or blood thinners.  What happens during the procedure?    · You will either lie on a table or sit in a chair in the position determined for your therapy.  · You may have a heavy shield placed on you to protect tissues and organs that are not being treated.  · The radiation machine (linear accelerator) will move around you to deliver the radiation in exact doses from different angles. The machine will not touch you.  The procedure may vary among health care providers and hospitals.  What happens after the procedure?  · You may return to your normal routine including diet, activities, and medicines as told by your health care provider.  · You may want to have someone take you home from the hospital or clinic.  Summary  · External beam radiation therapy is a type of radiation treatment for cancer.  · The amount of radiation you will receive and the length of therapy depend on your medical condition.  · Most people experience side effects from the therapy. Side effects depend on the amount of radiation and the part of the body that was exposed to radiation.  This information is not intended to replace advice given to you by your health care provider. Make sure you discuss any questions you have with your health care provider.  Document Released: 05/06/2010 Document Revised: 12/18/2017 Document Reviewed: 12/18/2017  [employer] Interactive Patient Education © 2019 [employer]

## 2020-02-12 NOTE — PROGRESS NOTES
I have discussed this patient with Dr. Holbrook and his he is a clinical stage IIIa (T2 N2 M0) we will plan definitive concomitant chemoradiation.  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 Holbrook.

## 2020-02-15 PROBLEM — I48.0 PAROXYSMAL A-FIB (HCC): Status: ACTIVE | Noted: 2020-01-01

## 2020-02-15 NOTE — ED PROVIDER NOTES
tablet, Refills: 0      simvastatin (ZOCOR) 10 MG tablet Take 1 tablet by mouth nightly  Qty: 30 tablet, Refills: 0      furosemide (LASIX) 40 MG tablet Take 1 tablet by mouth daily  Qty: 30 tablet, Refills: 0      ranitidine (ZANTAC) 150 MG tablet Take 1 tablet by mouth 2 times daily  Qty: 60 tablet, Refills: 0      aspirin 81 MG EC tablet Take 81 mg by mouth daily.         hydrALAZINE (APRESOLINE) 10 MG tablet Take 1 tablet by mouth every 8 hours  Qty: 90 tablet, Refills: 0      metoprolol tartrate (LOPRESSOR) 25 MG tablet Take 1 tablet by mouth 2 times daily  Qty: 60 tablet, Refills: 0             ALLERGIES     Crestor [rosuvastatin]    FAMILY HISTORY       Family History   Problem Relation Age of Onset    Coronary Art Dis Mother     Cancer Mother     Coronary Art Dis Father     Diabetes Father     Coronary Art Dis Sister     Depression Paternal Grandmother           SOCIAL HISTORY       Social History     Socioeconomic History    Marital status:      Spouse name: None    Number of children: None    Years of education: None    Highest education level: None   Occupational History    None   Social Needs    Financial resource strain: None    Food insecurity:     Worry: None     Inability: None    Transportation needs:     Medical: None     Non-medical: None   Tobacco Use    Smoking status: Former Smoker     Packs/day: 2.00     Years: 18.00     Pack years: 36.00     Types: Cigarettes     Last attempt to quit: 2011     Years since quittin.4    Smokeless tobacco: Never Used   Substance and Sexual Activity    Alcohol use: Yes     Comment: rare    Drug use: No    Sexual activity: None   Lifestyle    Physical activity:     Days per week: None     Minutes per session: None    Stress: None   Relationships    Social connections:     Talks on phone: None     Gets together: None     Attends Orthodoxy service: None     Active member of club or organization: None     Attends meetings of clubs or organizations: None     Relationship status: None    Intimate partner violence:     Fear of current or ex partner: None     Emotionally abused: None     Physically abused: None     Forced sexual activity: None   Other Topics Concern    None   Social History Narrative    None       SCREENINGS    Bulmaro Coma Scale  Eye Opening: Spontaneous  Best Verbal Response: Oriented  Best Motor Response: Obeys commands  Bulmaro Coma Scale Score: 15        PHYSICAL EXAM    (up to 7 for level 4, 8 or more for level 5)     ED Triage Vitals [02/15/20 1541]   BP Temp Temp src Pulse Resp SpO2 Height Weight   114/76 98.1 °F (36.7 °C) -- 84 20 96 % 5' 8\" (1.727 m) 240 lb (108.9 kg)       Physical Exam  Vitals signs reviewed. HENT:      Head: Normocephalic. Right Ear: External ear normal.      Left Ear: External ear normal.      Mouth/Throat:      Mouth: Mucous membranes are moist.      Pharynx: Oropharynx is clear. Eyes:      Conjunctiva/sclera: Conjunctivae normal.      Pupils: Pupils are equal, round, and reactive to light. Neck:      Musculoskeletal: Normal range of motion. Cardiovascular:      Rate and Rhythm: Normal rate and regular rhythm. Heart sounds: Normal heart sounds. Pulmonary:      Effort: Pulmonary effort is normal.      Breath sounds: Normal breath sounds. Abdominal:      General: Bowel sounds are normal.      Palpations: Abdomen is soft. Tenderness: There is abdominal tenderness (mild diffuse ttp). Musculoskeletal: Normal range of motion. Comments: ttp lower thoracic and upper lumbar spine  Moves extremities equally/weakly x4  CMS intact bilateral upper/lower extremities   Skin:     General: Skin is warm and dry. Neurological:      Mental Status: He is alert and oriented to person, place, and time.          DIAGNOSTIC RESULTS     EKG: All EKG's are interpreted by the Emergency Department Physician who either signs or Co-signs this chart in the absence of acardiologist.    Afib

## 2020-02-16 NOTE — ED PROVIDER NOTES
Attending Supervisory Note/Shared Visit   I have personally performed a face to face diagnostic evaluation on this patient. I have reviewed the mid-levels findings and agree. History and Exam by me shows she 2year-old male in no acute distress. Heart rate still rapid despite intervention with Cardizem and Cardizem drip. No chest pain negative cardiac enzymes. He cannot be anticoagulated or cardioverted because of his lung mass and hemoptysis. I found no acute pathology to explain his abdominal pain on CT scan. There were no obvious injuries or bony injuries from his fall. He is a little dehydrated BUN slightly elevated. He will have to be admitted and is rate controlled and once that is accomplished and he is rehydrated and given comfort he should be able to be discharged. With adjustments of medication. Back to gotten his heart rate under control I could have probably sent him home. I discussed the case with Dr. Mercy Torres.     Davina Anaya MD  Attending Emergency Physician        Mahesh Peralta MD  02/15/20 [id number]

## 2020-02-16 NOTE — H&P
used smokeless tobacco.  Alcohol:   reports current alcohol use. Illicit Drugs:no     Family History:      Problem Relation Age of Onset    Coronary Art Dis Mother     Cancer Mother     Coronary Art Dis Father     Diabetes Father     Coronary Art Dis Sister     Depression Paternal Grandmother        Review of Systems:   Constitutional / general:  No fever / chills / sweats  HEENT: No sore throat / hoarseness / vision changes  :  No dysuria / hesitancy / urgency / hematuria   Neuro: No muscle weakness / dysphagia / headache / paresthesias  Musculoskeletal:  No edema / cyanosis / pain  Psychiatric:  No depression / anxiety / insomnia  Skin:  No new rashes / lesions    Physical Examination:        /78   Pulse 132   Temp 98.1 °F (36.7 °C)   Resp 25   Ht 5' 8\" (1.727 m)   Wt 240 lb (108.9 kg)   SpO2 94%   BMI 36.49 kg/m²   General appearance: some  apparent distress, appears stated age and cooperative. Well developed and well groomed. HEENT: Normal cephalic, atraumatic without obvious deformity. Pupils equal, round, and reactive to light. Extra ocular muscles intact. Conjunctivae/corneas clear. Normal ears and nose. Neck: Supple, with full range of motion. No jugular venous distention. Trachea midline. Thyroid no masses noted. Respiratory: decreased lung side on left   Cardiovascular: IRRegular rate and rhythm with normal S1/S2 without murmurs, rubs or gallops. Abdomen: Soft, non-tender, non-distended with normal bowel sounds. Musculoskeletal: No clubbing, cyanosis or edema bilaterally. Full range of motion without deformity in 4 extremities. Neurologic: grossly non-focal.  Psychiatric: Alert and oriented, thought content appropriate, normal insight. Diagnostic Data:  Imaging:   CT ABDOMEN PELVIS W IV CONTRAST Additional Contrast? None   Final Result   1. Left pleural effusion. 2. No acute abnormality is seen within the abdomen.    Signed by Dr Leif Burgess on 2/15/2020 7:14 PM      CT Lumbar Spine WO Contrast   Final Result   1. Degenerative disc, endplate, and facet changes. 2. No acute fracture is seen. Signed by Dr Klaudia Baptiste on 2/15/2020 7:23 PM      CT THORACIC SPINE WO CONTRAST   Final Result   1. Degenerative disc and endplate changes. 2. No thoracic spine fracture is seen. Signed by Dr Klaudia Baptiste on 2/15/2020 7:09 PM      XR CHEST PORTABLE   Final Result   Complete opacification of the left hemithorax, which may   be due to an obstructing mass with atelectasis but is favored to be   due to a large pleural effusion. This is new compared to the recent   comparison exam.   Signed by Dr Bess Odell on 2/15/2020 5:00 PM        CBC:  Recent Labs     02/15/20  1615   WBC 11.3*   HGB 13.3*   HCT 44.1   *     BMP:  Recent Labs     02/15/20  1800   *   K 5.2*   CL 86*   CO2 33*   BUN 26*   CREATININE 1.1   CALCIUM 9.4     Recent Labs     02/15/20  1800   AST 19   ALT 12   BILITOT 0.5   ALKPHOS 159*     Coag Panel:   Recent Labs     02/15/20  1557   INR 1.19*   PROTIME 14.5   APTT 25.2*     Cardiac Enzymes:   Recent Labs     02/15/20  1939   TROPONINI <0.01     ABGs:  Lab Results   Component Value Date    PHART 7.390 01/25/2020    PO2ART 63.0 01/25/2020    QEB3BUV 49.0 01/25/2020     Urinalysis:  Lab Results   Component Value Date    NITRU NEGATIVE 09/05/2015    GLUCOSEU NEGATIVE 09/05/2015       Active Hospital Problems    Diagnosis Date Noted    Paroxysmal A-fib [facility]) [I48.0] 02/15/2020       Assessment and Plan: Active Problems:    Paroxysmal A-fib (HCC)  Resolved Problems:    * No resolved hospital problems. *    Admit, cardizem gtt, check TSH, MG echo cardiology consult, he's not on AC 2 to hemoptysis    COPD with exacerbation?  PN merrem, RT steroids   Lung cancer with obstruction, was worked up to [facility] 37 was supposed to see oncology , will consult   Palliative care consult   DNI  DVT prophylaxis with lovenix           Coye Ros

## 2020-02-16 NOTE — CONSULTS
angina pectoris        Myocardial infarction [facility])        Essential hypertension        Chronic diastolic congestive heart failure (HCC)        Paroxysmal A-fib (HCC)              Past Medical History:  Past Medical History:   Diagnosis Date    Atherosclerosis     cardiovascular disease     Blood circulation, collateral     4 fingers left hand tingly    CAD (coronary artery disease)     saw dr. Escalera Bernheim tunnel syndrome     CH[facility]estive [facility] failure) [facility]     COPD (chronic obstructive pulmonary disease) [facility]     Degenerative joint disease     right hip    Diabetes mellitus ([facility]     Diabetic x 1-2 years   Arty Mom FH: chronic lung disease requiring oxygen     sees dr. Lisa Townsend GERD (gastroesophageal reflux disease)     Hyperlipidemia     Hypertension     Myocardial infarction ([facility]     subenodcardial    Obesity     Presence of stent in coronary artery     9/23/11 BALJINDER placement 0MB of circumflex    Right carpal tunnel syndrome 9/12/2018        Past Surgical History:  Past Surgical History:   Procedure Laterality Date    BACK SURGERY      in 1980's    CARDIAC CATHETERIZATION  9/23/11    with stenting of OMB of left circumflex.     CORONARY ANGIOPLASTY WITH STENT PLACEMENT      HERNIA REPAIR      57 years ago    JOINT REPLACEMENT      Right hip replacement 4-6 years ago    LUMBAR LAMINECTOMY      VT REVISE MEDIAN N/CARPAL TUNNEL SURG Left 9/13/2018    CARPAL TUNNEL RELEASE performed by Jose Ramon Ojeda MD at [facility] OR       Past Family History:  Family History   Problem Relation Age of Onset    Coronary Art Dis Mother     Cancer Mother     Coronary Art Dis Father     Diabetes Father     Coronary Art Dis Sister     Depression Paternal Grandmother        Past Social History:  Social History     Socioeconomic History    Marital status:      Spouse name: Not on file    Number of children: Not on file    Years of education: Not on file    Highest education level: Not on file Occupational History    Not on file   Social Needs    Financial resource strain: Not on file    Food insecurity:     Worry: Not on file     Inability: Not on file    Transportation needs:     Medical: Not on file     Non-medical: Not on file   Tobacco Use    Smoking status: Former Smoker     Packs/day: 2.00     Years: 18.00     Pack years: 36.00     Types: Cigarettes     Last attempt to quit: 2011     Years since quittin.4    Smokeless tobacco: Never Used   Substance and Sexual Activity    Alcohol use: Yes     Comment: rare    Drug use: No    Sexual activity: Not on file   Lifestyle    Physical activity:     Days per week: Not on file     Minutes per session: Not on file    Stress: Not on file   Relationships    Social connections:     Talks on phone: Not on file     Gets together: Not on file     Attends Hoahaoism service: Not on file     Active member of club or organization: Not on file     Attends meetings of clubs or organizations: Not on file     Relationship status: Not on file    Intimate partner violence:     Fear of current or ex partner: Not on file     Emotionally abused: Not on file     Physically abused: Not on file     Forced sexual activity: Not on file   Other Topics Concern    Not on file   Social History Narrative    Not on file       Allergies: Allergies   Allergen Reactions    Crestor [Rosuvastatin] Hives and Swelling       Home Meds:  Prior to Admission medications    Medication Sig Start Date End Date Taking?  Authorizing Provider   albuterol (PROVENTIL) (2.5 MG/3ML) 0.083% nebulizer solution Take 2.5 mg by nebulization every 6 hours as needed for Wheezing   Yes Historical Provider, MD   escitalopram (LEXAPRO) 10 MG tablet Take 10 mg by mouth daily   Yes Historical Provider, MD   GUAIFENESIN PO Take 600 mg by mouth 2 times daily   Yes Historical Provider, MD   tiotropium (SPIRIVA) 18 MCG inhalation capsule Inhale 18 mcg into the lungs daily   Yes Historical Provider, Output 700 ml   Net 418 ml     Estimated body mass index is 36.35 kg/m² as calculated from the following:    Height as of this encounter: 5' 8\" (1.727 m). Weight as of this encounter: 239 lb 1 oz (108.4 kg). Physical Exam  Vitals signs reviewed. Constitutional:       Appearance: Normal appearance. HENT:      Head: Normocephalic and atraumatic. Nose: Nose normal.      Mouth/Throat:      Mouth: Mucous membranes are moist.   Eyes:      Pupils: Pupils are equal, round, and reactive to light. Neck:      Musculoskeletal: Normal range of motion. Cardiovascular:      Rate and Rhythm: Tachycardia present. Rhythm irregular. Pulses: Normal pulses. Heart sounds: Normal heart sounds. Pulmonary:      Effort: Pulmonary effort is normal.   Abdominal:      General: Abdomen is flat. Comments: Decreased breath sounds left hemithorax. Musculoskeletal: Normal range of motion. Skin:     General: Skin is warm. Neurological:      General: No focal deficit present. Mental Status: He is alert. Psychiatric:         Mood and Affect: Mood normal.         Labs:  Recent Labs     02/15/20  1615 02/16/20  0800   WBC 11.3* 11.6*   HGB 13.3* 12.3*   * 356       Recent Labs     02/15/20  1800   *   K 5.2*   CL 86*   CO2 33*   BUN 26*   CREATININE 1.1   LABGLOM >60   MG 1.8   CALCIUM 9.4       CK, CKMB, Troponin: @LABRCNT (CKTOTAL:3, CKMB:3, TROPONINI:3)@    Last 3 BNP:  No results for input(s): BNP in the last 72 hours. IMAGING:  Ct Thoracic Spine Wo Contrast    Result Date: 2/15/2020  CT THORACIC SPINE WO CONTRAST 2/15/2020 7:08 PM History: Fall injury. Back pain. In order to have a CT radiation dose as low as reasonably achievable Automated Exposure Control was utilized for adjustment of the mA and/or KV according to patient size. DLP in mGycm= 1580. No scoliosis. Multilevel disc space narrowing and endplate spurring. No compression fracture.  Large amount of pleural fluid on the left side. No pneumothorax is seen. 1. Degenerative disc and endplate changes. 2. No thoracic spine fracture is seen. Signed by Dr Luis Carlos Waggoner on 2/15/2020 7:09 PM    Ct Lumbar Spine Wo Contrast    Result Date: 2/15/2020  CT LUMBAR SPINE WO CONTRAST 2/15/2020 7:22 PM History: Back pain. Fall injury. In order to have a CT radiation dose as low as reasonably achievable Automated Exposure Control was utilized for adjustment of the mA and/or KV according to patient size. DLP in mGycm= 1297. Lumbar spine CT. Normal curvature and alignment. Endplate spurring with degenerative vacuum disc change at T12-L1, L1-2, and L5-S1. There is also prominent disc space narrowing at L5-S1. Facet hypertrophy and endplate spurring with moderate to high-grade foraminal stenosis at L5-S1. No compression fracture. Symmetric SI joints. Intact sacrum. 1. Degenerative disc, endplate, and facet changes. 2. No acute fracture is seen. Signed by Dr Luis Carlos Waggoner on 2/15/2020 7:23 PM    Ct Abdomen Pelvis W Iv Contrast Additional Contrast? None    Result Date: 2/15/2020  CT ABDOMEN PELVIS W IV CONTRAST 2/15/2020 7:12 PM History: Fall injury. Generalized abdominal pain. Abdomen/pelvis CT with IV contrast injection. In order to have a CT radiation dose as low as reasonably achievable Automated Exposure Control was utilized for adjustment of the mA and/or KV according to patient size. DLP in mGycm= 1858. Heart size is within normal limits. The right lung bases clear. Pleural fluid is noted on the left side. Normal liver, gallbladder, pancreas, and spleen. Normal and symmetric adrenal glands and kidneys. No bowel dilation. No appendicitis, diverticulitis, or colitis. Right hip prosthesis with streak artifact. Lumbar spine degenerative disc and endplate change with no evidence of compression fracture. 1. Left pleural effusion. 2. No acute abnormality is seen within the abdomen.  Signed by Dr Luis Carlos Waggoner on 2/15/2020 7:14 PM    Xr Chest Portable    Result Date: 2/15/2020  EXAMINATION: XR CHEST PORTABLE 2/15/2020 4:59 PM HISTORY: Fall, history of lung cancer COMPARISON: 1/28/2018 FINDINGS: The heart size is difficult to evaluate due to overlying pathology. There is complete opacification of the left hemithorax. There is prominence of the right hilum and infrahilar region, similar to the previous exam. No appreciable pneumothorax is identified. Complete opacification of the left hemithorax, which may be due to an obstructing mass with atelectasis but is favored to be due to a large pleural effusion. This is new compared to the recent comparison exam. Signed by Dr Jules Matos on 2/15/2020 5:00 PM    Vl Dup Lower Extremity Venous Bilateral    Result Date: 1/26/2020  Vascular Lower Extremities DVT Study Procedure  Demographics   Patient Name    Ruben Wolff  Age                   62                  N   Patient Number  [id number]           Gender                Male   Visit Number    [id number]        Interpreting          Tavares Quiroz                                   Physician   Date of Birth   1955       Referring Physician   Silvia Steele   Accession       [id number]        [address] [place] [address]  Number  Procedure Type of Study:   Veins:Lower Extremities DVT Study, VL LOWER EXTREMITY BILATERAL VENOUS  DUPLEX . Indications for Study:Edema, bilateral lower extremity and Shortness of breath. Impression   Normal venous duplex study of the bilateral lower extremity(ies). There is  no evidence of deep or superficial venous thrombosis.    Signature   ----------------------------------------------------------------  Electronically signed by Sia Harman(Interpreting  physician) on 01/26/2020 11:15 AM  ----------------------------------------------------------------  Velocities are measured in cm/s ; Diameters are measured in mm Right Lower Extremities DVT Study Measurements Right 2D Measurements +------------------------------------+----------+---------------+----------+ ! Location                            ! Visualized! Compressibility! Thrombosis! +------------------------------------+----------+---------------+----------+ ! Sapheno Femoral Junction            ! Yes       ! Yes            ! None      ! +------------------------------------+----------+---------------+----------+ ! Common Femoral                      !Yes       ! Yes            ! None      ! +------------------------------------+----------+---------------+----------+ ! Prox Femoral                        !Yes       ! Yes            ! None      ! +------------------------------------+----------+---------------+----------+ ! Mid Femoral                         !Yes       ! Yes            ! None      ! +------------------------------------+----------+---------------+----------+ ! Dist Femoral                        !Yes       ! Yes            ! None      ! +------------------------------------+----------+---------------+----------+ ! Deep Femoral                        !Yes       ! Yes            ! None      ! +------------------------------------+----------+---------------+----------+ ! Popliteal                           !Yes       ! Yes            ! None      ! +------------------------------------+----------+---------------+----------+ ! SSV                                 ! Yes       ! Yes            ! None      ! +------------------------------------+----------+---------------+----------+ ! Gastroc                             ! Yes       ! Yes            ! None      ! +------------------------------------+----------+---------------+----------+ ! PTV                                 ! Yes       ! Yes            ! None      ! +------------------------------------+----------+---------------+----------+ ! GSV                                 ! Yes       ! Yes            ! None      ! +------------------------------------+----------+---------------+----------+ ! ATV !Yes       !Yes            ! None      ! +------------------------------------+----------+---------------+----------+ ! Peroneal                            !Yes       ! Yes            ! None      ! +------------------------------------+----------+---------------+----------+ Left Lower Extremities DVT Study Measurements Left 2D Measurements +------------------------------------+----------+---------------+----------+ ! Location                            ! Visualized! Compressibility! Thrombosis! +------------------------------------+----------+---------------+----------+ ! Sapheno Femoral Junction            ! Yes       ! Yes            ! None      ! +------------------------------------+----------+---------------+----------+ ! Common Femoral                      !Yes       ! Yes            ! None      ! +------------------------------------+----------+---------------+----------+ ! Prox Femoral                        !Yes       ! Yes            ! None      ! +------------------------------------+----------+---------------+----------+ ! Mid Femoral                         !Yes       ! Yes            ! None      ! +------------------------------------+----------+---------------+----------+ ! Dist Femoral                        !Yes       ! Yes            ! None      ! +------------------------------------+----------+---------------+----------+ ! Deep Femoral                        !Yes       ! Yes            ! None      ! +------------------------------------+----------+---------------+----------+ ! Popliteal                           !Yes       ! Yes            ! None      ! +------------------------------------+----------+---------------+----------+ ! SSV                                 ! Yes       ! Yes            ! None      ! +------------------------------------+----------+---------------+----------+ ! Gastroc                             ! Yes       ! Yes            ! None      ! +------------------------------------+----------+---------------+----------+ ! PTV                                 ! Yes       ! Yes            ! None      ! +------------------------------------+----------+---------------+----------+ ! GSV                                 ! Yes       ! Yes            ! None      ! +------------------------------------+----------+---------------+----------+ ! ATV                                 ! Yes       ! Yes            ! None      ! +------------------------------------+----------+---------------+----------+ ! Peroneal                            !Yes       ! Yes            ! None      ! +------------------------------------+----------+---------------+----------+    Cta Pulmonary W Contrast    Result Date: 1/25/2020  CTA chest 1/25/2020 8:15 PM HISTORY: New onset atrial fibrillation with hemoptysis TECHNIQUE: Axial images of the chest were obtained following IV contrast. . Coronal and sagittal reformatted images are reconstructed and reviewed. Maximal intensity projectional images reconstructed and reviewed COMPARISON: 12/18/2017. DLP: 812 mGy cm Automated exposure control was utilized to minimize patient radiation dose. FINDINGS: No pulmonary emboli are identified. Thoracic aortic calcification with no aneurysm or dissection. There is moderate coronary calcification. There is mild cardiomegaly. Trace pericardial fluid. There is a moderate size left pleural effusion with collapse of the left lower lobe. There is abrupt occlusion of the left mainstem bronchus which may be from mucous plugging or endobronchial lesion. There is complete collapse left lung with associated volume loss. Images of the upper abdomen demonstrate stable prominence of the adrenal glands which do maintain their adrenal shape. Fatty atrophy of the pancreas. Calcified granuloma superior segment of the right lower lobe with right basilar atelectasis.  There is stable 3 mm size nodules along the right minor fissure which may be intrafissural lymph nodes. These are unchanged from 2017. There are old healed bilateral rib fractures. There are degenerative changes of the thoracic spine. There is no focal destructive osseous lesion. 1. There is complete collapse of the left lung secondary to an occluded left main stem bronchus. This could be due to mucous plugging, however endobronchial neoplasm must also be considered. Moderate size left pleural effusion. 2. Mild cardiomegaly. Trace pericardial fluid. 3. No pulmonary emboli. 4. No thoracic aortic aneurysm or dissection. Signed by Dr Capri Harris on 1/25/2020 9:27 PM      Assessment:  1. Atrial fibrillation, RVR  2. COPD  3. Lung cancer with left lung collapse. Recommendations:    Rate control with cardizem, digoxin,  ECHO for EF  No anticoagulation given hemoptysis.

## 2020-02-16 NOTE — ED NOTES
Pt's family came out and requested someone come in. Pt's 02 sat was 78% on 4L. Pt was coughing, and pt stated that he thinks he had a panic attack as well as getting chocked up on sputum. Pt has been have bloody sputum which is not uncommon per pt and spouse. Dr. Tristan at bedside.       Jeffry Dave, RN  02/15/20 [id number]

## 2020-02-16 NOTE — ED NOTES
Pt states he was here about a month ago and was in a-fib for the first time that he knows of.       Ivonne Collins RN  02/15/20 [id number]

## 2020-02-16 NOTE — PROGRESS NOTES
[facility]        Hospitalist Progress Note  2/16/2020 3:10 PM  Subjective:   Admit Date: 2/15/2020  PCP: ELTON Morales    Chief Complaint: Lethargy    Subjective: Patient seen and examined at bedside with wife present. Currently having some mild abdominal pain but otherwise feels okay. Cumulative Hospital History: 52-year-old male with a history of lung cancer presenting for lethargy found to have atrial fibrillation with RVR and extensive pleural effusion with cardiology, pulmonology, and oncology consulted on admission. ROS: 14 point review of systems is negative except as specifically addressed above. DIET CARB CONTROL;     Intake/Output Summary (Last 24 hours) at 2/16/2020 1510  Last data filed at 2/16/2020 1128  Gross per 24 hour   Intake 1358 ml   Output 800 ml   Net 558 ml     Medications:   diltiazem (CARDIZEM) 125 mg in dextrose 5% 125 mL infusion 15 mg/hr (02/16/20 1410)     Current Facility-Administered Medications   Medication Dose Route Frequency Provider Last Rate Last Dose    digoxin (LANOXIN) tablet 125 mcg  125 mcg Oral Daily Jonah Delacruz MD   125 mcg at 02/16/20 0841    diltiazem 125 mg in dextrose 5 % 125 mL infusion  5 mg/hr Intravenous Continuous [facility] MD ANABELL 15 mL/hr at 02/16/20 1410 15 mg/hr at 02/16/20 1410    diltiazem injection 10 mg  10 mg Intravenous Q30 Min PRN Licha Crump MD   10 mg at 02/15/20 2117    sodium chloride flush 0.9 % injection 10 mL  10 mL Intravenous 2 times per day [facility] [place] MD ANABELL   10 mL at 02/16/20 0842    sodium chloride flush 0.9 % injection 10 mL  10 mL Intravenous PRN [facility] [place] [facility] MD ANABELL        magnesium hydroxide (MILK OF MAGNESIA) 400 MG/5ML suspension 30 mL  30 mL Oral Daily PRN [facility] [place] MD ANABELL        ondansetron [facility]) injection 4 mg  4 mg Intravenous Q6H PRN [facility] [place] MD ANABELL   4 mg at 02/16/20 0842    acetaminophen (TYLENOL) tablet 650 mg  650 mg Oral Q4H PRN [facility] [place] MD ANABELL       [facility] ipratropium-albuterol (DUONEB) nebulizer solution 1 ampule  1 ampule Inhalation Q4H WA Per Huerta MD   1 ampule at 02/16/20 1057    meropenem (MERREM) 1 g in sodium chloride 0.9 % 100 mL IVPB (mini-bag)  1 g Intravenous Q8H Per Huerta MD   Stopped at 02/16/20 1006    insulin lispro (HUMALOG) injection vial 0-6 Units  0-6 Units Subcutaneous TID WC Per Huerta MD   1 Units at 02/16/20 0842    insulin lispro (HUMALOG) injection vial 0-3 Units  0-3 Units Subcutaneous Nightly Per Huerta MD        lactulose (CHRONULAC) 10 GM/15ML solution 20 g  20 g Oral TID Per Huerta MD   20 g at 02/16/20 0145    enoxaparin (LOVENOX) injection 40 mg  40 mg Subcutaneous Daily Per Huerta MD   40 mg at 02/16/20 0842        Labs:     Recent Labs     02/15/20  1615 02/16/20  0800   WBC 11.3* 11.6*   RBC 4.95 4.56*   HGB 13.3* 12.3*   HCT 44.1 40.8*   MCV 89.1 89.5   MCH 26.9* 27.0   MCHC 30.2* 30.1*   * 356     Recent Labs     02/15/20  1800 02/16/20  0800   * 131*   K 5.2* 5.1*   ANIONGAP 12 12   CL 86* 91*   CO2 33* 28   BUN 26* 17   CREATININE 1.1 0.8   GLUCOSE 149* 153*   CALCIUM 9.4 9.0     Recent Labs     02/15/20  1800   MG 1.8     Recent Labs     02/15/20  1800   AST 19   ALT 12   BILITOT 0.5   ALKPHOS 159*     ABGs:No results for input(s): PH, PO2, PCO2, HCO3, BE, O2SAT in the last 72 hours.   Troponin T:   Recent Labs     02/15/20  1939   TROPONINI <0.01     INR:   Recent Labs     02/15/20  1557   INR 1.19*     Lactic Acid:   Recent Labs     02/15/20  2004   LACTA 1.6       Objective:   Vitals: /74   Pulse 111   Temp 97 °F (36.1 °C) (Temporal)   Resp 18   Ht 5' 8\" (1.727 m)   Wt 239 lb 1 oz (108.4 kg)   SpO2 (!) 82%   BMI 36.35 kg/m²   24HR INTAKE/OUTPUT:      Intake/Output Summary (Last 24 hours) at 2/16/2020 1510  Last data filed at 2/16/2020 1128  Gross per 24 hour   Intake 1358 ml   Output 800 ml   Net 558 ml     General appearance: alert and cooperative with exam  HEENT: AT/NC  Lungs: wheezing right side, minimal breath sounds left side  Heart: irregularly irregular rhythm  Abdomen: soft, non-tender; bowel sounds normal; no masses,  no organomegaly  Extremities: edema+  Neurologic: alert, gross motor and sensory function intact  Skin: warm    Assessment and Plan: Active Problems:    Paroxysmal A-fib (HCC)  Resolved Problems:    * No resolved hospital problems. *    Paroxysmal Atrial Fibrillation: Digoxin/Cardizem gtt. Cardiology on board. Lung CA/Large pleural effusion/COPD on 4L NC: Oncology/Pulmonology on board. Antibiotics. Planned thoracentesis tomorrow with radiology. O2 via NC. Bronchodilators. Supportive management. Discussion with patient and wife at bedside regarding condition and goals of care. Patient and wife both agree on DNR/DNI status. Nursing staff aware. Orders placed. Wife wishing for advanced directive/living will paperwork.     Advance Directive: DNR-CC    DVT prophylaxis: Lovenox    Discharge planning: TBD      Signed:  David Hannah MD 2/16/2020 3:10 PM  Rounding Hospitalist

## 2020-02-16 NOTE — CONSULTS
Rosa Merida was transferred to [facility] in [facility] where further work-up was performed. X-ray Chest Portable 1/26/2020  Complete whiteout of the left lung with mild deviation of the trachea to the left. Findings are consistent with complete collapse of the left lung. Underlying infection or mass or effusion cannot be excluded     SIMONA at [address] [place] [address] on 1/27/2020 was performed. LVEF greater than 55%. Unable to comment on diastolic filling pattern due to atrial  fibrillation. RV mildly dilated. Mildly depressed RV systolic function. The left atrium is mild to moderately dilated. RA mildly dilated. Mild tricuspid regurgitation. The RV systolic pressure is calculated at 63 mmHg. A thoracentesis was performed at [facility] on 1/27/2020.  1100 cc of pleural fluid was drained. Cytology:  REACTIVE MESOTHELIAL CELLS, HISTIOCYTES, AND LYMPHOCYTES; NEGATIVE FOR MALIGNANCY     CT C/A/P at [facility] 1/29/2020  Impression:   1.  Signs of large LEFT lung mass with postobstructive collapse of   the entire LEFT lung and mediastinal shift. 2.  Bulky subcarinal adenopathy. 3.  Nodular bilateral adrenal glands still with some adreniform   contour, suspicious for either nodular hyperplasia or bilateral   metastatic disease to the adrenal glands. Bronchoscopy with insertion of stent was performed on 1/29/2020 at [address] [place] [address]. A mass was found in the middle portion of the left mainstem bronchus with about 90% obstruction. The mass was large and bloody and friable. a 10 x 40 Aero stent was placed with good results. Records discuss \"squamous cell lung cancer \"  however I am unable to view a pathology report. PET at [facility] 1/31/2020  Impression   Intense FDG uptake within a ill-defined left hilar mass as well as   the left upper and left lower lobes. Interval placement of a left   main bronchus stent which is occluded distally with complete collapse   of the left lung.  FDG uptake within enoxaparin (LOVENOX) injection 40 mg  40 mg Subcutaneous Daily Silvana Medrano MD   40 mg at 20 5119         Allergies: Allergies   Allergen Reactions    Crestor [Rosuvastatin] Hives and Swelling         Social History:    Social History     Socioeconomic History    Marital status:      Spouse name: None    Number of children: None    Years of education: None    Highest education level: None   Occupational History    None   Social Needs    Financial resource strain: None    Food insecurity:     Worry: None     Inability: None    Transportation needs:     Medical: None     Non-medical: None   Tobacco Use    Smoking status: Former Smoker     Packs/day: 2.00     Years: 18.00     Pack years: 36.00     Types: Cigarettes     Last attempt to quit: 2011     Years since quittin.4    Smokeless tobacco: Never Used   Substance and Sexual Activity    Alcohol use: Yes     Comment: rare    Drug use: No    Sexual activity: None   Lifestyle    Physical activity:     Days per week: None     Minutes per session: None    Stress: None   Relationships    Social connections:     Talks on phone: None     Gets together: None     Attends Judaism service: None     Active member of club or organization: None     Attends meetings of clubs or organizations: None     Relationship status: None    Intimate partner violence:     Fear of current or ex partner: None     Emotionally abused: None     Physically abused: None     Forced sexual activity: None   Other Topics Concern    None   Social History Narrative    None         Family History:   Family History   Problem Relation Age of Onset    Coronary Art Dis Mother     Cancer Mother     Coronary Art Dis Father     Diabetes Father     Coronary Art Dis Sister     Depression Paternal Grandmother        Review of Systems:  Constitutional: Weakness fatigue dyspnea  HENT: Negative for congestion, hearing loss, nosebleeds or sore throat.     Eyes: Negative normal.         DATA:    Labs:  General Labs:  CBC:   Lab Results   Component Value Date    WBC 11.6 (H) 02/16/2020    HGB 12.3 (L) 02/16/2020    HCT 40.8 (L) 02/16/2020    MCV 89.5 02/16/2020     02/16/2020       CMP:    Lab Results   Component Value Date     (L) 02/16/2020    K 5.1 (H) 02/16/2020    CL 91 (L) 02/16/2020    CO2 28 02/16/2020    BUN 17 02/16/2020    CREATININE 0.8 02/16/2020    GLUCOSE 153 (H) 02/16/2020    CALCIUM 9.0 02/16/2020    PROT 7.4 02/15/2020    LABALBU 2.9 (L) 02/15/2020    BILITOT 0.5 02/15/2020    ALKPHOS 159 (H) 02/15/2020    AST 19 02/15/2020    ALT 12 02/15/2020    LABGLOM >60 02/16/2020         30 Day lookback of cultures:    Blood Culture Recent:   Recent Labs     01/25/20  2141   BC No growth after 5 days of incubation. Gram Stain Recent: No results for input(s): LABGRAM in the last 720 hours. Resp Culture Recent: No results for input(s): CULTRESP in the last 720 hours. Body Fluid Recent : No results for input(s): BFCX in the last 720 hours. MRSA Recent : No results for input(s): [facility] in the last 720 hours. Urine Culture Recent : No results for input(s): LABURIN in the last 720 hours. Organism Recent : No results for input(s): ORG in the last 720 hours. IMPRESSION/RECOMMENDATIONS:      Mr. Harish Lane is being seen today (2/16/2020) in initial oncology consultation as an inpatient at [facility] with a recent diagnosis of metastatic lung cancer made at [facility] to establish care. He will be followed by Dr. Mingo Breen who cares for Martin's identical twin [facility], also with lung cancer. Melody Art has been on chronic supplemental oxygen for at least 6 months prior to presentation due to his history of underlying COPD. He wears 4 L of supplemental oxygen. His PCP is Janet Mcrae PA-C on the [place] side. Melody Art presented to [facility] ED on 1/25/2020 with hemoptysis increasing weakness.    He was also mass with postobstructive collapse of   the entire LEFT lung and mediastinal shift. 2.  Bulky subcarinal adenopathy. 3.  Nodular bilateral adrenal glands still with some adreniform   contour, suspicious for either nodular hyperplasia or bilateral   metastatic disease to the adrenal glands. Bronchoscopy with insertion of stent was performed on 1/29/2020 at [place]. A mass was found in the middle portion of the left mainstem bronchus with about 90% obstruction. The mass was large and bloody and friable. a 10 x 40 Aero stent was placed with good results. Records discuss \"squamous cell lung cancer \"  however I am unable to view a pathology report. PET at [facility] 1/31/2020  Impression   Intense FDG uptake within a ill-defined left hilar mass as well as   the left upper and left lower lobes. Interval placement of a left   main bronchus stent which is occluded distally with complete collapse   of the left lung. FDG uptake within the left upper and lower lobes   may be related to infection versus malignancy. Small left pleural   effusion. No FDG uptake is present within the subcarinal   lymphadenopathy. MRI brain w/ and w/o  2/1/2020 8:07 AM  Impression   1.  No findings of intracranial metastatic disease. 2.  Mild chronic small vessel ischemic white matter disease and tiny   old right cerebellar infarcts.       He was discharged, he had an outpatient appointment with Dr. Ora Campos for 2/7/2020. He has seen Dr. Pat Abrams in consultation. He also had an appointment with Dr. Ted Thompson on 2/13/2020 but was unable to keep that appointment due to symptoms related to his cancer. He was admitted to [facility] via the emergency room on 2/15/2020. Oncology consultation was called to establish care with Dr. Ted Thompson. Dr. Ted Thompson will return in the morning (2/17/2020) and assume his oncological care. Discussed with Dr. Yannick Kang    Thank you very much for alerting us to Mr. Nix's hospitalization.

## 2020-02-17 PROBLEM — Z51.5 PALLIATIVE CARE PATIENT: Status: ACTIVE | Noted: 2020-01-01

## 2020-02-17 NOTE — PROGRESS NOTES
1/26/2020  Complete whiteout of the left lung with mild deviation of the trachea to the left. Findings are consistent with complete collapse of the left lung. Underlying infection or mass or effusion cannot be excluded      SIMONA at [facility] on 1/27/2020 was performed. LVEF greater than 55%. Unable to comment on diastolic filling pattern due to atrial  fibrillation. RV mildly dilated. Mildly depressed RV systolic function. The left atrium is mild to moderately dilated. RA mildly dilated. Mild tricuspid regurgitation. The RV systolic pressure is calculated at 63 mmHg.     A thoracentesis was performed at [facility] on 1/27/2020.  1100 cc of pleural fluid was drained. Cytology:  REACTIVE MESOTHELIAL CELLS, HISTIOCYTES, AND LYMPHOCYTES; NEGATIVE FOR MALIGNANCY      CT C/A/P at [facility] 1/29/2020  Impression:   1.  Signs of large LEFT lung mass with postobstructive collapse of   the entire LEFT lung and mediastinal shift. 2.  Bulky subcarinal adenopathy. 3.  Nodular bilateral adrenal glands still with some adreniform   contour, suspicious for either nodular hyperplasia or bilateral   metastatic disease to the adrenal glands.      Bronchoscopy with insertion of stent was performed on 1/29/2020 at [facility]. A mass was found in the middle portion of the left mainstem bronchus with about 90% obstruction. The mass was large and bloody and friable. a 10 x 40 Aero stent was placed with good results.        Records discuss \"squamous cell lung cancer \"  however I am unable to view a pathology report.        PET at [facility] 1/31/2020  Impression   Intense FDG uptake within a ill-defined left hilar mass as well as   the left upper and left lower lobes. Interval placement of a left   main bronchus stent which is occluded distally with complete collapse   of the left lung. FDG uptake within the left upper and lower lobes   may be related to infection versus malignancy. Small left pleural   effusion.  No FDG uptake is present within the subcarinal   lymphadenopathy.         MRI brain w/ and w/o  2/1/2020 8:07 AM  Impression   1.  No findings of intracranial metastatic disease. 2.  Mild chronic small vessel ischemic white matter disease and tiny   old right cerebellar infarcts.       He was discharged, he was evaluated by Dr. Danyelle Morales for 2/7/2020 with anticipation of 33 fractions to the left lung. He also had an appointment with Dr. Belle Jacques on 2/13/2020 but was unable to keep that appointment due to symptoms related to his cancer.      He was admitted to [facility] via the emergency room on 2/15/2020. Oncology consultation was called to establish care with Dr. Belle Jacques. Subjective   REVIEW OF SYSTEMS:   Review of Systems   Constitutional: Positive for activity change and fatigue. Negative for chills, diaphoresis, fever and unexpected weight change. HENT: Negative for mouth sores, nosebleeds, sore throat, trouble swallowing and voice change. Eyes: Negative for visual disturbance. Respiratory: Positive for shortness of breath. Negative for cough and wheezing. Cardiovascular: Positive for palpitations and leg swelling. Negative for chest pain. Gastrointestinal: Negative for abdominal distention, abdominal pain, blood in stool, constipation, diarrhea, nausea and vomiting. Endocrine: Negative for cold intolerance, heat intolerance, polydipsia and polyuria. Genitourinary: Negative for difficulty urinating, dysuria, hematuria and urgency. Musculoskeletal: Positive for arthralgias and back pain. Negative for joint swelling and myalgias. Skin: Negative for color change and rash. Neurological: Positive for light-headedness. Negative for dizziness, tremors, seizures and syncope. Hematological: Negative for adenopathy. Does not bruise/bleed easily. Psychiatric/Behavioral: Negative for behavioral problems and suicidal ideas. The patient is nervous/anxious.     All other systems reviewed and are negative. Objective   /79   Pulse 97   Temp 96.9 °F (36.1 °C) (Temporal)   Resp 18   Ht 5' 8\" (1.727 m)   Wt 241 lb 9.6 oz (109.6 kg)   SpO2 91%   BMI 36.74 kg/m²     PHYSICAL EXAM:  Physical Exam  Vitals signs reviewed. Constitutional:       General: He is not in acute distress. Appearance: He is well-developed. He is obese. He is ill-appearing. HENT:      Head: Normocephalic and atraumatic. Nose: Nose normal.   Eyes:      General: No scleral icterus. Conjunctiva/sclera: Conjunctivae normal.   Neck:      Vascular: No JVD. Trachea: No tracheal deviation. Cardiovascular:      Rate and Rhythm: Rhythm irregularly irregular. Heart sounds: Normal heart sounds. No murmur. Pulmonary:      Effort: Pulmonary effort is normal. No respiratory distress. Breath sounds: Examination of the left-middle field reveals decreased breath sounds. Examination of the left-lower field reveals decreased breath sounds. Decreased breath sounds present. No wheezing. Abdominal:      General: Bowel sounds are normal. There is no distension. Palpations: Abdomen is soft. There is no mass. Tenderness: There is no abdominal tenderness. Musculoskeletal: Normal range of motion. General: No tenderness or deformity. Right lower le+ Pitting Edema present. Left lower le+ Pitting Edema present. Skin:     Findings: No erythema or rash. Neurological:      Mental Status: He is alert and oriented to person, place, and time. Psychiatric:         Thought Content:  Thought content normal.           Recent Labs     20  0659 20  0800 02/15/20  1615   WBC 12.0* 11.6* 11.3*   HGB 12.2* 12.3* 13.3*   HCT 40.0* 40.8* 44.1   MCV 88.3 89.5 89.1    356 440*       Lab Results   Component Value Date     (L) 2020    K 5.1 (H) 2020    CL 91 (L) 2020    CO2 28 2020    BUN 17 2020    CREATININE 0.8 2020    GLUCOSE 153 (H) 02/16/2020    CALCIUM 9.0 02/16/2020    PROT 7.4 02/15/2020    LABALBU 2.9 (L) 02/15/2020    BILITOT 0.5 02/15/2020    ALKPHOS 159 (H) 02/15/2020    AST 19 02/15/2020    ALT 12 02/15/2020    LABGLOM >60 02/16/2020       Lab Results   Component Value Date    INR 1.19 (H) 02/15/2020    INR 1.09 01/25/2020    INR 1.04 12/18/2017    PROTIME 14.5 02/15/2020    PROTIME 13.5 01/25/2020    PROTIME 13.5 12/18/2017       30 Day lookback of cultures:    Blood Culture Recent:   Recent Labs     01/25/20  2141   BC No growth after 5 days of incubation. Gram Stain Recent: No results for input(s): LABGRAM in the last 720 hours. Resp Culture Recent: No results for input(s): CULTRESP in the last 720 hours. Body Fluid Recent : No results for input(s): BFCX in the last 720 hours. MRSA Recent : No results for input(s): [address] in the last 720 hours. Urine Culture Recent : No results for input(s): LABURIN in the last 720 hours. Organism Recent : No results for input(s): ORG in the last 720 hours. ASSESSMENT/PLAN:  #1  Squamous cell lung carcinoma left lung status post left main stem bronchus stent - possibly stage II    Lisa George presented to [facility] ED on 1/25/2020 with hemoptysis increasing weakness. He was also found to have atrial fibrillation with RVR.     CTA PULMONARY W CONTRAST   Final Result   1. There is complete collapse of the left lung secondary to an   occluded left main stem bronchus. This could be due to mucous   plugging, however endobronchial neoplasm must also be considered. Moderate size left pleural effusion. 2. Mild cardiomegaly. Trace pericardial fluid. 3. No pulmonary emboli. 4. No thoracic aortic aneurysm or dissection. Signed by Dr Rich Peabody on 1/25/2020 9:27 PM       VL DUP LOWER EXTREMITY VENOUS BILATERAL              Vascular Lab Prelim  Duplex venous scan of B/L LE shows no evidence for dvt, svt, reflux noted at this time.  Final report pending     The case was discussed with Dr Eyal Zhang with pulmonology, who stated that he can do a bronch but with the bleeding, he would not be able to intervene other than using epinephrine. Would recommend transfer to higher level of care as pt may require embolization.      Mr. Jean-Paul Prabhakar was transferred to [facility] in [facility] where further work-up was performed.     X-ray Chest Portable 1/26/2020  Complete whiteout of the left lung with mild deviation of the trachea to the left. Findings are consistent with complete collapse of the left lung. Underlying infection or mass or effusion cannot be excluded      SIMONA at [facility] on 1/27/2020 was performed. LVEF greater than 55%. Unable to comment on diastolic filling pattern due to atrial  fibrillation. RV mildly dilated. Mildly depressed RV systolic function. The left atrium is mild to moderately dilated. RA mildly dilated. Mild tricuspid regurgitation. The RV systolic pressure is calculated at 63 mmHg.     A thoracentesis was performed at [facility] on 1/27/2020.  1100 cc of pleural fluid was drained. Cytology:  REACTIVE MESOTHELIAL CELLS, HISTIOCYTES, AND LYMPHOCYTES; NEGATIVE FOR MALIGNANCY      CT C/A/P at [facility] 1/29/2020  Impression:   1.  Signs of large LEFT lung mass with postobstructive collapse of   the entire LEFT lung and mediastinal shift. 2.  Bulky subcarinal adenopathy. 3.  Nodular bilateral adrenal glands still with some adreniform   contour, suspicious for either nodular hyperplasia or bilateral   metastatic disease to the adrenal glands.      Bronchoscopy with insertion of stent was performed on 1/29/2020 at [facility]. A mass was found in the middle portion of the left mainstem bronchus with about 90% obstruction. The mass was large and bloody and friable.  a 10 x 40 Aero stent was placed with good results.        Records discuss \"squamous cell lung cancer \"  however I am unable to view a pathology report.        PET at [facility] 1/31/2020  Impression   Intense FDG uptake within a ill-defined left hilar mass as well as   the left upper and left lower lobes. Interval placement of a left   main bronchus stent which is occluded distally with complete collapse   of the left lung. FDG uptake within the left upper and lower lobes   may be related to infection versus malignancy. Small left pleural   effusion. No FDG uptake is present within the subcarinal   lymphadenopathy.         MRI brain w/ and w/o  2/1/2020 8:07 AM  Impression   1.  No findings of intracranial metastatic disease. 2.  Mild chronic small vessel ischemic white matter disease and tiny   old right cerebellar infarcts. CT abdomen pelvis with contrast 2/15/2020 at [facility]  No acute abnormality seen in the abdomen  Left pleural effusion        PLAN  He is getting a left thoracentesis today- cytology will be requested  He has already been evaluated by Dr. Isabela Sol    #2 PAF    Monitored on CCU. Cardizem drip. # Hyponatremia    Na 129    Maria Guadalupe Villavicencio PA-C    02/17/20  7:40 AM  Physician's attestation/substantial contribution:  I, Dr Bonnie Kim, independently performed an evaluation on Abril Grace. I have reviewed relevant medical information/data to include but not limited to medication list, relevant appropriate labs and imaging when applicable. I reviewed other physician's notes, ancillary services and nurses assessment. I have reviewed the above documentation completed by the Nurse Practitioner or Physician Assistant. Please see my additional contributions to the history of present illness, physical examination, and assessment/medical decision-making that reflect my findings and impressions. I discussed essential elements of the care plan with the NP or PA and the patient as well. I answered all the questions to the patient's satisfaction. I concur with above stated. Subjective-complains of short of breath. Objective- alert oriented x3. Tachypneic. Assessment/plan:  Lung cancer- will defer further work-up to the outpatient. Pleural effusion-proceed with thoracentesis today.       Leti Morrison MD

## 2020-02-17 NOTE — PROGRESS NOTES
base.  Persistent white out of the left hemithorax. No pneumothorax.     Impression  1. Stable appearance of the left hemithorax. 2. Partial clearing of right basilar infiltrate. Signed by Dr Anthony Cortes on 2/17/2020 7:08 AM      My radiograph interpretation: Persistent white out of the left hemithorax   Pulmonary Assessment:    1. Large Left Pleural Effusion   2. Lung Cancer   3. Paroxysmal Atrial Fib   4. CAD  5. CHF  6. COPD    Recommend:   · Thoracentesis for today. · Continue supplemental oxygen and titrate to keep Sat >90%  · Diuresis per attending   · Continue antibiotics per attending   · Continue Nebs     Electronically signed by Angelina Blanchard on 2/17/2020 at 11:27 AM    Physician's substantive portion:  Subjective: The patient is status post thoracentesis. He does state he got some symptomatic relief from the procedure. Per radiology about 2 L of fluid removed. Objective: Breath sounds are initial on the left. Post procedure x-ray still shows complete opacification of the left hemithorax. The mediastinal structures may have shifted minimally to the left compared to his pre-thoracentesis x-ray. The fluid does appear to just barely exudative criteria based on the ratio of the LDH and the fluid to the LDH in the serum. By the criteria of the ratio of the pleural fluid protein to the serum total protein and the ratio of the pleural fluid LDH to the upper limits of normal of the serum LDH value the fluid does not meet exudative criteria. However in general just one these criteria needs to be met to classify the fluid as an exudate. Assessment/recommendation: This patient's pleural fluid is virtually certain to reaccumulate if his left mainstem remains obstructed. Hopefully medical therapy for his cancer will result in some improvement in the endobronchial obstruction and his left lung may then reexpand.   Presently the recommendation I would have should his fluid reaccumulate

## 2020-02-17 NOTE — PROGRESS NOTES
5' 8\" (1.727 m)   Wt 241 lb 9.6 oz (109.6 kg)   SpO2 90%   BMI 36.74 kg/m²   24HR INTAKE/OUTPUT:      Intake/Output Summary (Last 24 hours) at 2/17/2020 1059  Last data filed at 2/17/2020 9352  Gross per 24 hour   Intake 920 ml   Output 1150 ml   Net -230 ml     General appearance: alert and cooperative with exam  HEENT: AT/NC  Lungs: wheezing right side, minimal breath sounds left side  Heart: irregularly irregular rhythm  Abdomen: soft, non-tender; bowel sounds normal; no masses,  no organomegaly  Extremities: edema+  Neurologic: alert, gross motor and sensory function intact  Skin: warm    Assessment and Plan: Active Problems:    Paroxysmal A-fib [facility])    Palliative care patient  Resolved Problems:    * No resolved hospital problems. *    Paroxysmal Atrial Fibrillation: Digoxin/Cardizem gtt. Cardiology on board. Lung CA/Large pleural effusion/COPD on 4L NC: Oncology/Pulmonology on board. Antibiotics. O2 via NC. Bronchodilators. Thoracentesis planned for today. Supportive management. Palliative care on board.     Advance Directive: DNR-CC    DVT prophylaxis: Lovenox    Discharge planning: TBD      Signed:  Elly Sequeira MD 2/17/2020 10:59 AM  Rounding Hospitalist

## 2020-02-17 NOTE — PROGRESS NOTES
Called to patient's room multiple times for differing things. He is anxious, short of breath which appears to be getting worse as the night goes on, sat 90% on 4 L NC, resp 32, 123/80, 97.9 temp. Patient states he thinks he is in \"panic mode\". Stayed in room several minutes gave patient wet wash cloth for his forehead. Helped him use the urinal, and will give him some cardizem 10 mg ivp and zofran for his nausea. States doesn't want to wear a bi-pap (has one at home) and doesn't want any Lasix will continue to monitor and reassure Mr. Nix.

## 2020-02-17 NOTE — PROGRESS NOTES
Visited with pt to provide spiritual care. [address] [place] [address] Pky NP requested visit to provide AD/LW paperwork. Provided spiritual care with sustaining presence, nurtured hope, AD/LW paperwork, and prayer. Pt to look over paperwork with his wife.  to follow-up with pt tomorrow. Pt expressed gratitude for spiritual care.      Electronically signed by Kitty Doty on 2/17/2020 at 10:43 AM

## 2020-02-17 NOTE — PROGRESS NOTES
Patient remains SOB with abd retractions, orders received from Dr. Jeanette Montenegro for [facility], F/C, and Lasix 40 mg IV.   Transferred care to Jaleesa Gómez RN on days

## 2020-02-18 NOTE — PROGRESS NOTES
Cardiology Progress Note Maricarmen Blake MD      Patient:  Tran Allen  [id number]    Patient Active Problem List    Diagnosis Date Noted    Palliative care patient 02/17/2020     Priority: Low    Paroxysmal A-fib ([address] 02/15/2020     Priority: Low    Right carpal tunnel syndrome 09/12/2018     Priority: Low    COPD with acute exacerbation ([address] 01/29/2018     Priority: Low    Obesity hypoventilation syndrome ([address] 11/22/2017     Priority: Low    Chronic diastolic congestive heart failure ([address] 11/21/2017     Priority: Low    Essential hypertension      Priority: Low    Type 2 diabetes mellitus without complication ([address]      Priority: Low    Acute respiratory failure with hypoxia and hypercapnia (HCC) 11/17/2017     Priority: Low    Chronic bronchitis ([address] 09/17/2015     Priority: Low    Chronic venous stasis dermatitis of both lower extremities 09/17/2015     Priority: Low    Morbid obesity due to excess calories ([address] 09/17/2015     Priority: Low    Stented coronary artery 04/10/2014     Priority: Low    Coronary artery disease involving native coronary artery of native heart without angina pectoris      Priority: Low    Myocardial infarction [facility])      Priority: Low     Overview Note:     subenodcardial      Mixed hyperlipidemia      Priority: Low    Myocardial infarction [facility])      Priority: Low       Admit Date:  2/15/2020    Admission Problem List: Present on Admission:   Paroxysmal A-fib ([address]   Palliative care patient      Cardiac Specific Data:  Specialty Problems        Cardiology Problems    Myocardial infarction [facility])        Coronary artery disease involving native coronary artery of native heart without angina pectoris        Myocardial infarction [facility])        Essential hypertension        Chronic diastolic congestive heart failure (HCC)        Paroxysmal A-fib (HCC)            1. Left mainstem lung cancer with collapse of left lung with moderate pleural effusion, hemoptysis.   2. COPD mouth daily  11/23/17  Yes Feliberto Heart MD   aspirin 81 MG EC tablet Take 81 mg by mouth daily. Yes Historical Provider, MD   hydrALAZINE (APRESOLINE) 10 MG tablet Take 1 tablet by mouth every 8 hours 11/23/17   Feliberto Heart MD   metoprolol tartrate (LOPRESSOR) 25 MG tablet Take 1 tablet by mouth 2 times daily 11/23/17   Feliberto Heart MD        pantoprazole  40 mg Intravenous Daily    And    sodium chloride (PF)  10 mL Intravenous Daily    [START ON 2/19/2020] ceFAZolin (ANCEF) IVPB  2 g Intravenous On Call to OR    chlorhexidine   Topical See Admin Instructions    diltiazem  120 mg Oral BID    digoxin  125 mcg Oral Daily    sodium chloride flush  10 mL Intravenous 2 times per day    ipratropium-albuterol  1 ampule Inhalation Q4H WA    meropenem  1 g Intravenous Q8H    insulin lispro  0-6 Units Subcutaneous TID WC    insulin lispro  0-3 Units Subcutaneous Nightly    lactulose  20 g Oral TID    enoxaparin  40 mg Subcutaneous Daily       TELEMETRY: Atrial fibrillation     Physical Exam:      Physical Exam  Constitutional:       Appearance: He is well-developed. He is obese. HENT:      Mouth/Throat:      Pharynx: No oropharyngeal exudate. Eyes:      General: No scleral icterus. Right eye: No discharge. Left eye: No discharge. Neck:      Thyroid: No thyromegaly. Vascular: No JVD. Cardiovascular:      Rate and Rhythm: Normal rate and regular rhythm. Heart sounds: No murmur. No friction rub. No gallop. Pulmonary:      Effort: No respiratory distress. Breath sounds: No stridor. No wheezing or rales. Comments: Diminished air entry at left lung. Abdominal:      General: Bowel sounds are normal. There is no distension. Palpations: Abdomen is soft. There is no mass. Tenderness: There is no abdominal tenderness. There is no guarding or rebound. Musculoskeletal:         General: No deformity. Skin:     General: Skin is warm. Coloration: Skin is not pale. Findings: No erythema or rash. Neurological:      Mental Status: He is alert and oriented to person, place, and time. Motor: No abnormal muscle tone. Coordination: Coordination normal.      Deep Tendon Reflexes: Reflexes normal.                 Lab Data:  CBC:   Recent Labs     02/15/20  1615 02/16/20  0800 02/17/20  0659   WBC 11.3* 11.6* 12.0*   HGB 13.3* 12.3* 12.2*   HCT 44.1 40.8* 40.0*   MCV 89.1 89.5 88.3   * 356 350     BMP:   Recent Labs     02/15/20  1800 02/16/20  0800 02/17/20  0659   * 131* 129*   K 5.2* 5.1* 5.1*   CL 86* 91* 85*   CO2 33* 28 34*   BUN 26* 17 11   CREATININE 1.1 0.8 0.8     LIVER PROFILE:   Recent Labs     02/15/20  1800   AST 19   ALT 12   LIPASE 15   BILITOT 0.5   ALKPHOS 159*     PT/INR:   Recent Labs     02/15/20  1557 02/17/20  0659   PROTIME 14.5 14.1   INR 1.19* 1.15     APTT:   Recent Labs     02/15/20  1557   APTT 25.2*     BNP:  No results for input(s): BNP in the last 72 hours. CK, CKMB, Troponin: @LABRCNT (CKTOTAL:3, CKMB:3, TROPONINI:3)@    IMAGING:  Ct Thoracic Spine Wo Contrast    Result Date: 2/15/2020  CT THORACIC SPINE WO CONTRAST 2/15/2020 7:08 PM History: Fall injury. Back pain. In order to have a CT radiation dose as low as reasonably achievable Automated Exposure Control was utilized for adjustment of the mA and/or KV according to patient size. DLP in mGycm= 1580. No scoliosis. Multilevel disc space narrowing and endplate spurring. No compression fracture. Large amount of pleural fluid on the left side. No pneumothorax is seen. 1. Degenerative disc and endplate changes. 2. No thoracic spine fracture is seen. Signed by Dr Jarad Barajas on 2/15/2020 7:09 PM    Ct Lumbar Spine Wo Contrast    Result Date: 2/15/2020  CT LUMBAR SPINE WO CONTRAST 2/15/2020 7:22 PM History: Back pain. Fall injury.  In order to have a CT radiation dose as low as reasonably achievable Automated Exposure Control was infiltrate. Signed by Dr Adela Smith on 2/17/2020 7:08 AM    Xr Chest Portable    Result Date: 2/15/2020  EXAMINATION: XR CHEST PORTABLE 2/15/2020 4:59 PM HISTORY: Fall, history of lung cancer COMPARISON: 1/28/2018 FINDINGS: The heart size is difficult to evaluate due to overlying pathology. There is complete opacification of the left hemithorax. There is prominence of the right hilum and infrahilar region, similar to the previous exam. No appreciable pneumothorax is identified. Complete opacification of the left hemithorax, which may be due to an obstructing mass with atelectasis but is favored to be due to a large pleural effusion. This is new compared to the recent comparison exam. Signed by Dr Emory Gonzalez on 2/15/2020 5:00 PM    Xr Chest 1 Vw    Result Date: 2/17/2020  Examination. XR CHEST 1 VIEW 2/17/2020 1:15 PM History: Postthoracentesis. A single frontal projection of the chest is obtained after thoracentesis. The comparison is made with the previous study dated 2/17/2020 There is no pneumothorax. There is diffuse opacification of the left chest. However there appears to be loss of left chest volume since the previous study suggested by displacement of the trachea towards left. The opacity of the left chest may suggest consolidated lung with some residual pleural effusion. The right lung is unremarkable. No pneumothorax.  Signed by Dr Faviola Penn on 2/17/2020 2:09 PM    Vl Dup Lower Extremity Venous Bilateral    Result Date: 1/26/2020  Vascular Lower Extremities DVT Study Procedure  Demographics   Patient Name    Asha Vazquez  Age                   62                  N   Patient Number  [id number]           Gender                Male   Visit Number    [id number]        Interpreting          Gely Morton                                   Physician   Date of Birth   1955       Referring Physician   Isaías Ceron   Accession       [id number]        [address] [facility] !None      ! +------------------------------------+----------+---------------+----------+ ! SSV                                 ! Yes       ! Yes            ! None      ! +------------------------------------+----------+---------------+----------+ ! Gastroc                             ! Yes       ! Yes            ! None      ! +------------------------------------+----------+---------------+----------+ ! PTV                                 ! Yes       ! Yes            ! None      ! +------------------------------------+----------+---------------+----------+ ! GSV                                 ! Yes       ! Yes            ! None      ! +------------------------------------+----------+---------------+----------+ ! ATV                                 ! Yes       ! Yes            ! None      ! +------------------------------------+----------+---------------+----------+ ! Peroneal                            !Yes       ! Yes            ! None      ! +------------------------------------+----------+---------------+----------+ Left Lower Extremities DVT Study Measurements Left 2D Measurements +------------------------------------+----------+---------------+----------+ ! Location                            ! Visualized! Compressibility! Thrombosis! +------------------------------------+----------+---------------+----------+ ! Sapheno Femoral Junction            ! Yes       ! Yes            ! None      ! +------------------------------------+----------+---------------+----------+ ! Common Femoral                      !Yes       ! Yes            ! None      ! +------------------------------------+----------+---------------+----------+ ! Prox Femoral                        !Yes       ! Yes            ! None      ! +------------------------------------+----------+---------------+----------+ ! Mid Femoral                         !Yes       ! Yes            ! None      ! +------------------------------------+----------+---------------+----------+ ! Dist Femoral !Yes       !Yes            ! None      ! +------------------------------------+----------+---------------+----------+ ! Deep Femoral                        !Yes       ! Yes            ! None      ! +------------------------------------+----------+---------------+----------+ ! Popliteal                           !Yes       ! Yes            ! None      ! +------------------------------------+----------+---------------+----------+ ! SSV                                 ! Yes       ! Yes            ! None      ! +------------------------------------+----------+---------------+----------+ ! Gastroc                             ! Yes       ! Yes            ! None      ! +------------------------------------+----------+---------------+----------+ ! PTV                                 ! Yes       ! Yes            ! None      ! +------------------------------------+----------+---------------+----------+ ! GSV                                 ! Yes       ! Yes            ! None      ! +------------------------------------+----------+---------------+----------+ ! ATV                                 ! Yes       ! Yes            ! None      ! +------------------------------------+----------+---------------+----------+ ! Peroneal                            !Yes       ! Yes            ! None      ! +------------------------------------+----------+---------------+----------+    Cta Pulmonary W Contrast    Result Date: 1/25/2020  CTA chest 1/25/2020 8:15 PM HISTORY: New onset atrial fibrillation with hemoptysis TECHNIQUE: Axial images of the chest were obtained following IV contrast. . Coronal and sagittal reformatted images are reconstructed and reviewed. Maximal intensity projectional images reconstructed and reviewed COMPARISON: 12/18/2017. DLP: 812 mGy cm Automated exposure control was utilized to minimize patient radiation dose. FINDINGS: No pulmonary emboli are identified. Thoracic aortic calcification with no aneurysm or dissection.  There is moderate coronary calcification. There is mild cardiomegaly. Trace pericardial fluid. There is a moderate size left pleural effusion with collapse of the left lower lobe. There is abrupt occlusion of the left mainstem bronchus which may be from mucous plugging or endobronchial lesion. There is complete collapse left lung with associated volume loss. Images of the upper abdomen demonstrate stable prominence of the adrenal glands which do maintain their adrenal shape. Fatty atrophy of the pancreas. Calcified granuloma superior segment of the right lower lobe with right basilar atelectasis. There is stable 3 mm size nodules along the right minor fissure which may be intrafissural lymph nodes. These are unchanged from 2017. There are old healed bilateral rib fractures. There are degenerative changes of the thoracic spine. There is no focal destructive osseous lesion. 1. There is complete collapse of the left lung secondary to an occluded left main stem bronchus. This could be due to mucous plugging, however endobronchial neoplasm must also be considered. Moderate size left pleural effusion. 2. Mild cardiomegaly. Trace pericardial fluid. 3. No pulmonary emboli. 4. No thoracic aortic aneurysm or dissection. Signed by Dr Patrick Chung on 1/25/2020 9:27 PM    Us Thoracentesis    Result Date: 2/17/2020  Examination. US THORACENTESIS 2/17/2020 7:15 AM History: Pleural effusion. The procedure was explained to the patient. The benefits, the risks and complications are discussed. The patient understood the procedure and signed the consent. Ultrasound examination of the left lower chest posteriorly at the pars performed and showed a moderate size pocket of fluid. The point of maximum depth was marked on the patient's skin with an ink marker.  After sterile preparation and application of local anesthesia, a size 5 [employer] Yueh catheter was introduced into the pocket of fluid and a FreeFlow of clear straw-colored fluid was established. The outer hub of the catheter is attached to a vacuum assisted draining device. 2 L of fluid was drained. The catheter was then withdrawn and the puncture site was covered with a sterile Band-Aid. The patient was sent for a chest radiograph to evaluate for pneumothorax. The patient tolerated the procedure well. No immediate complications were noted. A successful thoracentesis. Signed by Dr Shelley Burnette on 2/17/2020 2:06 PM        Assessment and Plan:    81-year-old gentleman with past medical history of COPD on home oxygen, paroxysmal atrial fibrillation, diabetes mellitus, obesity, recent diagnosis of left mainstem lung CTA with collapsed left lung after recent fall with low back injury, noted to be in atrial fibrillation with RVR. 1. Rate control improved. Still on IV Cardizem. Would maintain for now as planned for Pleurx catheter tomorrow and possible nothing by mouth status.     Cristina Georges MD 2/18/2020 3:39 PM

## 2020-02-18 NOTE — CONSULTS
Consultation History & Physical    Date of Admission:  2/15/2020  3:44 PM  Date of Consultation:  2/18/2020    Surgeon: Dr. Kyrie Doe       PCP:  ELTON Cummings     Other:     Dr. Leti Morrison, Oncologist  Dr. Real Bowers, Hospitalist    Reason for Consultation:  Recurrent Pleural Effusion    History of Present Illness:   Mr. Jossie Mayes is a 62 y.o. male who suffers from metastatic lung cancer, recent diagnosis at [facility]. He has COPD with chronic hypoxic respiratory on home oxygen at 4 lpm.     Recent treatment at [facility] included thoracentesis, removing 1100 ml pleural fluid, where cytology revealed reactive mesothelial cells, negative for malignancy. CT Chest demonstrated evidence of large left lung mass with postobstructive collapse of the entire left lung and mediastinal shift, bulky subcarinal adenopathy, nodular bilateral adrenal glands with some adreniform contour, suspicious for either nodular hyperplasia or bilateral metastatic disease to the adrenal glands. A bronchoscopy with insertion of stent in middle portion of left mainstem bronchus with about 90% obstruction. PET scan had increased uptake of left hilar lung mass, left and left lower lobes. Patient presented [facility] ER on 02/15/2020 with hemoptysis. He was found to be in atrial fibrillation, RVR. Work-up included CXR that demonstrated complete opacification of left hemithorax. US thoracentesis was performed on 02/17/2020 with removal of 2 L of pleural fluid. Post thoracentesis CXR demonstrated continued whiteout of left lung. CT surgery consulted for evaluation and recommendation with possible insertion of PleurX catheter.           Past Medical History:    Past Medical History:   Diagnosis Date    Atherosclerosis     cardiovascular disease     Blood circulation, collateral     4 fingers left hand tingly    CAD (coronary artery disease)     saw dr. Sage mcdaniel Pack years: 36.00     Types: Cigarettes     Last attempt to quit: 2011     Years since quittin.4    Smokeless tobacco: Never Used   Substance and Sexual Activity    Alcohol use: Yes     Comment: rare    Drug use: No    Sexual activity: Not on file   Lifestyle    Physical activity:     Days per week: Not on file     Minutes per session: Not on file    Stress: Not on file   Relationships    Social connections:     Talks on phone: Not on file     Gets together: Not on file     Attends Restorationist service: Not on file     Active member of club or organization: Not on file     Attends meetings of clubs or organizations: Not on file     Relationship status: Not on file    Intimate partner violence:     Fear of current or ex partner: Not on file     Emotionally abused: Not on file     Physically abused: Not on file     Forced sexual activity: Not on file   Other Topics Concern    Not on file   Social History Narrative    Not on file       Family History:     Family History   Problem Relation Age of Onset    Coronary Art Dis Mother     Cancer Mother     Coronary Art Dis Father     Diabetes Father     Coronary Art Dis Sister     Depression Paternal Grandmother          Review of Systems:  Constitutional:  No fever, chills, night sweats, or weight loss. Generalized weakness, unable to walk, but a few steps. HEENT: No vision loss, double vision, blurred vision, or tearing. No hearing loss, tinnitus, or infection. No nasal discharge or epistaxis. No dysphagia. + hemoptysis. Respiratory:  Shortness of breath with any activity. Recent diagnosis metastatic lung cancer. Cardiovascular: No hypertension, hypotension, anginal type chest pain, dizziness, or syncopal spells. No history of palpitations. Admitted with atrial fibrillation, RVR. Peripheral Vascular:  No history of claudication. Gastrointestinal:  No nausea, vomiting, diarrhea, or constipation.   No reflux or gastroesophageal reflux with RVR  4. CAD  5. Essential HTN  6. Chronic Diastolic CHF  7. DM, Type 2  8. Mixed Hyperlipidemia      Plan:     1. Patient has obstruction of left main bronchus with trapped lung. Patient would likely benefit from PleurX catheter. Dr. Lori Byrd to review records/ images. He will discuss recommendations as well as R/B/A with patient and family. Further recommendations per Dr. Lori Byrd.          Yady Aaron, ELTON  2/18/2020  11:13 AM

## 2020-02-18 NOTE — PROGRESS NOTES
[facility]        Hospitalist Progress Note  2/18/2020 11:34 AM  Subjective:   Admit Date: 2/15/2020  PCP: ELTON Day    Chief Complaint: Lethargy    Subjective: Patient seen and examined at bedside. NAD. Cumulative Hospital History: 61-year-old male with a history of lung cancer presenting for lethargy found to have atrial fibrillation with RVR and extensive pleural effusion with cardiology, pulmonology, and oncology consulted on admission. Patient is status post left-sided thoracentesis with 2 L of fluid removed. Repeat chest x-ray showing left lung collapse with pulmonology recommending cardiothoracic surgery consultation for Pleurx catheter. Cardiothoracic surgery has been consulted. ROS: 14 point review of systems is negative except as specifically addressed above.     DIET CARDIAC; Carb Control: 4 carb choices (60 gms)/meal; Daily Fluid Restriction: 1500 ml    Intake/Output Summary (Last 24 hours) at 2/18/2020 1134  Last data filed at 2/18/2020 3477  Gross per 24 hour   Intake 680 ml   Output 1720 ml   Net -1040 ml     Medications:   diltiazem (CARDIZEM) 125 mg in dextrose 5% 125 mL infusion Stopped (02/18/20 7452)     Current Facility-Administered Medications   Medication Dose Route Frequency Provider Last Rate Last Dose    pantoprazole (PROTONIX) injection 40 mg  40 mg Intravenous Daily Laurie Mathews MD        And    sodium chloride (PF) 0.9 % injection 10 mL  10 mL Intravenous Daily Laurie Mathews MD        diltiazem (CARDIZEM CD) extended release capsule 120 mg  120 mg Oral BID Jules Cox MD   120 mg at 02/17/20 1843    digoxin (LANOXIN) tablet 125 mcg  125 mcg Oral Daily Anusha Ortiz MD   125 mcg at 02/17/20 0843    diphenhydrAMINE (BENADRYL) tablet 50 mg  50 mg Oral Nightly PRN Rowan Anthony MD   50 mg at 02/17/20 2008    acetaminophen (TYLENOL) tablet 1,000 mg  1,000 mg Oral Nightly PRN Rowan Anthony MD   650 mg at 02/18/20 0620    diltiazem 125 mg in dextrose 5 % 125 mL infusion  5 mg/hr Intravenous Continuous Zamzam Morrison MD   Stopped at 02/18/20 0628    diltiazem injection 10 mg  10 mg Intravenous Q30 Min PRN Amish Goodwin MD   10 mg at 02/15/20 2117    sodium chloride flush 0.9 % injection 10 mL  10 mL Intravenous 2 times per day Zamzam Morrison MD   10 mL at 02/17/20 2010    sodium chloride flush 0.9 % injection 10 mL  10 mL Intravenous PRN Zamzam Morrison MD        magnesium hydroxide (MILK OF MAGNESIA) 400 MG/5ML suspension 30 mL  30 mL Oral Daily PRN Zamzam Morrison MD        ondansetron [facility]) injection 4 mg  4 mg Intravenous Q6H PRN Zamzam Morrison MD   4 mg at 02/18/20 7251    acetaminophen (TYLENOL) tablet 650 mg  650 mg Oral Q4H PRN Zamzam Morrison MD   650 mg at 02/16/20 1519    ipratropium-albuterol (DUONEB) nebulizer solution 1 ampule  1 ampule Inhalation Q4H WA Zamzam Morrison MD   1 ampule at 02/17/20 2027    meropenem (MERREM) 1 g in sodium chloride 0.9 % 100 mL IVPB (mini-bag)  1 g Intravenous Q8H Zamzam Morrison MD   Stopped at 02/18/20 0649    insulin lispro (HUMALOG) injection vial 0-6 Units  0-6 Units Subcutaneous TID WC Zamzam Morrsion MD   Stopped at 02/17/20 0854    insulin lispro (HUMALOG) injection vial 0-3 Units  0-3 Units Subcutaneous Nightly Zamzam Morrison MD   1 Units at 02/16/20 2015    lactulose (CHRONULAC) 10 GM/15ML solution 20 g  20 g Oral TID Zamzam Morrison MD   20 g at 02/17/20 0843    enoxaparin (LOVENOX) injection 40 mg  40 mg Subcutaneous Daily Zamzam Morrison MD   Stopped at 02/17/20 0900        Labs:     Recent Labs     02/15/20  1615 02/16/20  0800 02/17/20  0659   WBC 11.3* 11.6* 12.0*   RBC 4.95 4.56* 4.53*   HGB 13.3* 12.3* 12.2*   HCT 44.1 40.8* 40.0*   MCV 89.1 89.5 88.3   MCH 26.9* 27.0 26.9*   MCHC 30.2* 30.1* 30.5*   * 356 350     Recent Labs     02/15/20  1800 02/16/20  0800 02/17/20  0659   * 131* 129*   K 5.2* 5.1* 5.1*   ANIONGAP 12 12 10   CL 86* 91* 85*   CO2 33* 28 34*   BUN 26*

## 2020-02-18 NOTE — PROGRESS NOTES
Portable 1/26/2020  Complete whiteout of the left lung with mild deviation of the trachea to the left. Findings are consistent with complete collapse of the left lung. Underlying infection or mass or effusion cannot be excluded      SIMONA at [facility] on 1/27/2020 was performed. LVEF greater than 55%. Unable to comment on diastolic filling pattern due to atrial  fibrillation. RV mildly dilated. Mildly depressed RV systolic function. The left atrium is mild to moderately dilated. RA mildly dilated. Mild tricuspid regurgitation. The RV systolic pressure is calculated at 63 mmHg.     A thoracentesis was performed at [facility] on 1/27/2020.  1100 cc of pleural fluid was drained. Cytology:  REACTIVE MESOTHELIAL CELLS, HISTIOCYTES, AND LYMPHOCYTES; NEGATIVE FOR MALIGNANCY      CT C/A/P at [facility] 1/29/2020  Impression:   1.  Signs of large LEFT lung mass with postobstructive collapse of   the entire LEFT lung and mediastinal shift. 2.  Bulky subcarinal adenopathy. 3.  Nodular bilateral adrenal glands still with some adreniform   contour, suspicious for either nodular hyperplasia or bilateral   metastatic disease to the adrenal glands.      Bronchoscopy with insertion of stent was performed on 1/29/2020 at [facility]. A mass was found in the middle portion of the left mainstem bronchus with about 90% obstruction. The mass was large and bloody and friable. a 10 x 40 Aero stent was placed with good results.        Records discuss \"squamous cell lung cancer \"  however I am unable to view a pathology report.        PET at [facility] 1/31/2020  Impression   Intense FDG uptake within a ill-defined left hilar mass as well as   the left upper and left lower lobes. Interval placement of a left   main bronchus stent which is occluded distally with complete collapse   of the left lung. FDG uptake within the left upper and lower lobes   may be related to infection versus malignancy. Small left pleural   effusion.  No FDG uptake is present within the subcarinal   lymphadenopathy.         MRI brain w/ and w/o  2/1/2020 8:07 AM  Impression   1.  No findings of intracranial metastatic disease. 2.  Mild chronic small vessel ischemic white matter disease and tiny   old right cerebellar infarcts.       He was discharged, he was evaluated by Dr. Tran Modi for 2/7/2020 with anticipation of 33 fractions to the left lung. He also had an appointment with Dr. Genny Valdez on 2/13/2020 but was unable to keep that appointment due to symptoms related to his cancer.      He was admitted to [facility] via the emergency room on 2/15/2020. Oncology consultation was called to establish care with Dr. Genny Valdez. Subjective   REVIEW OF SYSTEMS:   Review of Systems   Constitutional: Positive for activity change and fatigue. Negative for chills, diaphoresis, fever and unexpected weight change. HENT: Negative for mouth sores, nosebleeds, sore throat, trouble swallowing and voice change. Eyes: Negative for visual disturbance. Respiratory: Positive for shortness of breath. Negative for cough and wheezing. Supplemental O2 in use   Cardiovascular: Positive for palpitations and leg swelling. Negative for chest pain. Gastrointestinal: Negative for abdominal distention, abdominal pain, blood in stool, constipation, diarrhea, nausea and vomiting. Endocrine: Negative for cold intolerance, heat intolerance, polydipsia and polyuria. Genitourinary: Negative for difficulty urinating, dysuria, hematuria and urgency. Musculoskeletal: Positive for arthralgias and back pain. Negative for joint swelling and myalgias. Skin: Negative for color change and rash. Neurological: Positive for light-headedness. Negative for dizziness, tremors, seizures and syncope. Hematological: Negative for adenopathy. Does not bruise/bleed easily. Psychiatric/Behavioral: Negative for agitation and behavioral problems.    All other systems reviewed and BUN 11 02/17/2020    CREATININE 0.8 02/17/2020    GLUCOSE 168 (H) 02/17/2020    CALCIUM 8.9 02/17/2020    PROT 7.4 02/15/2020    LABALBU 2.9 (L) 02/15/2020    BILITOT 0.5 02/15/2020    ALKPHOS 159 (H) 02/15/2020    AST 19 02/15/2020    ALT 12 02/15/2020    LABGLOM >60 02/17/2020       Lab Results   Component Value Date    INR 1.15 02/17/2020    INR 1.19 (H) 02/15/2020    INR 1.09 01/25/2020    PROTIME 14.1 02/17/2020    PROTIME 14.5 02/15/2020    PROTIME 13.5 01/25/2020       30 Day lookback of cultures:    Blood Culture Recent:   Recent Labs     01/25/20  2141   BC No growth after 5 days of incubation. Gram Stain Recent: No results for input(s): LABGRAM in the last 720 hours. Resp Culture Recent: No results for input(s): CULTRESP in the last 720 hours. Body Fluid Recent : No results for input(s): BFCX in the last 720 hours. MRSA Recent : No results for input(s): [address] in the last 720 hours. Urine Culture Recent : No results for input(s): LABURIN in the last 720 hours. Organism Recent : No results for input(s): ORG in the last 720 hours. ASSESSMENT/PLAN:  #1  Squamous cell lung carcinoma left lung, status post left main stem bronchus stent 1/29/2020 - possibly stage II    Rehana Encinsa presented to [facility] ED on 1/25/2020 with hemoptysis increasing weakness. He was also found to have atrial fibrillation with RVR.     CTA chest with contrast:  NO PE  Complete collapse of the left lung secondary to an occluded left main stem bronchus  Moderate size left pleural effusion  Mild cardiomegaly, trace pericardial fluid    Duplex venous scan of B/L LE: NO evidence for dvt, svt, reflux noted at this time.      The case was discussed with Dr Neda Cohen with pulmonology, who stated that he can do a bronch but with the bleeding, he would not be able to intervene other than using epinephrine. Would recommend transfer to higher level of care as pt may require embolization.      Mr. Stan Humphries was transferred to [address] [place] [address] in [place] where further work-up was performed.     X-ray Chest Portable 1/26/2020  Complete whiteout of the left lung with mild deviation of the trachea to the left. Findings are consistent with complete collapse of the left lung. Underlying infection or mass or effusion cannot be excluded      SIMONA at [facility] on 1/27/2020 was performed. LVEF greater than 55%. Unable to comment on diastolic filling pattern due to atrial  fibrillation. RV mildly dilated. Mildly depressed RV systolic function. The left atrium is mild to moderately dilated. RA mildly dilated. Mild tricuspid regurgitation. The RV systolic pressure is calculated at 63 mmHg.     A thoracentesis was performed at [address] [place] [address] on 1/27/2020.  1100 cc of pleural fluid was drained. Cytology:  REACTIVE MESOTHELIAL CELLS, HISTIOCYTES, AND LYMPHOCYTES; NEGATIVE FOR MALIGNANCY      CT C/A/P at [facility] 1/29/2020  Impression:   1.  Signs of large LEFT lung mass with postobstructive collapse of   the entire LEFT lung and mediastinal shift. 2.  Bulky subcarinal adenopathy. 3.  Nodular bilateral adrenal glands still with some adreniform   contour, suspicious for either nodular hyperplasia or bilateral   metastatic disease to the adrenal glands.      Bronchoscopy with insertion of stent was performed on 1/29/2020 at [facility]. A mass was found in the middle portion of the left mainstem bronchus with about 90% obstruction. The mass was large and bloody and friable. a 10 x 40 Aero stent was placed with good results.        Records discuss \"squamous cell lung cancer \"  however I am unable to view a pathology report.        PET at [facility] 1/31/2020  Impression   Intense FDG uptake within a ill-defined left hilar mass as well as the left upper and left lower lobes. Interval placement of a left   main bronchus stent which is occluded distally with complete collapse of the left lung.    FDG uptake within the left upper and lower lobes may be related to infection versus malignancy. Small left pleural effusion. No FDG uptake is present within the subcarinal lymphadenopathy.      MRI brain w/ and w/o  2/1/2020 8:07 AM  Impression   1.  No findings of intracranial metastatic disease. 2.  Mild chronic small vessel ischemic white matter disease and tiny   old right cerebellar infarcts. CT abdomen pelvis with contrast 2/15/2020 at [facility]  No acute abnormality seen in the abdomen  Left pleural effusion    left thoracentesis 2/17/2020: 2L of clear, straw colored fluid drained. cytology pending  Post-procedure CXR 2/17/20: No pneumothorax, diffuse opacification of the left chest with some residual pleural effusion  He has already been evaluated by Dr. Vero Lee    #2 PAF  Cardizem/Digoxin per cardiology     #3 Hyponatremia  Na 129 on 2/17/2020      Await cytology from thoracentesis 2/17/20. Cardiology following for PAF. Continue supportive care. [facility], [facility]    02/18/20  7:09 AM  Physician's attestation/substantial contribution:  I, Dr Brayden Nguyen, independently performed an evaluation on Dustin Laguna. I have reviewed relevant medical information/data to include but not limited to medication list, relevant appropriate labs and imaging when applicable. I reviewed other physician's notes, ancillary services and nurses assessment. I have reviewed the above documentation completed by the Nurse Practitioner or Physician Assistant. Please see my additional contributions to the history of present illness, physical examination, and assessment/medical decision-making that reflect my findings and impressions. I discussed essential elements of the care plan with the NP or PA and the patient as well. I answered all the questions to the patient's satisfaction. I concur with above stated. Subjective-shortness of breath is slightly better than yesterda he had a thoracentesis of about 2 L. Cytology pendingy.    Objective- tachypneic. Tachycardic. Looks acutely ill. Assessment/plan:  Lung cancer/pleural effusion- recurrent pleural effusion. Cytology from [address] [place] [address] was negative. Second cytology from thoracentesis yesterday pending. Will consult thoracic surgery.       Micheline Santana MD

## 2020-02-18 NOTE — PROGRESS NOTES
on 4 L home oxygen. 3. Paroxysmal atrial fibrillation with RVR. 4. Diabetes mellitus. 5. Obesity. Subjective:  Mr. Harmeet Soto remains on Cardizem 5 mg an hour for rate control in atrial fibrillation. Patient is DNI and being managed conservatively at this time. Objective:   /65   Pulse 105   Temp 97.2 °F (36.2 °C) (Temporal)   Resp 20   Ht 5' 8\" (1.727 m)   Wt 241 lb 9.6 oz (109.6 kg)   SpO2 (!) 85%   BMI 36.74 kg/m²       Intake/Output Summary (Last 24 hours) at 2/17/2020 2132  Last data filed at 2/17/2020 1844  Gross per 24 hour   Intake 300 ml   Output 1900 ml   Net -1600 ml       Prior to Admission medications    Medication Sig Start Date End Date Taking? Authorizing Provider   albuterol (PROVENTIL) (2.5 MG/3ML) 0.083% nebulizer solution Take 2.5 mg by nebulization every 6 hours as needed for Wheezing   Yes Historical Provider, MD   escitalopram (LEXAPRO) 10 MG tablet Take 10 mg by mouth daily   Yes Historical Provider, MD   GUAIFENESIN PO Take 600 mg by mouth 2 times daily   Yes Historical Provider, MD   tiotropium (SPIRIVA) 18 MCG inhalation capsule Inhale 18 mcg into the lungs daily   Yes Historical Provider, MD   albuterol sulfate HFA (PROAIR HFA) 108 (90 Base) MCG/ACT inhaler Inhale 2 puffs into the lungs every 4 hours as needed for Wheezing or Shortness of Breath 12/18/17  Yes Chad Morton MD   nitroGLYCERIN (NITROSTAT) 0.4 MG SL tablet up to max of 3 total doses.  If no relief after 1 dose, call 911. 11/23/17  Yes Phyllis Reason, MD   metFORMIN (GLUCOPHAGE) 1000 MG tablet Take 1 tablet by mouth 2 times daily (with meals) 11/23/17  Yes Phyllis Reason, MD   simvastatin (ZOCOR) 10 MG tablet Take 1 tablet by mouth nightly  Patient taking differently: Take 10 mg by mouth 2 times daily  11/23/17  Yes Phyllis Reason, MD   furosemide (LASIX) 40 MG tablet Take 1 tablet by mouth daily 11/24/17  Yes Phyllis Reason, MD   ranitidine (ZANTAC) 150 MG tablet Take 1 tablet by mouth 2 times Shortness of breath. Impression   Normal venous duplex study of the bilateral lower extremity(ies). There is  no evidence of deep or superficial venous thrombosis. Signature   ----------------------------------------------------------------  Electronically signed by Jonas Harman(Interpreting  physician) on 01/26/2020 11:15 AM  ----------------------------------------------------------------  Velocities are measured in cm/s ; Diameters are measured in mm Right Lower Extremities DVT Study Measurements Right 2D Measurements +------------------------------------+----------+---------------+----------+ ! Location                            ! Visualized! Compressibility! Thrombosis! +------------------------------------+----------+---------------+----------+ ! Sapheno Femoral Junction            ! Yes       ! Yes            ! None      ! +------------------------------------+----------+---------------+----------+ ! Common Femoral                      !Yes       ! Yes            ! None      ! +------------------------------------+----------+---------------+----------+ ! Prox Femoral                        !Yes       ! Yes            ! None      ! +------------------------------------+----------+---------------+----------+ ! Mid Femoral                         !Yes       ! Yes            ! None      ! +------------------------------------+----------+---------------+----------+ ! Dist Femoral                        !Yes       ! Yes            ! None      ! +------------------------------------+----------+---------------+----------+ ! Deep Femoral                        !Yes       ! Yes            ! None      ! +------------------------------------+----------+---------------+----------+ ! Popliteal                           !Yes       ! Yes            ! None      ! +------------------------------------+----------+---------------+----------+ ! SSV                                 ! Yes       ! Yes            ! None      ! !Yes       !Yes            ! None      ! +------------------------------------+----------+---------------+----------+ ! Popliteal                           !Yes       ! Yes            ! None      ! +------------------------------------+----------+---------------+----------+ ! SSV                                 ! Yes       ! Yes            ! None      ! +------------------------------------+----------+---------------+----------+ ! Gastroc                             ! Yes       ! Yes            ! None      ! +------------------------------------+----------+---------------+----------+ ! PTV                                 ! Yes       ! Yes            ! None      ! +------------------------------------+----------+---------------+----------+ ! GSV                                 ! Yes       ! Yes            ! None      ! +------------------------------------+----------+---------------+----------+ ! ATV                                 ! Yes       ! Yes            ! None      ! +------------------------------------+----------+---------------+----------+ ! Peroneal                            !Yes       ! Yes            ! None      ! +------------------------------------+----------+---------------+----------+    Cta Pulmonary W Contrast    Result Date: 1/25/2020  CTA chest 1/25/2020 8:15 PM HISTORY: New onset atrial fibrillation with hemoptysis TECHNIQUE: Axial images of the chest were obtained following IV contrast. . Coronal and sagittal reformatted images are reconstructed and reviewed. Maximal intensity projectional images reconstructed and reviewed COMPARISON: 12/18/2017. DLP: 812 mGy cm Automated exposure control was utilized to minimize patient radiation dose. FINDINGS: No pulmonary emboli are identified. Thoracic aortic calcification with no aneurysm or dissection. There is moderate coronary calcification. There is mild cardiomegaly. Trace pericardial fluid.  There is a moderate size left pleural effusion with collapse of the left lower lobe. There is abrupt occlusion of the left mainstem bronchus which may be from mucous plugging or endobronchial lesion. There is complete collapse left lung with associated volume loss. Images of the upper abdomen demonstrate stable prominence of the adrenal glands which do maintain their adrenal shape. Fatty atrophy of the pancreas. Calcified granuloma superior segment of the right lower lobe with right basilar atelectasis. There is stable 3 mm size nodules along the right minor fissure which may be intrafissural lymph nodes. These are unchanged from 2017. There are old healed bilateral rib fractures. There are degenerative changes of the thoracic spine. There is no focal destructive osseous lesion. 1. There is complete collapse of the left lung secondary to an occluded left main stem bronchus. This could be due to mucous plugging, however endobronchial neoplasm must also be considered. Moderate size left pleural effusion. 2. Mild cardiomegaly. Trace pericardial fluid. 3. No pulmonary emboli. 4. No thoracic aortic aneurysm or dissection. Signed by Dr Nubia Castillo on 1/25/2020 9:27 PM    Us Thoracentesis    Result Date: 2/17/2020  Examination. US THORACENTESIS 2/17/2020 7:15 AM History: Pleural effusion. The procedure was explained to the patient. The benefits, the risks and complications are discussed. The patient understood the procedure and signed the consent. Ultrasound examination of the left lower chest posteriorly at the pars performed and showed a moderate size pocket of fluid. The point of maximum depth was marked on the patient's skin with an ink marker. After sterile preparation and application of local anesthesia, a size 5 [employer] [place] Yueh catheter was introduced into the pocket of fluid and a FreeFlow of clear straw-colored fluid was established. The outer hub of the catheter is attached to a vacuum assisted draining device. 2 L of fluid was drained.  The catheter was then withdrawn and the puncture

## 2020-02-19 NOTE — ANESTHESIA POSTPROCEDURE EVALUATION
Department of Anesthesiology  Postprocedure Note    Patient: Tiffanie Delgadillo  MRN: [id number]  [place]: 1955  Date of evaluation: 2/19/2020  Time:  9:00 AM     Procedure Summary     Date:  02/19/20 Room / Location:  [address]    Anesthesia Start:  5015 Anesthesia Stop:  0900    Procedure:  VIDEO ASSISTED THOROCOSCOPY WITH INSERTION OF [address] (Left ) Diagnosis:  (Recurrent Malignant Pleural Effusion, Left)    Surgeon:  Radha Sherman MD Responsible Provider:  Sudie Bloch, APRN - CRNA    Anesthesia Type:  general ASA Status:  3          Anesthesia Type: general    Mariam Phase I: Mariam Score: 9    Mariam Phase II:      Last vitals: Reviewed and per EMR flowsheets.        Anesthesia Post Evaluation    Patient location during evaluation: PACU  Patient participation: complete - patient participated  Level of consciousness: awake and alert  Pain score: 0  Airway patency: patent  Nausea & Vomiting: no nausea and no vomiting  Complications: no  Cardiovascular status: blood pressure returned to baseline  Respiratory status: acceptable, spontaneous ventilation and face mask  Hydration status: stable

## 2020-02-19 NOTE — CONSULTS
Palliative Care Consult Note  2/19/2020 11:29 AM  Subjective:   Admit Date: 2/15/2020  PCP: ELTON Romero    Reason For Consult: Goals of care, Family Support    Requesting Physician: Dr. Hayden Gimenez    History Obtained From: EMR, patient, nursing    Chief Complaint: \"Tired\"    History of Present Illness: Patient is a 20-year-old male with a PMH of COPD O2 dependent, PAF, ANABELL, morbid obesity, and DM who was recently diagnosed with metastatic lung cancer at [facility].  He presented to the ED on 2/15/2020 due to increased abdominal pain reporting that he had tripped and fallen days prior falling onto his upper and lower back. Work-up in the ER showed CT of the abdomen with no acute changes but did reveal L pleural effusion, CXR showing complete opacification of left hemothorax due to an obstructing mass, WBC 11.3, and he was also noted to have A. fib with RVR. He was admitted for further evaluation and treatment, oncology consulted for continuity of care. Patient was also seen by cardiothoracic surgery, and thoracentesis on 2/17/2020 with removal of 2L of pleural fluid, pathology pending. Post thoracentesis CXR continued to demonstrate white out of the left lung and he underwent placement of Pleurx catheter this morning and tolerated the procedure well. Cardiology has also been following and assisting with treatment of A. Fib. Due to patient's recent diagnosis of metastatic lung cancer and secondary comorbidities, palliative medicine was consulted to assist with goals of care and family support.     Past Medical History:        Diagnosis Date    Atherosclerosis     cardiovascular disease     Blood circulation, collateral     4 fingers left hand tingly    CAD (coronary artery disease)     saw dr. Hebert Duffon tunnel syndrome     CHF (congestive heart failure) (HCC)     COPD (chronic obstructive pulmonary disease) ([facility] [address]     Degenerative joint disease     right hip    Diabetes Neurological: Negative. Psychiatric/Behavioral: Negative.         Palliative Review of Advance Directives:     Surrogate Decision Maker:yes, spouse    Durable Power of :no    Advanced Directives/Living [place]: no, patient changed his code status to DNR    Out of hospital medical orders in place to reflect resuscitation status (MOLST/POLST): no    Information Sharing:  Patient's awareness of illness:  [] Terminal [] Life-Threatening [x] Serious [] Non life-threatening [] Not serious   [] Not discussed    Family awareness of illness:   [] Terminal [] Life-Threatening [] Serious [] Nonlife-threatening [] Not serious   [x] Not discussed    DIET CARDIAC; Carb Control: 4 carb choices (60 gms)/meal; Daily Fluid Restriction: 1500 ml    Medications:   dextrose      diltiazem (CARDIZEM) 125 mg in dextrose 5% 125 mL infusion Stopped (02/18/20 1257)     Current Facility-Administered Medications   Medication Dose Route Frequency Provider Last Rate Last Dose    glucose (GLUTOSE) 40 % oral gel 15 g  15 g Oral PRN Theo Galicia MD        dextrose 50 % IV solution  12.5 g Intravenous PRN Theo Galicia MD        glucagon (rDNA) injection 1 mg  1 mg Intramuscular PRN Theo Galicia MD        dextrose 5 % solution  100 mL/hr Intravenous PRN Theo Galicia MD        pantoprazole (PROTONIX) injection 40 mg  40 mg Intravenous Daily Theo Galicia MD   40 mg at 02/18/20 1206    And    sodium chloride (PF) 0.9 % injection 10 mL  10 mL Intravenous Daily Theo Galicia MD        aluminum & magnesium hydroxide-simethicone (MAALOX) 617-709-20 MG/5ML suspension 30 mL  30 mL Oral Q6H PRN Theo Galicia MD   30 mL at 02/18/20 2125    diltiazem (CARDIZEM CD) extended release capsule 120 mg  120 mg Oral BID Theo Galicia MD   120 mg at 02/18/20 2022    digoxin (LANOXIN) tablet 125 mcg  125 mcg Oral Daily Theo Galicia MD   125 mcg at 02/18/20 1202    diphenhydrAMINE (BENADRYL) tablet 50 mg  50 mg Oral Nightly PRN Beverley Zeng MD   50 mg at 02/17/20 2008    acetaminophen (TYLENOL) tablet 1,000 mg  1,000 mg Oral Nightly PRN Beverley Zeng MD   650 mg at 02/18/20 0620    diltiazem 125 mg in dextrose 5 % 125 mL infusion  5 mg/hr Intravenous Continuous Beverley Zeng MD   Stopped at 02/18/20 5689    diltiazem injection 10 mg  10 mg Intravenous Q30 Min PRN Beverley Zeng MD   10 mg at 02/15/20 2117    sodium chloride flush 0.9 % injection 10 mL  10 mL Intravenous 2 times per day Beevrley Zeng MD   10 mL at 02/18/20 2023    sodium chloride flush 0.9 % injection 10 mL  10 mL Intravenous PRN Beverley Zeng MD        magnesium hydroxide (MILK OF MAGNESIA) 400 MG/5ML suspension 30 mL  30 mL Oral Daily PRN Beverley Zeng MD        ondansetron [facility]) injection 4 mg  4 mg Intravenous Q6H PRN Beverley Zeng MD   4 mg at 02/18/20 5110    acetaminophen (TYLENOL) tablet 650 mg  650 mg Oral Q4H PRN Beverley Zeng MD   650 mg at 02/18/20 1657    ipratropium-albuterol (DUONEB) nebulizer solution 1 ampule  1 ampule Inhalation Q4H WA Beverley Zeng MD   1 ampule at 02/18/20 1925    meropenem (MERREM) 1 g in sodium chloride 0.9 % 100 mL IVPB (mini-bag)  1 g Intravenous Q8H Beverley Zeng MD 0 mL/hr at 02/19/20 0203 1 g at 02/19/20 0742    insulin lispro (HUMALOG) injection vial 0-6 Units  0-6 Units Subcutaneous TID WC Beverley Zeng MD   2 Units at 02/18/20 1208    insulin lispro (HUMALOG) injection vial 0-3 Units  0-3 Units Subcutaneous Nightly Beverley Zeng MD   1 Units at 02/16/20 2015    lactulose (CHRONULAC) 10 GM/15ML solution 20 g  20 g Oral TID Beverley Zeng MD   20 g at 02/17/20 0843    enoxaparin (LOVENOX) injection 40 mg  40 mg Subcutaneous Daily Beverley Zeng MD   40 mg at 02/18/20 1203        Labs:     Recent Labs     02/17/20  0659 02/19/20  0216   WBC 12.0* 15.3*   RBC 4.53* 4.81   HGB 12.2* 12.8*   HCT 40.0* 42.5   MCV 88.3 88.4   MCH 26.9* 26.6*   MCHC 30.5* 30.1* tremor, alert and oriented  Psychiatric: Alert and oriented, no recent or remote memory deficits, good judgement, mood and affect appropriate  Skin: warm, dry    Assessment and Plan: Active Problems:    Paroxysmal A-fib [facility])    Palliative care patient  Resolved Problems:    * No resolved hospital problems. *      Visit Summary: I saw the patient at the bedside with no family present. Patient is resting in bed after having Pleur X placed. I reviewed palliative medicine services with him and reason for consult. We discussed patient's health issues/newly diagnosed metastatic lung cancer, functional status, and home supports. Patient has a good understanding of his diagnosis and is planning to follow up with Oncology as an outpatient for recommendations on treatment. Patient reports being weak and he would benefit from PT/OT eval to help determine his level of need for support on discharge. HH would be beneficial at a minimum. I provided encouragement and emotional support to the patient. Palliative Medicine will continue to follow. Recommendations: Patient could benefit from [place] at a minimum. Will need eval by PT/OT to determine best level of care at discharge. Pt plans to follow up with Oncology for further evaluation and treatment options. Thank you for consulting palliative care and allowing us to participate in the care of the patient.       CounselingTopics: Goals of care, Family Support    Time Spent Counselin min                                       Total Face to Face Time: 40 min  Time Spent Reviewing Records/Provider Communication: 15 min  Total Time Spent: 55 min    Electronically signed by ELTON France on 2020 at 11:29 AM    (Please note that portions of this note were completed with a voice recognition program.  Efforts were made to edit the dictations but occasionally words are mis-transcribed.)

## 2020-02-19 NOTE — ANESTHESIA PROCEDURE NOTES
Arterial Line:    An arterial line was placed using ultrasound guidance, in the holding area for the following indication(s): continuous blood pressure monitoring. A 20 gauge (size), 1 and 3/4 inch (length), Arrow (type) catheter was placed, Seldinger technique used, into the right radial artery, secured by Tegaderm. Anesthesia type: Local  Local infiltration: Injection    Events:  patient tolerated procedure well with no complications.   Anesthesiologist: Kassidy Kapadia MD  Performed: Anesthesiologist   Preanesthetic Checklist  Completed: patient identified, site marked, surgical consent, pre-op evaluation, timeout performed, IV checked, risks and benefits discussed, monitors and equipment checked, anesthesia consent given, oxygen available and patient being monitored

## 2020-02-19 NOTE — PROGRESS NOTES
Portable 1/26/2020  Complete whiteout of the left lung with mild deviation of the trachea to the left. Findings are consistent with complete collapse of the left lung. Underlying infection or mass or effusion cannot be excluded      SIMONA at [facility] on 1/27/2020 was performed. LVEF greater than 55%. Unable to comment on diastolic filling pattern due to atrial  fibrillation. RV mildly dilated. Mildly depressed RV systolic function. The left atrium is mild to moderately dilated. RA mildly dilated. Mild tricuspid regurgitation. The RV systolic pressure is calculated at 63 mmHg.     A thoracentesis was performed at [facility] on 1/27/2020.  1100 cc of pleural fluid was drained. Cytology:  REACTIVE MESOTHELIAL CELLS, HISTIOCYTES, AND LYMPHOCYTES; NEGATIVE FOR MALIGNANCY      CT C/A/P at [facility] 1/29/2020  Impression:   1.  Signs of large LEFT lung mass with postobstructive collapse of   the entire LEFT lung and mediastinal shift. 2.  Bulky subcarinal adenopathy. 3.  Nodular bilateral adrenal glands still with some adreniform   contour, suspicious for either nodular hyperplasia or bilateral   metastatic disease to the adrenal glands.      Bronchoscopy with insertion of stent was performed on 1/29/2020 at [facility]. A mass was found in the middle portion of the left mainstem bronchus with about 90% obstruction. The mass was large and bloody and friable. a 10 x 40 Aero stent was placed with good results.        Records discuss \"squamous cell lung cancer \"  however I am unable to view a pathology report.        PET at [facility] 1/31/2020  Impression   Intense FDG uptake within a ill-defined left hilar mass as well as   the left upper and left lower lobes. Interval placement of a left   main bronchus stent which is occluded distally with complete collapse   of the left lung. FDG uptake within the left upper and lower lobes   may be related to infection versus malignancy. Small left pleural   effusion.  No FDG [address] [place] [address] in [place] where further work-up was performed.     X-ray Chest Portable 1/26/2020  Complete whiteout of the left lung with mild deviation of the trachea to the left. Findings are consistent with complete collapse of the left lung. Underlying infection or mass or effusion cannot be excluded      SIMONA at [facility] on 1/27/2020 was performed. LVEF greater than 55%. Unable to comment on diastolic filling pattern due to atrial  fibrillation. RV mildly dilated. Mildly depressed RV systolic function. The left atrium is mild to moderately dilated. RA mildly dilated. Mild tricuspid regurgitation. The RV systolic pressure is calculated at 63 mmHg.     A thoracentesis was performed at [address] [place] [address] on 1/27/2020.  1100 cc of pleural fluid was drained. Cytology:  REACTIVE MESOTHELIAL CELLS, HISTIOCYTES, AND LYMPHOCYTES;   NEGATIVE FOR MALIGNANCY      CT C/A/P at [facility] 1/29/2020  Impression:   1.  Signs of large LEFT lung mass with postobstructive collapse of   the entire LEFT lung and mediastinal shift. 2.  Bulky subcarinal adenopathy. 3.  Nodular bilateral adrenal glands still with some adreniform   contour, suspicious for either nodular hyperplasia or bilateral   metastatic disease to the adrenal glands.      Bronchoscopy with insertion of stent was performed on 1/29/2020 at [facility]. A mass was found in the middle portion of the left mainstem bronchus with about 90% obstruction. The mass was large and bloody and friable. a 10 x 40 Aero stent was placed with good results.        Pathology was positive for squamous cell lung cancer.      PET at [facility] 1/31/2020  Impression   Intense FDG uptake within a ill-defined left hilar mass as well as the left upper and left lower lobes. Interval placement of a left   main bronchus stent which is occluded distally with complete collapse of the left lung.    FDG uptake within the left upper and lower lobes may be related to infection versus malignancy. Small left pleural effusion. No FDG uptake is present within the subcarinal lymphadenopathy.      MRI brain w/ and w/o  2/1/2020 8:07 AM  Impression   1.  No findings of intracranial metastatic disease. 2.  Mild chronic small vessel ischemic white matter disease and tiny   old right cerebellar infarcts. CT abdomen pelvis with contrast 2/15/2020 at [facility]  No acute abnormality seen in the abdomen  Left pleural effusion    left thoracentesis 2/17/2020: 2L of clear, straw colored fluid drained. cytology pending  Post-procedure CXR 2/17/20: No pneumothorax, diffuse opacification of the left chest with some residual pleural effusion  He has already been evaluated by Dr. Jermaine Wilkins    Scheduled for Pleur-X catheter placement this am, 2/19/2020. #2 PAF  Cardizem/Digoxin per cardiology     #3 Hyponatremia  Na 130 on 2/19/2020        Ivett Canales, APRN    02/19/20  8:29 AM  Physician's attestation/substantial contribution:  I, Dr John Zapata, independently performed an evaluation on Grisel Glass. I have reviewed relevant medical information/data to include but not limited to medication list, relevant appropriate labs and imaging when applicable. I reviewed other physician's notes, ancillary services and nurses assessment. I have reviewed the above documentation completed by the Nurse Practitioner or Physician Assistant. Please see my additional contributions to the history of present illness, physical examination, and assessment/medical decision-making that reflect my findings and impressions. I discussed essential elements of the care plan with the NP or PA and the patient as well. I answered all the questions to the patient's satisfaction. I concur with above stated. Subjective-  Still short of breath. Objective- decreased breath sounds left lower lobe  Assessment/plan:  Recurrent pleural effusion- patient underwent left VATS/Pleurx catheter placement.   They drained 2 L pleural fluid for cytology.     Rodney Ruiz MD

## 2020-02-19 NOTE — ANESTHESIA PRE PROCEDURE
Department of Anesthesiology  Preprocedure Note       Name:  Modesto Stone   Age:  62 y.o.  :  1955                                          MRN:  [id number]         Date:  2020      Surgeon: Jolynn Toscano):  Eliseo Coto MD    Procedure: VATS WITH PLEUREX CATHETER (Left )    Medications prior to admission:   Prior to Admission medications    Medication Sig Start Date End Date Taking? Authorizing Provider   albuterol (PROVENTIL) (2.5 MG/3ML) 0.083% nebulizer solution Take 2.5 mg by nebulization every 6 hours as needed for Wheezing   Yes Historical Provider, MD   escitalopram (LEXAPRO) 10 MG tablet Take 10 mg by mouth daily   Yes Historical Provider, MD   GUAIFENESIN PO Take 600 mg by mouth 2 times daily   Yes Historical Provider, MD   tiotropium (SPIRIVA) 18 MCG inhalation capsule Inhale 18 mcg into the lungs daily   Yes Historical Provider, MD   albuterol sulfate HFA (PROAIR HFA) 108 (90 Base) MCG/ACT inhaler Inhale 2 puffs into the lungs every 4 hours as needed for Wheezing or Shortness of Breath 17  Yes Renée Carrillo MD   nitroGLYCERIN (NITROSTAT) 0.4 MG SL tablet up to max of 3 total doses. If no relief after 1 dose, call 911. 17  Yes Janett Rodriguez MD   metFORMIN (GLUCOPHAGE) 1000 MG tablet Take 1 tablet by mouth 2 times daily (with meals) 17  Yes Janett Rodriguez MD   simvastatin (ZOCOR) 10 MG tablet Take 1 tablet by mouth nightly  Patient taking differently: Take 10 mg by mouth 2 times daily  17  Yes Janett Rodriguez MD   furosemide (LASIX) 40 MG tablet Take 1 tablet by mouth daily 17  Yes Janett Rodriguez MD   ranitidine (ZANTAC) 150 MG tablet Take 1 tablet by mouth 2 times daily  Patient taking differently: Take 150 mg by mouth daily  17  Yes Janett Rodriguez MD   aspirin 81 MG EC tablet Take 81 mg by mouth daily.      Yes Historical Provider, MD   hydrALAZINE (APRESOLINE) 10 MG tablet Take 1 tablet by mouth every 8 hours 17   Talia Lomeli Nayan Sanchez MD   metoprolol tartrate (LOPRESSOR) 25 MG tablet Take 1 tablet by mouth 2 times daily 11/23/17   Nura Hester MD       Current medications:    Current Facility-Administered Medications   Medication Dose Route Frequency Provider Last Rate Last Dose    [MAR Hold] midazolam (VERSED) injection 2 mg  2 mg Intravenous Once Chepe Allen MD        [facility] Hold] pantoprazole (PROTONIX) injection 40 mg  40 mg Intravenous Daily Tabitha Gillespie MD   40 mg at 02/18/20 1206    And    [MAR Hold] sodium chloride (PF) 0.9 % injection 10 mL  10 mL Intravenous Daily Tabitha Gillespie MD        [facility] Hold] ceFAZolin (ANCEF) 2 g in 0.9% sodium chloride 50 mL IVPB  2 g Intravenous On Call to [address] [place] [address]        [MAR Hold] chlorhexidine (HIBICLENS) 4 % liquid   Topical See Admin Instructions ELTON Saenz        [facility] Hold] aluminum & magnesium hydroxide-simethicone (MAALOX) 200-200-20 MG/5ML suspension 30 mL  30 mL Oral Q6H PRN Shania Padgett MD   30 mL at 02/18/20 2125    [MAR Hold] diltiazem (CARDIZEM CD) extended release capsule 120 mg  120 mg Oral BID Patrick Portillo MD   120 mg at 02/18/20 2022    [MAR Hold] digoxin (LANOXIN) tablet 125 mcg  125 mcg Oral Daily Jonah Delacruz MD   125 mcg at 02/18/20 1202    [MAR Hold] diphenhydrAMINE (BENADRYL) tablet 50 mg  50 mg Oral Nightly PRN Shania Padgett MD   50 mg at 02/17/20 2008    [MAR Hold] acetaminophen (TYLENOL) tablet 1,000 mg  1,000 mg Oral Nightly PRN Shania Padgett MD   650 mg at 02/18/20 0620    [MAR Hold] diltiazem 125 mg in dextrose 5 % 125 mL infusion  5 mg/hr Intravenous Continuous Shania Padgett MD   Stopped at 02/18/20 0637    [MAR Hold] diltiazem injection 10 mg  10 mg Intravenous Q30 Min PRN Licha Crump MD   10 mg at 02/15/20 2117    [MAR Hold] sodium chloride flush 0.9 % injection 10 mL  10 mL Intravenous 2 times per day Shania Padgett MD   10 mL at 02/18/20 2023    [MAR Hold] sodium chloride flush 0.9 % injection 10 mL 10 mL Intravenous PRN Per Huerta MD        [facility] Hold] magnesium hydroxide (MILK OF MAGNESIA) 400 MG/5ML suspension 30 mL  30 mL Oral Daily PRN Per Huerta MD        [facility] Hold] ondansetron [facility]) injection 4 mg  4 mg Intravenous Q6H PRN Per Huerta MD   4 mg at 02/18/20 0611    [MAR Hold] acetaminophen (TYLENOL) tablet 650 mg  650 mg Oral Q4H PRN Per Huerta MD   650 mg at 02/18/20 1657    [MAR Hold] ipratropium-albuterol (DUONEB) nebulizer solution 1 ampule  1 ampule Inhalation Q4H WA Per Huerta MD   1 ampule at 02/18/20 1925    [MAR Hold] meropenem (MERREM) 1 g in sodium chloride 0.9 % 100 mL IVPB (mini-bag)  1 g Intravenous Q8H Per Huerta MD   Stopped at 02/19/20 0203    [MAR Hold] insulin lispro (HUMALOG) injection vial 0-6 Units  0-6 Units Subcutaneous TID WC Per Huerta MD   2 Units at 02/18/20 1208    [MAR Hold] insulin lispro (HUMALOG) injection vial 0-3 Units  0-3 Units Subcutaneous Nightly Per Huerta MD   1 Units at 02/16/20 2015    [MAR Hold] lactulose (CHRONULAC) 10 GM/15ML solution 20 g  20 g Oral TID Per Huerta MD   20 g at 02/17/20 0843    [MAR Hold] enoxaparin (LOVENOX) injection 40 mg  40 mg Subcutaneous Daily Per Huerta MD   40 mg at 02/18/20 1203       Allergies:     Allergies   Allergen Reactions    Crestor [Rosuvastatin] Hives and Swelling       Problem List:    Patient Active Problem List   Diagnosis Code    Myocardial infarction ([facility] I21.9    Mixed hyperlipidemia E78.2    Coronary artery disease involving native coronary artery of native heart without angina pectoris I25.10    Myocardial infarction ([facility] I21.9    Stented coronary artery Z95.5    Chronic bronchitis (HCC) J42    Chronic venous stasis dermatitis of both lower extremities I87.2    Morbid obesity due to excess calories ([facility]) E66.01    Acute respiratory failure with hypoxia and hypercapnia ([facility]) J96.01, J96.02    Essential hypertension I10    Type 2 diabetes mellitus without complication ([facility]) H08.7    Chronic diastolic congestive heart failure ([facility]) I50.32    Obesity hypoventilation syndrome ([facility]) E66.2    COPD with acute exacerbation ([facility]) J44.1    Right carpal tunnel syndrome G56.01    Paroxysmal A-fib ([facility]) I48.0    Palliative care patient Z51.5       Past Medical History:        Diagnosis Date    Atherosclerosis     cardiovascular disease     Blood circulation, collateral     4 fingers left hand tingly    CAD (coronary artery disease)     saw dr. Erica Clay tunnel syndrome     CHF (congestive heart failure) ([facility])     COPD (chronic obstructive pulmonary disease) ([facility]     Degenerative joint disease     right hip    Diabetes mellitus ([facility])     Diabetic x 1-2 years    FH: chronic lung disease requiring oxygen     sees dr. Jacqueline Nolen GERD (gastroesophageal reflux disease)     Hyperlipidemia     Hypertension     Myocardial infarction ([facility]     subenodcardial    Obesity     Palliative care patient 2018    Presence of stent in coronary artery     11 BALJINDER placement 0MB of circumflex    Right carpal tunnel syndrome 2018       Past Surgical History:        Procedure Laterality Date    BACK SURGERY      in     CARDIAC CATHETERIZATION  11    with stenting of OMB of left circumflex.     CORONARY ANGIOPLASTY WITH STENT PLACEMENT      HERNIA REPAIR      57 years ago    JOINT REPLACEMENT      Right hip replacement 4-6 years ago    LUMBAR LAMINECTOMY      MO REVISE MEDIAN N/CARPAL TUNNEL SURG Left 2018    CARPAL TUNNEL RELEASE performed by Alen Best MD at [facility] OR       Social History:    Social History     Tobacco Use    Smoking status: Former Smoker     Packs/day: 2.00     Years: 18.00     Pack years: 36.00     Types: Cigarettes     Last attempt to quit: 2011     Years since quittin.4    Smokeless tobacco: Never Used   Substance Use Topics    Alcohol use: Yes     Comment: rare 01/25/2020    SFU5OEO 29.7 01/25/2020    BEART 3.7 01/25/2020    P2DXGFYS 91.9 01/25/2020        Type & Screen (If Applicable):  No results found for: [facility]    Anesthesia Evaluation  Patient summary reviewed and Nursing notes reviewed no history of anesthetic complications:   Airway: Mallampati: II  TM distance: >3 FB   Neck ROM: full  Mouth opening: > = 3 FB Dental:          Pulmonary:normal exam    (+) COPD:                            ROS comment: Lung ca   Cardiovascular:    (+) hypertension:, CAD:, CABG/stent:, dysrhythmias: atrial fibrillation, hyperlipidemia      ECG reviewed               Beta Blocker:  Dose within 24 Hrs      ROS comment: LV is normal in size with normal systolic function. LV ejection fraction   estimated at 60%. RV is moderately enlarged with mildly reduced RV systolic function. Left atrium is normal in size. Right atrium appears moderately dilated. Severe pulmonary hypertension with RVSP estimated at around 80 mmHg. Mitral valve leaflets appear mildly thickened with normal leaflet   mobility. No significant stenosis. Trace mitral regurgitation. Aortic valve leaflets are not well visualized. No significant stenosis or   regurgitation noted. Mild to moderate tricuspid regurgitation. Trace anterior pericardial effusion. Large pleural effusion with large mobile structures within the pleural   cavity. Neuro/Psych:   (+) depression/anxiety             GI/Hepatic/Renal:   (+) GERD:,           Endo/Other:    (+) Diabetes, : arthritis:., .                 Abdominal:           Vascular:                                        Anesthesia Plan      general     ASA 3         arterial line  MIPS: Postoperative opioids intended and Prophylactic antiemetics administered. Anesthetic plan and risks discussed with patient. Plan discussed with CRNA.                   Chepe Allen MD   2/19/2020

## 2020-02-19 NOTE — PROGRESS NOTES
Subcutaneous Nightly Urszula Ruiz MD   1 Units at 02/16/20 2015    lactulose (CHRONULAC) 10 GM/15ML solution 20 g  20 g Oral TID Urszula Ruiz MD   20 g at 02/17/20 0843    enoxaparin (LOVENOX) injection 40 mg  40 mg Subcutaneous Daily Urszula Ruiz MD   40 mg at 02/18/20 1203        Labs:     Recent Labs     02/17/20  0659 02/19/20  0216   WBC 12.0* 15.3*   RBC 4.53* 4.81   HGB 12.2* 12.8*   HCT 40.0* 42.5   MCV 88.3 88.4   MCH 26.9* 26.6*   MCHC 30.5* 30.1*    329     Recent Labs     02/17/20  0659 02/19/20  0216   * 130*   K 5.1* 4.5   ANIONGAP 10 14   CL 85* 84*   CO2 34* 32*   BUN 11 9   CREATININE 0.8 0.6   GLUCOSE 168* 143*   CALCIUM 8.9 8.7*     No results for input(s): MG, PHOS in the last 72 hours. Recent Labs     02/19/20  0216   AST 18   ALT 13   BILITOT 0.5   ALKPHOS 140*     ABGs:No results for input(s): PH, PO2, PCO2, HCO3, BE, O2SAT in the last 72 hours. Troponin T:   No results for input(s): TROPONINI in the last 72 hours. INR:   Recent Labs     02/17/20 0659   INR 1.15     Lactic Acid:   No results for input(s): LACTA in the last 72 hours. Objective:   Vitals: BP (!) 160/90   Pulse 87   Temp 97 °F (36.1 °C)   Resp 22   Ht 5' 8\" (1.727 m)   Wt 242 lb (109.8 kg)   SpO2 90%   BMI 36.80 kg/m²   24HR INTAKE/OUTPUT:      Intake/Output Summary (Last 24 hours) at 2/19/2020 1404  Last data filed at 2/19/2020 0857  Gross per 24 hour   Intake 1100 ml   Output 920 ml   Net 180 ml     Physical Exam  Vitals signs and nursing note reviewed. Constitutional:       Comments: Patient under the effects of anesthesia, resting well   HENT:      Head: Normocephalic and atraumatic. Nose: Nose normal.   Eyes:      General: No scleral icterus. Conjunctiva/sclera: Conjunctivae normal.   Cardiovascular:      Rate and Rhythm: Normal rate. Rhythm irregularly irregular. Pulses: Normal pulses. Heart sounds: No murmur.    Pulmonary:      Effort: Pulmonary effort is normal. Breath sounds: Rales present. No rhonchi. Abdominal:      General: Bowel sounds are normal.      Tenderness: There is no guarding or rebound. Genitourinary:     Comments: Urethral catheter  Musculoskeletal:      Comments: Pleurx catheter left-sided   Skin:     General: Skin is warm. Capillary Refill: Capillary refill takes less than 2 seconds. Comments: Pleurx catheter incision site   Neurological:      General: No focal deficit present. Psychiatric:         Mood and Affect: Mood normal.           Assessment and Plan: Active Problems:    Paroxysmal A-fib [facility])    Palliative care patient    Atrial fibrillation with rapid ventricular response (HCC)    Malignant neoplasm of overlapping sites of left lung [facility])  Resolved Problems:    * No resolved hospital problems. *      History of lung carcinoma, large pleural effusion, chronic obstructive pulmonary disease -patient status post Pleurx catheter placement. Continue with nasal cannula oxygen therapy, Oncology on board. Antibiotics (day #4 meropenem). O2 via NC. Bronchodilators. Thoracentesis performed. Repeat CXR showing left lung collapse. Likely endobronchial obstruction. Pulmonology signed off, CT surgery placed Pleurx catheter today, appreciate recommendations, nursing communication placed for education to patient and family. Paroxysmal Atrial Fibrillation: Digoxin/Cardizem. Cardiology on board. Supportive management. Palliative care on board. Advance Directive: DNR-CC -patient has been transitioned back to [facility] after intervention    DVT prophylaxis: Lovenox    Discharge planning: TBD      Electronically signed by   Beverley Zeng   Internal Medicine Hospitalist  On 2/19/2020  At 2:04 PM    EMR Dragon/Transcription disclaimer:   Much of this encounter note is an electronic transcription/translation of spoken language to printed text.  The electronic translation of spoken language may permit erroneous, or at times, nonsensical

## 2020-02-20 NOTE — PROGRESS NOTES
Physical Therapy    [facility] PHYSICAL THERAPY EVALUATION      Kennyth Landau    : 1955  MRN: [id number]   PHYSICIAN:  Ana Lazaro MD  Primary Problem    Patient Active Problem List   Diagnosis    Myocardial infarction [facility])    Mixed hyperlipidemia    Coronary artery disease involving native coronary artery of native heart without angina pectoris    [facility] infarction [facility] [address]    Stented coronary artery    Chronic bronchitis (HCC)    Chronic venous stasis dermatitis of both lower extremities    Morbid obesity due to excess calories ([address]    Acute respiratory failure with hypoxia and hypercapnia ([address]    Essential hypertension    Type 2 diabetes mellitus without complication ([address]    Chronic diastolic congestive heart failure (HCC)    Obesity hypoventilation syndrome ([facility] [facility]    COPD with acute exacerbation [facility])    Right carpal tunnel syndrome    Paroxysmal A-fib ([facility] [address]    Palliative care patient    Atrial fibrillation with rapid ventricular response ([facility] [address]    Malignant neoplasm of overlapping sites of left lung [facility])       Rehabilitation Diagnosis:     Paroxysmal A-fib ([facility] [address] [I48.0]    Difficulty walking    SERVICE DATE: 2020        SUBJECTIVE: He states that he holds to furniture when he walks at home    PRIOR LEVEL OF FUNCTION:    [] Independent in community no assistive device     [] Independent in community with assistive device     [x] Independent in the house with assistive device (holds furniture)     [] Transfer only    []     OBJECTIVE:  Orientation      [x] Within normal limits       [] Follows directions one step at a time    [] Unable to follow directions    [] Unable to participate in therapy    ROM     [x] Active     [] Passive     HIP   LEFT:   [x] WFL Flexion:  Extension:      RIGHT: [x] WFL Flexion:  Extension:    KNEE   LEFT:   [x] WFL Flexion:  Extension:   RIGHT: [x] WFL Flexion:  Extension:     STRENGTH    [x] Grossly 3+/5 to 4-/5 and bilaterally symmetrical.    HIP   LEFT:   [] WFL Flexion:  Extension:      RIGHT: [] WFL Flexion:  Extension:    KNEE   LEFT:   [] WFL Flexion:  Extension:   RIGHT: [] WFL Flexion:  Extension:     TRANSFERS   Sit to stand   [] CGA   [] Independent [] Modified Independent [] Stand by / Supervision   [x] Minimum  [] Moderate   [] Maximum      Bed to chair   [x] CGA   [] Independent [] Modified Independent [] Stand by / Supervision   [] Minimum  [] Moderate   [] Maximum      Bed mobility   Supine to sit   [] Independent [] Modified Independent [] Stand by / Supervision   [x] Minimum  [] Moderate   [] Maximum        Roll  [] Left  [] Right    [] Independent [] Modified Independent [] Stand by / Supervision   [] Minimum  [] Moderate   [] Maximum      Scoot  [] Side to side  [] Up and down   [] Independent [] Modified Independent [] Stand by / Supervision   [x] Minimum  [] Moderate   [] Maximum     AMBULATION   Weight bearing:   [x] WBAT [] TTWB [] NWB []      Distance: 10 feet     Device:    [] No device [] Rolling Walker [] Walker [] [employer] [x] Hand Held     Assistance:   [] CGA    [] Independent [] Modified Independent [] Stand by / Supervision   [x] Minimum  [] Moderate   [] Maximum     Comment:    BALANCE   Sitting    [x] Good  [] Fair    [] Poor   Standing    [] Good  [x] Fair-    [] Poor    ASSESSMENT   [x] Would benefit from skilled physical therapy   [] Independent    [] Unable to participate in physical therapy at this time    PLAN  [] Discharge from skilled physical therapy      Physical therapy to see 4 to 7 X/ week for 2 weeks then reassess.  Plan of care to include:   [x] Gait training   [x] Therex   [] Stair training   [] Balance training  [x] Transfer training  [] Bed mobility   [] PreAmbulation  [] Brace/Sling training  [] Family education      GOALS   [] Independent and safe with all functional mobility (MET)    [x] Bed mobility: independent   [] Transfers: independent   [] Ambulation 200 Feet

## 2020-02-20 NOTE — PROGRESS NOTES
Hospitalist Progress Note  2/20/2020 10:21 AM  Subjective:   Admit Date: 2/15/2020  PCP: ELTON Osullivan    Chief Complaint: Lethargy    Subjective: Patient seen and examined this a.m., case discussed with nursing staff. Patient much more alert status post Pleurx catheter insertion completed on 2/19/2020. Patient currently resting, no acute distress, denies any headache, change in vision, chest pain    Cumulative Hospital History: 25-year-old male with a history of lung cancer presenting for lethargy found to have atrial fibrillation with RVR and extensive pleural effusion with cardiology, pulmonology, and oncology consulted on admission. Patient is status post left-sided thoracentesis with 2 L of fluid removed. Repeat chest x-ray showing left lung collapse with pulmonology recommending cardiothoracic surgery consultation for Pleurx catheter. Pleurx catheter was inserted 2/19/2020. No malignant cells have been seen from fluid culture obtained status post thoracentesis. ROS: 14 point review of systems is negative except as specifically addressed above.     DIET CARDIAC; Carb Control: 4 carb choices (60 gms)/meal; Daily Fluid Restriction: 1500 ml    Intake/Output Summary (Last 24 hours) at 2/20/2020 1021  Last data filed at 2/20/2020 4748  Gross per 24 hour   Intake 1795 ml   Output 700 ml   Net 1095 ml     Medications:   dextrose      diltiazem (CARDIZEM) 125 mg in dextrose 5% 125 mL infusion Stopped (02/18/20 9727)     Current Facility-Administered Medications   Medication Dose Route Frequency Provider Last Rate Last Dose    hydrOXYzine (VISTARIL) capsule 25 mg  25 mg Oral BID PRN Connor Cooley MD        escitalopram (LEXAPRO) tablet 10 mg  10 mg Oral Daily Connor Cooley MD        furosemide (LASIX) tablet 40 mg  40 mg Oral Daily Connor Cooley MD        metoprolol tartrate (LOPRESSOR) tablet 25 mg  25 mg Oral BID Connor Cooley MD        glucose (GLUTOSE) 40 % oral gel 15 g  15 g INTAKE/OUTPUT:      Intake/Output Summary (Last 24 hours) at 2/20/2020 1021  Last data filed at 2/20/2020 5327  Gross per 24 hour   Intake 1795 ml   Output 700 ml   Net 1095 ml     Physical Exam  Vitals signs and nursing note reviewed. Constitutional:       General: He is not in acute distress. Appearance: He is not ill-appearing or toxic-appearing. HENT:      Head: Normocephalic and atraumatic. Nose: Nose normal.   Eyes:      General: No scleral icterus. Conjunctiva/sclera: Conjunctivae normal.   Cardiovascular:      Rate and Rhythm: Normal rate. Rhythm irregularly irregular. Pulses: Normal pulses. Heart sounds: No murmur. Pulmonary:      Effort: Pulmonary effort is normal.      Breath sounds: Rales present. No rhonchi. Abdominal:      General: Bowel sounds are normal.      Tenderness: There is no guarding or rebound. Genitourinary:     Comments: Urethral catheter  Musculoskeletal:      Comments: Pleurx catheter left-sided   Skin:     General: Skin is warm. Capillary Refill: Capillary refill takes less than 2 seconds. Comments: Pleurx catheter incision site   Neurological:      General: No focal deficit present. Mental Status: He is alert. Psychiatric:         Mood and Affect: Mood normal.           Assessment and Plan: Active Problems:    Paroxysmal A-fib [facility])    Palliative care patient    Atrial fibrillation with rapid ventricular response (HCC)    Malignant neoplasm of overlapping sites of left lung [facility])  Resolved Problems:    * No resolved hospital problems. *      History of lung carcinoma, large pleural effusion, chronic obstructive pulmonary disease -patient status post Pleurx catheter placement. Continue with nasal cannula oxygen therapy, Oncology on board. Antibiotics (day #5 meropenem). O2 via NC. Bronchodilators. Repeat CXR showing left lung collapse. Likely endobronchial obstruction.  Pulmonology signed off, CT surgery placed Pleurx catheter on 2/19/2020, patient education ongoing. No findings of malignant cells on thoracentesis fluid. Hyponatremia- stable currently 130, will monitor daily metabolic profi    Paroxysmal Atrial Fibrillation- Digoxin/Cardizem. Cardiology on board. Anxiety-Vistaril PRN ordered, continue Lexapro    Gastroesophageal reflux disease-chronic condition, continue with Protonix therapy        Supportive management. Palliative care on board. Advance Directive: DNR-CC     DVT prophylaxis: Lovenox    Discharge planning: TBD      Electronically signed by   Masha Hernandez   Internal Medicine Hospitalist  On 2/20/2020  At 10:21 AM    EMR Dragon/Transcription disclaimer:   Much of this encounter note is an electronic transcription/translation of spoken language to printed text.  The electronic translation of spoken language may permit erroneous, or at times, nonsensical words or phrases to be inadvertently transcribed; although attempts have made to review the note for such errors, some may still exist.

## 2020-02-20 NOTE — PROGRESS NOTES
PROGRESS NOTE    Patient name: Tabitha Birch  Patient : 1955  Room: 730    SUBJECTIVE: better post-thoracentesis 20, breathing better. Still with cough. Significant generalized weakness. INTERVAL HISTORY  Mr. Jamarcus Vaughn. Gilbert MARY HOLM was seen in initial medical oncology consultation on 2020 as an inpatient at [facility] with a recent diagnosis of metastatic lung cancer made at [facility] to establish care.     He will be followed by Dr. Jimmie Hernández who cares for Martin's identical twin Delmis Garcia, also with lung cancer.     Va Ruano has been on chronic supplemental oxygen for at least 6 months prior to presentation due to his history of underlying COPD. He wears 4 L of supplemental oxygen. His PCP is Fela Myers PA-C on the [address] [facility] side.     Va Ruano presented to [facility] ED on 2020 with hemoptysis increasing weakness. He was also found to have atrial fibrillation with RVR.     CTA PULMONARY W CONTRAST   Final Result   1. There is complete collapse of the left lung secondary to an   occluded left main stem bronchus. This could be due to mucous   plugging, however endobronchial neoplasm must also be considered. Moderate size left pleural effusion. 2. Mild cardiomegaly. Trace pericardial fluid. 3. No pulmonary emboli. 4. No thoracic aortic aneurysm or dissection. Signed by Dr Bernardo Saucedo on 2020 9:27 PM       VL DUP LOWER EXTREMITY VENOUS BILATERAL              Vascular Lab Prelim  Duplex venous scan of B/L LE shows no evidence for dvt, svt, reflux noted at this time. Final report pending     The case was discussed with Dr Kellie Burgess with pulmonology, who stated that he can do a bronch but with the bleeding, he would not be able to intervene other than using epinephrine. Would recommend transfer to higher level of care as pt may require embolization.      Mr.  [facility] was transferred to [facility] in [place] where malignancy. Small left pleural   effusion. No FDG uptake is present within the subcarinal   lymphadenopathy.         MRI brain w/ and w/o  2/1/2020 8:07 AM  Impression   1.  No findings of intracranial metastatic disease. 2.  Mild chronic small vessel ischemic white matter disease and tiny   old right cerebellar infarcts.       He was discharged, he was evaluated by Dr. Isak Agudelo for 2/7/2020 with anticipation of 33 fractions to the left lung. He also had an appointment with Dr. Rachel Bennett on 2/13/2020 but was unable to keep that appointment due to symptoms related to his cancer.      He was admitted to [facility] via the emergency room on 2/15/2020. Oncology consultation was called to establish care with Dr. Rachel Bennett. Subjective   REVIEW OF SYSTEMS:   Review of Systems   Constitutional: Positive for activity change, appetite change (Decreased) and fatigue. Negative for chills, diaphoresis, fever and unexpected weight change. HENT: Negative for mouth sores, nosebleeds, sore throat, trouble swallowing and voice change. Eyes: Negative for visual disturbance. Respiratory: Positive for shortness of breath (Improving). Negative for cough and wheezing. Supplemental O2 in use   Cardiovascular: Positive for leg swelling (Improved). Negative for chest pain. Gastrointestinal: Negative for abdominal distention, abdominal pain, blood in stool, constipation, diarrhea, nausea and vomiting. Endocrine: Negative for cold intolerance, heat intolerance, polydipsia and polyuria. Genitourinary: Negative for difficulty urinating, dysuria, hematuria and urgency. Musculoskeletal: Positive for arthralgias and back pain. Negative for joint swelling and myalgias. Skin: Negative for color change and rash. Neurological: Positive for light-headedness. Negative for dizziness, tremors, seizures and syncope. Hematological: Negative for adenopathy. Does not bruise/bleed easily. Psychiatric/Behavioral: Negative for agitation and behavioral problems. All other systems reviewed and are negative. Objective   /87   Pulse 105   Temp 97 °F (36.1 °C) (Temporal)   Resp 18   Ht 5' 8\" (1.727 m)   Wt 242 lb (109.8 kg)   SpO2 92%   BMI 36.80 kg/m²     PHYSICAL EXAM:  Physical Exam  Vitals signs reviewed. Constitutional:       General: He is not in acute distress. Appearance: He is well-developed. He is obese. He is ill-appearing. HENT:      Head: Normocephalic and atraumatic. Nose: Nose normal.      Mouth/Throat:      Mouth: Mucous membranes are moist.   Eyes:      General: No scleral icterus. Conjunctiva/sclera: Conjunctivae normal.   Neck:      Vascular: No JVD. Trachea: No tracheal deviation. Cardiovascular:      Rate and Rhythm: Rhythm irregularly irregular. Comments: PAF  Pulmonary:      Effort: Pulmonary effort is normal. No respiratory distress. Breath sounds: Examination of the left-middle field reveals decreased breath sounds. Examination of the left-lower field reveals decreased breath sounds. Decreased breath sounds present. Comments: Less labored when compared to 2020  Abdominal:      General: Bowel sounds are normal. There is no distension. Palpations: Abdomen is soft. There is no mass. Tenderness: There is no abdominal tenderness. Musculoskeletal: Normal range of motion. General: No tenderness or deformity. Right lower le+ Pitting Edema present. Left lower le+ Pitting Edema present. Skin:     Findings: No erythema or rash. Neurological:      Mental Status: He is alert and oriented to person, place, and time. Psychiatric:         Thought Content:  Thought content normal.       Recent Labs     20  0331 20  0216 20  0659   WBC 17.5* 15.3* 12.0*   HGB 13.3* 12.8* 12.2*   HCT 42.4 42.5 40.0*   MCV 85.8 88.4 88.3    329 350       Lab Results   Component Value Date  (L) 02/20/2020    K 4.6 02/20/2020    CL 85 (L) 02/20/2020    CO2 31 (H) 02/20/2020    BUN 14 02/20/2020    CREATININE 0.6 02/20/2020    GLUCOSE 194 (H) 02/20/2020    CALCIUM 8.8 02/20/2020    PROT 6.4 (L) 02/20/2020    LABALBU 2.2 (L) 02/20/2020    BILITOT 0.4 02/20/2020    ALKPHOS 125 02/20/2020    AST 11 02/20/2020    ALT 10 02/20/2020    LABGLOM >60 02/20/2020       Lab Results   Component Value Date    INR 1.15 02/17/2020    INR 1.19 (H) 02/15/2020    INR 1.09 01/25/2020    PROTIME 14.1 02/17/2020    PROTIME 14.5 02/15/2020    PROTIME 13.5 01/25/2020       30 Day lookback of cultures:    Blood Culture Recent:   Recent Labs     01/25/20  2141   BC No growth after 5 days of incubation. Gram Stain Recent: No results for input(s): LABGRAM in the last 720 hours. Resp Culture Recent: No results for input(s): CULTRESP in the last 720 hours. Body Fluid Recent : No results for input(s): BFCX in the last 720 hours. MRSA Recent : No results for input(s): [address] in the last 720 hours. Urine Culture Recent : No results for input(s): LABURIN in the last 720 hours. Organism Recent : No results for input(s): ORG in the last 720 hours. ASSESSMENT/PLAN:  #1  Squamous cell lung carcinoma left lung, status post left main stem bronchus stent 1/29/2020 - possibly stage II    Xavier Andrew presented to [facility] ED on 1/25/2020 with hemoptysis increasing weakness. He was also found to have atrial fibrillation with RVR.     CTA chest with contrast:  NO PE  Complete collapse of the left lung secondary to an occluded left main stem bronchus  Moderate size left pleural effusion  Mild cardiomegaly, trace pericardial fluid    Duplex venous scan of B/L LE: NO evidence for dvt, svt, reflux noted at this time. The case was discussed with Dr Jerome Hamlin with pulmonology, who stated that he can do a bronch but with the bleeding, he would not be able to intervene other than using epinephrine.    Would recommend transfer lung.   FDG uptake within the left upper and lower lobes may be related to infection versus malignancy. Small left pleural effusion. No FDG uptake is present within the subcarinal lymphadenopathy.      MRI brain w/ and w/o  2/1/2020 8:07 AM  Impression   1.  No findings of intracranial metastatic disease. 2.  Mild chronic small vessel ischemic white matter disease and tiny   old right cerebellar infarcts. CT abdomen pelvis with contrast 2/15/2020 at [facility]  No acute abnormality seen in the abdomen  Left pleural effusion    left thoracentesis 2/17/2020: 2L of clear, straw colored fluid drained. cytology negative: benign mesothelial cells and occasional small round lymphocytes    Post-procedure CXR 2/17/20: No pneumothorax, diffuse opacification of the left chest with some residual pleural effusion  He has already been evaluated by Dr. Heber Joseph    S/p left VATS/Pleur-X catheter placement on 2/19/2020. Cytology pending    #2 PAF  Cardizem/Digoxin per cardiology     #3 Hyponatremia  Na 130 on 2/20/2020    #4 generalized weakness  Consider PT/OT, defer to attending  Consult case management for disposition    #5 GERD  Receiving Protonix    Pathology discussed with Mr. Petra Linares. Await PD-L1 on tissue biopsy 1/29/2020. Treatment options likely to include XRT, chemo +/-immunotherapy  Devendra Feliz is aware however his performance status must first be improved. Consider PT/OT, defer to attending  Consult case management for disposition      ELTON Song    02/20/20  7:05 AM  Physician's attestation/substantial contribution:  I, Dr Nellie Segura, independently performed an evaluation on Tina All. I have reviewed relevant medical information/data to include but not limited to medication list, relevant appropriate labs and imaging when applicable. I reviewed other physician's notes, ancillary services and nurses assessment.  I have reviewed the above documentation completed by the Nurse Practitioner or Physician Assistant. Please see my additional contributions to the history of present illness, physical examination, and assessment/medical decision-making that reflect my findings and impressions. I discussed essential elements of the care plan with the NP or PA and the patient as well. I answered all the questions to the patient's satisfaction. I concur with above stated. Subjective-shortness of breath is better. Objective- less tachypneic. Physical exam is unchanged. Assessment/plan:  Recurrent pleural effusion-status post Pleurx catheter placement. Lung cancer- apparently he has localized disease. Unknown if pleural effusion is malignant. Awaiting that result of cytology. So far two cytology is negative.     Elicia Wong MD

## 2020-02-20 NOTE — PROGRESS NOTES
Cardiology Progress Note Patrick Portillo MD      Patient:  Hugo Score  [id number]    Patient Active Problem List    Diagnosis Date Noted    Malignant neoplasm of lung [facility])      Priority: Low    Atrial fibrillation with rapid ventricular response ([address]      Priority: Low    Malignant neoplasm of overlapping sites of left lung [facility])      Priority: Low    Palliative care patient 02/17/2020     Priority: Low    Paroxysmal A-fib ([address] 02/15/2020     Priority: Low    Right carpal tunnel syndrome 09/12/2018     Priority: Low    COPD with acute exacerbation ([address] 01/29/2018     Priority: Low    Obesity hypoventilation syndrome ([address] 11/22/2017     Priority: Low    Chronic diastolic congestive heart failure ([address] 11/21/2017     Priority: Low    Essential hypertension      Priority: Low    Type 2 diabetes mellitus without complication ([address]      Priority: Low    Acute respiratory failure with hypoxia and hypercapnia (HCC) 11/17/2017     Priority: Low    Chronic bronchitis ([address] 09/17/2015     Priority: Low    Chronic venous stasis dermatitis of both lower extremities 09/17/2015     Priority: Low    Morbid obesity due to excess calories ([address] 09/17/2015     Priority: Low    Stented coronary artery 04/10/2014     Priority: Low    Coronary artery disease involving native coronary artery of native heart without angina pectoris      Priority: Low    Myocardial infarction [facility])      Priority: Low     Overview Note:     subenodcardial      Mixed hyperlipidemia      Priority: Low    Myocardial infarction [facility])      Priority: Low       Admit Date:  2/15/2020    Admission Problem List: Present on Admission:   Paroxysmal A-fib ([address]   Palliative care patient   Atrial fibrillation with rapid ventricular response (HCC)   Malignant neoplasm of overlapping sites of left lung (HCC)   Malignant neoplasm of lung [facility])      Cardiac Specific Data:  Specialty Problems        Cardiology Problems    Myocardial infarction [facility])        Mixed hyperlipidemia        Coronary artery disease involving native coronary artery of native heart without angina pectoris        Myocardial infarction [facility])        Essential hypertension        Chronic diastolic congestive heart failure (HCC)        Paroxysmal A-fib (HCC)        Atrial fibrillation with rapid ventricular response (HCC)            1. Left mainstem lung cancer with collapse of left lung with moderate pleural effusion, hemoptysis, status post left lung thoracentesis and Pleurx catheter insertion. 2. COPD on 4 L home oxygen. 3. Paroxysmal atrial fibrillation with RVR. 4. Diabetes mellitus. 5. Obesity. Subjective:  Mr. Carvalho Screws complaints of severe persistent heartburn. Atrial fibrillation rates between 90 and 100 beats a minute. Pleurx catheter inserted yesterday. Objective:   BP 94/71   Pulse 80   Temp 97.2 °F (36.2 °C) (Temporal)   Resp 18   Ht 5' 8\" (1.727 m)   Wt 242 lb (109.8 kg)   SpO2 94%   BMI 36.80 kg/m²       Intake/Output Summary (Last 24 hours) at 2/20/2020 1713  Last data filed at 2/20/2020 1319  Gross per 24 hour   Intake 2035 ml   Output 350 ml   Net 1685 ml       Prior to Admission medications    Medication Sig Start Date End Date Taking?  Authorizing Provider   albuterol (PROVENTIL) (2.5 MG/3ML) 0.083% nebulizer solution Take 2.5 mg by nebulization every 6 hours as needed for Wheezing   Yes Historical Provider, MD   escitalopram (LEXAPRO) 10 MG tablet Take 10 mg by mouth daily   Yes Historical Provider, MD   GUAIFENESIN PO Take 600 mg by mouth 2 times daily   Yes Historical Provider, MD   tiotropium (SPIRIVA) 18 MCG inhalation capsule Inhale 18 mcg into the lungs daily   Yes Historical Provider, MD   albuterol sulfate HFA (PROAIR HFA) 108 (90 Base) MCG/ACT inhaler Inhale 2 puffs into the lungs every 4 hours as needed for Wheezing or Shortness of Breath 12/18/17  Yes Yadi Tong MD   nitroGLYCERIN (NITROSTAT) 0.4 MG SL tablet up to max of 3 total doses. If no relief after 1 dose, call 911. 11/23/17  Yes Janett Rodriguez MD   metFORMIN (GLUCOPHAGE) 1000 MG tablet Take 1 tablet by mouth 2 times daily (with meals) 11/23/17  Yes Janett Rodriguez MD   simvastatin (ZOCOR) 10 MG tablet Take 1 tablet by mouth nightly  Patient taking differently: Take 10 mg by mouth 2 times daily  11/23/17  Yes Janett Rodriguez MD   furosemide (LASIX) 40 MG tablet Take 1 tablet by mouth daily 11/24/17  Yes Janett Rodriguez MD   ranitidine (ZANTAC) 150 MG tablet Take 1 tablet by mouth 2 times daily  Patient taking differently: Take 150 mg by mouth daily  11/23/17  Yes Janett Rodriguez MD   aspirin 81 MG EC tablet Take 81 mg by mouth daily. Yes Historical Provider, MD   hydrALAZINE (APRESOLINE) 10 MG tablet Take 1 tablet by mouth every 8 hours 11/23/17   Janett Rodriguez MD   metoprolol tartrate (LOPRESSOR) 25 MG tablet Take 1 tablet by mouth 2 times daily 11/23/17   Janett Rdoriguez MD        escitalopram  10 mg Oral Daily    furosemide  40 mg Oral Daily    metoprolol tartrate  25 mg Oral BID    pantoprazole  40 mg Intravenous Daily    And    sodium chloride (PF)  10 mL Intravenous Daily    dilTIAZem  120 mg Oral BID    digoxin  125 mcg Oral Daily    sodium chloride flush  10 mL Intravenous 2 times per day    ipratropium-albuterol  1 ampule Inhalation Q4H WA    meropenem  1 g Intravenous Q8H    insulin lispro  0-6 Units Subcutaneous TID WC    insulin lispro  0-3 Units Subcutaneous Nightly    lactulose  20 g Oral TID    enoxaparin  40 mg Subcutaneous Daily       TELEMETRY: Atrial fibrillation     Physical Exam:      Physical Exam  Constitutional:       Appearance: He is well-developed. He is obese. HENT:      Mouth/Throat:      Pharynx: No oropharyngeal exudate. Eyes:      General: No scleral icterus. Right eye: No discharge. Left eye: No discharge. Neck:      Thyroid: No thyromegaly. Vascular: No JVD.    Cardiovascular: disc space narrowing and endplate spurring. No compression fracture. Large amount of pleural fluid on the left side. No pneumothorax is seen. 1. Degenerative disc and endplate changes. 2. No thoracic spine fracture is seen. Signed by Dr Leif Burgess on 2/15/2020 7:09 PM    Ct Lumbar Spine Wo Contrast    Result Date: 2/15/2020  CT LUMBAR SPINE WO CONTRAST 2/15/2020 7:22 PM History: Back pain. Fall injury. In order to have a CT radiation dose as low as reasonably achievable Automated Exposure Control was utilized for adjustment of the mA and/or KV according to patient size. DLP in mGycm= 1297. Lumbar spine CT. Normal curvature and alignment. Endplate spurring with degenerative vacuum disc change at T12-L1, L1-2, and L5-S1. There is also prominent disc space narrowing at L5-S1. Facet hypertrophy and endplate spurring with moderate to high-grade foraminal stenosis at L5-S1. No compression fracture. Symmetric SI joints. Intact sacrum. 1. Degenerative disc, endplate, and facet changes. 2. No acute fracture is seen. Signed by Dr Leif Burgess on 2/15/2020 7:23 PM    Ct Abdomen Pelvis W Iv Contrast Additional Contrast? None    Result Date: 2/15/2020  CT ABDOMEN PELVIS W IV CONTRAST 2/15/2020 7:12 PM History: Fall injury. Generalized abdominal pain. Abdomen/pelvis CT with IV contrast injection. In order to have a CT radiation dose as low as reasonably achievable Automated Exposure Control was utilized for adjustment of the mA and/or KV according to patient size. DLP in mGycm= 1858. Heart size is within normal limits. The right lung bases clear. Pleural fluid is noted on the left side. Normal liver, gallbladder, pancreas, and spleen. Normal and symmetric adrenal glands and kidneys. No bowel dilation. No appendicitis, diverticulitis, or colitis. Right hip prosthesis with streak artifact. Lumbar spine degenerative disc and endplate change with no evidence of compression fracture.     1. Left pleural effusion. 2. No acute abnormality is seen within the abdomen. Signed by Dr Yuly Mohan on 2/15/2020 7:14 PM    Xr Chest Portable    Result Date: 2/20/2020  Examination. XR CHEST PORTABLE 2/20/2020 5:00 AM History: Postthoracentesis. A single frontal portable upright view of the chest is compared with the previous study dated 2/19/2020. There is complete consolidation of the left lung with a small pneumothorax in the upper chest. There is displacement of the trachea towards left suggesting loss of left lung volume. There are atelectatic changes in the right lower lung. There is a small right basal pleural effusion. A poorly visualized chest tube is seen at the left base. There is extensive soft tissue emphysema along the left lateral chest wall and the left side of the neck which appears unchanged. Persistent left lung consolidation and a small left hydropneumothorax. Persistent moderate displacement of the distal trachea towards left suggesting loss of left lung volume. The persistent and unchanged soft tissue emphysema of the left lateral chest wall and left-sided of the neck. A poorly visualized due to chest tube in the left lower chest. Signed by Dr William Flynn on 2/20/2020 7:11 AM    Xr Chest Portable    Result Date: 2/19/2020  Examination. XR CHEST PORTABLE 2/19/2020 7:45 AM History: Post  Pleur-X catheter placement. A single frontal portable semiupright view of the chest is compared with the previous study dated 2/19/2020. There is loss of left lung volume and the intervertebral. There is consolidation of the left lower lung surrounded by pneumothorax. Small loculated fluid is seen in the right upper chest. A poorly visualized due to chest tube is seen in the left lower chest. The distal end of the tube is not well-visualized. There is displacement of the distal trachea towards left due to loss of left lung volume. There is moderate diffuse narrowing of the distal trachea.  There is no tear seen in the left distal mainstem bronchus as in the previous study. There are large amount of soft tissue air along the left lateral chest wall and the left side of the neck. There are left rib deformities which may be due to previous trauma. The right lung appears unremarkable. Complete consolidation/collapse of the left lung surrounded by pneumothorax. A poorly visualized catheter in the left lower chest. A small loculated pleural fluid in the left upper chest. Displacement of the trachea towards the left suggesting loss of left lung volume. Large soft tissue air in the left lateral chest wall and left-sided of the neck. Above findings are recorded on a digital voice clip in PACS. Signed by Dr Bertha Lees on 2/19/2020 9:19 AM    Xr Chest Portable    Result Date: 2/19/2020  Examination. XR CHEST PORTABLE 2/19/2020 5:00 AM History: Postthoracentesis. Lung cancer. A single frontal portable upright view of the chest is compared with the previous study dated 2/17/2020. There is a complete left chest opacification similar to the previous study. No infiltrate lung tissue seen in the left chest. The right lung is unremarkable. The heart size may not be evaluated due to complete obliteration cardiac border by the left lung opacity. No pneumothorax. There is moderate diffuse narrowing of the tracheal lumen, similar to the previous study. Etiology is not certain. This lack of air in the distal part of the left mainstem bronchus. An intrabronchial mass is not excluded. No bony abnormality. Complete opacification of the left chest may represent left lung consolidation/collapse and pleural effusion. No pneumothorax. The moderate diffuse narrowing of the tracheal lumen and blockage of the left mainstem bronchus. Possibility of a mass in this area is suspected. The right lung is unremarkable. The above finding are recorded on a digital voice clip in PACS.  Signed by Dr Bertha Lees on 2/19/2020 7:13 AM    Xr Chest Portable    Result Date: 2/17/2020  XR CHEST PORTABLE 2/17/2020 7:07 AM History: Short of breath. Portable chest x-ray compared with 2/15/2020. While there is chronic appearing interstitial disease there is improved aeration of the right lung base. Persistent white out of the left hemithorax. No pneumothorax. 1. Stable appearance of the left hemithorax. 2. Partial clearing of right basilar infiltrate. Signed by Dr Pia Arana on 2/17/2020 7:08 AM    Xr Chest Portable    Result Date: 2/15/2020  EXAMINATION: XR CHEST PORTABLE 2/15/2020 4:59 PM HISTORY: Fall, history of lung cancer COMPARISON: 1/28/2018 FINDINGS: The heart size is difficult to evaluate due to overlying pathology. There is complete opacification of the left hemithorax. There is prominence of the right hilum and infrahilar region, similar to the previous exam. No appreciable pneumothorax is identified. Complete opacification of the left hemithorax, which may be due to an obstructing mass with atelectasis but is favored to be due to a large pleural effusion. This is new compared to the recent comparison exam. Signed by Dr Emre Gonzalez on 2/15/2020 5:00 PM    Xr Chest 1 Vw    Result Date: 2/17/2020  Examination. XR CHEST 1 VIEW 2/17/2020 1:15 PM History: Postthoracentesis. A single frontal projection of the chest is obtained after thoracentesis. The comparison is made with the previous study dated 2/17/2020 There is no pneumothorax. There is diffuse opacification of the left chest. However there appears to be loss of left chest volume since the previous study suggested by displacement of the trachea towards left. The opacity of the left chest may suggest consolidated lung with some residual pleural effusion. The right lung is unremarkable. No pneumothorax.  Signed by Dr Lexus Ko on 2/17/2020 2:09 PM    Vl Dup Lower Extremity Venous Bilateral    Result Date: 1/26/2020  Vascular Lower Extremities DVT Study Procedure  Demographics !Yes       !Yes            ! None      ! +------------------------------------+----------+---------------+----------+ ! Prox Femoral                        !Yes       ! Yes            ! None      ! +------------------------------------+----------+---------------+----------+ ! Mid Femoral                         !Yes       ! Yes            ! None      ! +------------------------------------+----------+---------------+----------+ ! Dist Femoral                        !Yes       ! Yes            ! None      ! +------------------------------------+----------+---------------+----------+ ! Deep Femoral                        !Yes       ! Yes            ! None      ! +------------------------------------+----------+---------------+----------+ ! Popliteal                           !Yes       ! Yes            ! None      ! +------------------------------------+----------+---------------+----------+ ! SSV                                 ! Yes       ! Yes            ! None      ! +------------------------------------+----------+---------------+----------+ ! Gastroc                             ! Yes       ! Yes            ! None      ! +------------------------------------+----------+---------------+----------+ ! PTV                                 ! Yes       ! Yes            ! None      ! +------------------------------------+----------+---------------+----------+ ! GSV                                 ! Yes       ! Yes            ! None      ! +------------------------------------+----------+---------------+----------+ ! ATV                                 ! Yes       ! Yes            ! None      ! +------------------------------------+----------+---------------+----------+ ! Peroneal                            !Yes       ! Yes            ! None      ! +------------------------------------+----------+---------------+----------+    Cta Pulmonary W Contrast    Result Date: 1/25/2020  CTA chest 1/25/2020 8:15 PM HISTORY: New onset atrial fibrillation with hemoptysis TECHNIQUE: Axial images of the chest were obtained following IV contrast. . Coronal and sagittal reformatted images are reconstructed and reviewed. Maximal intensity projectional images reconstructed and reviewed COMPARISON: 12/18/2017. DLP: 812 mGy cm Automated exposure control was utilized to minimize patient radiation dose. FINDINGS: No pulmonary emboli are identified. Thoracic aortic calcification with no aneurysm or dissection. There is moderate coronary calcification. There is mild cardiomegaly. Trace pericardial fluid. There is a moderate size left pleural effusion with collapse of the left lower lobe. There is abrupt occlusion of the left mainstem bronchus which may be from mucous plugging or endobronchial lesion. There is complete collapse left lung with associated volume loss. Images of the upper abdomen demonstrate stable prominence of the adrenal glands which do maintain their adrenal shape. Fatty atrophy of the pancreas. Calcified granuloma superior segment of the right lower lobe with right basilar atelectasis. There is stable 3 mm size nodules along the right minor fissure which may be intrafissural lymph nodes. These are unchanged from 2017. There are old healed bilateral rib fractures. There are degenerative changes of the thoracic spine. There is no focal destructive osseous lesion. 1. There is complete collapse of the left lung secondary to an occluded left main stem bronchus. This could be due to mucous plugging, however endobronchial neoplasm must also be considered. Moderate size left pleural effusion. 2. Mild cardiomegaly. Trace pericardial fluid. 3. No pulmonary emboli. 4. No thoracic aortic aneurysm or dissection. Signed by Dr Adrian Woods on 1/25/2020 9:27 PM    Us Thoracentesis    Result Date: 2/17/2020  Examination. US THORACENTESIS 2/17/2020 7:15 AM History: Pleural effusion. The procedure was explained to the patient.  The benefits, the risks and complications are

## 2020-02-20 NOTE — PROGRESS NOTES
Visited with pt to provide spiritual care and ask about pt's willingness to discuss AD/LW. Pt says he is not able to eat and doesn't want to eat. Provided spiritual care with sustaining presence, nurtured hope, and prayer. Pt asked what time this  left each day and requested a  come around 7 o'clock to speak with him and his wife about AD/LW. This  will text Hagan Or and have her come and visit pt and wife this evening. Pt expressed gratitude for spiritual care.     Electronically signed by Amparo Barajas on 2/20/2020 at 10:31 AM

## 2020-02-20 NOTE — PROGRESS NOTES
Palliative Care Progress Note  2/20/2020 11:26 AM  Subjective:   Admit Date: 2/15/2020  PCP: ELTON Malone    Chief Complaint: Weakness    Interval History: Pleur X placed yesterday and patient reports breathing better. Visibly improved but continues to have generalized weakness. PT to evaluate and assist with mobility. Review of Systems     Review of Systems   Constitutional: Positive for activity change, appetite change and fatigue. Generalized weakness   Eyes: Negative. Respiratory: Positive for shortness of breath. Negative for cough, wheezing and stridor. Cardiovascular: Negative. Gastrointestinal: Negative. Endocrine: Negative. Genitourinary: Negative. Musculoskeletal: Positive for myalgias. Skin:        L Pleur X drain with dressing in place   Neurological: Negative. Psychiatric/Behavioral: Negative.         DIET CARDIAC; Carb Control: 4 carb choices (60 gms)/meal; Daily Fluid Restriction: 1500 ml    Medications:   dextrose      diltiazem (CARDIZEM) 125 mg in dextrose 5% 125 mL infusion Stopped (02/18/20 6205)     Current Facility-Administered Medications   Medication Dose Route Frequency Provider Last Rate Last Dose    hydrOXYzine (VISTARIL) capsule 25 mg  25 mg Oral BID PRN Brendan Roche MD        escitalopram (LEXAPRO) tablet 10 mg  10 mg Oral Daily Brendan Roche MD        furosemide (LASIX) tablet 40 mg  40 mg Oral Daily Brendan Roche MD        metoprolol tartrate (LOPRESSOR) tablet 25 mg  25 mg Oral BID Brendan Roche MD        aluminum & magnesium hydroxide-simethicone (MAALOX) 30 mL, lidocaine viscous hcl (XYLOCAINE) 5 mL (GI COCKTAIL)   Oral Once Vicente Cooper MD        glucose (GLUTOSE) 40 % oral gel 15 g  15 g Oral PRN Brendan Roche MD        dextrose 50 % IV solution  12.5 g Intravenous PRN Brendan Roche MD        glucagon (rDNA) injection 1 mg  1 mg Intramuscular PRN Brendan Roche MD        dextrose 5 % solution  100 mL/hr Intravenous PRN Airam Martinez MD        HYDROcodone-acetaminophen [facility]) 5-325 MG per tablet 1 tablet  1 tablet Oral Q6H PRN Airam Martinez MD   1 tablet at 02/19/20 2146    ketorolac (TORADOL) injection 30 mg  30 mg Intravenous Q6H PRN Airam Martinez MD        melatonin tablet 2 mg  2 mg Oral Nightly PRN Molly aHley MD   2 mg at 02/20/20 0102    pantoprazole (PROTONIX) injection 40 mg  40 mg Intravenous Daily Airam Martinez MD   40 mg at 02/20/20 0831    And    sodium chloride (PF) 0.9 % injection 10 mL  10 mL Intravenous Daily Airam Martinez MD        aluminum & magnesium hydroxide-simethicone (MAALOX) 307765-44 MG/5ML suspension 30 mL  30 mL Oral Q6H PRN Airam Martinez MD   30 mL at 02/20/20 0933    diltiazem (CARDIZEM CD) extended release capsule 120 mg  120 mg Oral BID Airam Martinez MD   120 mg at 02/20/20 5328    digoxin (LANOXIN) tablet 125 mcg  125 mcg Oral Daily Airam Martinez MD   125 mcg at 02/20/20 8618    acetaminophen (TYLENOL) tablet 1,000 mg  1,000 mg Oral Nightly PRN Airam Martinez MD   650 mg at 02/18/20 0620    diltiazem 125 mg in dextrose 5 % 125 mL infusion  5 mg/hr Intravenous Continuous Airam Martinez MD   Stopped at 02/18/20 7511    diltiazem injection 10 mg  10 mg Intravenous Q30 Min PRN Airam Martinez MD   10 mg at 02/15/20 2117    sodium chloride flush 0.9 % injection 10 mL  10 mL Intravenous 2 times per day Airam Martinez MD   10 mL at 02/19/20 2146    sodium chloride flush 0.9 % injection 10 mL  10 mL Intravenous PRN Airam Martinez MD        magnesium hydroxide (MILK OF MAGNESIA) 400 MG/5ML suspension 30 mL  30 mL Oral Daily PRN Airam Martinez MD        ondansetron [facility]) injection 4 mg  4 mg Intravenous Q6H PRN Airam Martinez MD   4 mg at 02/19/20 1941    acetaminophen (TYLENOL) tablet 650 mg  650 mg Oral Q4H PRN Airam Martinez MD   650 mg at 02/18/20 6414    ipratropium-albuterol (DUONEB) nebulizer solution 1 ampule  1 ampule BMI 36.80 kg/m²   24HR INTAKE/OUTPUT:      Intake/Output Summary (Last 24 hours) at 2/20/2020 1126  Last data filed at 2/20/2020 1294  Gross per 24 hour   Intake 1795 ml   Output 700 ml   Net 1095 ml     Physical Exam     General appearance: alert and cooperative with exam, vital signs stable, obese, appears ill  HEENT: atraumatic, eyes with clear conjunctiva and normal lids, pupils and irises normal, external ears and nose are normal,lips normal.  Neck: without masses, supple  Lungs: no increased work of breathing, diminished breath sounds, especially throughout the L, nasal cannula oxygen in place  Heart: irregularly irregular rhythm and S1, S2 normal  Abdomen: soft, non-tender; bowel sounds normal; no masses,  no organomegaly  Genitourinary: No bladder fullness, masses, or tenderness  Extremities: atraumatic, able to JOHNSON, 1+ BLE edema  Neurologic: No focal neurologic deficits, normal sensation, no tremor, alert and oriented  Psychiatric: Alert and oriented, no recent or remote memory deficits, good judgement, mood and affect appropriate  Skin: warm, dry        Assessment and Plan: Active Problems:    Paroxysmal A-fib [facility])    Palliative care patient    Atrial fibrillation with rapid ventricular response (HCC)    Malignant neoplasm of overlapping sites of left lung [facility])  Resolved Problems:    * No resolved hospital problems. *        Visit Summary: I saw the patient at the bedside with no family present. He is visibly improved since yesterday and appears to be in better spirits today. We spoke about his Pleur X catheter and I discussed his possible need for [place] to assist with management at home. Patient reports that he has several dogs in the home and does not feel they will allow anyone in the home.  Patient's spouse will need to be proficient with management of his Pleur X cath and he is going to have his spouse view the video and directions, which I placed on his bedside table at his request. He endorses generalized weakness and I have approached his possible need to have short term rehab at SNF so that he is not only able to mobilize safely at home but also to be strong enough to have treatments. Patient is agreeable to begin working with PT and PT arrived at the end of my visit to evaluate the patient. I provided encouragement and emotional support to the patient. Nursing staff updated on the outcome of my visit. Palliative Medicine will continue to follow. Recommendations: Begin to mobilize and work with PT to evaluate strengthening needs at discharge. Discussed HH and patient does not feel he will be able to have [facility] due to his animals in the home. Approached possible need for short term SNF for rehab. Thank you for consulting Palliative Care and allowing us to participate in the care of this patient.        Electronically signed by ELTON Lunsford on 2/20/2020 at 11:26 AM    (Please note that portions of this note were completed with a voice recognition program.  Nicole Mtz made to edit the dictations but occasionally words are mis-transcribed.)

## 2020-02-20 NOTE — CARE COORDINATION
Spoke with pt and spouse present at bedside who report unable to go home with [facility] services at this time and requesting SNF rehab services. Pt declined SNF choice letter and requested a referral to [facility]. Pt will require a precert and SW made the referral through Ashley Lopez in admissions with [place].      Lola CR:[phone]  F:[phone]

## 2020-02-21 NOTE — CARE COORDINATION
Attempting placement with [facility] who is determining eligibility at this time. SW confirmed with pt's attending Dr Gorge Jennings pt will dc on only oral Abx. Awaiting further response from [facility] [facility] at this time.      Anabel Mack TN:[phone]  F:[phone]

## 2020-02-21 NOTE — PROGRESS NOTES
3:  CGA with clothing mgmt in standing  Long term goals  Long term goal 1: Return to PLOF       Therapy Time   Individual Concurrent Group Co-treatment   Time In           Time Out           Minutes                   Reva Deluna, OTR/L

## 2020-02-21 NOTE — PROGRESS NOTES
02/21/20 1017   Subjective   Subjective I am weak   General Comment   Comments Patient in bed   Pain Screening   Patient Currently in Pain No   Vital Signs   Level of Consciousness 0   Oxygen Therapy   O2 Device Nasal cannula   O2 Flow Rate (L/min) 4 L/min   Bed Mobility   Supine to Sit Minimal assistance   Comment Patient sat EOB for EX. x 15 minutes SBA then Stood to go to chair had to stop  and used BSC. PCA assist with clean up then stepped to chair   Transfers   Sit to Stand Minimal Assistance   Stand to sit Minimal Assistance   Bed to Chair Minimal assistance   Ambulation   Ambulation?  Yes   Ambulation 1   Surface level tile   Device Rolling Walker   Other Apparatus O2   Assistance Minimal assistance   Quality of Gait Slow unsteady   Gait Deviations Slow Dorie;Decreased step length   Distance 3' + 3'   Comments Patient in chair post TX   Exercises   Heelslides 10   Hip Flexion 10   Hip Abduction 10   Knee Long Arc Quad 10   Knee Active Range of Motion yes   Ankle Pumps 10   Comments Rest break with ex   Activity Tolerance   Activity Tolerance Patient limited by endurance   Safety Devices   Type of devices Left in chair;Call light within reach     Electronically signed by Gilberto Paez PTA on 2/21/2020 at 10:26 AM

## 2020-02-21 NOTE — CARE COORDINATION
Mikey Umaña is requesting to determine if the pt's radiation treatments will be occurring during his rehab therapy and attempting a costs analysis. BRANDYN reached out to Dr Pedrito Kirkpatrick via perfect serve and awaiting a response at this time.

## 2020-02-21 NOTE — PROGRESS NOTES
PROGRESS NOTE    Patient name: Luis Dhillon  Patient : 1955  Room: 730    SUBJECTIVE: Resting and breathing OK      INTERVAL HISTORY  Mr. Sima Ochoa. Simi Gann was seen in initial medical oncology consultation on 2020 as an inpatient at [facility] with a recent diagnosis of metastatic lung cancer made at [facility] to establish care.     He will be followed by Dr. Mars Chester who cares for Martin's identical twin Ashlyn Wylie, also with lung cancer.     Silvestre Ardon has been on chronic supplemental oxygen for at least 6 months prior to presentation due to his history of underlying COPD. He wears 4 L of supplemental oxygen. His PCP is Janett Onofre PA-C on the [place] side.     Silvestre Ardon presented to [facility] ED on 2020 with hemoptysis increasing weakness. He was also found to have atrial fibrillation with RVR.     CTA PULMONARY W CONTRAST   Final Result   1. There is complete collapse of the left lung secondary to an   occluded left main stem bronchus. This could be due to mucous   plugging, however endobronchial neoplasm must also be considered. Moderate size left pleural effusion. 2. Mild cardiomegaly. Trace pericardial fluid. 3. No pulmonary emboli. 4. No thoracic aortic aneurysm or dissection. Signed by Dr Kurt Najera on 2020 9:27 PM       VL DUP LOWER EXTREMITY VENOUS BILATERAL              Vascular Lab Prelim  Duplex venous scan of B/L LE shows no evidence for dvt, svt, reflux noted at this time. Final report pending     The case was discussed with Dr Jim Claros with pulmonology, who stated that he can do a bronch but with the bleeding, he would not be able to intervene other than using epinephrine. Would recommend transfer to higher level of care as pt may require embolization.      Mr. Simi Gann was transferred to [facility] in [facility] where further work-up was performed.     X-ray Chest Portable 2020  Complete whiteout of the left lung with mild deviation of the trachea to the left. Findings are consistent with complete collapse of the left lung. Underlying infection or mass or effusion cannot be excluded      SIMONA at [facility] on 1/27/2020 was performed. LVEF greater than 55%. Unable to comment on diastolic filling pattern due to atrial  fibrillation. RV mildly dilated. Mildly depressed RV systolic function. The left atrium is mild to moderately dilated. RA mildly dilated. Mild tricuspid regurgitation. The RV systolic pressure is calculated at 63 mmHg.     A thoracentesis was performed at [facility] on 1/27/2020.  1100 cc of pleural fluid was drained. Cytology:  REACTIVE MESOTHELIAL CELLS, HISTIOCYTES, AND LYMPHOCYTES; NEGATIVE FOR MALIGNANCY      CT C/A/P at [facility] 1/29/2020  Impression:   1.  Signs of large LEFT lung mass with postobstructive collapse of   the entire LEFT lung and mediastinal shift. 2.  Bulky subcarinal adenopathy. 3.  Nodular bilateral adrenal glands still with some adreniform   contour, suspicious for either nodular hyperplasia or bilateral   metastatic disease to the adrenal glands.      Bronchoscopy with insertion of stent was performed on 1/29/2020 at [facility]. A mass was found in the middle portion of the left mainstem bronchus with about 90% obstruction. The mass was large and bloody and friable. a 10 x 40 Aero stent was placed with good results.        Records discuss \"squamous cell lung cancer \"  however I am unable to view a pathology report.        PET at [facility] 1/31/2020  Impression   Intense FDG uptake within a ill-defined left hilar mass as well as   the left upper and left lower lobes. Interval placement of a left   main bronchus stent which is occluded distally with complete collapse   of the left lung. FDG uptake within the left upper and lower lobes   may be related to infection versus malignancy. Small left pleural   effusion.  No FDG uptake is present within the subcarinal   lymphadenopathy.         MRI brain w/ and w/o  2/1/2020 8:07 AM  Impression   1.  No findings of intracranial metastatic disease. 2.  Mild chronic small vessel ischemic white matter disease and tiny   old right cerebellar infarcts.       He was discharged, he was evaluated by Dr. Brady Valencia for 2/7/2020 with anticipation of 33 fractions to the left lung. He also had an appointment with Dr. Per Huerta on 2/13/2020 but was unable to keep that appointment due to symptoms related to his cancer.      He was admitted to [facility] via the emergency room on 2/15/2020. Oncology consultation was called to establish care with Dr. Per Huerta. Subjective   REVIEW OF SYSTEMS:   Review of Systems   Constitutional: Positive for activity change, appetite change (Decreased) and fatigue. Negative for chills, diaphoresis, fever and unexpected weight change. HENT: Negative for mouth sores, nosebleeds, sore throat, trouble swallowing and voice change. Eyes: Negative for visual disturbance. Respiratory: Positive for shortness of breath (Improving). Negative for cough and wheezing. Supplemental O2 in use   Cardiovascular: Positive for leg swelling (Improved). Negative for chest pain. Gastrointestinal: Negative for abdominal distention, abdominal pain, blood in stool, constipation, diarrhea, nausea and vomiting. Endocrine: Negative for cold intolerance, heat intolerance, polydipsia and polyuria. Genitourinary: Negative for difficulty urinating, dysuria, hematuria and urgency. Musculoskeletal: Positive for arthralgias and back pain. Negative for joint swelling and myalgias. Skin: Negative for color change and rash. Neurological: Positive for light-headedness. Negative for dizziness, tremors, seizures and syncope. Hematological: Negative for adenopathy. Does not bruise/bleed easily. Psychiatric/Behavioral: Negative for agitation and behavioral problems.    All other systems reviewed 20 02/21/2020    CREATININE 0.8 02/21/2020    GLUCOSE 170 (H) 02/21/2020    CALCIUM 9.2 02/21/2020    PROT 5.8 (L) 02/21/2020    LABALBU 2.6 (L) 02/21/2020    BILITOT 0.4 02/21/2020    ALKPHOS 135 (H) 02/21/2020    AST 17 02/21/2020    ALT 12 02/21/2020    LABGLOM >60 02/21/2020       Lab Results   Component Value Date    INR 1.15 02/17/2020    INR 1.19 (H) 02/15/2020    INR 1.09 01/25/2020    PROTIME 14.1 02/17/2020    PROTIME 14.5 02/15/2020    PROTIME 13.5 01/25/2020       30 Day lookback of cultures:    Blood Culture Recent:   Recent Labs     01/25/20  2141   BC No growth after 5 days of incubation. Gram Stain Recent: No results for input(s): LABGRAM in the last 720 hours. Resp Culture Recent: No results for input(s): CULTRESP in the last 720 hours. Body Fluid Recent : No results for input(s): BFCX in the last 720 hours. MRSA Recent : No results for input(s): [facility] [employer] in the last 720 hours. Urine Culture Recent : No results for input(s): LABURIN in the last 720 hours. Organism Recent : No results for input(s): ORG in the last 720 hours. ASSESSMENT/PLAN:  #1  Squamous cell lung carcinoma left lung, status post left main stem bronchus stent 1/29/2020 - possibly stage II    Wally Recio presented to [facility] ED on 1/25/2020 with hemoptysis increasing weakness. He was also found to have atrial fibrillation with RVR.     CTA chest with contrast:  NO PE  Complete collapse of the left lung secondary to an occluded left main stem bronchus  Moderate size left pleural effusion  Mild cardiomegaly, trace pericardial fluid    Duplex venous scan of B/L LE: NO evidence for dvt, svt, reflux noted at this time. The case was discussed with Dr Regan Muhammad with pulmonology, who stated that he can do a bronch but with the bleeding, he would not be able to intervene other than using epinephrine. Would recommend transfer to higher level of care as pt may require embolization.      Mr. Terrie Braga was transferred to [address] [place] [address] in [place] where further work-up was performed.     X-ray Chest Portable 1/26/2020  Complete whiteout of the left lung with mild deviation of the trachea to the left. Findings are consistent with complete collapse of the left lung. Underlying infection or mass or effusion cannot be excluded      SIMONA at [facility] on 1/27/2020 was performed. LVEF greater than 55%. Unable to comment on diastolic filling pattern due to atrial  fibrillation. RV mildly dilated. Mildly depressed RV systolic function. The left atrium is mild to moderately dilated. RA mildly dilated. Mild tricuspid regurgitation. The RV systolic pressure is calculated at 63 mmHg.     A thoracentesis was performed at [address] [place] [address] on 1/27/2020.  1100 cc of pleural fluid was drained. Cytology:  REACTIVE MESOTHELIAL CELLS, HISTIOCYTES, AND LYMPHOCYTES;   NEGATIVE FOR MALIGNANCY      CT C/A/P at [facility] 1/29/2020  Impression:   1.  Signs of large LEFT lung mass with postobstructive collapse of   the entire LEFT lung and mediastinal shift. 2.  Bulky subcarinal adenopathy. 3.  Nodular bilateral adrenal glands still with some adreniform   contour, suspicious for either nodular hyperplasia or bilateral   metastatic disease to the adrenal glands.      Bronchoscopy with insertion of stent was performed on 1/29/2020 at [facility]. A mass was found in the middle portion of the left mainstem bronchus with about 90% obstruction. The mass was large and bloody and friable. a 10 x 40 Aero stent was placed with good results.     Pathology was positive for squamous cell lung cancer.      PET at [facility] 1/31/2020  Impression   Intense FDG uptake within a ill-defined left hilar mass as well as the left upper and left lower lobes. Interval placement of a left   main bronchus stent which is occluded distally with complete collapse of the left lung.    FDG uptake within the left upper and lower lobes may be related to infection versus malignancy. Small left pleural effusion. No FDG uptake is present within the subcarinal lymphadenopathy.      MRI brain w/ and w/o  2/1/2020 8:07 AM  Impression   1.  No findings of intracranial metastatic disease. 2.  Mild chronic small vessel ischemic white matter disease and tiny   old right cerebellar infarcts. CT abdomen pelvis with contrast 2/15/2020 at [address]  No acute abnormality seen in the abdomen  Left pleural effusion    left thoracentesis 2/17/2020: 2L of clear, straw colored fluid drained. cytology negative: benign mesothelial cells and occasional small round lymphocytes    Post-procedure CXR 2/17/20: No pneumothorax, diffuse opacification of the left chest with some residual pleural effusion  He has already been evaluated by Dr. Chinyere Carlton    S/p left VATS/Pleur-X catheter placement on 2/19/2020. Cytology was negative    #2 PAF  Cardizem/Digoxin per cardiology     #3 Hyponatremia  Na 130 on 2/21/2020    #4 generalized weakness  Consider PT/OT, defer to attending  Consult case management for disposition    #5 GERD  Receiving Protonix    PLAN  Pathology has been discussed with Mr. Jackie Travis. Await PD-L1 on tissue biopsy 1/29/2020 from [facility]. Treatment options likely to include XRT, chemo +/-immunotherapy  Lisa Ariel is aware however his performance status must first be improved.   SS assisting with potential transfer to [facility]      Carey Diaz PA-C    02/21/20  6:52 AM

## 2020-02-21 NOTE — PROGRESS NOTES
Nutrition Assessment    Type and Reason for Visit: Initial    Nutrition Recommendations: start ONS    Nutrition Assessment: Following for LOS x 6 days. Pt appears adequately nourished AEB subcutaneous fat and muscle mass. Pt is at risk for nutritional compromise d/t decreased po intake after surgery, increased nutrients for healing and 13.6% weight decrease in 11 months. Malnutrition Assessment:  · Malnutrition Status: No malnutrition  · Context: Acute illness or injury  · Findings of the 6 clinical characteristics of malnutrition (Minimum of 2 out of 6 clinical characteristics is required to make the diagnosis of moderate or severe Protein Calorie Malnutrition based on AND/ASPEN Guidelines):  1. Energy Intake-Less than or equal to 75% of estimated energy requirement, Greater than or equal to 5 days    2. Weight Loss-10% loss or greater, (11 months)  3. Fat Loss-No significant subcutaneous fat loss,    4. Muscle Loss- ,    5. Fluid Accumulation-No significant fluid accumulation, Extremities  6.  Strength-Not measured    Nutrition Risk Level:  Moderate    Nutrition Diagnosis:   · Problem: Increased nutrient needs  · Etiology: related to Increased demand for energy/nutrients     Signs and symptoms:  as evidenced by Weight loss, Intake 0-25%, Intake 25-50%, Intake 50-75%    Objective Information:  · Nutrition-Focused Physical Findings: well nourished  · Wound Type: Surgical Wound  · Current Nutrition Therapies:  · Oral Diet Orders: Cardiac, Carb Control 4 Carbs/Meal, Fluid Restriction   · Oral Diet intake: 1-25%, 26-50%, 51-75%  · Oral Nutrition Supplement (ONS) Orders: None    · Anthropometric Measures:  · Ht: 5' 8\" (172.7 cm)   · Current Body Wt: 242 lb (109.8 kg)  · Admission Body Wt: 240 lb (108.9 kg)(stated)  · Usual Body Wt: 280 lb (127 kg)(3/2019)  · % Weight Change:  ,  13.6% weight decrease in 11 months  · Ideal Body Wt: 154 lb (69.9 kg), % Ideal Body 157.1%  · BMI Classification: BMI 35.0 - 39.9 Obese Class II    Nutrition Interventions:   Continue current diet, Start ONS  Continued Inpatient Monitoring    Nutrition Evaluation:   · Evaluation: Goals set   · Goals: po intake 50% or greater with all meals    · Monitoring: Meal Intake, Diet Tolerance, Skin Integrity, Wound Healing, Weight, Pertinent Labs      Electronically signed by Jacqueline Ware MS, RD, LD on 2/21/20 at 2:14 PM    Contact Number: [phone]

## 2020-02-21 NOTE — PROGRESS NOTES
Hospitalist Progress Note  2/21/2020 1:44 PM  Subjective:   Admit Date: 2/15/2020  PCP: ETLON Suarez    Chief Complaint: Lethargy    Subjective: Patient seen and examined this a.m. Case discussed with nursing staff, superior representative in the room assessing patient as well. Patient states he feels somewhat better. Continues with education on Pleurx catheter. Patient currently resting, no acute distress, denies any headache, change in vision, chest pain    [facility] History: 72-year-old male with a history of lung cancer presenting for lethargy found to have atrial fibrillation with RVR and extensive pleural effusion with cardiology, pulmonology, and oncology consulted on admission. Patient is status post left-sided thoracentesis with 2 L of fluid removed. Repeat chest x-ray showing left lung collapse with pulmonology recommending cardiothoracic surgery consultation for Pleurx catheter. Pleurx catheter was inserted 2/19/2020. No malignant cells have been seen from fluid culture obtained status post thoracentesis. Patient remains afebrile,    ROS: 14 point review of systems is negative except as specifically addressed above.     DIET CARDIAC; Carb Control: 4 carb choices (60 gms)/meal; Daily Fluid Restriction: 1500 ml    Intake/Output Summary (Last 24 hours) at 2/21/2020 1344  Last data filed at 2/21/2020 1330  Gross per 24 hour   Intake 250 ml   Output 250 ml   Net 0 ml     Medications:   dextrose      diltiazem (CARDIZEM) 125 mg in dextrose 5% 125 mL infusion Stopped (02/18/20 1185)     Current Facility-Administered Medications   Medication Dose Route Frequency Provider Last Rate Last Dose    glucose (GLUTOSE) 40 % oral gel 15 g  15 g Oral PRN Masha Hernandez MD        dextrose 50 % IV solution  12.5 g Intravenous PRN Masha Hernandez MD        glucagon (rDNA) injection 1 mg  1 mg Intramuscular PRN Masha Hernandez MD        dextrose 5 % solution  100 mL/hr Intravenous PRN at 02/18/20 6668    diltiazem injection 10 mg  10 mg Intravenous Q30 Min PRN Olivia Davis MD   10 mg at 02/15/20 2117    sodium chloride flush 0.9 % injection 10 mL  10 mL Intravenous 2 times per day Olivia Davis MD   10 mL at 02/21/20 0901    sodium chloride flush 0.9 % injection 10 mL  10 mL Intravenous PRN Olivia Davis MD        magnesium hydroxide (MILK OF MAGNESIA) 400 MG/5ML suspension 30 mL  30 mL Oral Daily PRN Olivia Davis MD        ondansetron [facility]) injection 4 mg  4 mg Intravenous Q6H PRN Olivia Davis MD   4 mg at 02/20/20 1656    acetaminophen (TYLENOL) tablet 650 mg  650 mg Oral Q4H PRN Olivia Davis MD   650 mg at 02/18/20 1657    ipratropium-albuterol (DUONEB) nebulizer solution 1 ampule  1 ampule Inhalation Q4H WA Olivia Davis MD   1 ampule at 02/21/20 1023    meropenem (MERREM) 1 g in sodium chloride 0.9 % 100 mL IVPB (mini-bag)  1 g Intravenous Q8H Olivia Davis MD   Stopped at 02/21/20 1041    lactulose (CHRONULAC) 10 GM/15ML solution 20 g  20 g Oral TID Olivia Davis MD   20 g at 02/20/20 1500    enoxaparin (LOVENOX) injection 40 mg  40 mg Subcutaneous Daily Olivia Davis MD   40 mg at 02/21/20 0859        Labs:     Recent Labs     02/19/20  0216 02/20/20  0331 02/21/20  0233   WBC 15.3* 17.5* 16.7*   RBC 4.81 4.94 5.23   HGB 12.8* 13.3* 14.1   HCT 42.5 42.4 45.8   MCV 88.4 85.8 87.6   MCH 26.6* 26.9* 27.0   MCHC 30.1* 31.4* 30.8*    321 367     Recent Labs     02/19/20  0216 02/20/20  0331 02/21/20  0233   * 130* 130*   K 4.5 4.6 5.3*   ANIONGAP 14 14 13   CL 84* 85* 82*   CO2 32* 31* 35*   BUN 9 14 20   CREATININE 0.6 0.6 0.8   GLUCOSE 143* 194* 170*   CALCIUM 8.7* 8.8 9.2     No results for input(s): MG, PHOS in the last 72 hours.   Recent Labs     02/19/20  0216 02/20/20  0331 02/21/20  0233   AST 18 11 17   ALT 13 10 12   BILITOT 0.5 0.4 0.4   ALKPHOS 140* 125 135*     ABGs:No results for input(s): PH, PO2, PCO2, HCO3, BE, O2SAT in the last 72 hours. Troponin T:   No results for input(s): TROPONINI in the last 72 hours. INR:   No results for input(s): INR in the last 72 hours. Lactic Acid:   No results for input(s): LACTA in the last 72 hours. Objective:   Vitals: /76   Pulse 80   Temp 97 °F (36.1 °C) (Temporal)   Resp 18   Ht 5' 8\" (1.727 m)   Wt 242 lb (109.8 kg)   SpO2 92%   BMI 36.80 kg/m²   24HR INTAKE/OUTPUT:      Intake/Output Summary (Last 24 hours) at 2/21/2020 1344  Last data filed at 2/21/2020 1330  Gross per 24 hour   Intake 250 ml   Output 250 ml   Net 0 ml     Physical Exam  Vitals signs and nursing note reviewed. Constitutional:       General: He is not in acute distress. Appearance: He is not ill-appearing or toxic-appearing. HENT:      Head: Normocephalic and atraumatic. Nose: Nose normal.   Eyes:      General: No scleral icterus. Conjunctiva/sclera: Conjunctivae normal.   Cardiovascular:      Rate and Rhythm: Normal rate. Rhythm irregularly irregular. Pulses: Normal pulses. Heart sounds: No murmur. Pulmonary:      Effort: Pulmonary effort is normal.      Breath sounds: Rales present. No rhonchi. Abdominal:      General: Bowel sounds are normal.      Tenderness: There is no guarding or rebound. Genitourinary:     Comments: Urethral catheter  Musculoskeletal:      Comments: Pleurx catheter left-sided   Skin:     General: Skin is warm. Capillary Refill: Capillary refill takes less than 2 seconds. Comments: Pleurx catheter incision site   Neurological:      General: No focal deficit present. Mental Status: He is alert. Psychiatric:         Mood and Affect: Mood normal.           Assessment and Plan:    Active Problems:    Paroxysmal A-fib (HCC)    Palliative care patient    Atrial fibrillation with rapid ventricular response (HCC)    Malignant neoplasm of overlapping sites of left lung (HCC)    Malignant neoplasm of lung ([facility] [address]  Resolved Problems:    * No resolved hospital problems. *      History of lung carcinoma, large pleural effusion, chronic obstructive pulmonary disease -patient status post Pleurx catheter placement. Continue with nasal cannula oxygen therapy, Oncology on board. Antibiotics (day #6 meropenem -will likely transition to Augmentin therapy on discharge p.o. ). O2 via NC. Bronchodilators. Repeat CXR showing left lung collapse. Likely endobronchial obstruction. Pulmonology signed off, CT surgery placed Pleurx catheter on 2/19/2020, patient education ongoing. No findings of malignant cells on thoracentesis fluid. Hyponatremia- stable currently 130, will monitor daily metabolic profi    Paroxysmal Atrial Fibrillation- Digoxin/Cardizem. Cardiology on board. Anxiety-Vistaril PRN ordered, continue Lexapro    Gastroesophageal reflux disease-chronic condition, continue with Protonix therapy    Supportive management. Palliative care on board. Advance Directive: DNR-CC     DVT prophylaxis: Lovenox    Discharge planning: Social work case management working on discharge to [facility]      Electronically signed by   Jeramie Ledesma   Internal Medicine Hospitalist  On 2/21/2020  At 1:44 PM    EMR Dragon/Transcription disclaimer:   Much of this encounter note is an electronic transcription/translation of spoken language to printed text.  The electronic translation of spoken language may permit erroneous, or at times, nonsensical words or phrases to be inadvertently transcribed; although attempts have made to review the note for such errors, some may still exist.

## 2020-02-22 NOTE — PROGRESS NOTES
PROGRESS NOTE    Patient name: Kadi Garsia  Patient : 1955  Room: 730    SUBJECTIVE: Resting and breathing OK      INTERVAL HISTORY  Mr. Mel Rivera was seen in initial medical oncology consultation on 2020 as an inpatient at [facility] with a recent diagnosis of metastatic lung cancer made at [facility] to establish care.     He will be followed by Dr. Evelio Silva who cares for Martin's identical twin Fabrizio Beaver, also with lung cancer.     Den Montemayor has been on chronic supplemental oxygen for at least 6 months prior to presentation due to his history of underlying COPD. He wears 4 L of supplemental oxygen. His PCP is Cynthia Luna PA-C on the [place] side.     Den Montemayor presented to [facility] ED on 2020 with hemoptysis increasing weakness. He was also found to have atrial fibrillation with RVR.     CTA PULMONARY W CONTRAST   Final Result   1. There is complete collapse of the left lung secondary to an   occluded left main stem bronchus. This could be due to mucous   plugging, however endobronchial neoplasm must also be considered. Moderate size left pleural effusion. 2. Mild cardiomegaly. Trace pericardial fluid. 3. No pulmonary emboli. 4. No thoracic aortic aneurysm or dissection. Signed by Dr Nancy Gallegos on 2020 9:27 PM       VL DUP LOWER EXTREMITY VENOUS BILATERAL              Vascular Lab Prelim  Duplex venous scan of B/L LE shows no evidence for dvt, svt, reflux noted at this time. Final report pending     The case was discussed with Dr Jake Garcia with pulmonology, who stated that he can do a bronch but with the bleeding, he would not be able to intervene other than using epinephrine. Would recommend transfer to higher level of care as pt may require embolization.      Mr. Julian Rivera was transferred to [facility] in [facility] where further work-up was performed.     X-ray Chest Portable 2020  Complete whiteout of the left lung with mild deviation of the trachea to the left. Findings are consistent with complete collapse of the left lung. Underlying infection or mass or effusion cannot be excluded      SIMONA at [facility] on 1/27/2020 was performed. LVEF greater than 55%. Unable to comment on diastolic filling pattern due to atrial  fibrillation. RV mildly dilated. Mildly depressed RV systolic function. The left atrium is mild to moderately dilated. RA mildly dilated. Mild tricuspid regurgitation. The RV systolic pressure is calculated at 63 mmHg.     A thoracentesis was performed at [facility] on 1/27/2020.  1100 cc of pleural fluid was drained. Cytology:  REACTIVE MESOTHELIAL CELLS, HISTIOCYTES, AND LYMPHOCYTES; NEGATIVE FOR MALIGNANCY      CT C/A/P at [facility] 1/29/2020  Impression:   1.  Signs of large LEFT lung mass with postobstructive collapse of   the entire LEFT lung and mediastinal shift. 2.  Bulky subcarinal adenopathy. 3.  Nodular bilateral adrenal glands still with some adreniform   contour, suspicious for either nodular hyperplasia or bilateral   metastatic disease to the adrenal glands.      Bronchoscopy with insertion of stent was performed on 1/29/2020 at [facility]. A mass was found in the middle portion of the left mainstem bronchus with about 90% obstruction. The mass was large and bloody and friable. a 10 x 40 Aero stent was placed with good results.        Records discuss \"squamous cell lung cancer \"  however I am unable to view a pathology report.        PET at [facility] 1/31/2020  Impression   Intense FDG uptake within a ill-defined left hilar mass as well as   the left upper and left lower lobes. Interval placement of a left   main bronchus stent which is occluded distally with complete collapse   of the left lung. FDG uptake within the left upper and lower lobes   may be related to infection versus malignancy. Small left pleural   effusion.  No FDG uptake is present within the subcarinal   lymphadenopathy.         MRI brain w/ and w/o  2/1/2020 8:07 AM  Impression   1.  No findings of intracranial metastatic disease. 2.  Mild chronic small vessel ischemic white matter disease and tiny   old right cerebellar infarcts.       He was discharged, he was evaluated by Dr. Dorinda Hannah for 2/7/2020 with anticipation of 33 fractions to the left lung. He also had an appointment with Dr. Deb Salazar on 2/13/2020 but was unable to keep that appointment due to symptoms related to his cancer.      He was admitted to [facility] via the emergency room on 2/15/2020. Oncology consultation was called to establish care with Dr. Deb Salazar. Subjective   REVIEW OF SYSTEMS:   Review of Systems   Constitutional: Positive for activity change, appetite change (Decreased) and fatigue. Negative for chills, diaphoresis, fever and unexpected weight change. HENT: Negative for mouth sores, nosebleeds, sore throat, trouble swallowing and voice change. Eyes: Negative for visual disturbance. Respiratory: Positive for shortness of breath (Improving). Negative for cough and wheezing. Supplemental O2 in use   Cardiovascular: Positive for leg swelling (Improved). Negative for chest pain. Gastrointestinal: Negative for abdominal distention, abdominal pain, blood in stool, constipation, diarrhea, nausea and vomiting. Endocrine: Negative for cold intolerance, heat intolerance, polydipsia and polyuria. Genitourinary: Negative for difficulty urinating, dysuria, hematuria and urgency. Musculoskeletal: Positive for arthralgias and back pain. Negative for joint swelling and myalgias. Skin: Negative for color change and rash. Neurological: Positive for light-headedness. Negative for dizziness, tremors, seizures and syncope. Hematological: Negative for adenopathy. Does not bruise/bleed easily. Psychiatric/Behavioral: Negative for agitation and behavioral problems.    All other systems reviewed examination, and assessment/medical decision-making that reflect my findings and impressions. I discussed essential elements of the care plan with the NP or PA and the patient as well. I answered all the questions to the patient's satisfaction. I concur with above stated. Subjective-feels overall better. Breathing has gotten better. He is eager to leave to nursing home. Objective-Decreased breath sounds left lower lobe. Assessment/plan:  Lung cancer- await results of PD-L1 from [facility]. We will follow-up. Will arrange for follow-up in clinic. Disposition-okay to discharge from my standpoint.     Laverne Eckert MD

## 2020-02-22 NOTE — PROGRESS NOTES
Hospitalist Progress Note  2/22/2020 11:53 AM  Subjective:   Admit Date: 2/15/2020  PCP: Geary Dance, APRN    Chief Complaint: Lethargy    Subjective: Patient seen and examined this a.m. looking more comfortable in bed. No acute distress noted overnight per nursing staff. Continues to await on acceptance to [facility]. Patient is complaining of some indigestion this a.m. Patient currently resting, no acute distress, denies any headache, change in vision, chest pain    Cumulative Hospital History: 60-year-old male with a history of lung cancer presenting for lethargy found to have atrial fibrillation with RVR and extensive pleural effusion with cardiology, pulmonology, and oncology consulted on admission. Patient is status post left-sided thoracentesis with 2 L of fluid removed. Repeat chest x-ray showing left lung collapse with pulmonology recommending cardiothoracic surgery consultation for Pleurx catheter. Pleurx catheter was inserted 2/19/2020. No malignant cells have been seen from fluid culture obtained status post thoracentesis. Patient remains afebrile,    ROS: 14 point review of systems is negative except as specifically addressed above.     Dietary Nutrition Supplements: Frozen Oral Supplement  DIET CARDIAC; Daily Fluid Restriction: 1500 ml    Intake/Output Summary (Last 24 hours) at 2/22/2020 1153  Last data filed at 2/22/2020 1137  Gross per 24 hour   Intake 1090 ml   Output 700 ml   Net 390 ml     Medications:   dextrose      diltiazem (CARDIZEM) 125 mg in dextrose 5% 125 mL infusion Stopped (02/18/20 1019)     Current Facility-Administered Medications   Medication Dose Route Frequency Provider Last Rate Last Dose    glucose (GLUTOSE) 40 % oral gel 15 g  15 g Oral PRN David Curiel MD        dextrose 50 % IV solution  12.5 g Intravenous PRN David Curiel MD        glucagon (rDNA) injection 1 mg  1 mg Intramuscular PRN David Curiel MD        dextrose 5 % mg/hr Intravenous Continuous Brendan Roche MD   Stopped at 02/18/20 5792    diltiazem injection 10 mg  10 mg Intravenous Q30 Min PRN Brendan Roche MD   10 mg at 02/15/20 2117    sodium chloride flush 0.9 % injection 10 mL  10 mL Intravenous 2 times per day Brendan Roche MD   10 mL at 02/22/20 6131    sodium chloride flush 0.9 % injection 10 mL  10 mL Intravenous PRN Brendan Roche MD        magnesium hydroxide (MILK OF MAGNESIA) 400 MG/5ML suspension 30 mL  30 mL Oral Daily PRN Brendan Roche MD        ondansetron [facility]) injection 4 mg  4 mg Intravenous Q6H PRN Brendan Roche MD   4 mg at 02/21/20 1911    acetaminophen (TYLENOL) tablet 650 mg  650 mg Oral Q4H PRN Brendan Roche MD   650 mg at 02/18/20 1657    ipratropium-albuterol (DUONEB) nebulizer solution 1 ampule  1 ampule Inhalation Q4H WA Brendan Roche MD   1 ampule at 02/22/20 1039    meropenem (MERREM) 1 g in sodium chloride 0.9 % 100 mL IVPB (mini-bag)  1 g Intravenous Q8H Brendan Roche MD   Stopped at 02/22/20 1018    lactulose (CHRONULAC) 10 GM/15ML solution 20 g  20 g Oral TID Brendan Roche MD   20 g at 02/20/20 1500    enoxaparin (LOVENOX) injection 40 mg  40 mg Subcutaneous Daily Brendan Roche MD   40 mg at 02/22/20 0611        Labs:     Recent Labs     02/20/20  0331 02/21/20  0233 02/22/20  0352   WBC 17.5* 16.7* 18.9*   RBC 4.94 5.23 4.82   HGB 13.3* 14.1 12.9*   HCT 42.4 45.8 42.4   MCV 85.8 87.6 88.0   MCH 26.9* 27.0 26.8*   MCHC 31.4* 30.8* 30.4*    367 321     Recent Labs     02/20/20  0331 02/21/20  0233 02/22/20  0352   * 130* 129*   K 4.6 5.3* 4.9   ANIONGAP 14 13 12   CL 85* 82* 86*   CO2 31* 35* 31*   BUN 14 20 23   CREATININE 0.6 0.8 0.7   GLUCOSE 194* 170* 150*   CALCIUM 8.8 9.2 8.6*     No results for input(s): MG, PHOS in the last 72 hours.   Recent Labs     02/20/20  0331 02/21/20  0233 02/22/20  0352   AST 11 17 31   ALT 10 12 21   BILITOT 0.4 0.4 0.3   ALKPHOS 125 135* 158* care patient    Atrial fibrillation with rapid ventricular response (HCC)    Malignant neoplasm of overlapping sites of left lung ([facility] [address]    Malignant neoplasm of lung ([facility] [address]  Resolved Problems:    * No resolved hospital problems. *      History of lung carcinoma, large pleural effusion, chronic obstructive pulmonary disease -patient status post Pleurx catheter placement. Continue with nasal cannula oxygen therapy, Oncology on board. Antibiotics (day #7 meropenem -will likely transition to Augmentin therapy on discharge p.o. ). O2 via NC. Bronchodilators. Repeat CXR showing left lung collapse. Likely endobronchial obstruction. Pulmonology signed off, CT surgery placed Pleurx catheter on 2/19/2020, patient education ongoing. No findings of malignant cells on thoracentesis fluid. Hyponatremia- 129, will monitor daily metabolic profile    Paroxysmal Atrial Fibrillation- Digoxin/Cardizem. Cardiology on board. Anxiety-Vistaril PRN ordered, continue Lexapro    Gastroesophageal reflux disease-chronic condition, continue with Protonix therapy    Supportive management. Palliative care on board. Advance Directive: DNR-CC     DVT prophylaxis: Lovenox    Discharge planning: Social work case management working on discharge to [facility]      Electronically signed by   Jeramie Ledesma   Internal Medicine Hospitalist  On 2/22/2020  At 11:53 AM    EMR Dragon/Transcription disclaimer:   Much of this encounter note is an electronic transcription/translation of spoken language to printed text.  The electronic translation of spoken language may permit erroneous, or at times, nonsensical words or phrases to be inadvertently transcribed; although attempts have made to review the note for such errors, some may still exist.

## 2020-02-23 NOTE — PROGRESS NOTES
Pts HR down to 39 then came back up in the 40's. Pt is in bed resting at this time.  Electronically signed by Carrington Magallon RN on 2/23/2020 at 1:24 PM

## 2020-02-23 NOTE — PLAN OF CARE
Problem:  Activity:  Goal: Ability to tolerate increased activity will improve  Outcome: Ongoing  Goal: Expression of feelings of increased energy will increase  Outcome: Ongoing     Problem: Cardiac:  Goal: Ability to maintain an adequate cardiac output will improve  Outcome: Ongoing  Goal: Complications related to the disease process, condition or treatment will be avoided or minimized  Outcome: Ongoing     Problem: Coping:  Goal: Level of anxiety will decrease  Outcome: Ongoing  Goal: General experience of comfort will improve  Outcome: Ongoing     Problem: Health Behavior:  Goal: Ability to manage health-related needs will improve  Outcome: Ongoing     Problem: Safety:  Goal: Ability to remain free from injury will improve  Outcome: Ongoing  Goal: Will show no signs and symptoms of excessive bleeding  Outcome: Ongoing     Problem: Risk for Impaired Skin Integrity  Goal: Tissue integrity - skin and mucous membranes  Outcome: Ongoing     Problem: Falls - Risk of:  Goal: Will remain free from falls  Outcome: Ongoing  Goal: Absence of physical injury  Outcome: Ongoing normal...

## 2020-02-23 NOTE — PROGRESS NOTES
Physical Therapy  Name: Trinity Hess  MRN:  [id number]  Date of service:  2/23/2020 02/23/20 1159   General   Missed reason Patient declined   Subjective   Subjective Pt states he will try later that she just got up to the chair.         Electronically signed by Katheryn Lou PTA on 2/23/2020 at 12:01 PM

## 2020-02-23 NOTE — PROGRESS NOTES
Physical Therapy  Name: Luis Dhillon  MRN:  [id number]  Date of service:  2/23/2020 02/23/20 1405   General   Missed reason Patient declined   Subjective   Subjective Pt states he just returned to bed and does not feel up to walking or therapy     Electronically signed by Meri Domingo PTA on 2/23/2020 at 2:05 PM

## 2020-02-23 NOTE — PROGRESS NOTES
Hospitalist Progress Note  2/23/2020 7:52 AM  Subjective:   Admit Date: 2/15/2020  PCP: ELTON Guajardo    Chief Complaint: Lethargy    Subjective: Patient seen and examined this a.m. subjectively feeling improved. Some back aching he states attributing to the bed. No acute distress noted overnight per nursing staff. Continues to await on acceptance to [facility]. Patient currently resting, no acute distress, denies any headache, change in vision, chest pain    Cumulative Hospital History: 60-year-old male with a history of lung cancer presenting for lethargy found to have atrial fibrillation with RVR and extensive pleural effusion with cardiology, pulmonology, and oncology consulted on admission. Patient is status post left-sided thoracentesis with 2 L of fluid removed. Repeat chest x-ray showing left lung collapse with pulmonology recommending cardiothoracic surgery consultation for Pleurx catheter. Pleurx catheter was inserted 2/19/2020. No malignant cells have been seen from fluid culture obtained status post thoracentesis. Patient remains afebrile    ROS: 14 point review of systems is negative except as specifically addressed above.     Dietary Nutrition Supplements: Frozen Oral Supplement  DIET CARDIAC; Daily Fluid Restriction: 1500 ml    Intake/Output Summary (Last 24 hours) at 2/23/2020 0752  Last data filed at 2/23/2020 0148  Gross per 24 hour   Intake 540 ml   Output 200 ml   Net 340 ml     Medications:   dextrose      diltiazem (CARDIZEM) 125 mg in dextrose 5% 125 mL infusion Stopped (02/18/20 1112)     Current Facility-Administered Medications   Medication Dose Route Frequency Provider Last Rate Last Dose    sodium chloride tablet 1 g  1 g Oral TID  Kimberly Reddy MD   1 g at 02/22/20 1625    glucose (GLUTOSE) 40 % oral gel 15 g  15 g Oral PRN Kimberly Reddy MD        dextrose 50 % IV solution  12.5 g Intravenous PRN Kimberly Reddy MD        glucagon (rDNA) injection 1 mg  1 mg Intramuscular PRN Harini Diaz MD        dextrose 5 % solution  100 mL/hr Intravenous PRN Harini Diaz MD        insulin lispro (HUMALOG) injection vial 9 Units  0.08 Units/kg Subcutaneous TID  Harini Diaz MD        insulin lispro (HUMALOG) injection vial 0-12 Units  0-12 Units Subcutaneous TID  Harini Diaz MD   4 Units at 02/21/20 1236    insulin lispro (HUMALOG) injection vial 0-6 Units  0-6 Units Subcutaneous Nightly Harini Diaz MD        hydrOXYzine (VISTARIL) capsule 25 mg  25 mg Oral BID PRN Harini Diaz MD   25 mg at 02/22/20 1325    escitalopram (LEXAPRO) tablet 10 mg  10 mg Oral Daily Harini Diaz MD   10 mg at 02/22/20 2328    furosemide (LASIX) tablet 40 mg  40 mg Oral Daily Harini Diaz MD   40 mg at 02/22/20 3567    metoprolol tartrate (LOPRESSOR) tablet 25 mg  25 mg Oral BID Harini Diaz MD   25 mg at 02/22/20 2048    HYDROcodone-acetaminophen (NORCO) 5-325 MG per tablet 1 tablet  1 tablet Oral Q6H PRN Harini Diaz MD   1 tablet at 02/22/20 1325    ketorolac (TORADOL) injection 30 mg  30 mg Intravenous Q6H PRN Harini Diaz MD   30 mg at 02/22/20 1858    melatonin tablet 2 mg  2 mg Oral Nightly PRN Humberto Lara MD   2 mg at 02/20/20 2116    pantoprazole (PROTONIX) injection 40 mg  40 mg Intravenous Daily Harini Diaz MD   40 mg at 02/22/20 6773    And    sodium chloride (PF) 0.9 % injection 10 mL  10 mL Intravenous Daily Harini Diaz MD   10 mL at 02/21/20 0859    aluminum & magnesium hydroxide-simethicone (MAALOX) 200-200-20 MG/5ML suspension 30 mL  30 mL Oral Q6H PRN Harini Diaz MD   30 mL at 02/22/20 1416    diltiazem (CARDIZEM CD) extended release capsule 120 mg  120 mg Oral BID Harini Diaz MD   120 mg at 02/22/20 2048    digoxin (LANOXIN) tablet 125 mcg  125 mcg Oral Daily Harini Diaz MD   125 mcg at 02/22/20 3774    acetaminophen (TYLENOL) tablet 1,000 mg  1,000 mg Oral Nightly PRN 02/22/20  0352 02/23/20  0349   AST 17 31 24   ALT 12 21 20   BILITOT 0.4 0.3 0.4   ALKPHOS 135* 158* 169*     ABGs:No results for input(s): PH, PO2, PCO2, HCO3, BE, O2SAT in the last 72 hours. Troponin T:   No results for input(s): TROPONINI in the last 72 hours. INR:   No results for input(s): INR in the last 72 hours. Lactic Acid:   No results for input(s): LACTA in the last 72 hours. Objective:   Vitals: /69   Pulse 90   Temp 95.7 °F (35.4 °C) (Temporal)   Resp 16   Ht 5' 8\" (1.727 m)   Wt 239 lb 1.6 oz (108.5 kg)   SpO2 94%   BMI 36.36 kg/m²   24HR INTAKE/OUTPUT:      Intake/Output Summary (Last 24 hours) at 2/23/2020 0752  Last data filed at 2/23/2020 0148  Gross per 24 hour   Intake 540 ml   Output 200 ml   Net 340 ml     Physical Exam  Vitals signs and nursing note reviewed. Constitutional:       General: He is not in acute distress. Appearance: He is not ill-appearing or toxic-appearing. HENT:      Head: Normocephalic and atraumatic. Nose: Nose normal.   Eyes:      General: No scleral icterus. Conjunctiva/sclera: Conjunctivae normal.   Neck:      Musculoskeletal: Normal range of motion. Cardiovascular:      Rate and Rhythm: Normal rate. Rhythm irregularly irregular. Pulses: Normal pulses. Heart sounds: No murmur. Pulmonary:      Effort: Pulmonary effort is normal.      Breath sounds: Rales present. No wheezing or rhonchi. Comments: Diminished breath sounds left-sided  Abdominal:      General: Bowel sounds are normal.      Tenderness: There is no guarding or rebound. Genitourinary:     Comments: Urethral catheter  Musculoskeletal:         General: Tenderness (lumbar) present. Right lower leg: No edema. Left lower leg: No edema. Comments: Pleurx catheter left-sided   Skin:     General: Skin is warm. Capillary Refill: Capillary refill takes less than 2 seconds.       Comments: Pleurx catheter incision site   Neurological:      General: No focal deficit present. Mental Status: He is alert and oriented to person, place, and time. Psychiatric:         Mood and Affect: Mood normal.           Assessment and Plan: Active Problems:    Paroxysmal A-fib [facility])    Palliative care patient    Atrial fibrillation with rapid ventricular response (HCC)    Malignant neoplasm of overlapping sites of left lung (HCC)    Malignant neoplasm of lung ([address]  Resolved Problems:    * No resolved hospital problems. *      History of lung carcinoma, large pleural effusion, chronic obstructive pulmonary disease -patient status post Pleurx catheter placement. Continue with nasal cannula oxygen therapy, Oncology on board. Antibiotics (day #8 meropenem -will likely transition to Augmentin therapy on discharge p.o. ). O2 via NC. Bronchodilators. Pulmonology signed off, CT surgery placed Pleurx catheter on 2/19/2020, patient education ongoing, draining recommendations per CT surgery. No findings of malignant cells on thoracentesis fluid. Chest x-ray 2/23-left lung opacification persists right lung grossly clear. Hyponatremia- 131, will monitor daily metabolic profile    Paroxysmal Atrial Fibrillation- Digoxin/Cardizem. Cardiology on board. Anxiety-Vistaril PRN ordered, continue Lexapro    Gastroesophageal reflux disease-chronic condition, continue with Protonix therapy    Supportive management. Palliative care on board. Advance Directive: DNR-CC     DVT prophylaxis: Lovenox    Discharge planning: Social work case management working on discharge to [facility]      Electronically signed by   Cydney Lee   Internal Medicine Hospitalist  On 2/23/2020  At 7:52 AM    EMR Dragon/Transcription disclaimer:   Much of this encounter note is an electronic transcription/translation of spoken language to printed text.  The electronic translation of spoken language may permit erroneous, or at times, nonsensical words or phrases to be inadvertently transcribed; although

## 2020-02-24 NOTE — PROGRESS NOTES
19.2* 24.1*   RBC 4.82 5.10 5.13   HGB 12.9* 13.5* 13.7*   HCT 42.4 44.5 45.2   MCV 88.0 87.3 88.1   MCH 26.8* 26.5* 26.7*   MCHC 30.4* 30.3* 30.3*    330 387     Recent Labs     02/22/20 0352 02/23/20 0349 02/24/20 0347   * 131* 132*   K 4.9 4.6 4.7   ANIONGAP 12 12 12   CL 86* 88* 89*   CO2 31* 31* 31*   BUN 23 17 18   CREATININE 0.7 0.5 0.5   GLUCOSE 150* 159* 213*   CALCIUM 8.6* 8.3* 8.4*     No results for input(s): MG, PHOS in the last 72 hours. Recent Labs     02/22/20 0352 02/23/20 0349 02/24/20 0347   AST 31 24 19   ALT 21 20 20   BILITOT 0.3 0.4 0.3   ALKPHOS 158* 169* 188*     ABGs:No results for input(s): PH, PO2, PCO2, HCO3, BE, O2SAT in the last 72 hours. Troponin T:   No results for input(s): TROPONINI in the last 72 hours. INR:   No results for input(s): INR in the last 72 hours. Lactic Acid:   No results for input(s): LACTA in the last 72 hours. Objective:   Vitals: /82   Pulse 76   Temp 96.4 °F (35.8 °C) (Temporal)   Resp 18   Ht 5' 8\" (1.727 m)   Wt 238 lb 1.6 oz (108 kg)   SpO2 97%   BMI 36.20 kg/m²   24HR INTAKE/OUTPUT:      Intake/Output Summary (Last 24 hours) at 2/24/2020 1119  Last data filed at 2/24/2020 0848  Gross per 24 hour   Intake 580 ml   Output --   Net 580 ml     Physical Exam  Vitals signs and nursing note reviewed. Constitutional:       General: He is not in acute distress. Appearance: He is not ill-appearing or toxic-appearing. HENT:      Head: Normocephalic and atraumatic. Nose: Nose normal.   Eyes:      General: No scleral icterus. Conjunctiva/sclera: Conjunctivae normal.   Neck:      Musculoskeletal: Normal range of motion. Cardiovascular:      Rate and Rhythm: Normal rate. Rhythm irregularly irregular. Pulses: Normal pulses. Heart sounds: No murmur. Pulmonary:      Effort: Pulmonary effort is normal.      Breath sounds: Rales present. No wheezing or rhonchi.       Comments: Diminished breath sounds Protonix therapy    Supportive management. Palliative care on board. Advance Directive: DNR-CC     DVT prophylaxis: Lovenox    Discharge planning: Social work case management working on discharge to [facility], patient is requested to talk with palliative services      Electronically signed by   Gennaro Reagan   Internal Medicine Hospitalist  On 2/24/2020  At 11:19 AM    EMR Dragon/Transcription disclaimer:   Much of this encounter note is an electronic transcription/translation of spoken language to printed text.  The electronic translation of spoken language may permit erroneous, or at times, nonsensical words or phrases to be inadvertently transcribed; although attempts have made to review the note for such errors, some may still exist.

## 2020-02-24 NOTE — PROGRESS NOTES
CREATININE 0.7 0.5 0.5   GLUCOSE 150* 159* 213*   CALCIUM 8.6* 8.3* 8.4*     No results for input(s): MG, PHOS in the last 72 hours. Recent Labs     02/22/20  0352 02/23/20  0349 02/24/20  0347   AST 31 24 19   ALT 21 20 20   BILITOT 0.3 0.4 0.3   ALKPHOS 158* 169* 188*     ABGs:No results for input(s): PHART, OXC2ENP, PO2ART, XGE9MLF, BEART, HGBAE, X3BUZMAM, CARBOXHGBART, 02THERAPY in the last 72 hours. HgBA1c: No results for input(s): LABA1C in the last 72 hours. FLP:    Lab Results   Component Value Date    TRIG 105 09/06/2015    HDL 28 09/06/2015    LDLCALC 164 09/24/2011    LDLDIRECT 85 09/06/2015     TSH:    Lab Results   Component Value Date    TSH 0.957 02/15/2020     Troponin T: No results for input(s): TROPONINI in the last 72 hours. INR: No results for input(s): INR in the last 72 hours.     Objective:   Vitals: /82   Pulse 76   Temp 96.4 °F (35.8 °C) (Temporal)   Resp 18   Ht 5' 8\" (1.727 m)   Wt 238 lb 1.6 oz (108 kg)   SpO2 97%   BMI 36.20 kg/m²   24HR INTAKE/OUTPUT:      Intake/Output Summary (Last 24 hours) at 2/24/2020 1235  Last data filed at 2/24/2020 0848  Gross per 24 hour   Intake 580 ml   Output --   Net 580 ml     Physical Exam     General appearance: no acute distress, obese, appears ill  HEENT: atraumatic, eyes with clear conjunctiva and normal lids, pupils and irises normal, external ears and nose are normal,lips normal.  Neck: without masses, supple  Lungs: no increased work of breathing, diminished breath sounds, especially throughout the L, coarse bilaterally with loose, nonproductive cough, nasal cannula oxygen in place  Heart: irregularly irregular rhythm and S1, S2 normal  Abdomen: soft, non-tender, BS difficult to auscultate, hypoactive  Genitourinary: No bladder fullness, masses, or tenderness  Extremities: atraumatic, able to JOHNSON, 1+ BLE edema  Neurologic: No focal neurologic deficits, normal sensation, no tremor, alert and oriented x 3 but drifts off easily  Psychiatric: Alert and oriented, mood and affect appropriate  Skin: warm, dry        Assessment and Plan: Active Problems:    Paroxysmal A-fib [facility])    Palliative care patient    Atrial fibrillation with rapid ventricular response (HCC)    Malignant neoplasm of overlapping sites of left lung (HCC)    Malignant neoplasm of lung ([address]  Resolved Problems:    * No resolved hospital problems. *        Visit Summary: I saw the patient at the bedside with no family present. Patient is resting in bed and does awaken when spoken to but tends to drift off easily today. He does report having increased nausea and difficulty clearing congestion with his cough. He appears ill and we discussed concern about his ability to improve enough to be able to tolerate treatments. We briefly discussed possible need for hospice and symptom management if he does not improve or become strong enough to have treatments. Patient dates that he would like to see Logan Dixon is going on in my stomach first\" but was agreeable to discussion with his spouse. I contacted the patient's spouse by phone and provided an update on the patient's status and current interventions/tests. We spoke about the need to revisit goals of care and I have planned a family meeting for tomorrow at 2 PM with the spouse to discuss the patient's health and options for goals of care. I provided emotional support to the patient's spouse and she was appreciative of the contact. Palliative medicine will continue to follow. Palliative Medicine will continue to follow. Recommendations: Family meeting planned for 2 pm tomorrow to revisit goals of care with the patient's spouse present    Thank you for consulting Palliative Care and allowing us to participate in the care of this patient.        Electronically signed by ELTON Clemente on 2/24/2020 at 12:35 PM    (Please note that portions of this note were completed with a voice recognition program. Effortswere made to edit the dictations but occasionally words are mis-transcribed.)

## 2020-02-24 NOTE — PLAN OF CARE
Problem:  Activity:  Goal: Ability to tolerate increased activity will improve  Description  Ability to tolerate increased activity will improve  2/24/2020 0204 by Elena Tay LPN  Outcome: Ongoing  2/23/2020 2308 by Elena Tay LPN  Outcome: Ongoing  2/23/2020 1549 by Marcia Bardales RN  Outcome: Ongoing  Goal: Expression of feelings of increased energy will increase  Description  Expression of feelings of increased energy will increase  2/24/2020 0204 by Elena Tay LPN  Outcome: Ongoing  2/23/2020 2308 by Elena Tay LPN  Outcome: Ongoing  2/23/2020 1549 by Marcia Bardales RN  Outcome: Ongoing     Problem: Cardiac:  Goal: Ability to maintain an adequate cardiac output will improve  Description  Ability to maintain an adequate cardiac output will improve  2/24/2020 0204 by Elena Tay LPN  Outcome: Ongoing  2/23/2020 2308 by Elena Tay LPN  Outcome: Ongoing  2/23/2020 1549 by Marcia Bardales RN  Outcome: Ongoing  Goal: Complications related to the disease process, condition or treatment will be avoided or minimized  Description  Complications related to the disease process, condition or treatment will be avoided or minimized  2/24/2020 0204 by Elena Tay LPN  Outcome: Ongoing  2/23/2020 2308 by Elena Tay LPN  Outcome: Ongoing  2/23/2020 1549 by Marcia Bardales RN  Outcome: Ongoing     Problem: Coping:  Goal: Level of anxiety will decrease  Description  Level of anxiety will decrease  2/24/2020 0204 by Elena Tay LPN  Outcome: Ongoing  2/23/2020 2308 by Elena Tay LPN  Outcome: Ongoing  2/23/2020 1549 by Marcia Bardales RN  Outcome: Ongoing  Goal: General experience of comfort will improve  Description  General experience of comfort will improve  2/24/2020 0204 by Elena Tay LPN  Outcome: Ongoing  2/23/2020 2308 by Elena Tay LPN  Outcome: Ongoing  2/23/2020 1549 by Marcia Bardales RN  Outcome: Ongoing Problem: Health Behavior:  Goal: Ability to manage health-related needs will improve  Description  Ability to manage health-related needs will improve  2/24/2020 0204 by Franco Acosta LPN  Outcome: Ongoing  2/23/2020 2308 by Franco Acosta LPN  Outcome: Ongoing  2/23/2020 1549 by Mahesh Kevin RN  Outcome: Ongoing     Problem: Safety:  Goal: Ability to remain free from injury will improve  Description  Ability to remain free from injury will improve  2/24/2020 0204 by Franco Acosta LPN  Outcome: Ongoing  2/23/2020 2308 by Franco Acosta LPN  Outcome: Ongoing  2/23/2020 1549 by Mahesh Kevin RN  Outcome: Ongoing  Goal: Will show no signs and symptoms of excessive bleeding  Description  Will show no signs and symptoms of excessive bleeding  2/24/2020 0204 by Franco Acosta LPN  Outcome: Ongoing  2/23/2020 2308 by Franco Acosta LPN  Outcome: Ongoing  2/23/2020 1549 by Mahesh Kevin RN  Outcome: Ongoing     Problem: Risk for Impaired Skin Integrity  Goal: Tissue integrity - skin and mucous membranes  Description  Structural intactness and normal physiological function of skin and  mucous membranes.   2/24/2020 0204 by Franco Acosta LPN  Outcome: Ongoing  2/23/2020 2308 by Franco Acosta LPN  Outcome: Ongoing  2/23/2020 1549 by Mahesh Kevin RN  Outcome: Ongoing     Problem: Falls - Risk of:  Goal: Will remain free from falls  Description  Will remain free from falls  2/24/2020 0204 by Franco Acosta LPN  Outcome: Ongoing  2/23/2020 2308 by Franco Acosta LPN  Outcome: Ongoing  2/23/2020 1549 by Mahesh Kevin RN  Outcome: Ongoing  Goal: Absence of physical injury  Description  Absence of physical injury  2/24/2020 0204 by Franco Acosta LPN  Outcome: Ongoing  2/23/2020 2308 by Franco Acosta LPN  Outcome: Ongoing  2/23/2020 1549 by Mahesh Kevin RN  Outcome: Ongoing     Problem: Pain:  Goal: Pain level will decrease  Description  Pain level will

## 2020-02-24 NOTE — PROGRESS NOTES
Cleaning patient up he is hurting so bad that he doesn't want to roll over to get soiled pad out from under him. Nauseated and rates his side pain \"more than a ten\" and his nausea has been persistent, nothing \"really helps much\". States he is \"miserable\" but had a \"pretty good day yesterday. \"  Spends most all his days in the bed and was prior to coming to hospital will contact hospitalist for some comfort medications for Mr. Nix.

## 2020-02-24 NOTE — PROGRESS NOTES
Physical Therapy  Name: Jossie Mayes  MRN:  [id number]  Date of service:  2/24/2020 02/24/20 1642   General   Missed reason Patient declined   Subjective   Subjective Patient states that he does not feel like working with therapy at this time.           Electronically signed by Cosmo Bailey PTA on 2/24/2020 at 4:43 PM

## 2020-02-24 NOTE — PLAN OF CARE
improve  Description  Ability to remain free from injury will improve  2/23/2020 2308 by Halina Valdez LPN  Outcome: Ongoing  2/23/2020 1549 by Jarad Christie RN  Outcome: Ongoing  Goal: Will show no signs and symptoms of excessive bleeding  Description  Will show no signs and symptoms of excessive bleeding  2/23/2020 2308 by Halina Valdez LPN  Outcome: Ongoing  2/23/2020 1549 by Jarad Christie RN  Outcome: Ongoing     Problem: Risk for Impaired Skin Integrity  Goal: Tissue integrity - skin and mucous membranes  Description  Structural intactness and normal physiological function of skin and  mucous membranes.   2/23/2020 2308 by Halina Valdez LPN  Outcome: Ongoing  2/23/2020 1549 by Jarad Christie RN  Outcome: Ongoing     Problem: Falls - Risk of:  Goal: Will remain free from falls  Description  Will remain free from falls  2/23/2020 2308 by Halina Valdez LPN  Outcome: Ongoing  2/23/2020 1549 by Jarad Christie RN  Outcome: Ongoing  Goal: Absence of physical injury  Description  Absence of physical injury  2/23/2020 2308 by Halina Valdez LPN  Outcome: Ongoing  2/23/2020 1549 by Jarad Christie RN  Outcome: Ongoing

## 2020-02-24 NOTE — PROGRESS NOTES
Nutrition Assessment    Type and Reason for Visit: Reassess    Nutrition Recommendations: continue current POC    Nutrition Assessment: Pt remains at risk for nutritional compromise d/t nausea, decreased po d/t pain and weight loss. Modifying current diet to iclude Carb Control. Malnutrition Assessment:  · Malnutrition Status: No malnutrition  · Context: Acute illness or injury  · Findings of the 6 clinical characteristics of malnutrition (Minimum of 2 out of 6 clinical characteristics is required to make the diagnosis of moderate or severe Protein Calorie Malnutrition based on AND/ASPEN Guidelines):  1. Energy Intake-Less than or equal to 75% of estimated energy requirement, Greater than or equal to 7 days    2. Weight Loss-10% loss or greater, (11 months)  3. Fat Loss-No significant subcutaneous fat loss,    4. Muscle Loss- ,    5. Fluid Accumulation-No significant fluid accumulation, Extremities  6.  Strength-Not measured    Nutrition Risk Level:  Moderate    Nutrition Diagnosis:   · Problem: Increased nutrient needs  · Etiology: related to Increased demand for energy/nutrients     Signs and symptoms:  as evidenced by Intake 0-25%, Intake 25-50%, Intake 50-75%, Weight loss, Presence of wounds    Objective Information:  · Nutrition-Focused Physical Findings: well nourished  · Wound Type: Surgical Wound  · Current Nutrition Therapies:  · Oral Diet Orders: Carb Control 4 Carbs/Meal, Cardiac, Fluid Restriction   · Oral Diet intake: 0%, 26-50%, 51-75%  · Oral Nutrition Supplement (ONS) Orders: Frozen Oral Supplement  · ONS intake: 1-25%, 26-50%     · Anthropometric Measures:  · Ht: 5' 8\" (172.7 cm)   · Current Body Wt: 238 lb 1 oz (108 kg)  · Admission Body Wt: 240 lb (108.9 kg)(stated)  · Usual Body Wt: 280 lb (127 kg)(3/2019)  · % Weight Change:  ,  15% weight loss in 11 months  · Ideal Body Wt: 154 lb (69.9 kg), % Ideal Body 154.6%  · BMI Classification: BMI 35.0 - 39.9 Obese Class II    Nutrition Interventions:   Modify current diet, Continue current ONS  Continued Inpatient Monitoring    Nutrition Evaluation:   · Evaluation: Progressing toward goals   · Goals: po intake 50% or greater with all meals    · Monitoring: Meal Intake, Supplement Intake, Diet Tolerance, Skin Integrity, Wound Healing, Weight, Pertinent Labs      Electronically signed by Gregory Burkitt, MS, RD, LD on 2/24/20 at 12:32 PM    Contact Number: [phone]

## 2020-02-25 NOTE — PROGRESS NOTES
PROGRESS NOTE    Pt Name: Hugo Marr  MRN: [id number]  YOB: 1955  Date of evaluation: 2/25/2020    Subjective  Significant weakness. Very poor performance status. Removed 250 mL's from Pleurx catheter yesterday. Mr. Baron Lock was seen in initial medical oncology consultation on 2/16/2020 as an inpatient at [facility] with a recent diagnosis of metastatic lung cancer made at [facility] to establish care.     He will be followed by [facility] for Martin's identical twin Nataliia Myles, also with lung cancer.     Myah Chavis has been on chronic supplemental oxygen for at least 6 months prior to presentation due to his history of underlying COPD.  He wears 4 L of supplemental oxygen. His PCP is Juan Manuel Mckeon PA-C on the [address].     Myah Chavis presented to [facility] ED on 1/25/2020 with hemoptysis increasing weakness. He was also found to have atrial fibrillation with RVR.      CTA chest with contrast on 1/25/2020:  NO PE  Complete collapse of the left lung secondary to an occluded left main stem bronchus  Moderate size left pleural effusion  Mild cardiomegaly, trace pericardial fluid  Vascular Lab Prelim    Duplex venous scan of B/L LE on 1/25/2020 shows no evidence for dvt, svt, reflux noted at this time. Final report pending     The case was discussed with Dr Gio Sy with pulmonology, who stated that he can do a bronch but with the bleeding, he would not be able to intervene other than using epinephrine. Would recommend transfer to higher level of care as pt may require embolization.      Mr. Maia Acevedo was transferred to [facility] in [facility] where further work-up was performed.     X-ray Chest Portable 1/26/2020  Complete whiteout of the left lung with mild deviation of the trachea to the left. Findings are consistent with complete collapse of the left lung.  Underlying infection or mass or effusion cannot be excluded    SIMONA at [place] on 1/27/2020 was performed. LVEF greater than 55%. Unable to comment on diastolic filling pattern due to atrial  fibrillation. RV mildly dilated. Mildly depressed RV systolic function. The left atrium is mild to moderately dilated. RA mildly dilated. Mild tricuspid regurgitation. The RV systolic pressure is calculated at 63 mmHg.     A thoracentesis was performed at [facility] on 1/27/2020.  1100 cc of pleural fluid was drained. Cytology:  REACTIVE MESOTHELIAL CELLS, HISTIOCYTES, AND LYMPHOCYTES; NEGATIVE FOR MALIGNANCY      CT C/A/P at [facility] 1/29/2020  Impression:   1.  Signs of large LEFT lung mass with postobstructive collapse of   the entire LEFT lung and mediastinal shift. 2.  Bulky subcarinal adenopathy. 3.  Nodular bilateral adrenal glands still with some adreniform   contour, suspicious for either nodular hyperplasia or bilateral   metastatic disease to the adrenal glands.      Bronchoscopy with insertion of stent was performed on 1/29/2020 at [facility]. A mass was found in the middle portion of the left mainstem bronchus with about 90% obstruction. The mass was large and bloody and friable. a 10 x 40 Aero stent was placed with good results.        Records discuss \"squamous cell lung cancer \"  however I am unable to view a pathology report.     PET at [facility] 1/31/2020  Impression   Intense FDG uptake within a ill-defined left hilar mass as well as the left upper and left lower lobes. Interval placement of a left   main bronchus stent which is occluded distally with complete collapse of the left lung. FDG uptake within the left upper and lower lobes may be related to infection versus malignancy. Small left pleural effusion. No FDG uptake is present within the subcarinal lymphadenopathy.      MRI brain w/ and w/o  2/1/2020 8:07 AM  Impression   1.  No findings of intracranial metastatic disease.    2.  Mild chronic small vessel ischemic white matter disease and tiny   old right cerebellar infarcts.      CT abdomen pelvis with contrast 2/15/2020 at [address]  No acute abnormality seen in the abdomen  Left pleural effusion     left thoracentesis 2/17/2020: 2L of clear, straw colored fluid drained. cytology negative: benign mesothelial cells and occasional small round lymphocytes     Post-procedure CXR 2/17/20: No pneumothorax, diffuse opacification of the left chest with some residual pleural effusion  He has already been evaluated by Dr. Gracia Smith     S/p left VATS/Pleur-X catheter placement on 2/19/2020. Cytology was negative    REVIEW OF SYSTEMS:   CONSTITUTIONAL: no fever, no night sweats, fatigue;  HEENT: no blurring of vision, no double vision, no hearing difficulty, no tinnitus, no ulceration, no dysplasia, no epistaxis;  LUNGS: no cough, no hemoptysis, no wheeze,   shortness of breath;  CARDIOVASCULAR: no palpitation, no chest pain,  shortness of breath;  GI:  abdominal pain, no nausea, no vomiting, no diarrhea, no constipation;  RAN: no dysuria, no hematuria, no frequency or urgency, no nephrolithiasis;  MUSCULOSKELETAL: no joint pain, no swelling, no stiffness;  ENDOCRINE: no polyuria, no polydipsia, no cold or heat intolerance;  HEMATOLOGY: no easy bruising or bleeding, no history of clotting disorder;  DERMATOLOGY: no skin rash, no eczema, no pruritus;  NEUROLOGY: no syncope, no seizures, no numbness or tingling of hands, no numbness or tingling of feet, no paresis;    Objective   /72   Pulse 91   Temp 96.2 °F (35.7 °C) (Temporal)   Resp 28   Ht 5' 8\" (1.727 m)   Wt 230 lb 9.6 oz (104.6 kg)   SpO2 94%   BMI 35.06 kg/m²     PHYSICAL EXAM:  CONSTITUTIONAL: Alert, appropriate, ill-appearing, some distress due to pain  EYES: Non icteric, EOM intact, pupils equal round   ENT: Mucus membranes moist, no oral pharyngeal lesions, external inspection of ears and nose are normal.  O2 nasal cannula  NECK: Supple, no masses.   No palpable thyroid mass  CHEST/LUNGS:

## 2020-02-25 NOTE — PROGRESS NOTES
Palliative Care Progress Note  2/25/2020 1:16 PM  Subjective:   Admit Date: 2/15/2020  PCP: ELTON Grace    Chief Complaint: Weakness    Interval History:No acute overnight events. Pleur X drained yesterday for 250 ml of fluid. He continues to have significant weakness and has not been participating in PT due to generally not feeling well and increased nausea yesterday. Patient continues to have poor performance status and goals of care to be revisited. Review of Systems     Review of Systems   Constitutional: Positive for activity change, appetite change and fatigue. Generalized weakness   Eyes: Negative. Respiratory: Positive for shortness of breath. Negative for cough, wheezing and stridor. Cardiovascular: Negative. Gastrointestinal: Positive for nausea and abdominal pain    Endocrine: Negative. Genitourinary: Negative. Musculoskeletal: Positive for myalgias. Skin:        L Pleur X drain with dressing in place   Neurological: Negative. Psychiatric/Behavioral: Negative.         Dietary Nutrition Supplements: Frozen Oral Supplement  DIET CARDIAC; Carb Control: 4 carb choices (60 gms)/meal; Daily Fluid Restriction: 1500 ml    Medications:   dextrose      dextrose      diltiazem (CARDIZEM) 125 mg in dextrose 5% 125 mL infusion Stopped (02/18/20 6720)     Current Facility-Administered Medications   Medication Dose Route Frequency Provider Last Rate Last Dose    glucose (GLUTOSE) 40 % oral gel 15 g  15 g Oral PRN Annamary Severance, MD        dextrose 50 % IV solution  12.5 g Intravenous PRN Annamary Severance, MD        glucagon (rDNA) injection 1 mg  1 mg Intramuscular PRN Annamary Severance, MD        dextrose 5 % solution  100 mL/hr Intravenous PRN Annamary Severance, MD        promethazine (PHENERGAN) tablet 25 mg  25 mg Oral Q6H PRN Brendan Trent MD   25 mg at 02/24/20 0935    oxyCODONE-acetaminophen (PERCOCET)  MG per tablet 1 tablet  1 tablet Oral Q6H PRN Jesus Pan 18 33*   CREATININE 0.5 0.5 0.8   GLUCOSE 159* 213* 193*   CALCIUM 8.3* 8.4* 8.6*     No results for input(s): MG, PHOS in the last 72 hours. Recent Labs     02/23/20  0349 02/24/20  0347 02/25/20  0211   AST 24 19 20   ALT 20 20 18   BILITOT 0.4 0.3 0.4   ALKPHOS 169* 188* 173*     ABGs:No results for input(s): PHART, ZTM7POS, PO2ART, SHL1OOF, BEART, HGBAE, S8IWKSTW, CARBOXHGBART, 02THERAPY in the last 72 hours. HgBA1c: No results for input(s): LABA1C in the last 72 hours. FLP:    Lab Results   Component Value Date    TRIG 105 09/06/2015    HDL 28 09/06/2015    LDLCALC 164 09/24/2011    LDLDIRECT 85 09/06/2015     TSH:    Lab Results   Component Value Date    TSH 0.957 02/15/2020     Troponin T: No results for input(s): TROPONINI in the last 72 hours. INR: No results for input(s): INR in the last 72 hours.     Objective:   Vitals: /68   Pulse 95   Temp 95.9 °F (35.5 °C) (Temporal)   Resp 24   Ht 5' 8\" (1.727 m)   Wt 230 lb 9.6 oz (104.6 kg)   SpO2 90%   BMI 35.06 kg/m²   24HR INTAKE/OUTPUT:      Intake/Output Summary (Last 24 hours) at 2/25/2020 1316  Last data filed at 2/25/2020 1305  Gross per 24 hour   Intake 240 ml   Output 475 ml   Net -235 ml     Physical Exam     General appearance: no acute distress, obese, appears ill  HEENT: atraumatic, eyes with clear conjunctiva and normal lids, pupils and irises normal, external ears and nose are normal,lips normal.  Neck: without masses, supple  Lungs: no increased work of breathing, diminished breath sounds, especially throughout the L, coarse bilaterally with loose, nonproductive cough, nasal cannula oxygen in place  Heart: irregularly irregular rhythm and S1, S2 normal  Abdomen: soft, non-tender, BS +  Genitourinary: No bladder fullness, masses, or tenderness  Extremities: atraumatic, able to JOHNSON, 1+ BLE edema  Neurologic: No focal neurologic deficits, normal sensation, no tremor, alert and oriented x 3   Psychiatric: Alert and oriented x 3, mood and affect appropriate  Skin: warm, dry        Assessment and Plan: Active Problems:    Paroxysmal A-fib [facility])    Palliative care patient    Atrial fibrillation with rapid ventricular response (HCC)    Malignant neoplasm of overlapping sites of left lung (HCC)    Malignant neoplasm of lung ([address]  Resolved Problems:    * No resolved hospital problems. *        Visit Summary: I saw the patient at the bedside with his spouse present. Patient is currently resting in bed, much more alert than yesterday. We discussed goals of care, patient currently being evaluated for possible rehabilitation at [facility]. Patient has not been participating in PT recently due to nausea and fatigue. I discussed the need to participate, even for short periods, to improve his performance status and to help with precertification and approval for rehabilitation. I also discussed the need to continue therapy to remain at rehab facility and improve performance status as he will be unable to undergo treatments while he remains weak. Patient/spouse understand that he has multiple problems that are contributing to his poor performance status, spouse reports that he was quite sedentary at home at baseline and had progressively become weaker. This was also compounded by his COPD. Difficult situation as patient's twin brother had visited earlier and it appears that his course with similar diagnosis has been different and his outlook for the patient may be altered due to this. I also discussed options for care if the patient does not improve enough to continue rehabilitation or receive recommended treatments to include hospice care. I feel it is reasonable to watch and wait as patient's ability to participate in PT has waxed and waned. I did provide encouragement to the patient and he was agreeable to have PT come back to the room date for therapy. I have discussed this with nursing and they will notify PT.  All questions were answered and I will touch base with the patient's spouse to keep her updated on his status. Palliative medicine will continue to follow. Recommendations: Encourage participation in therapy. Pt/spouse would like to continue to pursue rehab at [facility]. Thank you for consulting Palliative Care and allowing us to participate in the care of this patient.        Electronically signed by ELTON Jones on 2/25/2020 at 1:16 PM    (Please note that portions of this note were completed with a voice recognition program.  Cassie Georges made to edit the dictations but occasionally words are mis-transcribed.)

## 2020-02-25 NOTE — PROGRESS NOTES
Occupational Therapy    Pt. Declined OT attempts this PM, stating he was exhausted.   Electronically signed by Errol Cormier OT on 2/25/2020 at 2:00 PM

## 2020-02-25 NOTE — PLAN OF CARE
Problem: Activity:  Goal: Ability to tolerate increased activity will improve  Description  Ability to tolerate increased activity will improve  Outcome: Ongoing  Goal: Expression of feelings of increased energy will increase  Description  Expression of feelings of increased energy will increase  Outcome: Ongoing     Problem: Cardiac:  Goal: Ability to maintain an adequate cardiac output will improve  Description  Ability to maintain an adequate cardiac output will improve  Outcome: Ongoing  Goal: Complications related to the disease process, condition or treatment will be avoided or minimized  Description  Complications related to the disease process, condition or treatment will be avoided or minimized  Outcome: Ongoing     Problem: Coping:  Goal: Level of anxiety will decrease  Description  Level of anxiety will decrease  Outcome: Ongoing  Goal: General experience of comfort will improve  Description  General experience of comfort will improve  Outcome: Ongoing     Problem: Health Behavior:  Goal: Ability to manage health-related needs will improve  Description  Ability to manage health-related needs will improve  Outcome: Ongoing     Problem: Safety:  Goal: Ability to remain free from injury will improve  Description  Ability to remain free from injury will improve  Outcome: Ongoing  Goal: Will show no signs and symptoms of excessive bleeding  Description  Will show no signs and symptoms of excessive bleeding  Outcome: Ongoing     Problem: Risk for Impaired Skin Integrity  Goal: Tissue integrity - skin and mucous membranes  Description  Structural intactness and normal physiological function of skin and  mucous membranes.   Outcome: Ongoing     Problem: Falls - Risk of:  Goal: Will remain free from falls  Description  Will remain free from falls  Outcome: Ongoing  Goal: Absence of physical injury  Description  Absence of physical injury  Outcome: Ongoing     Problem: Pain:  Goal: Pain level will decrease  Description  Pain level will decrease  Outcome: Ongoing  Goal: Control of acute pain  Description  Control of acute pain  Outcome: Ongoing  Goal: Control of chronic pain  Description  Control of chronic pain  Outcome: Ongoing

## 2020-02-26 NOTE — PROGRESS NOTES
Pharmacy Intravenous to Oral Protocol  Medication changed per P&T protocol: Pantoprazole (PROTONIX)    Patient meets criteria based on the followin. IV therapy > 24 hours - yes  2. Nausea/vomiting - no  3. Regular diet - yes  4. Tolerating other oral medications: yes  5. Afebrile for 24 hours: n/a  6.  WBC trending downward: n/a      Electronically signed by Noel Kothari [facility] on 2020 at 1:51 PM

## 2020-02-26 NOTE — CARE COORDINATION
Pt's insurance denied SNF services. Pt has refused therapy this admission repeatedly and only this AM was agreeable to walk to the restroom and sit in the recliner when therapy came to the room. SW explained insurance has already declined services and the pt at this time will need to determine either home with [facility] services or home with hospice services d/t cancer diagnosis and ongoing medical issues. Pt called his spouse and she is on her way. SW will speak with both when spouse arrives. Pt reports has attempted home services in the past and services were always terminated because of his dogs. Pt states having 7 beagle mix dogs in the home. Pt refuses to place dogs outside in order to ensure services and says will \"maybe\" be able to put them in a room in the home when services are present.

## 2020-02-26 NOTE — PROGRESS NOTES
prolonged hospitalization, is sedentary at baseline and lacks motivation, has issues with anxiety related to mobility/COPD, and will require continued encouragement as his rehabilitation may take some time to help prepare him for treatment as offered by oncology. I will continue to follow and provide updates to patient spouse and assist with goals of care.

## 2020-02-26 NOTE — PROGRESS NOTES
Hospitalist Progress Note  2/26/2020 1:49 PM  Subjective:   Admit Date: 2/15/2020  PCP: ELTON Malone    Chief Complaint: Lethargy    Subjective: Patient seen and examined this a.m. feels somewhat better. Had discussion with palliative services yesterday patient wants to proceed with nursing home placement. Continues to have wet sounding cough however trouble with expectoration. Patient currently  denies any headache, change in vision, chest pain, fevers or chills  Strongly encourage patient to participate with therapy services today    [facility] History: 67-year-old male with a history of lung cancer presenting for lethargy found to have atrial fibrillation with RVR and extensive pleural effusion with cardiology, pulmonology, and oncology consulted on admission. Patient is status post left-sided thoracentesis with 2 L of fluid removed. Repeat chest x-ray showing left lung collapse with pulmonology recommending cardiothoracic surgery consultation for Pleurx catheter. Pleurx catheter was inserted 2/19/2020. No malignant cells have been seen from fluid culture obtained status post thoracentesis. Patient remains afebrile, Pleurx catheter drained 2/24, KUB obtained-findings of ileus possible small bowel obstruction-patient has had multiple bowel movements, bowel sounds active. Awaiting precertification for superior    ROS: 14 point review of systems is negative except as specifically addressed above.     Dietary Nutrition Supplements: Frozen Oral Supplement  DIET CARDIAC; Carb Control: 4 carb choices (60 gms)/meal; Daily Fluid Restriction: 1500 ml    Intake/Output Summary (Last 24 hours) at 2/26/2020 1349  Last data filed at 2/26/2020 1324  Gross per 24 hour   Intake 480 ml   Output 715 ml   Net -235 ml     Medications:   dextrose      dextrose      dilTIAZem (CARDIZEM) 125 mg in dextrose 5% 125 mL infusion Stopped (02/18/20 7041)     Current Facility-Administered Medications   Medication Dose Route Frequency Provider Last Rate Last Dose    glucose (GLUTOSE) 40 % oral gel 15 g  15 g Oral PRN Masha Hernandez MD        dextrose 50 % IV solution  12.5 g Intravenous PRN Masha Hernandez MD        glucagon (rDNA) injection 1 mg  1 mg Intramuscular PRN Masha Hernandez MD        dextrose 5 % solution  100 mL/hr Intravenous PRN Masha Hernandez MD        promethazine (PHENERGAN) tablet 25 mg  25 mg Oral Q6H PRN Rachel Bennett MD   25 mg at 02/25/20 2121    oxyCODONE-acetaminophen (PERCOCET)  MG per tablet 1 tablet  1 tablet Oral Q6H PRN Rachle Bennett MD   1 tablet at 02/24/20 0943    morphine injection 2 mg  2 mg Intravenous Q4H PRN Rachel Bennett MD   2 mg at 02/24/20 0925    amoxicillin-clavulanate (AUGMENTIN) 500-125 MG per tablet 1 tablet  1 tablet Oral 3 times per day Masha Hernandez MD   1 tablet at 02/26/20 0550    sodium chloride tablet 1 g  1 g Oral TID  Masha Hernandez MD   1 g at 02/26/20 1255    glucose (GLUTOSE) 40 % oral gel 15 g  15 g Oral PRN Masha Hernandez MD        dextrose 50 % IV solution  12.5 g Intravenous PRN Masha Hernandez MD        glucagon (rDNA) injection 1 mg  1 mg Intramuscular PRN Masha Hernandez MD        dextrose 5 % solution  100 mL/hr Intravenous PRN Masha Hernandez MD        insulin lispro (HUMALOG) injection vial 9 Units  0.08 Units/kg Subcutaneous TID  Masha Hernandez MD   Stopped at 02/24/20 1109    insulin lispro (HUMALOG) injection vial 0-12 Units  0-12 Units Subcutaneous TID  Masha Hernandez MD   2 Units at 02/25/20 1800    insulin lispro (HUMALOG) injection vial 0-6 Units  0-6 Units Subcutaneous Nightly Masha Hernandez MD   1 Units at 02/25/20 2116    hydrOXYzine (VISTARIL) capsule 25 mg  25 mg Oral BID PRN Masha Hernandez MD   25 mg at 02/24/20 0043    escitalopram (LEXAPRO) tablet 10 mg  10 mg Oral Daily Masha Hernandez MD   10 mg at 02/26/20 0091    furosemide (LASIX) tablet 40 mg  40 mg Oral Daily Masha Hernandez MD 40 mg at 02/26/20 2929    metoprolol tartrate (LOPRESSOR) tablet 25 mg  25 mg Oral BID Beverley Zeng MD   25 mg at 02/26/20 6760    melatonin tablet 2 mg  2 mg Oral Nightly PRN Shania Padgett MD   2 mg at 02/23/20 2046    pantoprazole (PROTONIX) injection 40 mg  40 mg Intravenous Daily Beverley Zeng MD   40 mg at 02/26/20 4671    And    sodium chloride (PF) 0.9 % injection 10 mL  10 mL Intravenous Daily Beverley Zeng MD   10 mL at 02/26/20 0910    aluminum & magnesium hydroxide-simethicone (MAALOX) 200-200-20 MG/5ML suspension 30 mL  30 mL Oral Q6H PRN Beverley Zeng MD   30 mL at 02/22/20 1416    diltiazem (CARDIZEM CD) extended release capsule 120 mg  120 mg Oral BID Beverley Zeng MD   120 mg at 02/26/20 5227    digoxin (LANOXIN) tablet 125 mcg  125 mcg Oral Daily Beverley Zeng MD   125 mcg at 02/26/20 8207    acetaminophen (TYLENOL) tablet 1,000 mg  1,000 mg Oral Nightly PRN Beverley Zeng MD   650 mg at 02/18/20 0620    diltiazem 125 mg in dextrose 5 % 125 mL infusion  5 mg/hr Intravenous Continuous Beverley Zeng MD   Stopped at 02/18/20 4006    diltiazem injection 10 mg  10 mg Intravenous Q30 Min PRN Beverley Zeng MD   10 mg at 02/15/20 2117    sodium chloride flush 0.9 % injection 10 mL  10 mL Intravenous 2 times per day Beverley Zeng MD   10 mL at 02/26/20 0911    sodium chloride flush 0.9 % injection 10 mL  10 mL Intravenous PRN Beverley Zeng MD        magnesium hydroxide (MILK OF MAGNESIA) 400 MG/5ML suspension 30 mL  30 mL Oral Daily PRN Beverley Zeng MD        ondansetron [facility]) injection 4 mg  4 mg Intravenous Q6H PRN Beverley Zeng MD   4 mg at 02/23/20 2126    acetaminophen (TYLENOL) tablet 650 mg  650 mg Oral Q4H PRN Beverley Zeng MD   650 mg at 02/18/20 1657    ipratropium-albuterol (DUONEB) nebulizer solution 1 ampule  1 ampule Inhalation Q4H WA Beverley Zeng MD   1 ampule at 02/26/20 1140    lactulose (CHRONULAC) 10 GM/15ML solution 20 g  20 g Oral TID Kimberly Reddy MD   20 g at 02/25/20 1453    enoxaparin (LOVENOX) injection 40 mg  40 mg Subcutaneous Daily Kimberly Reddy MD   40 mg at 02/26/20 0910        Labs:     Recent Labs     02/24/20 0347 02/25/20 0211 02/26/20  0350   WBC 24.1* 20.8* 14.5*   RBC 5.13 5.29 4.44*   HGB 13.7* 14.1 12.1*   HCT 45.2 46.9 40.0*   MCV 88.1 88.7 90.1   MCH 26.7* 26.7* 27.3   MCHC 30.3* 30.1* 30.3*    464* 367     Recent Labs     02/24/20 0347 02/25/20  0211 02/26/20  0350   * 134* 138   K 4.7 5.5* 3.8   ANIONGAP 12 13 10   CL 89* 91* 94*   CO2 31* 30* 34*   BUN 18 33* 30*   CREATININE 0.5 0.8 0.6   GLUCOSE 213* 193* 166*   CALCIUM 8.4* 8.6* 8.0*     No results for input(s): MG, PHOS in the last 72 hours. Recent Labs     02/24/20 0347 02/25/20  0211 02/26/20  0350   AST 19 20 16   ALT 20 18 15   BILITOT 0.3 0.4 0.3   ALKPHOS 188* 173* 155*     ABGs:No results for input(s): PH, PO2, PCO2, HCO3, BE, O2SAT in the last 72 hours. Troponin T:   No results for input(s): TROPONINI in the last 72 hours. INR:   No results for input(s): INR in the last 72 hours. Lactic Acid:   No results for input(s): LACTA in the last 72 hours. Objective:   Vitals: /63   Pulse 105   Temp 97 °F (36.1 °C) (Temporal)   Resp 20   Ht 5' 8\" (1.727 m)   Wt 230 lb 9.6 oz (104.6 kg)   SpO2 95%   BMI 35.06 kg/m²   24HR INTAKE/OUTPUT:      Intake/Output Summary (Last 24 hours) at 2/26/2020 1349  Last data filed at 2/26/2020 1324  Gross per 24 hour   Intake 480 ml   Output 715 ml   Net -235 ml     Physical Exam  Vitals signs and nursing note reviewed. Constitutional:       General: He is not in acute distress. Appearance: He is not ill-appearing or toxic-appearing. HENT:      Head: Normocephalic and atraumatic. Nose: Nose normal.   Eyes:      General: No scleral icterus. Conjunctiva/sclera: Conjunctivae normal.   Neck:      Musculoskeletal: Normal range of motion.    Cardiovascular:      Rate and Rhythm: Normal rate. Rhythm irregularly irregular. Pulses: Normal pulses. Heart sounds: No murmur. Pulmonary:      Effort: Pulmonary effort is normal.      Breath sounds: Rales present. No wheezing or rhonchi. Comments: Diminished breath sounds left-sided  Abdominal:      General: Bowel sounds are normal. There is distension (stable appearance). Tenderness: There is abdominal tenderness. There is no guarding or rebound. Genitourinary:     Comments: Urethral catheter  Musculoskeletal:         General: Tenderness (lumbar) present. Right lower leg: No edema. Left lower leg: No edema. Comments: Pleurx catheter left-sided   Skin:     General: Skin is warm. Capillary Refill: Capillary refill takes less than 2 seconds. Comments: Pleurx catheter incision site   Neurological:      General: No focal deficit present. Mental Status: He is alert and oriented to person, place, and time. Psychiatric:         Mood and Affect: Mood normal.           Assessment and Plan: Active Problems:    Paroxysmal A-fib [facility])    Palliative care patient    Atrial fibrillation with rapid ventricular response (HCC)    Malignant neoplasm of overlapping sites of left lung (HCC)    Malignant neoplasm of lung ([facility] [place] [address]  Resolved Problems:    * No resolved hospital problems. *      History of lung carcinoma, large pleural effusion, chronic obstructive pulmonary disease -patient status post Pleurx catheter placement. Continue with nasal cannula oxygen therapy, Oncology on board. Antibiotics (p.o. Augmentin). O2 via NC. Bronchodilators. Pulmonology signed off, CT surgery placed Pleurx catheter on 2/19/2020, patient education ongoing, draining recommendations per CT surgery. No findings of malignant cells on thoracentesis fluid.   Chest x-ray 2/23-left lung opacification persists right lung grossly clear. -Pleurx catheter to be drained today -prognosis guarded, PT/OT, awaiting precertification for

## 2020-02-26 NOTE — PROGRESS NOTES
complete collapse of the left lung. Underlying infection or mass or effusion cannot be excluded      SIOMNA at [place] on 1/27/2020 was performed. LVEF greater than 55%. Unable to comment on diastolic filling pattern due to atrial  fibrillation. RV mildly dilated. Mildly depressed RV systolic function. The left atrium is mild to moderately dilated. RA mildly dilated. Mild tricuspid regurgitation. The RV systolic pressure is calculated at 63 mmHg.     A thoracentesis was performed at [facility] on 1/27/2020.  1100 cc of pleural fluid was drained. Cytology:  REACTIVE MESOTHELIAL CELLS, HISTIOCYTES, AND LYMPHOCYTES; NEGATIVE FOR MALIGNANCY      CT C/A/P at [facility] 1/29/2020  Impression:   1.  Signs of large LEFT lung mass with postobstructive collapse of   the entire LEFT lung and mediastinal shift. 2.  Bulky subcarinal adenopathy. 3.  Nodular bilateral adrenal glands still with some adreniform   contour, suspicious for either nodular hyperplasia or bilateral   metastatic disease to the adrenal glands.      Bronchoscopy with insertion of stent was performed on 1/29/2020 at [facility]. A mass was found in the middle portion of the left mainstem bronchus with about 90% obstruction. The mass was large and bloody and friable. a 10 x 40 Aero stent was placed with good results.        Records discuss \"squamous cell lung cancer \"  however I am unable to view a pathology report.     PET at [facility] 1/31/2020  Impression   Intense FDG uptake within a ill-defined left hilar mass as well as the left upper and left lower lobes. Interval placement of a left   main bronchus stent which is occluded distally with complete collapse of the left lung. FDG uptake within the left upper and lower lobes may be related to infection versus malignancy. Small left pleural effusion.    No FDG uptake is present within the subcarinal lymphadenopathy.      MRI brain w/ and w/o  2/1/2020 8:07 AM  Impression   1.  No findings of intracranial metastatic disease. 2.  Mild chronic small vessel ischemic white matter disease and tiny   old right cerebellar infarcts.      CT abdomen pelvis with contrast 2/15/2020 at [facility]  No acute abnormality seen in the abdomen  Left pleural effusion     left thoracentesis 2/17/2020: 2L of clear, straw colored fluid drained. cytology negative: benign mesothelial cells and occasional small round lymphocytes     Post-procedure CXR 2/17/20: No pneumothorax, diffuse opacification of the left chest with some residual pleural effusion  He has already been evaluated by Dr. Monik Archibald     S/p left VATS/Pleur-X catheter placement on 2/19/2020.  Cytology was negative    REVIEW OF SYSTEMS:   CONSTITUTIONAL: no fever, no night sweats, fatigue;  HEENT: no blurring of vision, no double vision, no hearing difficulty, no tinnitus, no ulceration, no dysplasia, no epistaxis;  LUNGS: no cough, no hemoptysis, no wheeze,   shortness of breath;  CARDIOVASCULAR: no palpitation, no chest pain,  shortness of breath;  GI:  abdominal pain, no nausea, no vomiting, no diarrhea, no constipation;  RAN: no dysuria, no hematuria, no frequency or urgency, no nephrolithiasis;  MUSCULOSKELETAL: no joint pain, no swelling, no stiffness;  ENDOCRINE: no polyuria, no polydipsia, no cold or heat intolerance;  HEMATOLOGY: no easy bruising or bleeding, no history of clotting disorder;  DERMATOLOGY: no skin rash, no eczema, no pruritus;  NEUROLOGY: no syncope, no seizures, no numbness or tingling of hands, no numbness or tingling of feet, no paresis;    Objective   BP (!) 92/59   Pulse 88   Temp 95.4 °F (35.2 °C) (Temporal)   Resp 18   Ht 5' 8\" (1.727 m)   Wt 230 lb 9.6 oz (104.6 kg)   SpO2 95%   BMI 35.06 kg/m²     PHYSICAL EXAM:  CONSTITUTIONAL: Alert, appropriate, ill-appearing, some distress due to pain  EYES: Non icteric, EOM intact, pupils equal round   ENT: Mucus membranes moist, no oral pharyngeal lesions, external inspection of ears and nose are normal.  O2 nasal cannula  NECK: Supple, no masses. No palpable thyroid mass  CHEST/LUNGS: Decreased breath sounds left lower lobe. Pleurx cath. CARDIOVASCULAR:  no murmurs. lower extremity edema  ABDOMEN: soft non-tender, active bowel sounds, no HSM. No palpable masses. EXTREMITIES: warm, full ROM in all 4 extremities, no focal weakness  SKIN: Cold, dry with no rashes or lesions  LYMPH: No cervical, clavicular, axillary, or inguinal lymphadenopathy  NEUROLOGIC: follows commands, non focal   PSYCH: Flat mood        LABORATORY RESULTS REVIEWED BY ME:  Recent Labs     02/26/20  0350 02/25/20  0211 02/24/20  0347   WBC 14.5* 20.8* 24.1*   HGB 12.1* 14.1 13.7*   HCT 40.0* 46.9 45.2   MCV 90.1 88.7 88.1    464* 387       Lab Results   Component Value Date     02/26/2020    K 3.8 02/26/2020    CL 94 (L) 02/26/2020    CO2 34 (H) 02/26/2020    BUN 30 (H) 02/26/2020    CREATININE 0.6 02/26/2020    GLUCOSE 166 (H) 02/26/2020    CALCIUM 8.0 (L) 02/26/2020    PROT 4.7 (L) 02/26/2020    LABALBU 2.2 (L) 02/26/2020    BILITOT 0.3 02/26/2020    ALKPHOS 155 (H) 02/26/2020    AST 16 02/26/2020    ALT 15 02/26/2020    LABGLOM >60 02/26/2020       Lab Results   Component Value Date    INR 1.15 02/17/2020    INR 1.19 (H) 02/15/2020    INR 1.09 01/25/2020    PROTIME 14.1 02/17/2020    PROTIME 14.5 02/15/2020    PROTIME 13.5 01/25/2020       RADIOLOGY STUDIES REVIEWED BY ME:    ASSESSMENT:  #1  Squamous cell lung carcinoma left lung, status post left main stem bronchus stent 1/29/2020 - possibly stage II     Chest xray on 2/24/2020 small left pneumothorax, larger compared to 2/24/2020. No significant aeration of left lung. Subcutaneous emphysema in left chest wall increased.     #2 PAF  Cardizem/Digoxin per cardiology      #3 Hyponatremia-resolved  Na 138 today 2/26/2020     #4 Generalized weakness  PT/OT-used to decline due to significant fatigue  Planning discharge possibly to [address] [place] [address]  #5 GERD  Receiving

## 2020-02-26 NOTE — PROGRESS NOTES
Pt received medication and is resting. No complaints at this time. Will continue to monitor.  Electronically signed by Boom Perales RN on 2/25/2020 at 9:41 PM

## 2020-02-26 NOTE — PROGRESS NOTES
amoxicillin-clavulanate (AUGMENTIN) 500-125 MG per tablet 1 tablet  1 tablet Oral 3 times per day Annamary Severance, MD   1 tablet at 02/26/20 0550    sodium chloride tablet 1 g  1 g Oral TID  Annamary Severance, MD   1 g at 02/25/20 1800    glucose (GLUTOSE) 40 % oral gel 15 g  15 g Oral PRN Annamary Severance, MD        dextrose 50 % IV solution  12.5 g Intravenous PRN Annamary Severance, MD        glucagon (rDNA) injection 1 mg  1 mg Intramuscular PRN Annamary Severance, MD        dextrose 5 % solution  100 mL/hr Intravenous PRN Annamary Severance, MD        insulin lispro (HUMALOG) injection vial 9 Units  0.08 Units/kg Subcutaneous TID  Annamary Severance, MD   Stopped at 02/24/20 1109    insulin lispro (HUMALOG) injection vial 0-12 Units  0-12 Units Subcutaneous TID  Annamary Severance, MD   2 Units at 02/25/20 1800    insulin lispro (HUMALOG) injection vial 0-6 Units  0-6 Units Subcutaneous Nightly Annamary Severance, MD   1 Units at 02/25/20 2116    hydrOXYzine (VISTARIL) capsule 25 mg  25 mg Oral BID PRN Annamary Severance, MD   25 mg at 02/24/20 0043    escitalopram (LEXAPRO) tablet 10 mg  10 mg Oral Daily Annamary Severance, MD   10 mg at 02/25/20 2361    furosemide (LASIX) tablet 40 mg  40 mg Oral Daily Annamary Severance, MD   40 mg at 02/25/20 2059    metoprolol tartrate (LOPRESSOR) tablet 25 mg  25 mg Oral BID Annamary Severance, MD   25 mg at 02/25/20 2116    melatonin tablet 2 mg  2 mg Oral Nightly PRN Brendan Trent MD   2 mg at 02/23/20 2046    pantoprazole (PROTONIX) injection 40 mg  40 mg Intravenous Daily Annamary Severance, MD   40 mg at 02/25/20 0638    And    sodium chloride (PF) 0.9 % injection 10 mL  10 mL Intravenous Daily Annamary Severance, MD   10 mL at 02/25/20 0929    aluminum & magnesium hydroxide-simethicone (MAALOX) 200-200-20 MG/5ML suspension 30 mL  30 mL Oral Q6H PRN Annamary Severance, MD   30 mL at 02/22/20 1416    diltiazem (CARDIZEM CD) extended release capsule 120 mg  120 mg Oral BID Mode Thorne Justo Eller MD   120 mg at 02/25/20 2116    digoxin (LANOXIN) tablet 125 mcg  125 mcg Oral Daily Ana Lazaro MD   125 mcg at 02/25/20 0434    acetaminophen (TYLENOL) tablet 1,000 mg  1,000 mg Oral Nightly PRN Ana aLzaro MD   650 mg at 02/18/20 0620    diltiazem 125 mg in dextrose 5 % 125 mL infusion  5 mg/hr Intravenous Continuous Ana Lazaro MD   Stopped at 02/18/20 8387    diltiazem injection 10 mg  10 mg Intravenous Q30 Min PRN Ana Lazaro MD   10 mg at 02/15/20 2117    sodium chloride flush 0.9 % injection 10 mL  10 mL Intravenous 2 times per day Ana Lazaro MD   10 mL at 02/25/20 2116    sodium chloride flush 0.9 % injection 10 mL  10 mL Intravenous PRN Ana Lazaro MD        magnesium hydroxide (MILK OF MAGNESIA) 400 MG/5ML suspension 30 mL  30 mL Oral Daily PRN Ana Lazaro MD        [facility]ans[facility]ron [facility] injection 4 mg  4 mg Intravenous Q6H PRN Ana Lazaro MD   4 mg at 02/23/20 2126    acetaminophen (TYLENOL) tablet 650 mg  650 mg Oral Q4H PRN Ana Lazaro MD   650 mg at 02/18/20 1657    ipratropium-albuterol (DUONEB) nebulizer solution 1 ampule  1 ampule Inhalation Q4H WA Ana Lazaro MD   1 ampule at 02/26/20 0710    lactulose (CHRONULAC) 10 GM/15ML solution 20 g  20 g Oral TID Ana Lazaro MD   20 g at 02/25/20 1453    enoxaparin (LOVENOX) injection 40 mg  40 mg Subcutaneous Daily Ana Lazaro MD   40 mg at 02/25/20 1558        Labs:     Recent Labs     02/24/20  0347 02/25/20  0211 02/26/20  0350   WBC 24.1* 20.8* 14.5*   RBC 5.13 5.29 4.44*   HGB 13.7* 14.1 12.1*   HCT 45.2 46.9 40.0*   MCV 88.1 88.7 90.1   MCH 26.7* 26.7* 27.3   MCHC 30.3* 30.1* 30.3*    464* 367     Recent Labs     02/24/20  0347 02/25/20  0211 02/26/20  0350   * 134* 138   K 4.7 5.5* 3.8   ANIONGAP 12 13 10   CL 89* 91* 94*   CO2 31* 30* 34*   BUN 18 33* 30*   CREATININE 0.5 0.8 0.6   GLUCOSE 213* 193* 166*   CALCIUM 8.4* 8.6* 8.0*     No results for

## 2020-02-27 NOTE — PROGRESS NOTES
Physical Therapy  Name: Tran Allen  MRN:  [id number]  Date of service:  2/27/2020 02/27/20 0954   Subjective   Subjective Pt states he feels stronger and noted :that felt good: after amb a short distance   General Comment   Comments pt on 4L 02   Bed Mobility   Supine to Sit Contact guard assistance   Comment worked with pt on proper techniques for sup>sit in order to inc ind   Transfers   Sit to [employer] guard assistance   Stand to sit Contact guard assistance;Minimal Assistance   Ambulation 1   Surface level tile   Device Rolling Walker   Other Apparatus O2   Assistance Contact guard assistance;Minimal assistance   Gait Deviations Slow Dorie;Decreased step length;Decreased step height  (mildly unsteady backing to recliner)   Distance 3 ft fw/bw x2   Comments mild soa and c/o mild anxiety (anxiety meds given prior to PT)   Exercises   Comments le ex sitting and supine   Short term goals   Time Frame for Short term goals 2 wks   Short term goal 1 ind sup<>sit   Short term goal 2 amb 200 ft with rw and sba   Activity Tolerance   Activity Tolerance Patient limited by endurance; Patient Tolerated treatment well   Plan   Times per week 5-7   Plan weeks 2   Safety Devices   Type of devices Chair alarm in place;Call light within reach;Gait belt;Left in chair       Electronically signed by Kavitha Kimbrough, PT on 2/27/2020 at 10:06 AM

## 2020-02-27 NOTE — PLAN OF CARE
Problem: Activity:  Goal: Ability to tolerate increased activity will improve  Outcome: Ongoing  Goal: Expression of feelings of increased energy will increase  Outcome: Ongoing     Problem: Cardiac:  Goal: Ability to maintain an adequate cardiac output will improve  Outcome: Ongoing  Goal: Complications related to the disease process, condition or treatment will be avoided or minimized  Outcome: Ongoing     Problem: Coping:  Goal: Level of anxiety will decrease  Outcome: Ongoing  Goal: General experience of comfort will improve  Outcome: Ongoing     Problem: Health Behavior:  Goal: Ability to manage health-related needs will improve  Outcome: Ongoing     Problem: Safety:  Goal: Ability to remain free from injury will improve  Outcome: Ongoing  Goal: Will show no signs and symptoms of excessive bleeding  Outcome: Ongoing     Problem: Risk for Impaired Skin Integrity  Goal: Tissue integrity - skin and mucous membranes  Outcome: Ongoing     Problem: Falls - Risk of:  Goal: Will remain free from falls  Outcome: Ongoing  Goal: Absence of physical injury  Outcome: Ongoing     Problem: Pain:  Description  Pain management should include both nonpharmacologic and pharmacologic interventions.   Goal: Pain level will decrease  Outcome: Ongoing  Goal: Control of acute pain  Outcome: Ongoing  Goal: Control of chronic pain  Outcome: Ongoing

## 2020-02-27 NOTE — CARE COORDINATION
Spoke with patient regarding MD orders for [facility] services. Patient agreeable and has chosen [facility]. Referral Faxed. [address] [place] [address] [phone]. [phone]. The Patient was provided with a choice of provider and agrees with the discharge plan. [x] Yes [] No    Freedom of choice list was provided with basic dialogue that supports the patient's individualized plan of care/goals, treatment preferences and shares the quality data associated with the providers.  [x] Yes [] No  Electronically signed by Donovan Salazar RN on 2/27/2020 at 1:15 PM

## 2020-02-27 NOTE — PROGRESS NOTES
Palliative Care Progress Note  2/27/2020 8:30 AM  Subjective:   Admit Date: 2/15/2020  PCP: ELTON Payan    Chief Complaint: Weakness    Interval History:No acute overnight events. He reports feeling better and was able to participate with therapy yesterday. Also was up in the chair with assistance by nursing staff. Congestion and cough is much improved and he does appear to be improved overall. He continues on oral abx and duonebs. Patient did request assistance to sit up on the side of the bed before I left and required minimal assistance. Appetite has improved as well. Review of Systems     Review of Systems   Constitutional: Positive for activity change, appetite change and fatigue. Generalized weakness   Eyes: Negative. Respiratory: Positive for shortness of breath. Negative for cough, wheezing and stridor. Cardiovascular: Negative. Gastrointestinal: Positive for nausea and abdominal pain    Endocrine: Negative. Genitourinary: Negative. Musculoskeletal: Negative. Skin:        L Pleur X drain with dressing in place   Neurological: Negative. Psychiatric/Behavioral: Negative.         Dietary Nutrition Supplements: Frozen Oral Supplement  DIET CARDIAC; Carb Control: 4 carb choices (60 gms)/meal; Daily Fluid Restriction: 1500 ml    Medications:   dextrose      dextrose      dilTIAZem (CARDIZEM) 125 mg in dextrose 5% 125 mL infusion Stopped (02/18/20 2192)     Current Facility-Administered Medications   Medication Dose Route Frequency Provider Last Rate Last Dose    pantoprazole (PROTONIX) tablet 40 mg  40 mg Oral MAYRAM CHIO Farrell MD   40 mg at 02/27/20 0717    glucose (GLUTOSE) 40 % oral gel 15 g  15 g Oral PRN Jeramie Ledesma MD        dextrose 50 % IV solution  12.5 g Intravenous PRN Jeramie Ledesma MD        glucagon (rDNA) injection 1 mg  1 mg Intramuscular PRN Jeramie Ledesma MD        dextrose 5 % solution  100 mL/hr Intravenous ALVARO Ledesma MD  promethazine (PHENERGAN) tablet 25 mg  25 mg Oral Q6H PRN Nikki Morgan MD   25 mg at 02/25/20 2121    oxyCODONE-acetaminophen (PERCOCET)  MG per tablet 1 tablet  1 tablet Oral Q6H PRN Nikki Morgan MD   1 tablet at 02/24/20 0943    morphine injection 2 mg  2 mg Intravenous Q4H PRN Nikki Morgan MD   2 mg at 02/24/20 0925    amoxicillin-clavulanate (AUGMENTIN) 500-125 MG per tablet 1 tablet  1 tablet Oral 3 times per day Malia Conklin MD   1 tablet at 02/27/20 7689    sodium chloride tablet 1 g  1 g Oral TID  Malia Conklin MD   1 g at 02/27/20 0810    glucose (GLUTOSE) 40 % oral gel 15 g  15 g Oral PRN Malia Conklin MD        dextrose 50 % IV solution  12.5 g Intravenous PRN Malia Conklin MD        glucagon (rDNA) injection 1 mg  1 mg Intramuscular PRN Malia Conklin MD        dextrose 5 % solution  100 mL/hr Intravenous PRN Malia Conklin MD        insulin lispro (HUMALOG) injection vial 9 Units  0.08 Units/kg Subcutaneous TID  Malia Conklin MD   Stopped at 02/24/20 1109    insulin lispro (HUMALOG) injection vial 0-12 Units  0-12 Units Subcutaneous TID  Malia Conklin MD   2 Units at 02/27/20 0746    insulin lispro (HUMALOG) injection vial 0-6 Units  0-6 Units Subcutaneous Nightly Malia Conklin MD   1 Units at 02/25/20 2116    hydrOXYzine (VISTARIL) capsule 25 mg  25 mg Oral BID PRN Malia Conklin MD   25 mg at 02/27/20 0810    escitalopram (LEXAPRO) tablet 10 mg  10 mg Oral Daily Malia Conklin MD   10 mg at 02/27/20 0810    furosemide (LASIX) tablet 40 mg  40 mg Oral Daily Malia oCnklin MD   40 mg at 02/27/20 0810    metoprolol tartrate (LOPRESSOR) tablet 25 mg  25 mg Oral BID Malia Conklin MD   25 mg at 02/27/20 0810    melatonin tablet 2 mg  2 mg Oral Nightly PRN Nikki Morgan MD   2 mg at 02/23/20 2046    aluminum & magnesium hydroxide-simethicone (MAALOX) 200-200-20 MG/5ML suspension 30 mL  30 mL Oral Q6H PRN Malia Conklin MD   30 mL 18   CREATININE 0.8 0.6 0.6   GLUCOSE 193* 166* 160*   CALCIUM 8.6* 8.0* 8.1*     No results for input(s): MG, PHOS in the last 72 hours. Recent Labs     02/25/20  0211 02/26/20  0350 02/27/20  0216   AST 20 16 18   ALT 18 15 15   BILITOT 0.4 0.3 0.3   ALKPHOS 173* 155* 141*     ABGs:No results for input(s): PHART, JXX5MYU, PO2ART, EMR1UVK, BEART, HGBAE, P5GSVJHV, CARBOXHGBART, 02THERAPY in the last 72 hours. HgBA1c: No results for input(s): LABA1C in the last 72 hours. FLP:    Lab Results   Component Value Date    TRIG 105 09/06/2015    HDL 28 09/06/2015    LDLCALC 164 09/24/2011    LDLDIRECT 85 09/06/2015     TSH:    Lab Results   Component Value Date    TSH 0.957 02/15/2020     Troponin T: No results for input(s): TROPONINI in the last 72 hours. INR: No results for input(s): INR in the last 72 hours.     Objective:   Vitals: /63   Pulse 110   Temp 97 °F (36.1 °C)   Resp 18   Ht 5' 8\" (1.727 m)   Wt 230 lb 9.6 oz (104.6 kg)   SpO2 96%   BMI 35.06 kg/m²   24HR INTAKE/OUTPUT:      Intake/Output Summary (Last 24 hours) at 2/27/2020 0830  Last data filed at 2/26/2020 1636  Gross per 24 hour   Intake 720 ml   Output 100 ml   Net 620 ml     Physical Exam     General appearance: no acute distress, obese, appears ill  HEENT: atraumatic, eyes with clear conjunctiva and normal lids, pupils and irises normal, external ears and nose are normal,lips normal.  Neck: without masses, supple  Lungs: no increased work of breathing, diminished breath sounds, especially throughout the L, nasal cannula oxygen in place  Heart: irregularly irregular rhythm and S1, S2 normal  Abdomen: soft, non-tender, BS +  Genitourinary: No bladder fullness, masses, or tenderness  Extremities: atraumatic, able to JOHNSON, 1+ BLE edema  Neurologic: No focal neurologic deficits, normal sensation, no tremor, alert and oriented x 3   Psychiatric: Alert and oriented x 3, mood and affect appropriate  Skin: warm, dry        Assessment and Plan: Active Problems:    Paroxysmal A-fib [facility])    Palliative care patient    Atrial fibrillation with rapid ventricular response (HCC)    Malignant neoplasm of overlapping sites of left lung (HCC)    Malignant neoplasm of lung ([address]  Resolved Problems:    * No resolved hospital problems. *        Visit Summary: I saw the patient at the bedside with no family present. He appears improved this morning, alert and wakes easily. Reports feeling better, is breathing easier, and congestion is greatly improved. He is much more eager to mobilize this morning and does remains hopeful to be re-evaluated for rehab. I will discuss with SW. If he can participate in rehab and improve functionally, he does plan to  [facility] treatment per Oncology. I provided hope and encouragement to the patient. I will provide an update to the patient's spouse this afternoon. Palliative medicine will continue to follow. Recommendations: Encourage participation in therapy. Pt/spouse would like to continue to pursue rehab at [facility]. Thank you for consulting Palliative Care and allowing us to participate in the care of this patient.        Electronically signed by ELTON Dos Santos on 2/27/2020 at 8:30 AM    (Please note that portions of this note were completed with a voice recognition program.  Bakari Tavares made to edit the dictations but occasionally words are mis-transcribed.)

## 2020-02-27 NOTE — PLAN OF CARE
Sara Odell RN  Outcome: Ongoing  2/26/2020 1925 by Mara Hinojosa RN  Outcome: Ongoing  Goal: Absence of physical injury  2/26/2020 2328 by Zaria Mcgee RN  Outcome: Ongoing  2/26/2020 1925 by Mara Hinojosa RN  Outcome: Ongoing     Problem: Pain:  Goal: Pain level will decrease  2/26/2020 2328 by Zaria Mcgee RN  Outcome: Ongoing  2/26/2020 1925 by Mara Hinojosa RN  Outcome: Ongoing  Goal: Control of acute pain  2/26/2020 2328 by Zaria Mcege RN  Outcome: Ongoing  2/26/2020 1925 by Mara Hinojosa RN  Outcome: Ongoing  Goal: Control of chronic pain  2/26/2020 2328 by Zaria Mcgee RN  Outcome: Ongoing  2/26/2020 1925 by Mara Hinojosa RN  Outcome: Ongoing

## 2020-02-27 NOTE — PROGRESS NOTES
Chest Portable 1/26/2020  Complete whiteout of the left lung with mild deviation of the trachea to the left. Findings are consistent with complete collapse of the left lung. Underlying infection or mass or effusion cannot be excluded      SIMONA at [facility] on 1/27/2020 was performed. LVEF greater than 55%. Unable to comment on diastolic filling pattern due to atrial  fibrillation. RV mildly dilated. Mildly depressed RV systolic function. The left atrium is mild to moderately dilated. RA mildly dilated. Mild tricuspid regurgitation. The RV systolic pressure is calculated at 63 mmHg.     A thoracentesis was performed at [facility] on 1/27/2020.  1100 cc of pleural fluid was drained. Cytology:  REACTIVE MESOTHELIAL CELLS, HISTIOCYTES, AND LYMPHOCYTES; NEGATIVE FOR MALIGNANCY      CT C/A/P at [facility] 1/29/2020  Impression:   1.  Signs of large LEFT lung mass with postobstructive collapse of   the entire LEFT lung and mediastinal shift. 2.  Bulky subcarinal adenopathy. 3.  Nodular bilateral adrenal glands still with some adreniform   contour, suspicious for either nodular hyperplasia or bilateral   metastatic disease to the adrenal glands.      Bronchoscopy with insertion of stent was performed on 1/29/2020 at [facility]. A mass was found in the middle portion of the left mainstem bronchus with about 90% obstruction. The mass was large and bloody and friable. a 10 x 40 Aero stent was placed with good results.        Records discuss \"squamous cell lung cancer \"  however I am unable to view a pathology report.     PET at [facility] 1/31/2020  Impression   Intense FDG uptake within a ill-defined left hilar mass as well as the left upper and left lower lobes. Interval placement of a left   main bronchus stent which is occluded distally with complete collapse of the left lung. FDG uptake within the left upper and lower lobes may be related to infection versus malignancy. Small left pleural effusion.    No FDG uptake is present within the subcarinal lymphadenopathy.      MRI brain w/ and w/o  2/1/2020 8:07 AM  Impression   1.  No findings of intracranial metastatic disease. 2.  Mild chronic small vessel ischemic white matter disease and tiny   old right cerebellar infarcts.      CT abdomen pelvis with contrast 2/15/2020 at [facility]  No acute abnormality seen in the abdomen  Left pleural effusion     left thoracentesis 2/17/2020: 2L of clear, straw colored fluid drained. cytology negative: benign mesothelial cells and occasional small round lymphocytes     Post-procedure CXR 2/17/20: No pneumothorax, diffuse opacification of the left chest with some residual pleural effusion  He has already been evaluated by Dr. Mariella Cheng     S/p left VATS/Pleur-X catheter placement on 2/19/2020.  Cytology was negative    REVIEW OF SYSTEMS:   CONSTITUTIONAL: no fever, no night sweats, fatigue;  HEENT: no blurring of vision, no double vision, no hearing difficulty, no tinnitus, no ulceration, no dysplasia, no epistaxis;  LUNGS: no cough, no hemoptysis, no wheeze,   shortness of breath;  CARDIOVASCULAR: no palpitation, no chest pain,  shortness of breath;  GI:  abdominal pain, no nausea, no vomiting, no diarrhea, no constipation;  RAN: no dysuria, no hematuria, no frequency or urgency, no nephrolithiasis;  MUSCULOSKELETAL: no joint pain, no swelling, no stiffness;  ENDOCRINE: no polyuria, no polydipsia, no cold or heat intolerance;  HEMATOLOGY: no easy bruising or bleeding, no history of clotting disorder;  DERMATOLOGY: no skin rash, no eczema, no pruritus;  NEUROLOGY: no syncope, no seizures, no numbness or tingling of hands, no numbness or tingling of feet, no paresis;    Objective   BP 95/60   Pulse 72   Temp 96 °F (35.6 °C) (Temporal)   Resp 18   Ht 5' 8\" (1.727 m)   Wt 230 lb 9.6 oz (104.6 kg)   SpO2 94%   BMI 35.06 kg/m²     PHYSICAL EXAM:  CONSTITUTIONAL: Alert, appropriate, ill-appearing, some distress due to pain  EYES: Non icteric, EOM intact, pupils equal round   ENT: Mucus membranes moist, no oral pharyngeal lesions, external inspection of ears and nose are normal.  O2 nasal cannula  NECK: Supple, no masses. No palpable thyroid mass  CHEST/LUNGS: Decreased breath sounds left lower lobe. Pleurx cath. CARDIOVASCULAR:  no murmurs. lower extremity edema  ABDOMEN: soft non-tender, active bowel sounds, no HSM. No palpable masses. EXTREMITIES: warm, full ROM in all 4 extremities, no focal weakness  SKIN: Cold, dry with no rashes or lesions  LYMPH: No cervical, clavicular, axillary, or inguinal lymphadenopathy  NEUROLOGIC: follows commands, non focal   PSYCH: Flat mood        LABORATORY RESULTS REVIEWED BY ME:  Recent Labs     02/27/20  0216 02/26/20  0350 02/25/20  0211   WBC 11.4* 14.5* 20.8*   HGB 10.9* 12.1* 14.1   HCT 36.8* 40.0* 46.9   MCV 89.3 90.1 88.7    367 464*       Lab Results   Component Value Date     02/27/2020    K 3.4 (L) 02/27/2020    CL 93 (L) 02/27/2020    CO2 33 (H) 02/27/2020    BUN 18 02/27/2020    CREATININE 0.6 02/27/2020    GLUCOSE 160 (H) 02/27/2020    CALCIUM 8.1 (L) 02/27/2020    PROT 5.2 (L) 02/27/2020    LABALBU 1.8 (L) 02/27/2020    BILITOT 0.3 02/27/2020    ALKPHOS 141 (H) 02/27/2020    AST 18 02/27/2020    ALT 15 02/27/2020    LABGLOM >60 02/27/2020       Lab Results   Component Value Date    INR 1.15 02/17/2020    INR 1.19 (H) 02/15/2020    INR 1.09 01/25/2020    PROTIME 14.1 02/17/2020    PROTIME 14.5 02/15/2020    PROTIME 13.5 01/25/2020       RADIOLOGY STUDIES REVIEWED BY ME:    ASSESSMENT:  #1  Squamous cell lung carcinoma left lung, status post left main stem bronchus stent 1/29/2020 - possibly stage II     Chest xray on 2/24/2020 small left pneumothorax, larger compared to 2/24/2020. No significant aeration of left lung. Subcutaneous emphysema in left chest wall increased.     #2 PAF  Cardizem/Digoxin per cardiology      #3 Hyponatremia-resolved  Na 138 today 2/27/2020     #4 Generalized weakness  PT/OT-assisting  [facility] declined acceptance due to nonparticipation with PT/OT     #5 GERD  Receiving Protonix    #Neutrophilic leukocytosis- likely reactive-continue antibiotics as per your discretion. WBC 11.4/ANC 9.4 on 2/27/2020  Afebrile    #Prognosis- very poor prognosis. The patient has a very poor performance and concerned that he would not be able to tolerate any therapy, unless his performance status improves greatly. He could tolerate radiation and/or Keytruda if he becomes ambulatory. Unfortunately, at this point right now the patient can barely move out of the bed due to overall weakness.     PLAN  Await PD-L1 on tissue biopsy 1/29/2020 from [facility]. Myah Chavis is aware that his performance status must first be improved before being considered a candidate for treatment. Encouraged participating with PT and getting up to chair. Consider home with hospice for now and if performance status improves could consider coming out of hospice and reevaluating for potential treatment.       Jesika Martinez, APRN    02/27/20  7:13 AM   Physician's attestation/substantial contribution:  I, Dr Gala Mckeon, independently performed an evaluation on Hugo Score. I have reviewed relevant medical information/data to include but not limited to medication list, relevant appropriate labs and imaging when applicable. I reviewed other physician's notes, ancillary services and nurses assessment. I have reviewed the above documentation completed by the Nurse Practitioner or Physician Assistant. Please see my additional contributions to the history of present illness, physical examination, and assessment/medical decision-making that reflect my findings and impressions. I discussed essential elements of the care plan with the NP or PA and the patient as well. I answered all the questions to the patient's satisfaction. I concur with above stated. Subjective-still quite fatigued.   Does not have

## 2020-02-27 NOTE — DISCHARGE SUMMARY
tablet  Commonly known as:  LANOXIN  Take 1 tablet by mouth daily  Start taking on:  February 28, 2020     dilTIAZem 120 MG extended release capsule  Commonly known as:  CARDIZEM CD  Take 1 capsule by mouth 2 times daily     hydrOXYzine 25 MG capsule  Commonly known as:  VISTARIL  Take 1 capsule by mouth 2 times daily as needed for Anxiety        CHANGE how you take these medications    raNITIdine 150 MG tablet  Commonly known as:  ZANTAC  Take 1 tablet by mouth 2 times daily  What changed:  when to take this     simvastatin 10 MG tablet  Commonly known as:  ZOCOR  Take 1 tablet by mouth nightly  What changed:  when to take this        CONTINUE taking these medications    * albuterol sulfate  (90 Base) MCG/ACT inhaler  Commonly known as:  ProAir HFA  Inhale 2 puffs into the lungs every 4 hours as needed for Wheezing or Shortness of Breath     * albuterol (2.5 MG/3ML) 0.083% nebulizer solution  Commonly known as:  PROVENTIL     aspirin 81 MG EC tablet     furosemide 40 MG tablet  Commonly known as:  LASIX  Take 1 tablet by mouth daily     GUAIFENESIN PO     hydrALAZINE 10 MG tablet  Commonly known as:  APRESOLINE  Take 1 tablet by mouth every 8 hours     Lexapro 10 MG tablet  Generic drug:  escitalopram     metFORMIN 1000 MG tablet  Commonly known as:  GLUCOPHAGE  Take 1 tablet by mouth 2 times daily (with meals)     metoprolol tartrate 25 MG tablet  Commonly known as:  LOPRESSOR  Take 1 tablet by mouth 2 times daily     nitroGLYCERIN 0.4 MG SL tablet  Commonly known as:  NITROSTAT  up to max of 3 total doses. If no relief after 1 dose, call 911. tiotropium 18 MCG inhalation capsule  Commonly known as:  SPIRIVA         * This list has 2 medication(s) that are the same as other medications prescribed for you. Read the directions carefully, and ask your doctor or other care provider to review them with you.                Where to Get Your Medications      These medications were sent to [address] [facility], [address], [facility] [place] [address]    Phone:  [phone]   · amoxicillin-clavulanate 500-125 MG per tablet  · digoxin 125 MCG tablet  · dilTIAZem 120 MG extended release capsule  · hydrOXYzine 25 MG capsule         Electronically signed by   Connor Cooley   Internal Medicine Hospitalist  On 2/27/2020  At 10:57 AM    EMR Dragon/Transcription disclaimer:   Much of this encounter note is an electronic transcription/translation of spoken language to printed text.  The electronic translation of spoken language may permit erroneous, or at times, nonsensical words or phrases to be inadvertently transcribed; although attempts have made to review the note for such errors, some may still exist.

## 2020-03-09 NOTE — TELEPHONE ENCOUNTER
"Spoke with patient's wife re: patient's status and when he should be ready to start radiation therapy.  She states that \"there is no way he is ready to start radiation now.\"  Patient is currently at [facility] for rehab and will be there for approximately 2 weeks.    Wife states that he has been bedridden in the hospital for the past several weeks and is extremely weak.  She states that she will notify our office when patient is ready to start radiation therapy.  "

## 2020-03-16 PROBLEM — R07.9 CHEST PAIN: Status: ACTIVE | Noted: 2020-01-01

## 2020-03-16 NOTE — ED PROVIDER NOTES
[facility]  eMERGENCY dEPARTMENT eNCOUnter      Pt Name: Elio Funez  MRN: [id number]  [place] 1955  Date of evaluation: 3/16/2020  Provider: Catalino Mccarthy MD    [address] [place] [address]       Chief Complaint   Patient presents with    Chest Pain         HISTORY OF PRESENT ILLNESS   (Location/Symptom, Timing/Onset,Context/Setting, Quality, Duration, Modifying Factors, Severity)  Note limiting factors. Elio Funez is a 62 y.o. male who presents to the emergency department For chest pain. The patient states that 3 episodes of vague chest discomfort in his central lower chest.  He is currently pain-free. He denies any shortness of breath. He chronically wears 4 L of oxygen. He has a known lung cancer and he is planned to be started on radiation treatment but has not started yet. He has known history of atrial fibrillation as well as coronary artery disease and was formally followed by Dr. Max Murillo. He denies any cough fever or chills. He was sent from [facility] for further evaluation. Of note tells me he had a left-sided thoracentesis approximately 1 month ago. Patient tells me overall he has not been doing well since before Thanksgiving and significantly decline in his health. HPI    NursingNotes were reviewed. REVIEW OF SYSTEMS    (2-9 systems for level 4, 10 or more for level 5)     Review of Systems   Constitutional: Negative for chills, diaphoresis and fever. HENT: Negative for rhinorrhea and sore throat. Respiratory: Negative for cough and shortness of breath. Cardiovascular: Positive for chest pain. Negative for leg swelling. Gastrointestinal: Negative for abdominal pain, diarrhea, nausea and vomiting. Genitourinary: Negative for dysuria and frequency. Musculoskeletal: Negative for back pain and neck pain. Neurological: Negative for dizziness and headaches. All other systems reviewed and are negative.            PAST MEDICALHISTORY     Past Medical History: Transportation needs     Medical: None     Non-medical: None   Tobacco Use    Smoking status: Former Smoker     Packs/day: 2.00     Years: 18.00     Pack years: 36.00     Types: Cigarettes     Last attempt to quit: 2011     Years since quittin.5    Smokeless tobacco: Never Used   Substance and Sexual Activity    Alcohol use: Yes     Comment: rare    Drug use: No    Sexual activity: None   Lifestyle    Physical activity     Days per week: None     Minutes per session: None    Stress: None   Relationships    Social connections     Talks on phone: None     Gets together: None     Attends Anabaptism service: None     Active member of club or organization: None     Attends meetings of clubs or organizations: None     Relationship status: None    Intimate partner violence     Fear of current or ex partner: None     Emotionally abused: None     Physically abused: None     Forced sexual activity: None   Other Topics Concern    None   Social History Narrative    None       SCREENINGS    Bulmaro Coma Scale  Eye Opening: Spontaneous  Best Verbal Response: Oriented  Best Motor Response: Obeys commands  Bulmaro Coma Scale Score: 15        PHYSICAL EXAM    (up to 7 for level 4, 8 or more for level 5)     ED Triage Vitals [20 1111]   BP Temp Temp Source Pulse Resp SpO2 Height Weight   93/62 98.8 °F (37.1 °C) Oral 78 18 95 % 5' 7\" (1.702 m) 212 lb (96.2 kg)       Physical Exam  Vitals signs and nursing note reviewed. Constitutional:       General: He is not in acute distress. Appearance: He is well-developed. He is not toxic-appearing or diaphoretic. Comments: Chronically ill-appearing appears much older than stated age   HENT:      Head: Normocephalic and atraumatic. Nose: Nose normal.      Mouth/Throat:      Mouth: Mucous membranes are moist.   Eyes:      Conjunctiva/sclera: Conjunctivae normal.   Neck:      Musculoskeletal: Normal range of motion and neck supple.       Trachea: No tracheal deviation. Cardiovascular:      Rate and Rhythm: Normal rate and regular rhythm. Heart sounds: Normal heart sounds. No murmur. Pulmonary:      Breath sounds: Normal breath sounds. No wheezing or rales. Abdominal:      Palpations: Abdomen is soft. There is no mass. Tenderness: There is no abdominal tenderness. Musculoskeletal: Normal range of motion. Right lower leg: Edema present. Left lower leg: Edema present. Comments: Bilateral trace lower extremity edema    Does have a mild contusion on his left anterior tibia from a fall 1 month ago no bony tenderness   Skin:     General: Skin is warm and dry. Neurological:      Mental Status: He is alert and oriented to person, place, and time. DIAGNOSTIC RESULTS     EKG: All EKG's areinterpreted by the Emergency Department Physician who either signs or Co-signs this chart in the absence of a cardiologist.    68 atrial fibrillation, subtle anterior and lateral ST depression, nondiagnostic EKG these changes appear somewhat new from February 15, 2020    RADIOLOGY:  Non-plain film images such as CT, Ultrasound and MRI are read by the radiologist. Plain radiographic images are visualized and preliminarily interpreted bythe emergency physician with the below findings:      XR CHEST PORTABLE   Final Result   The complete opacification of the left chest may be   present left lung consolidation and left-sided pleural effusion. This   is more progressive since the previous study. The right lung is poorly inflated with scarring and discoid   atelectasis.    Signed by Dr Margie Hugo on 3/16/2020 11:58 AM              LABS:  Labs Reviewed   CBC WITH AUTO DIFFERENTIAL - Abnormal; Notable for the following components:       Result Value    RBC 4.27 (*)     Hemoglobin 11.5 (*)     Hematocrit 37.3 (*)     MCH 26.9 (*)     MCHC 30.8 (*)     RDW 16.2 (*)     MPV 9.2 (*)     Neutrophils % 72.8 (*)     Lymphocytes % 13.4 (*)     Lymphocytes Absolute 1.0 (*)     All other components within normal limits   COMPREHENSIVE METABOLIC PANEL - Abnormal; Notable for the following components:    Potassium 2.3 (*)     Chloride 79 (*)     CO2 44 (*)     Glucose 155 (*)     Calcium 7.4 (*)     Total Protein 6.3 (*)     Alb 2.3 (*)     Alkaline Phosphatase 146 (*)     All other components within normal limits    Narrative:     CALL  Felix  KLED tel. ,  Chemistry results called to and read back by Christophe Severs in ED, 03/16/2020 12:09,  by RAYMON   TROPONIN - Abnormal; Notable for the following components:    Troponin 0.04 (*)     All other components within normal limits   BRAIN NATRIURETIC PEPTIDE - Abnormal; Notable for the following components:    Pro-BNP 4,868 (*)     All other components within normal limits   MAGNESIUM - Abnormal; Notable for the following components:    Magnesium 0.9 (*)     All other components within normal limits   APTT   PROTIME-INR   DIGOXIN LEVEL       All other labs were within normal range or not returned as of this dictation.     EMERGENCY DEPARTMENT COURSE and DIFFERENTIAL DIAGNOSIS/MDM:   Vitals:    Vitals:    03/16/20 1111 03/16/20 1515   BP: 93/62 96/63   Pulse: 78 87   Resp: 18 18   Temp: 98.8 °F (37.1 °C) 96.9 °F (36.1 °C)   TempSrc: Oral Temporal   SpO2: 95% 99%   Weight: 212 lb (96.2 kg) 217 lb 4 oz (98.5 kg)   Height: 5' 7\" (1.702 m)        MDM  Number of Diagnoses or Management Options     Amount and/or Complexity of Data Reviewed  Clinical lab tests: ordered and reviewed  Tests in the radiology section of CPT®: ordered and reviewed  Independent visualization of images, tracings, or specimens: yes      Patient sent from nursing home due to episodes of chest pain earlier currently pain-free, prior history of cardiac disease, does have some subtle lateral st changes, mild elevation of troponin 0 0.04 however this could be due to secondary underlying illness, does have reaccumulation of his left pleural effusion however this is chronic and he is maintaining a normal oxygen saturation on his home 4 L, patient complains of generalized weakness and fatigue, likely some of this attributed to his hypokalemia and hypomagnesemia, both of these have been replaced, will continue to trend troponins, likely will need therapeutic thoracentesis during this hospitalization, do not feel that this is infectious given his known history of lung cancer he is currently not on chemotherapy agents, discussed the case with hospitalist service for admission and further work-up      CONSULTS:  None    PROCEDURES:  Unless otherwise noted below, none     Procedures    FINAL IMPRESSION      1. Acute chest pain    2. Hypokalemia    3.  Elevated troponin          DISPOSITION/PLAN   DISPOSITION Admitted 03/16/2020 01:01:58 PM      PATIENT REFERRED TO:  Vasyl Dayana, APRN  [address]  [place] [address]  [phone]            DISCHARGE MEDICATIONS:  Current Discharge Medication List             (Please note that portions of this note were completed with a voice recognition program.  Efforts were made to edit thedictations but occasionally words are mis-transcribed.)    Erlin Muñoz MD (electronically signed)  Attending Emergency Physician        Alea Herrera MD  03/16/20 0335

## 2020-03-16 NOTE — PROGRESS NOTES
Kennyth Landau arrived to room # [phone]. Presented with: Chest pain  Mental Status: Patient is oriented and alert. Vitals:    03/16/20 1515   BP: 96/63   Pulse: 87   Resp: 18   Temp: 96.9 °F (36.1 °C)   SpO2: 99%     Patient safety contract and falls prevention contract reviewed with patient Yes. Oriented Patient to room. Call light within reach. Yes.   Needs, issues or concerns expressed at this time: no.      Electronically signed by Dorothy Ochoa RN on 3/16/2020 at 4:22 PM negative...

## 2020-03-16 NOTE — H&P
[facility]ist - History & Physical      PCP: ELTON Burrell    Date of Admission: 3/16/2020    Date of Service: 3/16/2020    Chief Complaint:  Chest pain    History Of Present Illness: The patient is a 62 y.o. male with PMH lung cancer, atrial fibrillation, COPD, CHF, CAD, HTN, HLD who presented to [address] [place] [address] ED complaining of chest pain. He describes chest pressure/tightness occurring intermittently over the since this morning. He denies dyspnea. Chronically wears 4L NC oxygen. CXR concerning for complete opacification left lung. He has multiple electrolyte abnormalities. BNP and troponin both elevated. He states he has been emptying Pleurx catheter about every 3 days. It was emptied this AM in ED per patient and he did feel relief of chest pressure/tightness. He was discharged from this facility on 02/27/2020 after being treated for atrial fibrillation with rapid ventricular response as well as pleural effusion. He underwent thoracentesis that admission with Pleurx catheter insertion 02/19/2020. He was placed on digoxin and Cardizem with instructions to drain Pleurx catheter every 72 hours.      Past Medical History:        Diagnosis Date    Atherosclerosis     cardiovascular disease     Blood circulation, collateral     4 fingers left hand tingly    CAD (coronary artery disease)     saw dr. Ruiz Pedlar tunnel syndrome     CHF (congestive heart failure) (HCC)     COPD (chronic obstructive pulmonary disease) ([address]     Degenerative joint disease     right hip    Diabetes mellitus ([address]     Diabetic x 1-2 years    FH: chronic lung disease requiring oxygen     sees dr. Malachi Mai GERD (gastroesophageal reflux disease)     Hyperlipidemia     Hypertension     Malignant neoplasm of overlapping sites of left lung ([address]     Myocardial infarction ([address]     subenodcardial    Obesity     Palliative care patient 01/29/2018    Presence of stent in coronary artery     9/23/11 BALJINDER placement 0MB of circumflex    Right carpal tunnel syndrome 9/12/2018     Past Surgical History:        Procedure Laterality Date    BACK SURGERY      in 1980's    CARDIAC CATHETERIZATION  9/23/11    with stenting of OMB of left circumflex.  CORONARY ANGIOPLASTY WITH STENT PLACEMENT      HERNIA REPAIR      57 years ago    JOINT REPLACEMENT      Right hip replacement 4-6 years ago    LUMBAR LAMINECTOMY      CT REVISE MEDIAN N/CARPAL TUNNEL SURG Left 9/13/2018    CARPAL TUNNEL RELEASE performed by Darin Sahu MD at [facility] THORACOSCOPY Left 2/19/2020    VIDEO ASSISTED THOROCOSCOPY WITH INSERTION OF PLEUREX CATHETER performed by Keira Toscano MD at [facility] Medications:  Prior to Admission medications    Medication Sig Start Date End Date Taking?  Authorizing Provider   dilTIAZem (CARDIZEM CD) 120 MG extended release capsule Take 1 capsule by mouth 2 times daily 2/27/20  Yes Ana Lazaro MD   digoxin (LANOXIN) 125 MCG tablet Take 1 tablet by mouth daily 2/28/20  Yes Ana Lazaro MD   escitalopram (LEXAPRO) 10 MG tablet Take 10 mg by mouth daily   Yes Historical Provider, MD   tiotropium (SPIRIVA) 18 MCG inhalation capsule Inhale 18 mcg into the lungs daily   Yes Historical Provider, MD   metFORMIN (GLUCOPHAGE) 1000 MG tablet Take 1 tablet by mouth 2 times daily (with meals) 11/23/17  Yes Malorie Briceño MD   simvastatin (ZOCOR) 10 MG tablet Take 1 tablet by mouth nightly 11/23/17  Yes Malorie Briceño MD   hydrALAZINE (APRESOLINE) 10 MG tablet Take 1 tablet by mouth every 8 hours 11/23/17  Yes Malorie Briceño MD   metoprolol tartrate (LOPRESSOR) 25 MG tablet Take 1 tablet by mouth 2 times daily 11/23/17  Yes Malorie Briceño MD   furosemide (LASIX) 40 MG tablet Take 1 tablet by mouth daily 11/24/17  Yes Malorie Briceño MD   ranitidine (ZANTAC) 150 MG tablet Take 1 tablet by mouth 2 times daily 11/23/17  Yes Malorie Briceño MD   aspirin 81 MG EC tablet Take 81 mg by Portable  The complete opacification of the left chest may be present left lung consolidation and left-sided pleural effusion. This is more progressive since the previous study. The right lung is poorly inflated with scarring and discoid atelectasis.  Signed by Dr Gio Modi on 3/16/2020 11:58 AM    Assessment/Plan:  Principal Problem:    Chest pain-continue to trend troponin, Cardiology consultation   Active Problems:    Pleural effusion, questionable pneumonia-will consult Pulmonology for recommendations, hold antibiotics for now    Mixed hyperlipidemia-statin continued    Coronary artery disease involving native coronary artery of native heart without angina pectoris-continue aspirin, statin, BB    Chronic venous stasis dermatitis of both lower extremities-noted    Morbid obesity due to excess calories (HCC)-noted    Essential hypertension-with hypotension, chronic per patient, monitor closely    Type 2 diabetes mellitus without complication (HCC)-SSI, accuchecks, hypoglycemia orders    Chronic diastolic congestive heart failure (HCC)-noted    Paroxysmal A-fib (HCC)-currently sinus rhythm, rate controlled    Malignant neoplasm of lung (HCC)-will request Oncology to follow    Further orders per clinical course/attending     Signed:  Florian Hua PA-C

## 2020-03-16 NOTE — ED NOTES
Bed: 17  Expected date:   Expected time:   Means of arrival:   Comments:  Coral Palacio RN  03/16/20 5102

## 2020-03-16 NOTE — PROGRESS NOTES
4 Eyes Skin Assessment    Tiffanie Delgadillo is being assessed upon: Admission    I agree that I, Oswaldo Ovalle, along with *** (either 2 RN's or 1 LPN and 1 RN) have performed a thorough Head to Toe Skin Assessment on the patient. ALL assessment sites listed below have been assessed. Areas assessed by both nurses:     [x]   Head, Face, and Ears   [x]   Shoulders, Back, and Chest  [x]   Arms, Elbows, and Hands   [x]   Coccyx, Sacrum, and Ischium  [x]   Legs, Feet, and Heels    Does the Patient have Skin Breakdown? Yes, wound(s) noted upon assessment. It is the responsibility of the Primary Nurse to assure that the following documentation, preventions, orders, and consults are complete on the above noted wound(s): Wound LDA initiated. LDA Flowsheet Documentation includes the Shabana-wound, Wound Assessment, Measurements, Dressing Treatment, Drainage, and Color.      Redness, soft bilateral heels  Brusing, swelling LLE  Abrasions, bruising, scattered  Redness, excoriation, wound Sacrum; buttocks; gretta cleft  Puncture site and drain, LUQ Abdomen    Kiko Prevention initiated: Yes  Wound Care Orders initiated: Yes    [id number]  nurse consulted for Pressure Injury (Stage 3,4, Unstageable, DTI, NWPT, and Complex wounds) and New or Established Ostomies: Yes        Primary Nurse eSignature: Oswaldo Ovalle RN on 3/16/2020 at 4:23 PM      Co-Signer eSignature: {Esignature:[id number]}

## 2020-03-17 NOTE — CONSULTS
Pulmonary and Critical Care Consult Note  Enrike Ocasio    MR# [id number]    Acct# [de-identified]  3/17/2020   2:32 PM CDT    Referring Yeni Krause MD  Chief Complaint: pleural effusion    HPI: We have been consulted to see this 62y.o. year old male born on 1955. Patient complains of mild shortness of breath in the mid chest for several months, aggravated by nothing and alleviated by oxygen and pleural drainage, and associated with chest pain at this presentation. . Previously he was found to have hemoptysis due to invasive squamous carcinoma of left mainstem assoicated with atelectasis of the left lung and large left pleural effusion. He was admitted recently for similar problems at which time a pleurX catheter was placed. He continues to use the catheter for drainage. [facility] records are reviewed: Pt has undergone bronchoscopy with placement of stent in the left main bronchus which subsequently became obstructed distally. He has been evaluated for radiation oncology but reports not yet starting radiation.   Past Medical History      Past Medical History:   Diagnosis Date    Atherosclerosis     cardiovascular disease     Blood circulation, collateral     4 fingers left hand tingly    CAD (coronary artery disease)     saw dr. Escalera Bernheim tunnel syndrome     CHF (congestive heart failure) (HCC)     COPD (chronic obstructive pulmonary disease) ([address]     Degenerative joint disease     right hip    Diabetes mellitus ([address]     Diabetic x 1-2 years    FH: chronic lung disease requiring oxygen     sees dr. Lisa Townsend GERD (gastroesophageal reflux disease)     Hyperlipidemia     Hypertension     Malignant neoplasm of overlapping sites of left lung ([address]     Myocardial infarction ([address]     subenodcardial    Obesity     Palliative care patient 01/29/2018    Presence of stent in coronary artery     9/23/11 BALJINDER placement 0MB of circumflex    Right carpal tunnel syndrome 9/12/2018     SurgicalHistory  Past Surgical History:   Procedure Laterality Date    BACK SURGERY      in 1980's    CARDIAC CATHETERIZATION  9/23/11    with stenting of OMB of left circumflex.  CORONARY ANGIOPLASTY WITH STENT PLACEMENT      HERNIA REPAIR      63 years ago    JOINT REPLACEMENT      Right hip replacement 4-6 years ago    LUMBAR LAMINECTOMY      MN REVISE MEDIAN N/CARPAL TUNNEL SURG Left 9/13/2018    CARPAL TUNNEL RELEASE performed by Shahid Pitt MD at [address] [facility] Left 2/19/2020    VIDEO ASSISTED THOROCOSCOPY WITH INSERTION OF PLEUREX CATHETER performed by Jagruti Foreman MD at [address] [facility] [place]     Allergies  Allergies   Allergen Reactions    Crestor [Rosuvastatin] Hives and Swelling     Medications    isosorbide mononitrate, 30 mg, Oral, Daily    spironolactone, 50 mg, Oral, Daily    magnesium oxide, 400 mg, Oral, Daily    sodium chloride flush, 10 mL, Intravenous, 2 times per day    aspirin, 81 mg, Oral, Daily    digoxin, 125 mcg, Oral, Daily    dilTIAZem, 120 mg, Oral, BID    escitalopram, 10 mg, Oral, Daily    metoprolol tartrate, 25 mg, Oral, BID    atorvastatin, 10 mg, Oral, Nightly    tiotropium, 2 puff, Inhalation, Daily    insulin lispro, 0-6 Units, Subcutaneous, TID WC    insulin lispro, 0-3 Units, Subcutaneous, Nightly   Social History   reports that he quit smoking about 8 years ago. His smoking use included cigarettes. He has a 36.00 pack-year smoking history. He has never used smokeless tobacco. He reports current alcohol use. He reports that he does not use drugs. Family History  family history includes Cancer in his mother; Coronary Art Dis in his father, mother, and sister; Depression in his paternal grandmother; Diabetes in his father. Review of Systems:  Review of Systems   Constitutional: Negative for activity change, chills, fatigue and fever. HENT: Negative for congestion, nosebleeds, postnasal drip and trouble swallowing.     Eyes: There is no abdominal tenderness. Skin:     General: Skin is warm and dry. Findings: No erythema. Neurological:      Mental Status: He is alert. Recent Labs     03/16/20  1130 03/17/20  0628   WBC 7.2 6.6   RBC 4.27* 3.89*   HGB 11.5* 10.5*   HCT 37.3* 34.2*    302   MCV 87.4 87.9   MCH 26.9* 27.0   MCHC 30.8* 30.7*   RDW 16.2* 16.5*      Recent Labs     03/16/20  1130 03/16/20  1717 03/17/20  0628     --  136   K 2.3* 2.3* 2.7*   CL 79*  --  83*   CO2 44*  --  41*   BUN 11  --  11   CREATININE 0.7  --  0.6   CALCIUM 7.4*  --  6.8*   GLUCOSE 155*  --  135*      No results for input(s): PHART, QSC0UTJ, PO2ART, EJM4NME, K0FWKKMM, BEART in the last 72 hours. Recent Labs     03/16/20  1130 03/16/20 2018 03/17/20  0628   AST 16  --   --  13   ALT 31  --   --  25   ALKPHOS 146*  --   --  111   BILITOT 0.4  --   --  0.3   MG 0.9*   < >  --  1.6   CALCIUM 7.4*  --   --  6.8*   TROPONINI 0.04*   < > 0.03  --    INR 1.05  --   --   --     < > = values in this interval not displayed. Radiograph: Xr Chest Standard (2 Vw)    Result Date: 3/17/2020  Exam:   XR CHEST (2 VW)  Date:  3/17/2020 History:  Male, age  62 years; pleural effusion COMPARISON:  Chest x-ray dated 3/16/2020 Findings : The left heart border is indistinct. Continued complete opacification of the left hemithorax. The right lung is unchanged. No measurable pneumothorax. The bones show no acute pathology. Impression: No interval changes. Signed by Dr Lisette Browning on 3/17/2020 9:02 AM    Xr Abdomen (kub) (single Ap View)    Result Date: 2/24/2020  Examination. XR ABDOMEN (KUB) (SINGLE AP VIEW) 2/24/2020 6:30 AM History: Abdominal pain. Frontal projection of the abdomen including the pelvis is obtained. There is no previous study for comparison. There is marked dilatation of the stomach. There is moderate dilatation of the small and large bowel loops. There is gas in the distal colon and rectum.  Both kidneys are obscured by the bowel contents. No radiopaque calculi are noted. A tubular structure is seen projected over the left upper abdomen. There is soft tissue air along the left lateral chest and abdominal wall. The Chronic degenerative changes of the lumbar spine are noted. There is evidence of right hip arthroplasty which is incompletely visualized and evaluated. The Dilated stomach, small and large bowel loops may suggest an ileus. A less likely possibility of incomplete/partial distal large bowel obstruction may not be excluded. Further follow-up may be obtained. Signed by Dr Martha Jiménez on 2/24/2020 2:38 PM    Xr Chest Portable    Result Date: 3/16/2020  Examination. XR CHEST PORTABLE 3/16/2020 10:15 AM History: Chest pain. A single frontal portable upright view of the chest is compared with the previous study dated 2/25/2020. There is complete opacification of the left chest which may represent a combination of lung consolidation or pleural effusion. There is moderate displacement of the trachea towards left suggesting loss of left lung volume. There are interstitial changes in the right lung which may represent scarring and discoid atelectasis. No bony abnormality. The complete opacification of the left chest may be present left lung consolidation and left-sided pleural effusion. This is more progressive since the previous study. The right lung is poorly inflated with scarring and discoid atelectasis. Signed by Dr Martha Jiménez on 3/16/2020 11:58 AM    Xr Chest Portable    Result Date: 2/25/2020  EXAMINATION:  XR CHEST PORTABLE  2/25/2020 7:01 AM HISTORY: Lung cancer. COMPARISON: 2/24/2020. FINDINGS:  There is no significant measurable pneumothorax on the current study. There is some aerated lung in the upper lung zone on the left laterally. There is near complete opacification of the left hemithorax otherwise. There is shifting of the heart and mediastinal structures to the left. The right lung is clear.  There is decreasing subcutaneous emphysema in the left chest wall. 1. No measurable pneumothorax. Minimal aeration of the left upper lobe. 2. Near complete opacification of the left hemithorax in the mid to lower lung zone which may be due to underlying atelectasis. There is shifting the heart and mediastinal structures to the left. There is left mainstem bronchus stent. 3. Decreasing subcutaneous emphysema in the left chest wall. Signed by Dr Good Loera on 2/25/2020 7:03 AM    Xr Chest Portable    Result Date: 2/24/2020  EXAMINATION:  XR CHEST PORTABLE  2/24/2020 2:33 PM HISTORY: Shortness of air. COMPARISON: 2/24/2020 at 4:43 AM. FINDINGS:  A small pneumothorax in the upper lung zone laterally on the left side is larger on the current study. There is no significant aeration of the left lung. There is a left main bronchus stent. Subcutaneous emphysema in the left chest wall is stable or increased slightly. There is bronchial wall thickening on the right. There is no dense consolidation on the right side. There is minimal atelectasis in the right lung base. 1. There is a small pneumothorax on the left side. The pneumothorax is larger on the current study. There is no significant aeration of the left lung. 2. Subcutaneous emphysema in the left chest wall may be increased slightly. 3. Minimal right lung base atelectasis. Chronic bronchial wall thickening on the right. 4. Left mainstem bronchus stent. Signed by Dr Good Loera on 2/24/2020 2:38 PM    Xr Chest Portable    Result Date: 2/24/2020  Examination. XR CHEST PORTABLE 2/24/2020 5:00 AM History: Postthoracentesis. A single frontal portable upright view of the chest is compared with the previous study dated 2/23/2020. Complete opacification of the left lung and a minimally deviated left upper lung are loculated pneumothorax? Isabell Ware The right lung is unremarkable similar to the previous study. There is a persistent narrowing of the trachea.  There is no air in the left segmental bronchi noted. There is a persistent soft tissue emphysema of the left lateral chest wall left side of the neck. No change in the previous study. Persistent opacification left lung with a minimal aerated lung in the left upper chest are loculated pneumothorax. Persistent left chest wall and neck soft tissue emphysema. Signed by Dr Rajeev Jj on 2/24/2020 7:12 AM    Xr Chest Portable    Result Date: 2/23/2020  XR CHEST PORTABLE 2/23/2020 5:00 AM HISTORY:   Postthoracentesis, cancer  Single view. COMPARISONS:  2/22/2020 FINDINGS: Left lung opacification persists with minimal aeration of the upper lobes. The right lung remains grossly clear. Simultaneous emphysema in the left lateral chest wall and upper neck also identified, essentially unchanged. No measurable pneumothorax identified. Radiographically, chest is unchanged. Stable 1 day appearance the chest. Signed by Dr Whit Corona on 2/23/2020 7:22 AM    Xr Chest Portable    Result Date: 2/22/2020  EXAM: XR CHEST PORTABLE -- 2/22/2020 5:00 AM HISTORY: 64 years, Male, post thoracentesis, lung cancer COMPARISON: 2/21/2020 TECHNIQUE:  1 images. Frontal view of the chest. FINDINGS:  Subcutaneous emphysema at the left lateral chest wall, incompletely visualized, but similar compared to 2/21/2020. Suspect a small left pneumothorax. There may be a thin left-sided chest tube at the lateral chest wall. Near complete opacification of the left lung. Cardiac mediastinal silhouette obscured. Right lung appears grossly clear. No right pneumothorax or large pleural effusion. Limited evaluation of bones and upper abdomen due to technique. 1. Suspect small persistent left apical pneumothorax with otherwise near complete opacification of the left lung. Similar left chest wall subcutaneous emphysema, incompletely visualized. There may be a left-sided chest tube, but evaluation is limited by technique. Correlate with patient's lines and tubes.  2. Right lung 2:09 PM    Us Thoracentesis    Result Date: 2/17/2020  Examination. US THORACENTESIS 2/17/2020 7:15 AM History: Pleural effusion. The procedure was explained to the patient. The benefits, the risks and complications are discussed. The patient understood the procedure and signed the consent. Ultrasound examination of the left lower chest posteriorly at the pars performed and showed a moderate size pocket of fluid. The point of maximum depth was marked on the patient's skin with an ink marker. After sterile preparation and application of local anesthesia, a size 5 [employer] Yueh catheter was introduced into the pocket of fluid and a FreeFlow of clear straw-colored fluid was established. The outer hub of the catheter is attached to a vacuum assisted draining device. 2 L of fluid was drained. The catheter was then withdrawn and the puncture site was covered with a sterile Band-Aid. The patient was sent for a chest radiograph to evaluate for pneumothorax. The patient tolerated the procedure well. No immediate complications were noted. A successful thoracentesis.  Signed by Dr Balbir Freedman on 2/17/2020 2:06 PM    My radiograph interpretation/independent review of imaging: opacification of left chest    Problem list generated by [employer]:  Patient Active Problem List   Diagnosis    Myocardial infarction [facility])    Mixed hyperlipidemia    Coronary artery disease involving native coronary artery of native heart without angina pectoris    Myocardial infarction ([address]    Stented coronary artery    Chronic bronchitis ([address]    Chronic venous stasis dermatitis of both lower extremities    Morbid obesity due to excess calories ([address]    Acute respiratory failure with hypoxia and hypercapnia ([address]    Essential hypertension    Type 2 diabetes mellitus without complication ([address]    Chronic diastolic congestive heart failure (HCC)    Obesity hypoventilation syndrome ([address]    COPD with acute exacerbation ([address]    Right

## 2020-03-17 NOTE — PROGRESS NOTES
Subjective :      Shahana Colon is a 62 y.o. male referral for skin breakdown. Patient has a Other: moisture associated skin dermatitis wound which is located on the L heel, R heel, Buttocks L gluteal, Buttocks R gluteal.     Objective:        Wound:  Inner buttocks folds, perineum, scrotum red, moist and yeasty red rash   Right heel dark red blanchable erythema 2cm x 1cm x 0cm, silicone dressing placed and floated off bed with pillow  Left heel pink blanchable erythema, silicone dressing placed and heel floated off the bed with ;pillow    Wound Description:see above  This was originally noted 3/16/20  Today's visit:3/17/20      Assessment :      Other: moisture associated skin dermatitis       Plan :      Plan for wound:paper chux, barrier cream, float heels off the bed with pillows, silicone dressings to pad and protect pressure areas, pressure injury prevention    Dress per physician order:spoke with Dr. Francisco Fountain and order eceived for magic butt cream, order placed in [facility]    Pressure Ulcer Prevention Protocol and wound care order sets reviewed and updated.     Discussed with Pepe Campuzano RN 3/17/2020

## 2020-03-17 NOTE — CONSULTS
Authorizing Provider   dilTIAZem (CARDIZEM CD) 120 MG extended release capsule Take 1 capsule by mouth 2 times daily 2/27/20  Yes Fallon Ruiz MD   digoxin (LANOXIN) 125 MCG tablet Take 1 tablet by mouth daily 2/28/20  Yes Fallon Ruiz MD   escitalopram (LEXAPRO) 10 MG tablet Take 10 mg by mouth daily   Yes Historical Provider, MD   tiotropium (SPIRIVA) 18 MCG inhalation capsule Inhale 18 mcg into the lungs daily   Yes Historical Provider, MD   metFORMIN (GLUCOPHAGE) 1000 MG tablet Take 1 tablet by mouth 2 times daily (with meals) 11/23/17  Yes Marcella Smith MD   simvastatin (ZOCOR) 10 MG tablet Take 1 tablet by mouth nightly 11/23/17  Yes Marcella Smith MD   hydrALAZINE (APRESOLINE) 10 MG tablet Take 1 tablet by mouth every 8 hours 11/23/17  Yes Marcella Smith MD   metoprolol tartrate (LOPRESSOR) 25 MG tablet Take 1 tablet by mouth 2 times daily 11/23/17  Yes Marcella Smith MD   furosemide (LASIX) 40 MG tablet Take 1 tablet by mouth daily 11/24/17  Yes Marcella Smith MD   ranitidine (ZANTAC) 150 MG tablet Take 1 tablet by mouth 2 times daily 11/23/17  Yes Marcella Smith MD   aspirin 81 MG EC tablet Take 81 mg by mouth daily. Yes Historical Provider, MD   hydrOXYzine (ATARAX) 25 MG tablet Take 25 mg by mouth 2 times daily as needed for Itching anxiety    Historical Provider, MD   albuterol (PROVENTIL) (2.5 MG/3ML) 0.083% nebulizer solution Take 2.5 mg by nebulization every 6 hours as needed for Wheezing    Historical Provider, MD   albuterol sulfate HFA (PROAIR HFA) 108 (90 Base) MCG/ACT inhaler Inhale 2 puffs into the lungs every 4 hours as needed for Wheezing or Shortness of Breath 12/18/17   Servando Guerrero MD   nitroGLYCERIN (NITROSTAT) 0.4 MG SL tablet up to max of 3 total doses.  If no relief after 1 dose, call 911. 11/23/17   Marcella Smith MD        Facility Administered Medications:    sodium chloride flush  10 mL Intravenous 2 times per day    [START ON 3/17/2020] aspirin  81 mg Oral Daily    [START ON 3/17/2020] digoxin  125 mcg Oral Daily    dilTIAZem  120 mg Oral BID    [START ON 3/17/2020] escitalopram  10 mg Oral Daily    [START ON 3/17/2020] furosemide  40 mg Oral Daily    metoprolol tartrate  25 mg Oral BID    atorvastatin  10 mg Oral Nightly    [START ON 3/17/2020] tiotropium  2 puff Inhalation Daily    insulin lispro  0-6 Units Subcutaneous TID WC    insulin lispro  0-3 Units Subcutaneous Nightly       Allergies:  Crestor [rosuvastatin]     Social History:       Social History     Socioeconomic History    Marital status:      Spouse name: Not on file    Number of children: Not on file    Years of education: Not on file    Highest education level: Not on file   Occupational History    Not on file   Social Needs    Financial resource strain: Not on file    Food insecurity     Worry: Not on file     Inability: Not on file   [employer] needs     Medical: Not on file     Non-medical: Not on file   Tobacco Use    Smoking status: Former Smoker     Packs/day: 2.00     Years: 18.00     Pack years: 36.00     Types: Cigarettes     Last attempt to quit: 2011     Years since quittin.5    Smokeless tobacco: Never Used   Substance and Sexual Activity    Alcohol use: Yes     Comment: rare    Drug use: No    Sexual activity: Not on file   Lifestyle    Physical activity     Days per week: Not on file     Minutes per session: Not on file    Stress: Not on file   Relationships    Social connections     Talks on phone: Not on file     Gets together: Not on file     Attends Mandaeism service: Not on file     Active member of club or organization: Not on file     Attends meetings of clubs or organizations: Not on file     Relationship status: Not on file    Intimate partner violence     Fear of current or ex partner: Not on file     Emotionally abused: Not on file     Physically abused: Not on file     Forced sexual activity: Not on file 7. 2   HGB 11.5*   HCT 37.3*   MCV 87.4        BMP:   Recent Labs     03/16/20  1130 03/16/20  1717     --    K 2.3* 2.3*   CL 79*  --    CO2 44*  --    BUN 11  --    CREATININE 0.7  --      Cardiac Enzymes:   Recent Labs     03/16/20  1130 03/16/20  1717 03/16/20  2018   TROPONINI 0.04* 0.03 0.03     PT/INR:   Recent Labs     03/16/20  1130   PROTIME 13.6   INR 1.05     APTT:   Recent Labs     03/16/20  1130   APTT 33.7     Liver Profile:  Lab Results   Component Value Date    AST 16 03/16/2020    ALT 31 03/16/2020    BILITOT 0.4 03/16/2020    ALKPHOS 146 03/16/2020    ALKPHOS 91 09/23/2011     Lab Results   Component Value Date    CHOL 136 09/06/2015    HDL 28 09/06/2015    TRIG 105 09/06/2015     TSH:  Lab Results   Component Value Date    TSH 0.957 02/15/2020     UA:   Lab Results   Component Value Date    COLORU YELLOW 09/05/2015    PHUR 6.5 09/05/2015    LEUKOCYTESUR NEGATIVE 09/05/2015    UROBILINOGEN 2.0 09/05/2015    BILIRUBINUR NEGATIVE 09/05/2015    GLUCOSEU NEGATIVE 09/05/2015             ALL THE CARDIOLOGY PROBLEMS ARE LISTED ABOVE; HOWEVER, THE FOLLOWING SPECIFIC CARDIAC PROBLEMS WERE ADDRESSED AND TREATED DURING THE HOSPITAL VISIT TODAY:                                                                                                                                                                                                                                            MEDICAL DECISION MAKING             Cardiac Specific Problem / Diagnosis  Discussion and Data Reviewed Diagnostic Procedures Ordered Management Options Selected           1.  Presenting problem / symptom    Long term persistent atrial fibrillation  show no change   The Atrial Fibrillation Stroke Risk is calculated according to the     [id number] Score      Risk   Factors  Component Value   C CHF Yes 1   H HTN Yes 1   A2 Age >= 75 No,  (65 y.o.) 0   D DM Yes 1   S2 Prior Stroke/TIA No 0   V Vascular Disease Yes 1   A Age

## 2020-03-17 NOTE — PLAN OF CARE
Problem: Falls - Risk of:  Goal: Will remain free from falls  Description: Will remain free from falls  3/17/2020 1231 by Opal Pennington RN  Outcome: Ongoing  3/17/2020 0409 by Deanna Ruiz RN  Outcome: Ongoing  Goal: Absence of physical injury  Description: Absence of physical injury  3/17/2020 1231 by Opal Pennington RN  Outcome: Ongoing  3/17/2020 0409 by Deanna Ruiz RN  Outcome: Ongoing     Problem: Pain:  Description: Pain management should include both nonpharmacologic and pharmacologic interventions.   Goal: Pain level will decrease  Description: Pain level will decrease  3/17/2020 1231 by Opal Pennington RN  Outcome: Ongoing  3/17/2020 0409 by Deanna Ruiz RN  Outcome: Ongoing  Goal: Control of acute pain  Description: Control of acute pain  3/17/2020 1231 by Opal Pennington RN  Outcome: Ongoing  3/17/2020 0409 by Deanna Ruiz RN  Outcome: Ongoing  Goal: Control of chronic pain  Description: Control of chronic pain  3/17/2020 1231 by Opal Pennington RN  Outcome: Ongoing  3/17/2020 0409 by Deanna Ruiz RN  Outcome: Ongoing     Problem: Skin Integrity:  Goal: Will show no infection signs and symptoms  Description: Will show no infection signs and symptoms  3/17/2020 1231 by Opal Pennington RN  Outcome: Ongoing  3/17/2020 0409 by Deanna Ruiz RN  Outcome: Ongoing  Goal: Absence of new skin breakdown  Description: Absence of new skin breakdown  3/17/2020 1231 by Opal Pennington RN  Outcome: Ongoing  3/17/2020 0409 by Deanna Ruiz RN  Outcome: Ongoing

## 2020-03-17 NOTE — PLAN OF CARE
Problem: Falls - Risk of:  Goal: Will remain free from falls  Description: Will remain free from falls  3/17/2020 0409 by Deanna Ruiz RN  Outcome: Ongoing  3/16/2020 1708 by Kassy Nguyen RN  Outcome: Ongoing  Goal: Absence of physical injury  Description: Absence of physical injury  Outcome: Ongoing     Problem: Pain:  Goal: Pain level will decrease  Description: Pain level will decrease  Outcome: Ongoing  Goal: Control of acute pain  Description: Control of acute pain  Outcome: Ongoing  Goal: Control of chronic pain  Description: Control of chronic pain  Outcome: Ongoing     Problem: Skin Integrity:  Goal: Will show no infection signs and symptoms  Description: Will show no infection signs and symptoms  3/17/2020 0409 by Deanna Ruiz RN  Outcome: Ongoing  3/16/2020 1708 by Kassy Nguyen RN  Outcome: Ongoing  Goal: Absence of new skin breakdown  Description: Absence of new skin breakdown  3/17/2020 0409 by Deanna Ruiz RN  Outcome: Ongoing  3/16/2020 1708 by Kassy Nguyen RN  Outcome: Ongoing

## 2020-03-17 NOTE — PROGRESS NOTES
hyperlipidemia   Coronary artery disease involving native coronary artery of native heart without angina pectoris   Chronic venous stasis dermatitis of both lower extremities   Morbid obesity due to excess calories ([facility] [address]   Essential hypertension   Type 2 diabetes mellitus without complication (HCC)   Chronic diastolic congestive heart failure (HCC)   Paroxysmal A-fib (HCC)   Malignant neoplasm of lung [facility])      Cardiac Specific Data:  Specialty Problems        Cardiology Problems    Myocardial infarction [facility])        Mixed hyperlipidemia        Coronary artery disease involving native coronary artery of native heart without angina pectoris        Myocardial infarction [facility])        Essential hypertension        Chronic diastolic congestive heart failure (HCC)        Paroxysmal A-fib (HCC)        Atrial fibrillation with rapid ventricular response (HCC)        * (Principal) Chest pain            1. Left metastatic mainstem lung CA with collapse of left lung with pleural effusion, hemoptysis, status post left lung thoracentesis and Pleurx catheter insertion. 2. COPD on 4 L home oxygen. 3. Paroxysmal atrial fibrillation with RVR, not on anticoagulation. 4. Diabetes mellitus. 5. Coronary artery disease with prior PCI OM1 9/2011, echo 2/2020 with right-sided chamber enlargement, severe pulmonary hypertension, ejection fraction 60%.     Subjective:  Mr. Jean-Paul Prabhakar was admitted from the nursing home with complaints of chest pain which she describes as retrosternal throbbing in nature lasting about half an hour. Symptoms have since abated. ProBNP noted to be elevated at 4800.  Borderline troponin elevation at 0.04.    Objective:   /60   Pulse 74   Temp 96.4 °F (35.8 °C) (Temporal)   Resp 18   Ht 5' 7\" (1.702 m)   Wt 211 lb 11.2 oz (96 kg)   SpO2 94%   BMI 33.16 kg/m²       Intake/Output Summary (Last 24 hours) at 3/17/2020 1253  Last data filed at 3/17/2020 0929  Gross per 24 hour   Intake 770 ml 03/16/20  1717 03/17/20  0628     --  136   K 2.3* 2.3* 2.7*   CL 79*  --  83*   CO2 44*  --  41*   BUN 11  --  11   CREATININE 0.7  --  0.6     LIVER PROFILE:   Recent Labs     03/16/20  1130 03/17/20  0628   AST 16 13   ALT 31 25   BILITOT 0.4 0.3   ALKPHOS 146* 111     PT/INR:   Recent Labs     03/16/20  1130   PROTIME 13.6   INR 1.05     APTT:   Recent Labs     03/16/20  1130   APTT 33.7     BNP:  No results for input(s): BNP in the last 72 hours. CK, CKMB, Troponin: @LABRCNT (CKTOTAL:3, CKMB:3, TROPONINI:3)@    IMAGING:  Xr Chest Standard (2 Vw)    Result Date: 3/17/2020  Exam:   XR CHEST (2 VW)  Date:  3/17/2020 History:  Male, age  62 years; pleural effusion COMPARISON:  Chest x-ray dated 3/16/2020 Findings : The left heart border is indistinct. Continued complete opacification of the left hemithorax. The right lung is unchanged. No measurable pneumothorax. The bones show no acute pathology. Impression: No interval changes. Signed by Dr Kristin Cruz on 3/17/2020 9:02 AM    Xr Abdomen (kub) (single Ap View)    Result Date: 2/24/2020  Examination. XR ABDOMEN (KUB) (SINGLE AP VIEW) 2/24/2020 6:30 AM History: Abdominal pain. Frontal projection of the abdomen including the pelvis is obtained. There is no previous study for comparison. There is marked dilatation of the stomach. There is moderate dilatation of the small and large bowel loops. There is gas in the distal colon and rectum. Both kidneys are obscured by the bowel contents. No radiopaque calculi are noted. A tubular structure is seen projected over the left upper abdomen. There is soft tissue air along the left lateral chest and abdominal wall. The Chronic degenerative changes of the lumbar spine are noted. There is evidence of right hip arthroplasty which is incompletely visualized and evaluated. The Dilated stomach, small and large bowel loops may suggest an ileus.  A less likely possibility of incomplete/partial distal large bowel obstruction may not be excluded. Further follow-up may be obtained. Signed by Dr Sarah Tovar on 2/24/2020 2:38 PM    Xr Chest Portable    Result Date: 3/16/2020  Examination. XR CHEST PORTABLE 3/16/2020 10:15 AM History: Chest pain. A single frontal portable upright view of the chest is compared with the previous study dated 2/25/2020. There is complete opacification of the left chest which may represent a combination of lung consolidation or pleural effusion. There is moderate displacement of the trachea towards left suggesting loss of left lung volume. There are interstitial changes in the right lung which may represent scarring and discoid atelectasis. No bony abnormality. The complete opacification of the left chest may be present left lung consolidation and left-sided pleural effusion. This is more progressive since the previous study. The right lung is poorly inflated with scarring and discoid atelectasis. Signed by Dr Sarah Tovar on 3/16/2020 11:58 AM    Xr Chest Portable    Result Date: 2/25/2020  EXAMINATION:  XR CHEST PORTABLE  2/25/2020 7:01 AM HISTORY: Lung cancer. COMPARISON: 2/24/2020. FINDINGS:  There is no significant measurable pneumothorax on the current study. There is some aerated lung in the upper lung zone on the left laterally. There is near complete opacification of the left hemithorax otherwise. There is shifting of the heart and mediastinal structures to the left. The right lung is clear. There is decreasing subcutaneous emphysema in the left chest wall. 1. No measurable pneumothorax. Minimal aeration of the left upper lobe. 2. Near complete opacification of the left hemithorax in the mid to lower lung zone which may be due to underlying atelectasis. There is shifting the heart and mediastinal structures to the left. There is left mainstem bronchus stent. 3. Decreasing subcutaneous emphysema in the left chest wall.  Signed by Dr Hernesto Ferguson on 2/25/2020 7:03 AM    Xr Chest Postthoracentesis, cancer  Single view. COMPARISONS:  2/22/2020 FINDINGS: Left lung opacification persists with minimal aeration of the upper lobes. The right lung remains grossly clear. Simultaneous emphysema in the left lateral chest wall and upper neck also identified, essentially unchanged. No measurable pneumothorax identified. Radiographically, chest is unchanged. Stable 1 day appearance the chest. Signed by Dr Vanda Peralta on 2/23/2020 7:22 AM    Xr Chest Portable    Result Date: 2/22/2020  EXAM: XR CHEST PORTABLE -- 2/22/2020 5:00 AM HISTORY: 64 years, Male, post thoracentesis, lung cancer COMPARISON: 2/21/2020 TECHNIQUE:  1 images. Frontal view of the chest. FINDINGS:  Subcutaneous emphysema at the left lateral chest wall, incompletely visualized, but similar compared to 2/21/2020. Suspect a small left pneumothorax. There may be a thin left-sided chest tube at the lateral chest wall. Near complete opacification of the left lung. Cardiac mediastinal silhouette obscured. Right lung appears grossly clear. No right pneumothorax or large pleural effusion. Limited evaluation of bones and upper abdomen due to technique. 1. Suspect small persistent left apical pneumothorax with otherwise near complete opacification of the left lung. Similar left chest wall subcutaneous emphysema, incompletely visualized. There may be a left-sided chest tube, but evaluation is limited by technique. Correlate with patient's lines and tubes. 2. Right lung appears clear. Signed by Dr Kadie Henley on 2/22/2020 6:57 AM    Xr Chest Portable    Result Date: 2/21/2020  Examination. XR CHEST PORTABLE 2/21/2020 5:00 AM History: Post thoracentesis. History of lung cancer. Significant frontal portable upright view of the chest is compared with the previous study dated 2/20/2020. No significant interval change. There is complete consolidation of the left lung. Small pneumothorax in the left upper chest is stable.  There are scarring and and pleural effusion. No pneumothorax. The moderate diffuse narrowing of the tracheal lumen and blockage of the left mainstem bronchus. Possibility of a mass in this area is suspected. The right lung is unremarkable. The above finding are recorded on a digital voice clip in PACS. Signed by Dr Alexandrea Bain on 2/19/2020 7:13 AM    Xr Chest Portable    Result Date: 2/17/2020  XR CHEST PORTABLE 2/17/2020 7:07 AM History: Short of breath. Portable chest x-ray compared with 2/15/2020. While there is chronic appearing interstitial disease there is improved aeration of the right lung base. Persistent white out of the left hemithorax. No pneumothorax. 1. Stable appearance of the left hemithorax. 2. Partial clearing of right basilar infiltrate. Signed by Dr Dexter Artis on 2/17/2020 7:08 AM    Xr Chest 1 Vw    Result Date: 2/17/2020  Examination. XR CHEST 1 VIEW 2/17/2020 1:15 PM History: Postthoracentesis. A single frontal projection of the chest is obtained after thoracentesis. The comparison is made with the previous study dated 2/17/2020 There is no pneumothorax. There is diffuse opacification of the left chest. However there appears to be loss of left chest volume since the previous study suggested by displacement of the trachea towards left. The opacity of the left chest may suggest consolidated lung with some residual pleural effusion. The right lung is unremarkable. No pneumothorax. Signed by Dr Alexandrea Bain on 2/17/2020 2:09 PM    Us Thoracentesis    Result Date: 2/17/2020  Examination. US THORACENTESIS 2/17/2020 7:15 AM History: Pleural effusion. The procedure was explained to the patient. The benefits, the risks and complications are discussed. The patient understood the procedure and signed the consent. Ultrasound examination of the left lower chest posteriorly at the pars performed and showed a moderate size pocket of fluid.  The point of maximum depth was marked on the patient's skin with an ink marker. After sterile preparation and application of local anesthesia, a size 5 [employer] Yueh catheter was introduced into the pocket of fluid and a FreeFlow of clear straw-colored fluid was established. The outer hub of the catheter is attached to a vacuum assisted draining device. 2 L of fluid was drained. The catheter was then withdrawn and the puncture site was covered with a sterile Band-Aid. The patient was sent for a chest radiograph to evaluate for pneumothorax. The patient tolerated the procedure well. No immediate complications were noted. A successful thoracentesis. Signed by Dr Sarah Gupta on 2/17/2020 2:06 PM        Assessment and Plan:    45-year-old gentleman with past medical history of COPD on home oxygen, paroxysmal atrial fibrillation, diabetes mellitus, obesity, recent diagnosis of left mainstem lung Ca with collapsed left lung after recent fall with low back injury, atrial fibrillation not on anticoagulation due to hemoptysis on rate control. 1. Atypical chest pain with borderline troponins with known history of prior CAD. Currently symptom free. Chest x-ray reviewed. Complete opacification of left lung. Multiple alternate etiologies for chest pain as well. At this time given his advanced metastatic lung CA, not on treatment currently, do not feel he is a candidate for aggressive cardiac intervention. Would manage medically. Can obtain echocardiogram. Previous echo with right-sided chamber dilatation and severe pulmonary hypertension noted. Add Imdur 30 mg once daily. 2. A. fib appears rate controlled. Not a candidate for anticoagulation.         Marinda Meigs, MD 3/17/2020 12:53 PM

## 2020-03-17 NOTE — PROGRESS NOTES
Spoke to [employer] concerning Pleurx - she states to leave cath alone if pt remains asymptomatic.  Electronically signed by Berkley Mcgraw RN on 3/17/2020 at 1:55 AM

## 2020-03-17 NOTE — CONSULTS
Palliative Care Consult Note  3/17/2020 10:34 AM  Subjective:   Admit Date: 3/16/2020  PCP: ELTON Torres    Reason For Consult: Goals of care,  Family Support    Requesting Physician: Steph Decker PA-C    History Obtained From: EMR, nursing, patient    Chief Complaint: Weakness    History of Present Illness: Patient is a 62 yr old male with a PMH of lung cancer, a fib, COPD, CHF, CAD, HTN, HLD who presented to the ED form [facility] on [facility]/16/2020 due to complaints of chest pain, pressure, and tightness that had been occurring throughout the morning. Patient is oxygen dependent, wears 4LNC, and had L PleurX catheter placed approximately 1 month ago due to recurrent pleural effusion. PleurX was being drained every 3 days at the nursing facility, was drained upon arrival to ED and he felt relief of chest pressure/tightness. CXR on this visit showed complete opacification of the L lung. BNP and troponin elevated, K+ 2.3. He was admitted for further evaluation and treatment. Cardiology consulted, ECHO completed on 2/17/2020 with normal EF but RVSP 80 mmHg. He continues on diltiazem and digoxin for rate control. Oncology consulted for continuity of care, patient has not been able to begin treatment due to poor performance status and weakness. Patient is known to Palliative Care and we were consulted to assist with goals of care, code status, and family support.      Past Medical History:        Diagnosis Date    Atherosclerosis     cardiovascular disease     Blood circulation, collateral     4 fingers left hand tingly    CAD (coronary artery disease)     saw dr. Ta Glimpse tunnel syndrome     CHF (congestive heart failure) (HCC)     COPD [facility] pulmonary disease) ([facility] [address]     Degenerative joint disease     right hip    Diabetes mellitus ([facility] [address]     Diabetic x 1-2 years    FH: chronic lung disease requiring oxygen     sees dr. Guerda Smith GERD (gastroesophageal reflux disease)     Hyperlipidemia     Hypertension     Malignant neoplasm of overlapping sites of left lung ([facility] [address]     Myocardial infarction ([facility] [address]     subenodcardial    Obesity     Palliative care patient 01/29/2018    Presence of stent in coronary artery     9/23/11 BALJINDER placement 0MB of circumflex    Right carpal tunnel syndrome 9/12/2018       Past Surgical History:        Procedure Laterality Date    BACK SURGERY      in 1980's    CARDIAC CATHETERIZATION  9/23/11    with stenting of OMB of left circumflex.  CORONARY ANGIOPLASTY WITH STENT PLACEMENT      HERNIA REPAIR      57 years ago    JOINT REPLACEMENT      Right hip replacement 4-6 years ago    LUMBAR LAMINECTOMY      NM REVISE MEDIAN N/CARPAL TUNNEL SURG Left 9/13/2018    CARPAL TUNNEL RELEASE performed by Jennifer Arriaga MD at [address] THORACOSCOPY Left 2/19/2020    VIDEO ASSISTED THOROCOSCOPY WITH INSERTION OF PLEUREX CATHETER performed by Gennette Essex, MD at [address]       Allergies:  Crestor [rosuvastatin]    Social History:    TOBACCO:   reports that he quit smoking about 8 years ago. His smoking use included cigarettes. He has a 36.00 pack-year smoking history. He has never used smokeless tobacco.  ETOH:   reports current alcohol use. MARITAL STATUS:    Patient currently lives in a nursing home    Family History:       Problem Relation Age of Onset    Coronary Art Dis Mother     Cancer Mother     Coronary Art Dis Father     Diabetes Father     Coronary Art Dis Sister     Depression Paternal Grandmother        Palliative Performance Score:  30-40%    Review of Systems   Constitutional: Positive for activity change and fatigue. Generalized weakness   HENT: Negative. Eyes: Negative. Respiratory: Positive for shortness of breath. Cardiovascular: Negative. Gastrointestinal: Negative. Endocrine: Negative. Genitourinary: Negative. Musculoskeletal: Negative. Skin: Negative. Neurological: Negative. Psychiatric/Behavioral: Negative. Palliative Review of Advance Directives:     Surrogate Decision Maker:yes, spouse    Durable Power of :no    Advanced Directives/Living Loaiza: yes    Out of hospital medical orders in place to reflect resuscitation status (MOLST/POLST): no    Information Sharing:  Patient's awareness of illness:  [] Terminal [] Life-Threatening [x] Serious [] Non life-threatening [] Not serious   [] Not discussed    Family awareness of illness:   [] Terminal [] Life-Threatening [] Serious [] Nonlife-threatening [] Not serious   [x] Not discussed    DIET CARB CONTROL; Low Sodium (2 GM);  Dysphagia Minced and Moist; No Caffeine    Medications:   dextrose       Current Facility-Administered Medications   Medication Dose Route Frequency Provider Last Rate Last Dose    sodium chloride flush 0.9 % injection 10 mL  10 mL Intravenous 2 times per day Caryle Gabo, PA-C   10 mL at 03/17/20 0929    sodium chloride flush 0.9 % injection 10 mL  10 mL Intravenous PRN Caryle Gabo, PA-C        acetaminophen (TYLENOL) tablet 650 mg  650 mg Oral Q6H PRN Caryle Gabo, PA-C        Or    acetaminophen (TYLENOL) suppository 650 mg  650 mg Rectal Q6H PRN Caryle Gabo, PA-C        polyethylene glycol [facility]) packet 17 g  17 g Oral Daily PRN Caryle Gabo, PA-C        promethazine (PHENERGAN) tablet 12.5 mg  12.5 mg Oral Q6H PRN Caryle Gabo, PA-C        Or    ondansetron [facility]) injection 4 mg  4 mg Intravenous Q6H PRN Caryle Gabo, PA-C        magnesium sulfate 2 g in 50 mL IVPB premix  2 g Intravenous PRN Caryfelipe Gabo, PA-C   Stopped at 03/17/20 0052    nitroGLYCERIN (NITROSTAT) SL tablet 0.4 mg  0.4 mg Sublingual Q5 Min PRN Caryle Gabo, PA-C        albuterol sulfate  (90 Base) MCG/ACT inhaler 2 puff  2 puff Inhalation Q4H PRN Caryle Gabo, PA-C        aspirin EC tablet 81 mg  81 mg Oral Daily Caryle Gabo, PA-C   81 mg at 03/17/20 6589    digoxin PleurX  Heart: regular rate and rhythm and S1, S2 normal  Abdomen: soft, non-tender; bowel sounds normal; no masses,  no organomegaly  Genitourinary: No bladder fullness, masses, or tenderness  Extremities: extremities normal, atraumatic, no cyanosis or edema  Neurologic: No focal neurologic deficits, normal sensation, no tremor, alert and oriented  Psychiatric: Alert and oriented, no recent or remote memory deficits, good judgement, mood and affect appropriate  Skin: no rashes,lesions, or nodules. Assessment and Plan:   Principal Problem:    Chest pain  Active Problems:    Mixed hyperlipidemia    Coronary artery disease involving native coronary artery of native heart without angina pectoris    Chronic venous stasis dermatitis of both lower extremities    Morbid obesity due to excess calories (HCC)    Essential hypertension    Type 2 diabetes mellitus without complication (HCC)    Chronic diastolic congestive heart failure (HCC)    Paroxysmal A-fib (HCC)    Malignant neoplasm of lung (HCC)  Resolved Problems:    * No resolved hospital problems. *      Visit Summary: I saw the patient at the bedside with no family present. He is in good spirits this morning and we discussed his rehabilitation since his last admission. Patient expresses that it did not go well because he did not participate like he should have but feels that he is ready to make the effort to improve. I reviewed that he will not be able to pursue treatment if his performance status does not improve, patient verbalizes understanding but wants to pursue therapy so that he can have treatments for his lung cancer. I am not sure how realistic this is for the patient but I will continue to discuss goals with him. Patient may need to consider hospice care to help support him as he reports being motivated to improve but does not follow through when it comes to therapy.  I will revisit this option with him again tomorrow, he is adamant that he is motivated to participate in therapy. Palliative Care will continue to follow. Recommendations: Patient states that he wants to continue to pursue therapy to gain strength so he can have treatment. I will revisit the option for hospice care with the patient again tomorrow    Thank you for consulting palliative care and allowing us to participate in the care of the patient.       CounselingTopics: Goals of care, Code Status    Time Spent Counselin min                                      Total Face to Face Time:  50 min  Time Spent Reviewing Records/Provider Communication: 10 min  Total Time Spent: 60 min    Electronically signed by Milta Aschoff, APRN on 3/17/2020 at 10:34 AM    (Please note that portions of this note were completed with a voice recognition program.  Efforts were made to edit the dictations but occasionally words are mis-transcribed.)

## 2020-03-17 NOTE — CONSULTS
(KLOR-CON M) extended release tablet 40 mEq, 40 mEq, Oral, PRN **OR** potassium bicarb-citric acid (EFFER-K) effervescent tablet 40 mEq, 40 mEq, Oral, PRN **OR** potassium chloride 10 mEq/100 mL IVPB (Peripheral Line), 10 mEq, Intravenous, PRN, Logan Benito MD    potassium chloride 10 mEq/100 mL IVPB (Peripheral Line), 10 mEq, Intravenous, PRN, Logan Benito MD, Last Rate: 100 mL/hr at 03/17/20 1345, 10 mEq at 03/17/20 1345  Outpatient Medications Marked as Taking for the 3/16/20 encounter [facility] Encounter)   Medication Sig Dispense Refill    dilTIAZem (CARDIZEM CD) 120 MG extended release capsule Take 1 capsule by mouth 2 times daily 30 capsule 3    digoxin (LANOXIN) 125 MCG tablet Take 1 tablet by mouth daily 30 tablet 3    escitalopram (LEXAPRO) 10 MG tablet Take 10 mg by mouth daily      tiotropium (SPIRIVA) 18 MCG inhalation capsule Inhale 18 mcg into the lungs daily      metFORMIN (GLUCOPHAGE) 1000 MG tablet Take 1 tablet by mouth 2 times daily (with meals) 60 tablet 0    simvastatin (ZOCOR) 10 MG tablet Take 1 tablet by mouth nightly 30 tablet 0    hydrALAZINE (APRESOLINE) 10 MG tablet Take 1 tablet by mouth every 8 hours 90 tablet 0    metoprolol tartrate (LOPRESSOR) 25 MG tablet Take 1 tablet by mouth 2 times daily 60 tablet 0    furosemide (LASIX) 40 MG tablet Take 1 tablet by mouth daily 30 tablet 0    ranitidine (ZANTAC) 150 MG tablet Take 1 tablet by mouth 2 times daily 60 tablet 0    aspirin 81 MG EC tablet Take 81 mg by mouth daily. Allergies   Crestor [rosuvastatin]    Family History       Family History   Problem Relation Age of Onset    Coronary Art Dis Mother     Cancer Mother     Coronary Art Dis Father     Diabetes Father     Coronary Art Dis Sister     Depression Paternal Grandmother      Family history negative for kidney disease.      Social History      Social History     Socioeconomic History    Marital status:      Spouse name: None    Number of children: None    Years of education: None    Highest education level: None   Occupational History    None   Social Needs    Financial resource strain: None    Food insecurity     Worry: None     Inability: None    Transportation needs     Medical: None     Non-medical: None   Tobacco Use    Smoking status: Former Smoker     Packs/day: 2.00     Years: 18.00     Pack years: 36.00     Types: Cigarettes     Last attempt to quit: 2011     Years since quittin.5    Smokeless tobacco: Never Used   Substance and Sexual Activity    Alcohol use: Yes     Comment: rare    Drug use: No    Sexual activity: None   Lifestyle    Physical activity     Days per week: None     Minutes per session: None    Stress: None   Relationships    Social connections     Talks on phone: None     Gets together: None     Attends Oriental orthodox service: None     Active member of club or organization: None     Attends meetings of clubs or organizations: None     Relationship status: None    Intimate partner violence     Fear of current or ex partner: None     Emotionally abused: None     Physically abused: None     Forced sexual activity: None   Other Topics Concern    None   Social History Narrative    None       Physical Exam     Blood pressure 104/60, pulse 74, temperature 96.4 °F (35.8 °C), temperature source Temporal, resp. rate 18, height 5' 7\" (1.702 m), weight 211 lb 11.2 oz (96 kg), SpO2 94 %.     General:  NAD, A+Ox3, ill-appearing, normal body habitus  HEENT:  Atraumatic, normocephalic  Neck:  Supple, normal range of movement  Chest:  CTAB, good respiratory effort, good air movement  CV:  RRR, no murmurs  Abdomen:  NTND, soft, +BS, no hepatosplenomegaly  Extremities:  No peripheral edema  Neurological:  Moving all four extremities  Lymphatics:  No palpable lymph nodes   Skin:  No rash, no jaundice  Psychiatric:  Normal insight and judgement, good recall    Data     Recent Labs     20  1130 20  0628   WBC 7.2

## 2020-03-17 NOTE — CONSULTS
increasing weakness. He was also found to have atrial fibrillation with RVR.      CTA chest with contrast on 1/25/2020:  NO PE  Complete collapse of the left lung secondary to an occluded left main stem bronchus  Moderate size left pleural effusion  Mild cardiomegaly, trace pericardial fluid  Vascular Lab Prelim     Duplex venous scan of B/L LE on 1/25/2020 shows no evidence for dvt, svt, reflux noted at this time. Final report pending     The case was discussed with Dr Jake Garcia with pulmonology, who stated that he can do a bronch but with the bleeding, he would not be able to intervene other than using epinephrine. Would recommend transfer to higher level of care as pt may require embolization.      Mr. Julian Rivera was transferred to [facility] in [facility] where further work-up was performed.     X-ray Chest Portable 1/26/2020  Complete whiteout of the left lung with mild deviation of the trachea to the left. Findings are consistent with complete collapse of the left lung. Underlying infection or mass or effusion cannot be excluded      SIMONA at [place] on 1/27/2020 was performed. LVEF greater than 55%. Unable to comment on diastolic filling pattern due to atrial  fibrillation. RV mildly dilated. Mildly depressed RV systolic function. The left atrium is mild to moderately dilated. RA mildly dilated. Mild tricuspid regurgitation. The RV systolic pressure is calculated at 63 mmHg.     A thoracentesis was performed at [facility] on 1/27/2020.  1100 cc of pleural fluid was drained. Cytology:  REACTIVE MESOTHELIAL CELLS, HISTIOCYTES, AND LYMPHOCYTES; NEGATIVE FOR MALIGNANCY      CT C/A/P at [facility] 1/29/2020  Impression:   1.  Signs of large LEFT lung mass with postobstructive collapse of   the entire LEFT lung and mediastinal shift. 2.  Bulky subcarinal adenopathy.    3.  Nodular bilateral adrenal glands still with some adreniform   contour, suspicious for either nodular hyperplasia or COPD (chronic obstructive pulmonary disease) ([facility]     Degenerative joint disease     right hip    Diabetes mellitus ([facility]     Diabetic x 1-2 years   [employer] FH: chronic lung disease requiring oxygen     sees dr. Emmy Chandler GERD (gastroesophageal reflux disease)     Hyperlipidemia     Hypertension     Malignant neoplasm of overlapping sites of left lung ([facility]     Myocardial infarction ([facility]     subenodcardial    Obesity     Palliative care patient 2018    Presence of stent in coronary artery     11 BALJINDER placement 0MB of circumflex    Right carpal tunnel syndrome 2018       Past Surgical History:    Past Surgical History:   Procedure Laterality Date    BACK SURGERY      in     CARDIAC CATHETERIZATION  11    with stenting of OMB of left circumflex.     CORONARY ANGIOPLASTY WITH STENT PLACEMENT      HERNIA REPAIR      63 years ago    JOINT REPLACEMENT      Right hip replacement 4-6 years ago    LUMBAR LAMINECTOMY      MS REVISE MEDIAN N/CARPAL TUNNEL SURG Left 2018    CARPAL TUNNEL RELEASE performed by Jennifer Arriaga MD at [address] [place] [address] THORACOSCOPY Left 2020    VIDEO ASSISTED THOROCOSCOPY WITH INSERTION OF PLEUREX CATHETER performed by Gennette Essex, MD at [facility] OR       Social History:    Social History     Socioeconomic History    Marital status:      Spouse name: Not on file    Number of children: Not on file    Years of education: Not on file    Highest education level: Not on file   Occupational History    Not on file   Social Needs    Financial resource strain: Not on file    Food insecurity     Worry: Not on file     Inability: Not on file    Transportation needs     Medical: Not on file     Non-medical: Not on file   Tobacco Use    Smoking status: Former Smoker     Packs/day: 2.00     Years: 18.00     Pack years: 36.00     Types: Cigarettes     Last attempt to quit: 2011     Years since quittin.5    Smokeless tobacco: Never Used   Substance and Sexual Activity    Alcohol use: Yes     Comment: rare    Drug use: No    Sexual activity: Not on file   Lifestyle    Physical activity     Days per week: Not on file     Minutes per session: Not on file    Stress: Not on file   Relationships    Social connections     Talks on phone: Not on file     Gets together: Not on file     Attends Islam service: Not on file     Active member of club or organization: Not on file     Attends meetings of clubs or organizations: Not on file     Relationship status: Not on file    Intimate partner violence     Fear of current or ex partner: Not on file     Emotionally abused: Not on file     Physically abused: Not on file     Forced sexual activity: Not on file   Other Topics Concern    Not on file   Social History Narrative    Not on file       Family History:   Family History   Problem Relation Age of Onset    Coronary Art Dis Mother     Cancer Mother     Coronary Art Dis Father     Diabetes Father     Coronary Art Dis Sister     Depression Paternal Grandmother        Current Hospital Medications:    Current Facility-Administered Medications: sodium chloride flush 0.9 % injection 10 mL, 10 mL, Intravenous, 2 times per day  sodium chloride flush 0.9 % injection 10 mL, 10 mL, Intravenous, PRN  acetaminophen (TYLENOL) tablet 650 mg, 650 mg, Oral, Q6H PRN **OR** acetaminophen (TYLENOL) suppository 650 mg, 650 mg, Rectal, Q6H PRN  polyethylene glycol (GLYCOLAX) packet 17 g, 17 g, Oral, Daily PRN  promethazine (PHENERGAN) tablet 12.5 mg, 12.5 mg, Oral, Q6H PRN **OR** ondansetron (ZOFRAN) injection 4 mg, 4 mg, Intravenous, Q6H PRN  magnesium sulfate 2 g in 50 mL IVPB premix, 2 g, Intravenous, PRN  nitroGLYCERIN (NITROSTAT) SL tablet 0.4 mg, 0.4 mg, Sublingual, Q5 Min PRN  albuterol sulfate  (90 Base) MCG/ACT inhaler 2 puff, 2 puff, Inhalation, Q4H PRN  aspirin EC tablet 81 mg, 81 mg, Oral, Daily  digoxin (LANOXIN) tablet 125 mcg, 125 mcg, Oral, Suspect a small left pneumothorax. There may be a thin left-sided chest tube at the lateral chest wall. Near complete opacification of the left lung. Cardiac mediastinal silhouette obscured. Right lung appears grossly clear. No right pneumothorax or large pleural effusion. Limited evaluation of bones and upper abdomen due to technique. 1. Suspect small persistent left apical pneumothorax with otherwise near complete opacification of the left lung. Similar left chest wall subcutaneous emphysema, incompletely visualized. There may be a left-sided chest tube, but evaluation is limited by technique. Correlate with patient's lines and tubes. 2. Right lung appears clear. Signed by Dr Felicia Escalona on 2/22/2020 6:57 AM    Xr Chest Portable    Result Date: 2/21/2020  Examination. XR CHEST PORTABLE 2/21/2020 5:00 AM History: Post thoracentesis. History of lung cancer. Significant frontal portable upright view of the chest is compared with the previous study dated 2/20/2020. No significant interval change. There is complete consolidation of the left lung. Small pneumothorax in the left upper chest is stable. There are scarring and atelectasis in the right lung. There is a persistent displacement of the trachea towards left. There is moderate diffuse narrowing of the mid and distal trachea. There is a persistent soft tissue emphysema along the left lateral chest wall and the left side of the neck. This is incompletely included in the study. Left-sided chest tube is not visualized in this study. No change in the cardiopulmonary status. Signed by Dr Low Flores on 2/21/2020 7:13 AM    Xr Chest Portable    Result Date: 2/20/2020  Examination. XR CHEST PORTABLE 2/20/2020 5:00 AM History: Postthoracentesis. A single frontal portable upright view of the chest is compared with the previous study dated 2/19/2020.  There is complete consolidation of the left lung with a small pneumothorax in the upper chest. There is (TPS): 99% Intensity of staining: strong     He is a candidate for single agent pembrolizumab as outpatient if PS improves. Chest xray showed complete opacification left chest.    #Left pleural effusion/lung collapse  Awaiting pulmonary evaluation.     #PAF  Cardizem/Digoxin per cardiology      # Generalized weakness  PT/OT-used to decline due to significant fatigue  Planning discharge possibly to [address] [place] [address]  #GERD  Receiving Protonix       #Prognosis- very poor prognosis. The patient has a very poor performance and concerned that he would not be able to tolerate any therapy, unless his performance status improves greatly. He could tolerate radiation and/or Keytruda if he becomes ambulatory. Recommended intensive PT      PLAN  Va Ruano is aware however his performance status must first be improved. Encouraged participating with PT and getting up to chair. PLAN:  Agree with palliative care consult   Awaiting pulmonary evaluation. Consult PT      I have seen, examined and reviewed this patient medication list, appropriate labs and imaging studies. I reviewed    relevant medical records and others physicians notes. I discussed the plans of care with the patient. I answered all the questions to the patients satisfaction.     (Please note that portions of this note were completed with a voice recognition program. Efforts were made to edit the dictations but occasionally words are mis-transcribed.)  Ludmila Barreto MD    03/17/20  7:03 AM

## 2020-03-18 PROBLEM — J91.0 MALIGNANT PLEURAL EFFUSION: Status: ACTIVE | Noted: 2020-01-01

## 2020-03-18 NOTE — PLAN OF CARE
Problem: Falls - Risk of:  Goal: Will remain free from falls  Description: Will remain free from falls  3/17/2020 1958 by Tish Isbell RN  Outcome: Ongoing  3/17/2020 1231 by Leroy Duffy RN  Outcome: Ongoing  Goal: Absence of physical injury  Description: Absence of physical injury  3/17/2020 1958 by Tish Isbell RN  Outcome: Ongoing  3/17/2020 1231 by Leroy Duffy RN  Outcome: Ongoing     Problem: Pain:  Description: Pain management should include both nonpharmacologic and pharmacologic interventions.   Goal: Pain level will decrease  Description: Pain level will decrease  3/17/2020 1958 by Tish Isbell RN  Outcome: Ongoing  3/17/2020 1231 by Leroy Duffy RN  Outcome: Ongoing  Goal: Control of acute pain  Description: Control of acute pain  3/17/2020 1958 by Tish Isbell RN  Outcome: Ongoing  3/17/2020 1231 by Leroy Duffy RN  Outcome: Ongoing  Goal: Control of chronic pain  Description: Control of chronic pain  3/17/2020 1958 by Tish Isbell RN  Outcome: Ongoing  3/17/2020 1231 by Leroy Duffy RN  Outcome: Ongoing     Problem: Skin Integrity:  Goal: Will show no infection signs and symptoms  Description: Will show no infection signs and symptoms  3/17/2020 1958 by Tish Isbell RN  Outcome: Ongoing  3/17/2020 1231 by Leroy Duffy RN  Outcome: Ongoing  Goal: Absence of new skin breakdown  Description: Absence of new skin breakdown  3/17/2020 1958 by Tish Isbell RN  Outcome: Ongoing  3/17/2020 1231 by Leroy Duffy RN  Outcome: Ongoing     Problem: HH PREVENTION OF SKIN BREAKDOWN-PRESSURE ULCER  Goal: Understanding of ways to prevent future skin breakdown will improve  Description: Understanding of ways to prevent future skin breakdown will improve     3/17/2020 1958 by Tish Isbell RN  Outcome: Ongoing  3/17/2020 1317 by Minnie Dominique RN  Outcome: Ongoing     Problem: SAFETY  Goal: Free from accidental physical injury  Outcome: Ongoing  Goal: Free from intentional harm  Outcome: Ongoing     Problem: DAILY CARE  Goal: Daily care needs are met  Outcome: Ongoing     Problem: PAIN  Goal: Patient's pain/discomfort is manageable  Outcome: Ongoing     Problem: SKIN INTEGRITY  Goal: Skin integrity is maintained or improved  Outcome: Ongoing     Problem: KNOWLEDGE DEFICIT  Goal: Patient/S.O. demonstrates understanding of disease process, treatment plan, medications, and discharge instructions.   Outcome: Ongoing

## 2020-03-18 NOTE — CARE COORDINATION
Informed by P/C ELTON Bernal after speaking with the pt the pt is refusing hospice services at this time and requesting SNF rehab services. Pt is requesting a referral to [facility] d/t a family member that is employed there and would have ability to have the visits and the family member to motivate the pt to complete therapy daily. P/C ELTON spoke in length with the pt about working with therapy to avoid a denial through insurance as occurred the pt's previous dc and why the pt had to go home with only [facility] services. Pt is agreeable at this time. CM HSADE Krishna entered OT order and pt already had PT order entered. SW notified Sera Wilkins in therapy of the need for therapy evals for precert. Sent referral to [facility]  for services and review.      [address] [place], [place] [address]  [employer]  ph: [phone]  weekday fax: [phone]  weekend fax: [phone]

## 2020-03-18 NOTE — PROGRESS NOTES
Cardiology Progress Note Julissa Rush MD      Patient:  Brooke Mena  [id number]    Patient Active Problem List    Diagnosis Date Noted    Malignant pleural effusion 03/18/2020     Priority: Low    Acute chest pain 03/16/2020     Priority: Low    Malignant neoplasm of lung [facility])      Priority: Low    Atrial fibrillation with rapid ventricular response ([facility] [address]      Priority: Low    Malignant neoplasm of overlapping sites of left lung [facility])      Priority: Low    Palliative care patient 02/17/2020     Priority: Low    Paroxysmal A-fib ([address] 02/15/2020     Priority: Low     Overview Note:     9/23/2011  Cath  Inferior hypo, stent OM1  11/19/2017  Echo  EF 45%  11/21/2017  lexiscan negative for myocardial ischemia, EF 51  %  2/17/2020  Echo  Normal LVFX , PA pressure 80 mmHg, large pleural effusion      Right carpal tunnel syndrome 09/12/2018     Priority: Low    COPD with acute exacerbation ([address] 01/29/2018     Priority: Low    Obesity hypoventilation syndrome ([address] 11/22/2017     Priority: Low    Chronic diastolic congestive heart failure ([address] 11/21/2017     Priority: Low    Essential hypertension      Priority: Low    Type 2 diabetes mellitus without complication ([address]      Priority: Low    Acute respiratory failure with hypoxia and hypercapnia (HCC) 11/17/2017     Priority: Low    Chronic bronchitis ([address] 09/17/2015     Priority: Low    Chronic venous stasis dermatitis of both lower extremities 09/17/2015     Priority: Low    Morbid obesity due to excess calories ([address] 09/17/2015     Priority: Low    Stented coronary artery 04/10/2014     Priority: Low    Coronary artery disease involving native coronary artery of native heart without angina pectoris      Priority: Low    Myocardial infarction [facility])      Priority: Low     Overview Note:     subenodcardial      Mixed hyperlipidemia      Priority: Low    Myocardial infarction [facility])      Priority: Low       Admit Date:  3/16/2020    Admission Problem List: Present on Admission:   Acute chest pain   Mixed hyperlipidemia   Coronary artery disease involving native coronary artery of native heart without angina pectoris   Chronic venous stasis dermatitis of both lower extremities   Morbid obesity due to excess calories (HCC)   Essential hypertension   Type 2 diabetes mellitus without complication (HCC)   Chronic diastolic congestive heart failure (HCC)   Paroxysmal A-fib (HCC)   Malignant neoplasm of lung (HCC)   Malignant pleural effusion      Cardiac Specific Data:  Specialty Problems        Cardiology Problems    Myocardial infarction [facility])        Mixed hyperlipidemia        Coronary artery disease involving native coronary artery of native heart without angina pectoris        Myocardial infarction [facility])        Essential hypertension        Chronic diastolic congestive heart failure (HCC)        Paroxysmal A-fib (HCC)        Atrial fibrillation with rapid ventricular response (HCC)        * (Principal) Acute chest pain            1. Left metastatic mainstem lung CA with collapse of left lung with pleural effusion, hemoptysis, status post left lung thoracentesis and Pleurx catheter insertion. 2. COPD on 4 L home oxygen. 3. Paroxysmal atrial fibrillation with RVR, not on anticoagulation. 4. Diabetes mellitus. 5. Coronary artery disease with prior PCI OM1 9/2011, echo 2/2020 with right-sided chamber enlargement, severe pulmonary hypertension, ejection fraction 60%.     Subjective:  Mr. Fabrizio Bloom reports no chest pain overnight or this morning. Vitals stable. Has not been out of bed.   Objective:   BP (!) 82/53   Pulse 61   Temp 96.9 °F (36.1 °C) (Temporal)   Resp 16   Ht 5' 7\" (1.702 m)   Wt 211 lb 11.2 oz (96 kg)   SpO2 97%   BMI 33.16 kg/m²       Intake/Output Summary (Last 24 hours) at 3/18/2020 1500  Last data filed at 3/18/2020 1410  Gross per 24 hour   Intake 930 ml   Output 575 ml   Net 355 ml       Prior to Admission medications The Chronic degenerative changes of the lumbar spine are noted. There is evidence of right hip arthroplasty which is incompletely visualized and evaluated. The Dilated stomach, small and large bowel loops may suggest an ileus. A less likely possibility of incomplete/partial distal large bowel obstruction may not be excluded. Further follow-up may be obtained. Signed by Dr Martha Jiménez on 2/24/2020 2:38 PM    Xr Chest Portable    Result Date: 3/16/2020  Examination. XR CHEST PORTABLE 3/16/2020 10:15 AM History: Chest pain. A single frontal portable upright view of the chest is compared with the previous study dated 2/25/2020. There is complete opacification of the left chest which may represent a combination of lung consolidation or pleural effusion. There is moderate displacement of the trachea towards left suggesting loss of left lung volume. There are interstitial changes in the right lung which may represent scarring and discoid atelectasis. No bony abnormality. The complete opacification of the left chest may be present left lung consolidation and left-sided pleural effusion. This is more progressive since the previous study. The right lung is poorly inflated with scarring and discoid atelectasis. Signed by Dr Martha Jiménez on 3/16/2020 11:58 AM    Xr Chest Portable    Result Date: 2/25/2020  EXAMINATION:  XR CHEST PORTABLE  2/25/2020 7:01 AM HISTORY: Lung cancer. COMPARISON: 2/24/2020. FINDINGS:  There is no significant measurable pneumothorax on the current study. There is some aerated lung in the upper lung zone on the left laterally. There is near complete opacification of the left hemithorax otherwise. There is shifting of the heart and mediastinal structures to the left. The right lung is clear. There is decreasing subcutaneous emphysema in the left chest wall. 1. No measurable pneumothorax. Minimal aeration of the left upper lobe.  2. Near complete opacification of the left hemithorax in the mid to lower lung zone which may be due to underlying atelectasis. There is shifting the heart and mediastinal structures to the left. There is left mainstem bronchus stent. 3. Decreasing subcutaneous emphysema in the left chest wall. Signed by Dr Tez Calvin on 2/25/2020 7:03 AM    Xr Chest Portable    Result Date: 2/24/2020  EXAMINATION:  XR CHEST PORTABLE  2/24/2020 2:33 PM HISTORY: Shortness of air. COMPARISON: 2/24/2020 at 4:43 AM. FINDINGS:  A small pneumothorax in the upper lung zone laterally on the left side is larger on the current study. There is no significant aeration of the left lung. There is a left main bronchus stent. Subcutaneous emphysema in the left chest wall is stable or increased slightly. There is bronchial wall thickening on the right. There is no dense consolidation on the right side. There is minimal atelectasis in the right lung base. 1. There is a small pneumothorax on the left side. The pneumothorax is larger on the current study. There is no significant aeration of the left lung. 2. Subcutaneous emphysema in the left chest wall may be increased slightly. 3. Minimal right lung base atelectasis. Chronic bronchial wall thickening on the right. 4. Left mainstem bronchus stent. Signed by Dr Tez Calvin on 2/24/2020 2:38 PM    Xr Chest Portable    Result Date: 2/24/2020  Examination. XR CHEST PORTABLE 2/24/2020 5:00 AM History: Postthoracentesis. A single frontal portable upright view of the chest is compared with the previous study dated 2/23/2020. Complete opacification of the left lung and a minimally deviated left upper lung are loculated pneumothorax? [place] The right lung is unremarkable similar to the previous study. There is a persistent narrowing of the trachea. There is no air in the left segmental bronchi noted. There is a persistent soft tissue emphysema of the left lateral chest wall left side of the neck. No change in the previous study.  Persistent opacification left lung of lung cancer. Significant frontal portable upright view of the chest is compared with the previous study dated 2/20/2020. No significant interval change. There is complete consolidation of the left lung. Small pneumothorax in the left upper chest is stable. There are scarring and atelectasis in the right lung. There is a persistent displacement of the trachea towards left. There is moderate diffuse narrowing of the mid and distal trachea. There is a persistent soft tissue emphysema along the left lateral chest wall and the left side of the neck. This is incompletely included in the study. Left-sided chest tube is not visualized in this study. No change in the cardiopulmonary status. Signed by Dr John Parikh on 2/21/2020 7:13 AM    Xr Chest Portable    Result Date: 2/20/2020  Examination. XR CHEST PORTABLE 2/20/2020 5:00 AM History: Postthoracentesis. A single frontal portable upright view of the chest is compared with the previous study dated 2/19/2020. There is complete consolidation of the left lung with a small pneumothorax in the upper chest. There is displacement of the trachea towards left suggesting loss of left lung volume. There are atelectatic changes in the right lower lung. There is a small right basal pleural effusion. A poorly visualized chest tube is seen at the left base. There is extensive soft tissue emphysema along the left lateral chest wall and the left side of the neck which appears unchanged. Persistent left lung consolidation and a small left hydropneumothorax. Persistent moderate displacement of the distal trachea towards left suggesting loss of left lung volume. The persistent and unchanged soft tissue emphysema of the left lateral chest wall and left-sided of the neck. A poorly visualized due to chest tube in the left lower chest. Signed by Dr John Parikh on 2/20/2020 7:11 AM    Xr Chest Portable    Result Date: 2/19/2020  Examination.  XR CHEST PORTABLE 2/19/2020 7:45 AM History: Post  Pleur-X catheter placement. A single frontal portable semiupright view of the chest is compared with the previous study dated 2/19/2020. There is loss of left lung volume and the intervertebral. There is consolidation of the left lower lung surrounded by pneumothorax. Small loculated fluid is seen in the right upper chest. A poorly visualized due to chest tube is seen in the left lower chest. The distal end of the tube is not well-visualized. There is displacement of the distal trachea towards left due to loss of left lung volume. There is moderate diffuse narrowing of the distal trachea. There is no tear seen in the left distal mainstem bronchus as in the previous study. There are large amount of soft tissue air along the left lateral chest wall and the left side of the neck. There are left rib deformities which may be due to previous trauma. The right lung appears unremarkable. Complete consolidation/collapse of the left lung surrounded by pneumothorax. A poorly visualized catheter in the left lower chest. A small loculated pleural fluid in the left upper chest. Displacement of the trachea towards the left suggesting loss of left lung volume. Large soft tissue air in the left lateral chest wall and left-sided of the neck. Above findings are recorded on a digital voice clip in PACS. Signed by Dr Lexus Ko on 2/19/2020 9:19 AM    Xr Chest Portable    Result Date: 2/19/2020  Examination. XR CHEST PORTABLE 2/19/2020 5:00 AM History: Postthoracentesis. Lung cancer. A single frontal portable upright view of the chest is compared with the previous study dated 2/17/2020. There is a complete left chest opacification similar to the previous study. No infiltrate lung tissue seen in the left chest. The right lung is unremarkable. The heart size may not be evaluated due to complete obliteration cardiac border by the left lung opacity. No pneumothorax.  There is moderate diffuse narrowing of the tracheal lumen, similar to the previous study. Etiology is not certain. This lack of air in the distal part of the left mainstem bronchus. An intrabronchial mass is not excluded. No bony abnormality. Complete opacification of the left chest may represent left lung consolidation/collapse and pleural effusion. No pneumothorax. The moderate diffuse narrowing of the tracheal lumen and blockage of the left mainstem bronchus. Possibility of a mass in this area is suspected. The right lung is unremarkable. The above finding are recorded on a digital voice clip in PACS. Signed by Dr Bobbi Ying on 2/19/2020 7:13 AM        Assessment and Plan:    52-year-old gentleman with past medical history of COPD on home oxygen, paroxysmal atrial fibrillation, diabetes mellitus, obesity, coronary artery disease with prior PCI to OM1 2011, severe pulmonary hypertension and right-sided chamber dilatation with left lung whiteout, recent diagnosis of left mainstem lung Ca with collapsed left lung after recent fall with low back injury, atrial fibrillation not on anticoagulation due to hemoptysis on rate control.     1. No recurrence in chest pain. Troponins low level and flat. Continue medical management.       Patrick Portillo MD 3/18/2020 3:00 PM

## 2020-03-18 NOTE — PROGRESS NOTES
PROGRESS NOTE    Pt Name: Modesto Stone  MRN: [id number]  YOB: 1955  Date of evaluation: 3/18/2020    Subjective  Feels well. Got up yesterday a little. Willing to work with PT. denies any chest pain. Mr Suzi Balderas is a 62years old male who has a diagnosis of non-small cell lung cancer, squamous cell subtype. He has a complex medical history including history of COPD, CHF, CAD, hypertension, atrial fibrillation and hyperlipidemia. He presented to the ER yesterday from nursing home with complaints of chest discomfort accompanied by dyspnea. The patient has not received any direct treatment for his lung cancer due to severe deconditioning. He also developed a left pleural effusion that was negative for malignancy on several instances. He had a Pleurx catheter placed and has it emptied every 3 days.           Cancer history   Participated with PT and OT yesterday.  Continues to have significant weakness and fatigue.  Unfortunately SNF declined due to lack of participation with OT/PT. Discussed possibly home with hospice until overall performance status improves and then possibly come out of hospice and reevaluate for potential treatment.  No family at bedside.       Mr. Esperanza Rodriguez was seen in initial medical oncology consultation on 2/16/2020 as an inpatient at [facility] with a recent diagnosis of metastatic lung cancer made at [facility] to establish care.     He will be followed by Jeyson piedra for Martin's identical twin Tasia Dang, also with lung cancer.     Aron Jiménez has been on chronic supplemental oxygen for at least 6 months prior to presentation due to his history of underlying COPD.  He wears 4 L of supplemental oxygen. His PCP is Ventura Trujillo PA-C on the [address] [place] [address] side.     Aron Jiménez presented to [facility] ED on 1/25/2020 with hemoptysis increasing weakness.    He was also found to have atrial fibrillation with RVR.      CTA chest with contrast on 1/25/2020:  NO PE  Complete collapse of the left lung secondary to an occluded left main stem bronchus  Moderate size left pleural effusion  Mild cardiomegaly, trace pericardial fluid  Vascular Lab Prelim     Duplex venous scan of B/L LE on 1/25/2020 shows no evidence for dvt, svt, reflux noted at this time. Final report pending     The case was discussed with Dr Uyen Lyons with pulmonology, who stated that he can do a bronch but with the bleeding, he would not be able to intervene other than using epinephrine. Would recommend transfer to higher level of care as pt may require embolization.      Mr. Harmeet Soto was transferred to [facility] in [facility] where further work-up was performed.     X-ray Chest Portable 1/26/2020  Complete whiteout of the left lung with mild deviation of the trachea to the left. Findings are consistent with complete collapse of the left lung. Underlying infection or mass or effusion cannot be excluded      SIMONA at [facility] on 1/27/2020 was performed. LVEF greater than 55%. Unable to comment on diastolic filling pattern due to atrial  fibrillation. RV mildly dilated. Mildly depressed RV systolic function. The left atrium is mild to moderately dilated. RA mildly dilated. Mild tricuspid regurgitation. The RV systolic pressure is calculated at 63 mmHg.     A thoracentesis was performed at [facility] on 1/27/2020.  1100 cc of pleural fluid was drained. Cytology:  REACTIVE MESOTHELIAL CELLS, HISTIOCYTES, AND LYMPHOCYTES; NEGATIVE FOR MALIGNANCY      CT C/A/P at [facility] 1/29/2020  Impression:   1.  Signs of large LEFT lung mass with postobstructive collapse of   the entire LEFT lung and mediastinal shift. 2.  Bulky subcarinal adenopathy.    3.  Nodular bilateral adrenal glands still with some adreniform   contour, suspicious for either nodular hyperplasia or bilateral   metastatic disease to the adrenal glands.      Bronchoscopy with insertion of stent was performed on 1/29/2020 at [facility]. A mass was found in the middle portion of the left mainstem bronchus with about 90% obstruction. The mass was large and bloody and friable. a 10 x 40 Aero stent was placed with good results.        Records discuss \"squamous cell lung cancer \"  however I am unable to view a pathology report.     PET at [facility] 1/31/2020  Impression   Intense FDG uptake within a ill-defined left hilar mass as well as the left upper and left lower lobes. Interval placement of a left   main bronchus stent which is occluded distally with complete collapse of the left lung. FDG uptake within the left upper and lower lobes may be related to infection versus malignancy. Small left pleural effusion. No FDG uptake is present within the subcarinal lymphadenopathy.      MRI brain w/ and w/o  2/1/2020 8:07 AM  Impression   1.  No findings of intracranial metastatic disease. 2.  Mild chronic small vessel ischemic white matter disease and tiny   old right cerebellar infarcts.      CT abdomen pelvis with contrast 2/15/2020 at [facility]  No acute abnormality seen in the abdomen  Left pleural effusion     left thoracentesis 2/17/2020: 2L of clear, straw colored fluid drained. cytology negative: benign mesothelial cells and occasional small round lymphocytes     Post-procedure CXR 2/17/20: No pneumothorax, diffuse opacification of the left chest with some residual pleural effusion  He has already been evaluated by Dr. Carlo Gamboa     S/p left VATS/Pleur-X catheter placement on 2/19/2020.  Cytology was negative            REVIEW OF SYSTEMS:   CONSTITUTIONAL: no fever, no night sweats,  fatigue;  HEENT: no blurring of vision, no double vision, no hearing difficulty, no tinnitus, no ulceration, no dysplasia, no epistaxis;  LUNGS: no cough, no hemoptysis, no wheeze,  no shortness of breath;  CARDIOVASCULAR: no palpitation, no chest pain, no shortness of breath;  GI: no abdominal pain, no nausea, no vomiting, no diarrhea, no constipation;  RAN: no dysuria, no hematuria, no frequency or urgency, no nephrolithiasis;  MUSCULOSKELETAL: no joint pain, no swelling, no stiffness;  ENDOCRINE: no polyuria, no polydipsia, no cold or heat intolerance;  HEMATOLOGY: no easy bruising or bleeding, no history of clotting disorder;  DERMATOLOGY: no skin rash, no eczema, no pruritus;  PSYCHIATRY: no depression, no anxiety, no panic attacks, no suicidal ideation, no homicidal ideation;  NEUROLOGY: no syncope, no seizures, no numbness or tingling of hands, no numbness or tingling of feet, no paresis;    Objective   BP (!) 84/42   Pulse 68   Temp 96.8 °F (36 °C) (Temporal)   Resp 16   Ht 5' 7\" (1.702 m)   Wt 211 lb 11.2 oz (96 kg)   SpO2 98%   BMI 33.16 kg/m²     PHYSICAL EXAM:  CONSTITUTIONAL: Alert, appropriate, no acute distress, deconditioning   EYES: Non icteric, EOM intact, pupils equal round   ENT: Mucus membranes moist, no oral pharyngeal lesions, external inspection of ears and nose are normal  NECK: Supple, no masses.  No palpable thyroid mass  CHEST/LUNGS: Absent left lung sounds  CARDIOVASCULAR: RRR, no murmurs.  No lower extremity edema  ABDOMEN: soft non-tender, active bowel sounds, no HSM.  No palpable masses   EXTREMITIES: warm, full ROM in all 4 extremities, no focal weakness.   SKIN: warm, dry with no rashes or lesions  LYMPH: No cervical, clavicular, axillary, or inguinal lymphadenopathy  NEUROLOGIC: follows commands, non focal   PSYCH: mood and affect appropriate.  Alert and oriented to time, place, person        LABORATORY RESULTS REVIEWED BY ME:  Recent Labs     03/18/20  0218 03/17/20  0628 03/16/20  1130   WBC 7.0 6.6 7.2   HGB 9.2* 10.5* 11.5*   HCT 30.4* 34.2* 37.3*   MCV 89.1 87.9 87.4    302 376       Lab Results   Component Value Date     03/18/2020    K 3.2 (L) 03/18/2020    CL 81 (L) 03/18/2020    CO2 40 (H) 03/18/2020    BUN 11 03/18/2020    CREATININE 0.6 03/18/2020    GLUCOSE 130 (H) 03/18/2020    CALCIUM 7.1 (L) 03/18/2020    PROT 5.3 (L) 03/17/2020    LABALBU 2.0 (L) 03/17/2020    BILITOT 0.3 03/17/2020    ALKPHOS 111 03/17/2020    AST 13 03/17/2020    ALT 25 03/17/2020    LABGLOM >60 03/18/2020       Lab Results   Component Value Date    INR 1.05 03/16/2020    INR 1.15 02/17/2020    INR 1.19 (H) 02/15/2020    PROTIME 13.6 03/16/2020    PROTIME 14.1 02/17/2020    PROTIME 14.5 02/15/2020       RADIOLOGY STUDIES REVIEWED BY ME:  My interpretation: left pleural effusion.           ASSESSMENT:  #  Squamous cell lung carcinoma left lung, status post left main stem bronchus stent 1/29/2020 - possibly stage II. Tumor Proportion Score (TPS): 99% Intensity of staining: strong     He is a candidate for single agent pembrolizumab as outpatient if PS improves. Chest xray showed complete opacification left chest.     #Left pleural effusion/lung collapse  Discussed with pulmonary. I have discussed this case with Dr. Jennifer Roe. She suggested a bronchoscopy to rule out endobronchial lesion. CT chest at [place] 1/29/2020  Bulky subcarinal lymphadenopathy with enhancement   measures 1.6 cm in short axis. LEFT hilar and infrahilar soft tissue   with bronchial narrowing, may measure as large as 7.6 x 4.8 cm. Peripheral collapse of the entire LEFT lung. He  Lung parenchyma and pleural space: LEFT lung collapse with LEFT hilar and infrahilar mass left-sided effusion. RIGHT lung with granuloma in the superior segment of the RIGHT lower lobe.  Small noncalcified nodules in the RIGHT upper chest.     #PAF  Cardizem/Digoxin per she is not a medical bradycardia just immunotherapy she is medical prostatic cancer cardiology      # Generalized weakness  PT/OT-used to decline due to significant fatigue  Planning discharge possibly to [address] [place] [address]  #GERD  Receiving Protonix    #Mild normocytic anemia- Hb 9.2      #Prognosis- very poor prognosis.  The patient has a very poor performance and concerned that he would not be able to tolerate any therapy, unless his performance status improves greatly. He could tolerate radiation and/or Keytruda if he becomes ambulatory. Recommended intensive PT      PLAN  Deonna James is aware however his performance status must first be improved. Encouraged participating with PT and getting up to chair.        PLAN:  Continue physical therapy. Discussed with pulmonary regarding bronchoscopy    I have seen, examined and reviewed this patient medication list, appropriate labs and imaging studies. I reviewed relevant medical records and others physicians notes. I discussed the plans of care with the patient.  I answered all the questions to the patients satisfaction.     (Please note that portions of this note were completed with a voice recognition program. Efforts were made to edit the dictations but occasionally words are mis-transcribed.)  MD Delmy Bazan MD    03/18/20  7:00 AM

## 2020-03-18 NOTE — PROGRESS NOTES
Palliative Care Progress Note  3/18/2020 2:32 PM  Subjective:   Admit Date: 3/16/2020  PCP: ELTON Man    Chief Complaint: Weakness    Interval History: Patient reports increased weakness and fatigue today. He did get up to the chair yesterday and I have encouraged him to participate in any therapy offered. He reports that PleurX was drained on Monday and it has been drained approx every 3 days. He may improve if PleurX is drained tomorrow so that he can participate with therapy. Goals of care continue to be discussed. Review of Systems   Review of Systems   Constitutional: Positive for activity change and fatigue. Generalized weakness   HENT: Negative. Eyes: Negative. Respiratory: Positive for shortness of breath. Cardiovascular: Negative. Gastrointestinal: Negative. Endocrine: Negative. Genitourinary: Negative. Musculoskeletal: Negative. Skin: Negative. Neurological: Negative. Psychiatric/Behavioral: Negative. DIET CARB CONTROL; Low Sodium (2 GM);  Dysphagia Minced and Moist; No Caffeine    Medications:   dextrose       Current Facility-Administered Medications   Medication Dose Route Frequency Provider Last Rate Last Dose    nitroglycerin (NITRO-BID) 2 % ointment 1 inch  1 inch Topical 4 times per day Logan Benito MD   1 inch at 03/18/20 1212    potassium chloride (KLOR-CON M) extended release tablet 20 mEq  20 mEq Oral BID Leta Everett MD   20 mEq at 03/18/20 1212    metoprolol tartrate (LOPRESSOR) tablet 12.5 mg  12.5 mg Oral BID Agustina Prince PA-C        isosorbide mononitrate (IMDUR) extended release tablet 30 mg  30 mg Oral Daily Marianne Murillo MD   30 mg at 03/17/20 1644    magic butt cream   Topical Q4H PRN Logan Benito MD        spironolactone (ALDACTONE) tablet 50 mg  50 mg Oral Daily Leta Everett MD   50 mg at 03/18/20 0914    magnesium oxide (MAG-OX) tablet 400 mg  400 mg Oral Daily Leta Everett MD   400 mg at 03/18/20 input(s): LABA1C in the last 72 hours.   FLP:    Lab Results   Component Value Date    TRIG 105 09/06/2015    HDL 28 09/06/2015    LDLCALC 164 09/24/2011    LDLDIRECT 85 09/06/2015     TSH:    Lab Results   Component Value Date    TSH 0.957 02/15/2020     Troponin T:   Recent Labs     03/16/20  1717 03/16/20  2018 03/18/20  0909   TROPONINI 0.03 0.03 0.03     INR:   Recent Labs     03/16/20  1130   INR 1.05       Objective:   Vitals: BP (!) 82/53   Pulse 61   Temp 96.9 °F (36.1 °C) (Temporal)   Resp 16   Ht 5' 7\" (1.702 m)   Wt 211 lb 11.2 oz (96 kg)   SpO2 97%   BMI 33.16 kg/m²   24HR INTAKE/OUTPUT:      Intake/Output Summary (Last 24 hours) at 3/18/2020 1432  Last data filed at 3/18/2020 1410  Gross per 24 hour   Intake 930 ml   Output 575 ml   Net 355 ml     Physical Exam  General appearance: alert and cooperative with exam, appears ill, well nourished, well developed, no acute distress  HEENT: atraumatic, eyes with clear conjunctiva and normal lids, pupils and irises normal, external ears and nose are normal,lips normal.  Neck: without masses, lymphadenopathy, supple  Lungs: no increased work of breathing, clear but diminished on R, L lungs sounds absent, L PleurX  Heart: regular rate and rhythm and S1, S2 normal  Abdomen: soft, non-tender; bowel sounds normal; no masses,  no organomegaly  Genitourinary: No bladder fullness, masses, or tenderness  Extremities: extremities normal, atraumatic, no cyanosis or edema  Neurologic: No focal neurologic deficits, normal sensation, no tremor, alert and oriented  Psychiatric: Alert and oriented, no recent or remote memory deficits, good judgement, mood and affect appropriate  Skin: no rashes,lesions, or nodules.         Assessment and Plan:   Principal Problem:    Acute chest pain  Active Problems:    Mixed hyperlipidemia    Coronary artery disease involving native coronary artery of native heart without angina pectoris    Chronic venous stasis dermatitis of both lower extremities    Morbid obesity due to excess calories [facility])    Essential hypertension    Type 2 diabetes mellitus without complication (HCC)    Chronic diastolic congestive heart failure (HCC)    Paroxysmal A-fib (HCC)    Malignant neoplasm of lung (HCC)    Malignant pleural effusion  Resolved Problems:    * No resolved hospital problems. *        Visit Summary: I saw the patient at the bedside with no family present. Patient is currently resting in bed, does appear fatigued today. I discussed patient's current health status and lack of improvement and participation in therapies with him. Patient was much more energetic yesterday and was able to get up to the chair for a little while. We discussed that he may have continued days that alternate between him feeling better and feeling fatigued which may progress to more days of fatigue than better. I again discussed goals of care with him and he does want to continue to pursue rehabilitation and therapy. Patient is aware that he will need to have the ability to participate in therapy daily and I have highly encouraged him to do as much as he can and he is currently agreeable. I also discussed the ability for him to pursue hospice care and if he were to improve, then pursue rehab, but he does not feel he has a place that he can be suitably cared for with hospice care and does express genuine desire to have rehabilitation. Patient would now like a referral to [address] [place] [address] for rehabilitation, has a niece that works there, and feels that she will be able to motivate him and be a source of encouragement. I have discussed that if he does go to [address] [place] [address] and is not able to participate or tolerate therapy, he needs to consider hospice care and patient is agreeable. I believe patient is due for Pleurx to be drained tomorrow and he may have improvement in his energy level afterwards and be able to participate better.   I provided emotional support and encouragement to the patient. I have notified SW about the patient's desire for referral to [address] [place] [address]. Palliative medicine will continue to follow. Recommendations: Patient would like a referral to [address] [place] [address] for rehab. If he does not improve, Hospice has been recommended and place of service will need to be determined. Thank you for consulting Palliative Care and allowing us to participate in the care of this patient.        Electronically signed by ELTON Robles on 3/18/2020 at 2:32 PM    (Please note that portions of this note were completed with a voice recognition program.  Marcia Mcdaniel made to edit the dictations but occasionally words are mis-transcribed.)

## 2020-03-18 NOTE — PROGRESS NOTES
50 mL of orlin colored pleural fluid was removed from patient's PleurX catheter using his at home collection kit. The catheter was draining very slowly, but patient asked this nurse to stop as it was starting to hurt and he stated \"that's when I know it's about done. \" Patient stated he usually has more drainage from the PleurX than was collected. Patient typically has his PleurX drained every 3 days, with the last collection on Monday. Catheter insertion site was dressed per instructions on at home kit with foam pad, gauze, and a Tegaderm dated with time and initials. Patient tolerated well and without complaint. Will continue to monitor.    Electronically signed by Cassi Agarwal RN on 3/18/2020 at 4:03 PM

## 2020-03-18 NOTE — PROGRESS NOTES
Procedure Component Value Units Date/Time     Blood Gas, Arterial [[id number]] (Abnormal) Collected: 03/18/20 1452     Specimen: Blood gases Updated: 03/18/20 1453      pH, Arterial 7.530      pCO2, Arterial 68.0 mmHg       pO2, Arterial 81.0 mmHg       HCO3, Arterial 56.8 mmol/L       Base Excess, Arterial 30.0 mmol/L       Hemoglobin, Art, Extended 9.9 g/dL       O2 Sat, Arterial 95.4 %       Carboxyhgb, Arterial 2.7 %       Methemoglobin, Arterial 0.9 %       O2 Content, Arterial 13.4 mL/dL       O2 Therapy Unknown     Narrative:       CALL  Isidro Whyte RN, 03/18/2020 14:53, by Cherylene Pheasant     Potassium, Whole Blood [[id number]] Collected: 03/18/20 1452      Updated: 03/18/20 1453      Potassium, Whole Blood 3.8     Narrative:       CALL  Isidro Whyte RN, 03/18/2020 14:53, by EVENS     4 L/M [place] RR16 RR ADRIANA MIRANDA

## 2020-03-18 NOTE — PROGRESS NOTES
Nephrology ([address] [facility]) Progress Note    Patient:  Kennyth Landau  YOB: 1955  Date of Service: 3/18/2020  MRN: [id number]   Acct: [de-identified]   Primary Care Physician: ELTON Romero  Advance Directive: DNR-CC  Admit Date: 3/16/2020       Hospital Day: 0  Referring Provider: Kelsey Díaz MD    Patient Seen, Chart, Consults notes, Labs, Radiology studies reviewed. Subjective:  Patient is a 62 yr old pleasant man who has a history of lung cancer, atrial fibrillation, COPD, CHF, CAD, HTN, HLD who presented to the ED from [facility] on 3/16/2020 due to complaints of chest pain. He has anxiety. He stated that he has been having chronic diarrhea and decreased intale for at least 2 months. On admission, his serum potassium was 2.3 and has only improved to 2.7. Renal service was consulted to manage his hypokalemia. Patient is weak but denied cramping. His chest pain has improved. He denies dysuria. Today, he offered no new complaints.      Allergies:  Crestor [rosuvastatin]    Medicines:  Current Facility-Administered Medications   Medication Dose Route Frequency Provider Last Rate Last Dose    nitroglycerin (NITRO-BID) 2 % ointment 1 inch  1 inch Topical 4 times per day Logan Benito MD        isosorbide mononitrate (IMDUR) extended release tablet 30 mg  30 mg Oral Daily Shahana Dias MD   30 mg at 03/17/20 1644    magic butt cream   Topical Q4H PRN Logan Benito MD        spironolactone (ALDACTONE) tablet 50 mg  50 mg Oral Daily Augusto Dumont MD   50 mg at 03/18/20 0914    magnesium oxide (MAG-OX) tablet 400 mg  400 mg Oral Daily Augusto Dumont MD   400 mg at 03/18/20 0914    albuterol (PROVENTIL) nebulizer solution 2.5 mg  2.5 mg Nebulization Q4H PRN Mario Colunga PA-C        sodium chloride flush 0.9 % injection 10 mL  10 mL Intravenous 2 times per day Mario Colunga PA-C   10 mL at 03/18/20 0916    sodium chloride flush 0.9 % injection Right hip replacement 4-6 years ago    LUMBAR LAMINECTOMY      TX REVISE MEDIAN N/CARPAL TUNNEL SURG Left 2018    CARPAL TUNNEL RELEASE performed by Kathryn Paredes MD at [address] Left 2020    VIDEO ASSISTED THOROCOSCOPY WITH INSERTION OF [address] performed by Rodriguez Whitten MD at [facility] OR       Family History  Family History   Problem Relation Age of Onset    Coronary Art Dis Mother     Cancer Mother     Coronary Art Dis Father     Diabetes Father     Coronary Art Dis Sister     Depression Paternal Grandmother        Social History  Social History     Socioeconomic History    Marital status:      Spouse name: Not on file    Number of children: Not on file    Years of education: Not on file    Highest education level: Not on file   Occupational History    Not on file   Social Needs    Financial resource strain: Not on file    Food insecurity     Worry: Not on file     Inability: Not on file    Transportation needs     Medical: Not on file     Non-medical: Not on file   Tobacco Use    Smoking status: Former Smoker     Packs/day: 2.00     Years: 18.00     Pack years: 36.00     Types: Cigarettes     Last attempt to quit: 2011     Years since quittin.5    Smokeless tobacco: Never Used   Substance and Sexual Activity    Alcohol use: Yes     Comment: rare    Drug use: No    Sexual activity: Not on file   Lifestyle    Physical activity     Days per week: Not on file     Minutes per session: Not on file    Stress: Not on file   Relationships    Social connections     Talks on phone: Not on file     Gets together: Not on file     Attends Tenriism service: Not on file     Active member of club or organization: Not on file     Attends meetings of clubs or organizations: Not on file     Relationship status: Not on file    Intimate partner violence     Fear of current or ex partner: Not on file     Emotionally abused: Not on file     Physically abused: Not on file     Forced sexual activity: Not on file   Other Topics Concern    Not on file   Social History Narrative    Not on file         Review of Systems:  History obtained from chart review and the patient  General ROS: No fever or chills  Respiratory ROS: No cough, shortness of breath, wheezing  Cardiovascular ROS: no chest pain or dyspnea on exertion  Gastrointestinal ROS: No abdominal pain or melena  Genito-Urinary ROS: No dysuria or hematuria  Musculoskeletal ROS: No joint pain or swelling         Objective:  Blood pressure (!) 82/53, pulse 61, temperature 96.9 °F (36.1 °C), temperature source Temporal, resp. rate 16, height 5' 7\" (1.702 m), weight 211 lb 11.2 oz (96 kg), SpO2 97 %. Intake/Output Summary (Last 24 hours) at 3/18/2020 1105  Last data filed at 3/18/2020 0941  Gross per 24 hour   Intake 990 ml   Output 450 ml   Net 540 ml     General: alert and oriented x3   Chest:  clear to auscultation bilaterally without respiratory distress  CVS: regular rate and rhythm  Abdominal: soft, nontender, normal bowel sounds  Extremities: no cyanosis or edema  Skin: warm and dry without rash    Labs:  BMP:   Recent Labs     03/16/20  1130  03/17/20  0628 03/17/20  1903 03/18/20  0218     --  136  --  136   K 2.3*   < > 2.7* 3.2* 3.2*   CL 79*  --  83*  --  81*   CO2 44*  --  41*  --  40*   BUN 11  --  11  --  11   CREATININE 0.7  --  0.6  --  0.6   CALCIUM 7.4*  --  6.8*  --  7.1*    < > = values in this interval not displayed.      CBC:   Recent Labs     03/16/20  1130 03/17/20  0628 03/18/20  0218   WBC 7.2 6.6 7.0   HGB 11.5* 10.5* 9.2*   HCT 37.3* 34.2* 30.4*   MCV 87.4 87.9 89.1    302 305     LIVER PROFILE:   Recent Labs     03/16/20  1130 03/17/20  0628   AST 16 13   ALT 31 25   BILITOT 0.4 0.3   ALKPHOS 146* 111     PT/INR:   Recent Labs     03/16/20  1130   PROTIME 13.6   INR 1.05     APTT:   Recent Labs     03/16/20  1130   APTT 33.7     BNP:  No results for input(s): BNP in the last 72 hours. Ionized Calcium:No results for input(s): IONCA in the last 72 hours. Magnesium:  Recent Labs     03/16/20  1130 03/16/20  1717 03/17/20  0628   MG 0.9* 1.4* 1.6     Phosphorus:No results for input(s): PHOS in the last 72 hours. HgbA1C: No results for input(s): LABA1C in the last 72 hours. Hepatic:   Recent Labs     03/16/20  1130 03/17/20  0628   ALKPHOS 146* 111   ALT 31 25   AST 16 13   PROT 6.3* 5.3*   BILITOT 0.4 0.3   LABALBU 2.3* 2.0*     Lactic Acid: No results for input(s): LACTA in the last 72 hours. Troponin: No results for input(s): CKTOTAL, CKMB, TROPONINT in the last 72 hours. ABGs: No results for input(s): PH, PCO2, PO2, HCO3, O2SAT in the last 72 hours. CRP:  No results for input(s): CRP in the last 72 hours. Sed Rate:  No results for input(s): SEDRATE in the last 72 hours. Cultures:   No results for input(s): CULTURE in the last 72 hours. Radiology reports as per the Radiologist  Radiology: Xr Chest Standard (2 Vw)    Result Date: 3/17/2020  Exam:   XR CHEST (2 VW)  Date:  3/17/2020 History:  Male, age  62 years; pleural effusion COMPARISON:  Chest x-ray dated 3/16/2020 Findings : The left heart border is indistinct. Continued complete opacification of the left hemithorax. The right lung is unchanged. No measurable pneumothorax. The bones show no acute pathology. Impression: No interval changes. Signed by Dr Andree Light on 3/17/2020 9:02 AM    Xr Chest Portable    Result Date: 3/16/2020  Examination. XR CHEST PORTABLE 3/16/2020 10:15 AM History: Chest pain. A single frontal portable upright view of the chest is compared with the previous study dated 2/25/2020. There is complete opacification of the left chest which may represent a combination of lung consolidation or pleural effusion. There is moderate displacement of the trachea towards left suggesting loss of left lung volume.  There are interstitial changes in the right lung which may represent scarring and discoid atelectasis. No bony abnormality. The complete opacification of the left chest may be present left lung consolidation and left-sided pleural effusion. This is more progressive since the previous study. The right lung is poorly inflated with scarring and discoid atelectasis. Signed by Dr Bobbi Ying on 3/16/2020 11:58 AM       Assessment     1. Hypokalemia (due to decrease intake and increase K losses)  2. Lung cancer  3. Chest pain  4. Anemia    Plan:  Continue potassium suppl, continue aldactone.

## 2020-03-18 NOTE — PROGRESS NOTES
[facility]    Patient:  Sj Kent  YOB: 1955  Date of Service: 3/18/2020  MRN: [id number]   Acct: [de-identified]   Primary Care Physician: ELTON Sales  Advance Directive: DNR-CC  Admit Date: 3/16/2020       Hospital Day: 0  Portions of this note have been copied forward, however, changed to reflect the most current clinical status of this patient. CHIEF COMPLAINT Chest pain    SUBJECTIVE: Mr. Manuel Anna has no new complaints. Feels poorly today with increased fatigue. Denies worsening dyspnea. Denies chest pain. Feels too weak for discharge to SNF, refuses Hospice at this time. Cumulative Hospital Course: The patient is a 62 y.o. male with PMH lung cancer, atrial fibrillation, COPD, CHF, CAD, HTN, HLD who presented to [address] [place] [address] ED complaining of chest pain. He describes chest pressure/tightness occurring intermittently. He denies dyspnea. Chronically wears 4L NC oxygen. CXR concerning for complete opacification left lung. He has multiple electrolyte abnormalities. BNP and troponin both elevated. He states he has been emptying Pleurx catheter about every 3 days. It was emptied this AM in ED per patient and he did feel relief of chest pressure/tightness. He was discharged from this facility on 02/27/2020 after being treated for atrial fibrillation with rapid ventricular response as well as pleural effusion. He underwent thoracentesis that admission with Pleurx catheter insertion 02/19/2020. He was placed on digoxin and Cardizem with instructions to drain Pleurx catheter every 72 hours. He was admitted to Hospitalist service with consultation to Cardiology who recommends repeat Echo as well as addition of Imdur 30 mg daily. Oncology consulted who agree with Palliative care but he does have a poor prognosis at this time. Should his performance status improve radiation and/or Keytruda could be considered.  Pulmonology state no further recommendations for pleural effusion or obstructed airway. Avoid anticoagulation given history of severe hemoptysis. Spironolactone added per Nephrology. Review of Systems:   14 point review of systems is negative except as specifically addressed above. Objective:   VITALS:  BP (!) 82/53   Pulse 61   Temp 96.9 °F (36.1 °C) (Temporal)   Resp 16   Ht 5' 7\" (1.702 m)   Wt 211 lb 11.2 oz (96 kg)   SpO2 97%   BMI 33.16 kg/m²   24HR INTAKE/OUTPUT:      Intake/Output Summary (Last 24 hours) at 3/18/2020 1303  Last data filed at 3/18/2020 1223  Gross per 24 hour   Intake 990 ml   Output 575 ml   Net 415 ml     General appearance: lethargic, appears stated age, cooperative and no distress, laying in bed leaning toward left side with hospital gown off  Head: Normocephalic, without obvious abnormality, atraumatic  Eyes: conjunctivae/corneas clear. PERRL, EOM's intact. Ears: normal external ears and nose, throat without exudate  Neck: no adenopathy, no carotid bruit, no JVD, supple, symmetrical, trachea midline   Lungs: No breath sounds UMER/LLL. Otherwise no wheezes, rhonchi or rales  Heart: RRR, S1, S2 normal, no murmurs, rubs, gallops  Abdomen:soft, non-tender; non-distended, sluggish bowel sounds no masses, no organomegaly  Extremities:No lower extremity edema,  No erythema, no tenderness to palpation  Skin: Pale, warm and dry. No rashes or lesions  Lymphatic: No palpable lymph node enlargment  Neurologic: Alert and oriented X 2, severe generalized weakness and normal tone.  No focal deficits  Psychiatric: Alert and oriented, thought content appropriate, normal insight, mood appropriate    Medications:      dextrose        nitroglycerin  1 inch Topical 4 times per day    potassium chloride  20 mEq Oral BID    isosorbide mononitrate  30 mg Oral Daily    spironolactone  50 mg Oral Daily    magnesium oxide  400 mg Oral Daily    sodium chloride flush  10 mL Intravenous 2 times per day    aspirin  81 mg Oral Daily    digoxin 125 mcg Oral Daily    dilTIAZem  120 mg Oral BID    escitalopram  10 mg Oral Daily    metoprolol tartrate  25 mg Oral BID    atorvastatin  10 mg Oral Nightly    tiotropium  2 puff Inhalation Daily    insulin lispro  0-6 Units Subcutaneous TID WC    insulin lispro  0-3 Units Subcutaneous Nightly     magic butt cream, albuterol, sodium chloride flush, acetaminophen **OR** acetaminophen, polyethylene glycol, promethazine **OR** ondansetron, magnesium sulfate, nitroGLYCERIN, hydrOXYzine, glucose, dextrose, glucagon (rDNA), dextrose, potassium chloride **OR** potassium alternative oral replacement **OR** potassium chloride, potassium chloride  DIET CARB CONTROL; Low Sodium (2 GM); Dysphagia Minced and Moist; No Caffeine     Lab and other Data:     Recent Labs     03/16/20  1130 03/17/20  0628 03/18/20  0218   WBC 7.2 6.6 7.0   HGB 11.5* 10.5* 9.2*    302 305     Recent Labs     03/16/20  1130  03/17/20  0628 03/17/20  1903 03/18/20  0218     --  136  --  136   K 2.3*   < > 2.7* 3.2* 3.2*   CL 79*  --  83*  --  81*   CO2 44*  --  41*  --  40*   BUN 11  --  11  --  11   CREATININE 0.7  --  0.6  --  0.6   GLUCOSE 155*  --  135*  --  130*    < > = values in this interval not displayed. Recent Labs     03/16/20  1130 03/17/20  0628   AST 16 13   ALT 31 25   BILITOT 0.4 0.3   ALKPHOS 146* 111     Troponin T:   Recent Labs     03/16/20  1717 03/16/20  2018 03/18/20  0909   TROPONINI 0.03 0.03 0.03     INR:   Recent Labs     03/16/20  1130   INR 1.05     RAD:   Xr Chest Standard (2 Vw)  Impression: No interval changes. Signed by Dr Melanie Painting on 3/17/2020 9:02 AM    Xr Chest Portable  The complete opacification of the left chest may be present left lung consolidation and left-sided pleural effusion. This is more progressive since the previous study. The right lung is poorly inflated with scarring and discoid atelectasis.  Signed by Dr Won Mayorga on 3/16/2020 11:58 AM    Assessment/Plan   Principal

## 2020-03-19 NOTE — PROGRESS NOTES
Occupational Therapy   Occupational Therapy Initial Assessment  Date: 3/19/2020   Patient Name: Kennyth Landau  MRN: [id number]     : 1955    Date of Service: 3/19/2020    Discharge Recommendations:  Patient would benefit from continued therapy after discharge       Assessment   Assessment: Evaluation  completed and tx initiated. The patient is likely to make significant gains with skilled therapy intervention. Treatment Diagnosis: Malignant pleural effusion, malignant neoplasm of the lung  History: CHF, respiratory failure, DM, A Fib  REQUIRES OT FOLLOW UP: Yes  Activity Tolerance  Activity Tolerance: Patient Tolerated treatment well  Safety Devices  Safety Devices in place: Yes  Type of devices: Call light within reach; Left in chair;Chair alarm in place           Patient Diagnosis(es): The primary encounter diagnosis was Acute chest pain. Diagnoses of Hypokalemia, Elevated troponin, and Hypomagnesemia were also pertinent to this visit. has a past medical history of Atherosclerosis, Blood circulation, collateral, CAD (coronary artery disease), Carpal tunnel syndrome, CHF (congestive heart failure) ([address] COPD (chronic obstructive pulmonary disease) ([address] Degenerative joint disease, Diabetes mellitus ([address] FH: chronic lung disease requiring oxygen, GERD (gastroesophageal reflux disease), Hyperlipidemia, Hypertension, Malignant neoplasm of overlapping sites of left lung [facility]), Myocardial infarction ([address] Obesity, Palliative care patient, Presence of stent in coronary artery, and Right carpal tunnel syndrome. has a past surgical history that includes Coronary angioplasty with stent; lumbar laminectomy; back surgery; hernia repair; joint replacement; Cardiac catheterization (11); pr revise median n/carpal tunnel surg (Left, 2018); and Thoracoscopy (Left, 2020).     Treatment Diagnosis: Malignant pleural effusion, malignant neoplasm of the assistance; Moderate assistance        Bed mobility  Supine to Sit: Contact guard assistance  Transfers  Stand Step Transfers: Minimal assistance     Cognition  Overall Cognitive Status: Exceptions  Following Commands: Follows one step commands with increased time; Follows one step commands with repetition  Attention Span: Attends with cues to redirect  Memory: Appears intact  Safety Judgement: Decreased awareness of need for safety  Problem Solving: Assistance required to implement solutions;Assistance required to generate solutions  Insights: Decreased awareness of deficits  Initiation: Requires cues for some  Sequencing: Requires cues for some  Cognition Comment: Awake, alert, able to follow directions, some cues for safety and technique        Sensation  Overall Sensation Status: (reports numbness B feet and L hand due to carpal tunnel issues)        LUE PROM (degrees)  LUE PROM: WNL  LUE AROM (degrees)  LUE AROM : WNL  RUE PROM (degrees)  RUE PROM: WNL  RUE AROM (degrees)  RUE AROM : WNL  LUE Strength  Gross LUE Strength: WFL  RUE Strength  Gross RUE Strength: WFL                Tx initiated:  Sitting and standing balance activities  (10 mins)       Plan   Plan  Times per week: 3-5x weekly    G-Code     OutComes Score                                                  AM-PAC Score             Goals  Short term goals  Time Frame for Short term goals: 1-2 weeks  Short term goal 1: Supervision for dressing with AE  Short term goal 2: Supervision for toileting  Short term goal 3: Supervision for transfers  Short term goal 4: Supervision for light ambulatory ADL  Short term goal 5:  Independent with beginning level exercise program  Long term goals  Long term goal 1: upgrade to modified independent as tolerated       Therapy Time   Individual Concurrent Group Co-treatment   Time In           Time Out           Minutes                   Roanne Kayser, OT Electronically signed by Roanne Kayser, OT on 3/19/2020 at

## 2020-03-19 NOTE — PROGRESS NOTES
mL Intravenous 2 times per day Jose Cook PA-C   10 mL at 03/19/20 0848    sodium chloride flush 0.9 % injection 10 mL  10 mL Intravenous PRN Jose Cook PA-C        acetaminophen (TYLENOL) tablet 650 mg  650 mg Oral Q6H PRN Jose Cook PA-C        Or    acetaminophen (TYLENOL) suppository 650 mg  650 mg Rectal Q6H PRN Jose Cook PA-C        polyethylene glycol (GLYCOLAX) packet 17 g  17 g Oral Daily PRN Jose Cook PA-C        promethazine (PHENERGAN) tablet 12.5 mg  12.5 mg Oral Q6H PRN Jose Cook PA-C        Or    ondansetron [facility]) injection 4 mg  4 mg Intravenous Q6H PRN Jose Cook PA-C   4 mg at 03/18/20 1217    magnesium sulfate 2 g in 50 mL IVPB premix  2 g Intravenous PRN Jose Cook PA-C   Stopped at 03/17/20 0052    nitroGLYCERIN (NITROSTAT) SL tablet 0.4 mg  0.4 mg Sublingual Q5 Min PRN Jose Cook PA-C        aspirin EC tablet 81 mg  81 mg Oral Daily Jose Cook PA-C   81 mg at 03/19/20 0847    digoxin (LANOXIN) tablet 125 mcg  125 mcg Oral Daily Jose Cook PA-C   125 mcg at 03/19/20 9541    dilTIAZem (CARDIZEM CD) extended release capsule 120 mg  120 mg Oral BID Jose Cook PA-C   120 mg at 03/19/20 0847    escitalopram (LEXAPRO) tablet 10 mg  10 mg Oral Daily Jose Cook PA-C   10 mg at 03/19/20 0676    hydrOXYzine (ATARAX) tablet 25 mg  25 mg Oral BID PRN Jose Cook PA-C        atorvastatin (LIPITOR) tablet 10 mg  10 mg Oral Nightly Jose Cook PA-C   10 mg at 03/18/20 2124    tiotropium (SPIRIVA RESPIMAT) 2.5 MCG/ACT inhaler 2 puff  2 puff Inhalation Daily Jose Cook PA-C   2 puff at 03/19/20 0850    insulin lispro (HUMALOG) injection vial 0-6 Units  0-6 Units Subcutaneous TID  Jose Cook PA-C   1 Units at 03/19/20 1355    insulin lispro (HUMALOG) injection vial 0-3 Units  0-3 Units Subcutaneous Nightly Jose Cook PA-C        glucose (GLUTOSE) 40 % oral gel 15 g  15 g Oral PRN Brian Davison PA-C        dextrose 50 % IV solution  12.5 g Intravenous PRN Brian Davison PA-C        glucagon (rDNA) injection 1 mg  1 mg Intramuscular PRN Brain Davison PA-C        dextrose 5 % solution  100 mL/hr Intravenous PRN Brian Davison PA-C        potassium chloride (KLOR-CON M) extended release tablet 40 mEq  40 mEq Oral PRN Logan Benito MD   40 mEq at 03/18/20 4457    Or    potassium bicarb-citric acid (EFFER-K) effervescent tablet 40 mEq  40 mEq Oral PRN Logan Benito MD        Or    potassium chloride 10 mEq/100 mL IVPB (Peripheral Line)  10 mEq Intravenous PRN Logan Benito MD        potassium chloride 10 mEq/100 mL IVPB (Peripheral Line)  10 mEq Intravenous PRN Logan Benito  mL/hr at 03/17/20 1644 10 mEq at 03/17/20 1644        Labs:     Recent Labs     03/17/20  0628 03/18/20  0218 03/19/20  0159   WBC 6.6 7.0 7.5   RBC 3.89* 3.41* 3.63*   HGB 10.5* 9.2* 9.7*   HCT 34.2* 30.4* 32.8*   MCV 87.9 89.1 90.4   MCH 27.0 27.0 26.7*   MCHC 30.7* 30.3* 29.6*    305 337     Recent Labs     03/17/20  0628  03/18/20  0218 03/18/20  1452 03/19/20  0159     --  136  --  135*   K 2.7*   < > 3.2* 3.8 4.7   ANIONGAP 12  --  15  --  8   CL 83*  --  81*  --  85*   CO2 41*  --  40*  --  42*   BUN 11  --  11  --  10   CREATININE 0.6  --  0.6  --  0.7   GLUCOSE 135*  --  130*  --  126*   CALCIUM 6.8*  --  7.1*  --  7.7*    < > = values in this interval not displayed. Recent Labs     03/16/20  1717 03/17/20  0628 03/19/20  0159   MG 1.4* 1.6 1.8   PHOS  --   --  2.3*     Recent Labs     03/17/20  0628   AST 13   ALT 25   BILITOT 0.3   ALKPHOS 111     ABGs:  Recent Labs     03/18/20  1452   PHART 7.530*   XXY2DDF 68.0*   PO2ART 81.0   GBB1OMN 56.8*   BEART 30.0*   HGBAE 9.9*   [id number] 95.4   CARBOXHGBART 2.7   02THERAPY Unknown     HgBA1c: No results for input(s): LABA1C in the last 72 hours.   FLP:    Lab Results   Component Value Date    TRIG 105 09/06/2015    HDL 28 09/06/2015 [address] 164 09/24/2011    LDLDIRECT 85 09/06/2015     TSH:    Lab Results   Component Value Date    TSH 0.957 02/15/2020     Troponin T:   Recent Labs     03/16/20  1717 03/16/20 2018 03/18/20  0909   TROPONINI 0.03 0.03 0.03     INR:   No results for input(s): INR in the last 72 hours.     Objective:   Vitals: /70   Pulse 80   Temp 96.1 °F (35.6 °C) (Temporal)   Resp 18   Ht 5' 7\" (1.702 m)   Wt 209 lb 7 oz (95 kg)   SpO2 94%   BMI 32.80 kg/m²   24HR INTAKE/OUTPUT:      Intake/Output Summary (Last 24 hours) at 3/19/2020 1525  Last data filed at 3/19/2020 0414  Gross per 24 hour   Intake 300 ml   Output 350 ml   Net -50 ml     Physical Exam  General appearance: alert and cooperative with exam, appears ill, well nourished, well developed, no acute distress  HEENT: atraumatic, eyes with clear conjunctiva and normal lids, pupils and irises normal, external ears and nose are normal,lips normal.  Neck: without masses, lymphadenopathy, supple  Lungs: no increased work of breathing, clear but diminished on R, L lungs sounds absent, L PleurX  Heart: regular rate and rhythm and S1, S2 normal  Abdomen: soft, non-tender; bowel sounds normal; no masses,  no organomegaly  Genitourinary: No bladder fullness, masses, or tenderness  Extremities: extremities normal, atraumatic, no cyanosis or edema  Neurologic: No focal neurologic deficits, normal sensation, no tremor, alert and oriented  Psychiatric: Alert and oriented, no recent or remote memory deficits, good judgement, mood and affect appropriate  Skin: no rashes,lesions, or nodules.         Assessment and Plan:   Principal Problem:    Acute chest pain  Active Problems:    Mixed hyperlipidemia    Coronary artery disease involving native coronary artery of native heart without angina pectoris    Chronic venous stasis dermatitis of both lower extremities    Morbid obesity due to excess calories (HCC)    Essential hypertension    Type 2 diabetes mellitus without

## 2020-03-19 NOTE — PROGRESS NOTES
Wound Care  Spoke with Dr. Casper Saenz via phone re: LLE anterior shin hematoma from a fall one month ago. Patient states area is sore. Order received for ultrasound of the LLE anterior shin. Call placed to general ultrasound and spoke with Herrera Montiel re:how to order test  - non vascular limited left lower extremity placed order in [facility]. Call placed to [facility] RN patient nurse today to inform test has been ordered.

## 2020-03-19 NOTE — PROGRESS NOTES
diarrhea, no constipation;  RAN: no dysuria, no hematuria, no frequency or urgency, no nephrolithiasis;  MUSCULOSKELETAL: no joint pain, no swelling, no stiffness;  ENDOCRINE: no polyuria, no polydipsia, no cold or heat intolerance;  HEMATOLOGY: no easy bruising or bleeding, no history of clotting disorder;  DERMATOLOGY: no skin rash, no eczema, no pruritus;  PSYCHIATRY: no depression, no anxiety, no panic attacks, no suicidal ideation, no homicidal ideation;  NEUROLOGY: no syncope, no seizures, no numbness or tingling of hands, no numbness or tingling of feet, no paresis;    Objective   BP 96/62   Pulse 78   Temp 96.4 °F (35.8 °C) (Temporal)   Resp 16   Ht 5' 7\" (1.702 m)   Wt 209 lb 7 oz (95 kg)   SpO2 92%   BMI 32.80 kg/m²     PHYSICAL EXAM:  CONSTITUTIONAL: Alert, appropriate, no acute distress, deconditioning   EYES: Non icteric, EOM intact, pupils equal round   ENT: Mucus membranes moist, no oral pharyngeal lesions, external inspection of ears and nose are normal  NECK: Supple, no masses.  No palpable thyroid mass  CHEST/LUNGS: Absent left lung sounds  CARDIOVASCULAR: RRR, no murmurs.  No lower extremity edema  ABDOMEN: soft non-tender, active bowel sounds, no HSM.  No palpable masses   EXTREMITIES: warm, full ROM in all 4 extremities, no focal weakness.   SKIN: warm, dry with no rashes or lesions  LYMPH: No cervical, clavicular, axillary, or inguinal lymphadenopathy  NEUROLOGIC: follows commands, non focal   PSYCH: mood and affect appropriate.  Alert and oriented to time, place, person        LABORATORY RESULTS REVIEWED BY ME:  Recent Labs     03/18/20  0218 03/17/20  0628 03/16/20  1130   WBC 7.0 6.6 7.2   HGB 9.2* 10.5* 11.5*   HCT 30.4* 34.2* 37.3*   MCV 89.1 87.9 87.4    302 376       Lab Results   Component Value Date     03/18/2020    K 3.8 03/18/2020    CL 81 (L) 03/18/2020    CO2 40 (H) 03/18/2020    BUN 11 03/18/2020    CREATININE 0.6 03/18/2020    GLUCOSE 130 (H) 03/18/2020 He could tolerate radiation and/or Keytruda if he becomes ambulatory. Recommended intensive PT           PLAN:  Physical and Occupational Therapy to assist, Myah Chavis is requesting to be transferred to [facility] for rehabilitation and declined hospice at this time. Performance status must improve before treatment can be considered, Myah Chavis is aware and voices understanding. Okay from a oncology standpoint to be discharged, will follow-up in clinic on 4/24/2020 at 9 AM with Dr.Claudino Jesika Martinez, APRN    03/19/20  7:34 AM   Physician's attestation/substantial contribution:  I, Dr Gala Mckeon, independently performed an evaluation on Hugo Score. I have reviewed relevant medical information/data to include but not limited to medication list, relevant appropriate labs and imaging when applicable. I reviewed other physician's notes, ancillary services and nurses assessment. I have reviewed the above documentation completed by the Nurse Practitioner or Physician Assistant. Please see my additional contributions to the history of present illness, physical examination, and assessment/medical decision-making that reflect my findings and impressions. I discussed essential elements of the care plan with the NP or PA and the patient as well. I answered all the questions to the patient's satisfaction. I concur with above stated. Subjective-feeling about the same. Denies any breathing problems  Objective- decreased breath sounds left lung. Chronically appearing  Assessment/plan:  Non-small cell lung cancer cell subtype- not a candidate for treatment at this time time unless significant improvement of his performance.     Gala Mckeon MD

## 2020-03-19 NOTE — PROGRESS NOTES
Physical Therapy    Facility/Department: [facility] PROGRESSIVE CARE  Initial Assessment    NAME: Chris Gtz  : 1955  MRN: [id number]    Date of Service: 3/19/2020    Discharge Recommendations:  Continue to assess pending progress, Patient would benefit from continued therapy after discharge        Assessment   Body structures, Functions, Activity limitations: Decreased functional mobility ; Decreased ROM; Decreased safe awareness;Decreased strength;Decreased endurance;Decreased balance; Increased pain;Decreased posture  Assessment: Pt. will benefit from cont. PT to decrease impairments. Pt. a fall risk and should not attempt mobility on his own at this time. Anticipate pt. will need additional therapy after d/c from [facility] as he is in a deconditioned state at this time and would be too unsteady and weak to amb. very far. Needs to have 2A and wear gait belt for transfers to chair. Treatment Diagnosis: impaired gait and mobility  Prognosis: Fair  Decision Making: Medium Complexity  PT Education: Goals;PT Role;Plan of Care;General Safety;Transfer Training;Precautions;Gait Training;Functional Mobility Training  Patient Education: use of call light for mobility  Barriers to Learning: needs simple, direct commands. REQUIRES PT FOLLOW UP: Yes  Activity Tolerance  Activity Tolerance: Patient limited by fatigue       Patient Diagnosis(es): The primary encounter diagnosis was Acute chest pain. Diagnoses of Hypokalemia, Elevated troponin, and Hypomagnesemia were also pertinent to this visit.      has a past medical history of Atherosclerosis, Blood circulation, collateral, CAD (coronary artery disease), Carpal tunnel syndrome, CHF (congestive heart failure) ([address] COPD (chronic obstructive pulmonary disease) ([address] Degenerative joint disease, Diabetes mellitus ([address] FH: chronic lung disease requiring oxygen, GERD (gastroesophageal reflux disease), Hyperlipidemia, Hypertension, Malignant neoplasm of overlapping sites of left lung [facility]), Myocardial infarction [facility]), Obesity, Palliative care patient, Presence of stent in coronary artery, and Right carpal tunnel syndrome. has a past surgical history that includes Coronary angioplasty with stent; lumbar laminectomy; back surgery; hernia repair; joint replacement; Cardiac catheterization (9/23/11); pr revise median n/carpal tunnel surg (Left, 9/13/2018); and Thoracoscopy (Left, 2/19/2020). Restrictions  Restrictions/Precautions  Restrictions/Precautions: Fall Risk, Contact Precautions  Position Activity Restriction  Other position/activity restrictions: c diff rule out, on a dysphagia diet  Vision/Hearing  Vision: Impaired  Vision Exceptions: Wears glasses at all times  Hearing: Within functional limits     Subjective  General  Chart Reviewed: Yes  Patient assessed for rehabilitation services?: Yes  Response To Previous Treatment: Not applicable  Family / Caregiver Present: No  Referring Practitioner: Adeel Martinez MD  Referral Date : 03/17/20  Diagnosis: acute chest pain, hypokalemia, elevated troponin, squamous call cancer L lung, L pleural effusion/lung collapse  Follows Commands: Impaired  Other (Comment): needs simple, directed commands. Pt. gets overwhelmed and frustrated with multiple step commands. General Comment  Comments: RNMarisa PT and states pt. on bedpan right now and stool is being sent down to test for c diff. Subjective  Subjective: Pt. states, \"I can't\" when asked to stand from EOB.    Pain Screening  Patient Currently in Pain: No  Pain Assessment  Pain Location: Leg  Vital Signs  Patient Currently in Pain: Yes(pt. does not give pain rating)  Pre Treatment Pain Screening  Intervention List: Patient able to continue with treatment    Orientation  Orientation  Overall Orientation Status: Within Functional Limits  Social/Functional History  Social/Functional History  Lives With: Spouse  Type of Home: House  Receives Help From: Other (comment)  ADL Assistance: Needs assistance  Ambulation Assistance: Needs assistance  Transfer Assistance: Needs assistance  Additional Comments: pt. states he was living at home with his wife, then had to go to [facility] from stay at [facility], then to his dad's, then to [facility]. He states he was working with PT at [facility] but that he was stubborn. Cognition   Cognition  Overall Cognitive Status: Exceptions  Following Commands: Follows one step commands with increased time; Follows one step commands with repetition  Attention Span: Attends with cues to redirect  Memory: Appears intact  Safety Judgement: Decreased awareness of need for safety  Problem Solving: Assistance required to implement solutions;Assistance required to generate solutions  Insights: Decreased awareness of deficits  Initiation: Requires cues for some  Sequencing: Requires cues for some  Cognition Comment: pt. very talkative and likes to tell stories    Objective     Observation/Palpation  Posture: Fair  Observation: O2  Body Mechanics: forward head, rounded shoulders  Scar: bump/reddened and bruised area over L shin-reports he fell out of bed about 3 wks ago.     AROM RLE (degrees)  RLE AROM: Exceptions  RLE General AROM: hip flexion limited to about 100 deg, knees do no fully extend, ankles WFLs  AROM LLE (degrees)  LLE AROM : Exceptions  LLE General AROM: hip flexion limited to about 100 deg, knees do no fully extend, ankles WFLs  Strength RLE  Strength RLE: WFL  Comment: grossly 3+/5  Strength LLE  Strength LLE: WFL  Comment: grossly 3+/5     Sensation  Overall Sensation Status: (reports numbness B feet and L hand due to carpal tunnel issues)  Bed mobility  Rolling to Right: Stand by assistance  Supine to Sit: Contact guard assistance(using bed rail)  Sit to Supine: Unable to assess  Comment: pt. sat on EOB x 5 mins SBA  Transfers  Sit to Stand: Minimal Assistance;2 Person Assistance  Stand to sit: Minimal Assistance;2 Person Assistance  Bed to Chair: Minimal assistance;2 Person Assistance  Comment: pt. side stepped to [facility] 2 steps with HHA/Min A x 2, then to chair Min A x 2. Ambulation  Ambulation?: Yes  WB Status: no restrictions  Ambulation 1  Surface: level tile  Device: No Device  Other Apparatus: O2  Assistance: Minimal assistance;2 Person assistance  Quality of Gait: unsteady, knees feel like they would buckle, so therapists remained close, ready to block knees if needed. Gait Deviations: Slow Dorie;Decreased step length;Decreased step height  Distance: 1', 2' side stepping     Balance  Posture: Fair  Sitting - Static: Fair;+  Sitting - Dynamic: Fair;-  Standing - Static: Poor;+  Standing - Dynamic: Poor;+        Plan   Plan  Times per week: 3-7  Times per day: Daily  Plan weeks: 2  Current Treatment Recommendations: Strengthening, ROM, Balance Training, Functional Mobility Training, Transfer Training, Endurance Training, Gait Training, Safety Education & Training, Positioning, Equipment Evaluation, Education, & procurement, Patient/Caregiver Education & Training  Plan Comment: cont. PT per POC.   Safety Devices  Type of devices: Gait belt, Patient at risk for falls, Nurse notified, Call light within reach, Left in chair, Chair alarm in place(reclined)    G-Code       OutComes Score                                                  AM-PAC Score             Goals  Short term goals  Time Frame for Short term goals: 2 wks  Short term goal 1: supine to sit indep  Short term goal 2: sit to stand indep  Short term goal 3: amb. 100' with RW SBA  Patient Goals   Patient goals : get stronger       Therapy Time   Individual Concurrent Group Co-treatment   Time In           Time Out           Minutes                   Vita Apley, PT    Electronically signed by Vita Apley, PT on 3/19/2020 at 10:53 AM

## 2020-03-19 NOTE — PROGRESS NOTES
lispro (HUMALOG) injection vial 0-3 Units  0-3 Units Subcutaneous Nightly Alonza Level, PA-C        glucose (GLUTOSE) 40 % oral gel 15 g  15 g Oral PRN Alonza Level, PA-C        dextrose 50 % IV solution  12.5 g Intravenous PRN Alonza Level, PA-C        glucagon (rDNA) injection 1 mg  1 mg Intramuscular PRN Alonza Level, PA-C        dextrose 5 % solution  100 mL/hr Intravenous PRN Alonza Level, PA-C        potassium chloride (KLOR-CON M) extended release tablet 40 mEq  40 mEq Oral PRN Logan Benito MD   40 mEq at 03/18/20 5646    Or    potassium bicarb-citric acid (EFFER-K) effervescent tablet 40 mEq  40 mEq Oral PRN Logan Benito MD        Or    potassium chloride 10 mEq/100 mL IVPB (Peripheral Line)  10 mEq Intravenous PRN Logan Benito MD        potassium chloride 10 mEq/100 mL IVPB (Peripheral Line)  10 mEq Intravenous PRN Logan Benito  mL/hr at 03/17/20 1644 10 mEq at 03/17/20 1644       Past Medical History:  Past Medical History:   Diagnosis Date    Atherosclerosis     cardiovascular disease     Blood circulation, collateral     4 fingers left hand tingly    CAD (coronary artery disease)     saw dr. Garcia Chi tunnel syndrome     CHF (congestive heart failure) ([address]     COPD (chronic obstructive pulmonary disease) ([address]     Degenerative joint disease     right hip    Diabetes mellitus ([address]     Diabetic x 1-2 years    FH: chronic lung disease requiring oxygen     sees dr. Shelby Sharif GERD (gastroesophageal reflux disease)     Hyperlipidemia     Hypertension     Malignant neoplasm of overlapping sites of left lung ([address]     Myocardial infarction ([address]     subenodcardial    Obesity     Palliative care patient 01/29/2018    Presence of stent in coronary artery     9/23/11 BALJINDER placement 0MB of circumflex    Right carpal tunnel syndrome 9/12/2018       Past Surgical History:  Past Surgical History:   Procedure Laterality Date    BACK SURGERY      in 1980's   [facility] CARDIAC CATHETERIZATION  11    with stenting of OMB of left circumflex.     CORONARY ANGIOPLASTY WITH STENT PLACEMENT      HERNIA REPAIR      63 years ago    JOINT REPLACEMENT      Right hip replacement 4-6 years ago    LUMBAR LAMINECTOMY      MS REVISE MEDIAN N/CARPAL TUNNEL SURG Left 2018    CARPAL TUNNEL RELEASE performed by Sonido Granados MD at [address] [facility] Left 2020    VIDEO ASSISTED THOROCOSCOPY WITH INSERTION OF [address] performed by Radha Sherman MD at [address] [facility] OR       Family History  Family History   Problem Relation Age of Onset    Coronary Art Dis Mother     Cancer Mother     Coronary Art Dis Father     Diabetes Father     Coronary Art Dis Sister     Depression Paternal Grandmother        Social History  Social History     Socioeconomic History    Marital status:      Spouse name: Not on file    Number of children: Not on file    Years of education: Not on file    Highest education level: Not on file   Occupational History    Not on file   Social Needs    Financial resource strain: Not on file    Food insecurity     Worry: Not on file     Inability: Not on file    Transportation needs     Medical: Not on file     Non-medical: Not on file   Tobacco Use    Smoking status: Former Smoker     Packs/day: 2.00     Years: 18.00     Pack years: 36.00     Types: Cigarettes     Last attempt to quit: 2011     Years since quittin.5    Smokeless tobacco: Never Used   Substance and Sexual Activity    Alcohol use: Yes     Comment: rare    Drug use: No    Sexual activity: Not on file   Lifestyle    Physical activity     Days per week: Not on file     Minutes per session: Not on file    Stress: Not on file   Relationships    Social connections     Talks on phone: Not on file     Gets together: Not on file     Attends Alevism service: Not on file     Active member of club or organization: Not on file     Attends meetings of clubs or organizations: Not on file     Relationship status: Not on file    Intimate partner violence     Fear of current or ex partner: Not on file     Emotionally abused: Not on file     Physically abused: Not on file     Forced sexual activity: Not on file   Other Topics Concern    Not on file   Social History Narrative    Not on file         Review of Systems:  History obtained from chart review and the patient  General ROS: No fever or chills  Respiratory ROS: No cough, shortness of breath, wheezing  Cardiovascular ROS: no chest pain or dyspnea on exertion  Gastrointestinal ROS: No abdominal pain or melena  Genito-Urinary ROS: No dysuria or hematuria  Musculoskeletal ROS: No joint pain or swelling         Objective:  Blood pressure 101/70, pulse 83, temperature 96.1 °F (35.6 °C), temperature source Temporal, resp. rate 18, height 5' 7\" (1.702 m), weight 209 lb 7 oz (95 kg), SpO2 94 %. Intake/Output Summary (Last 24 hours) at 3/19/2020 1145  Last data filed at 3/19/2020 0414  Gross per 24 hour   Intake 360 ml   Output 475 ml   Net -115 ml     General: alert and oriented x3   Chest:  clear to auscultation bilaterally without respiratory distress  CVS: regular rate and rhythm  Abdominal: soft, nontender, normal bowel sounds  Extremities: no cyanosis or edema  Skin: warm and dry without rash    Labs:  BMP:   Recent Labs     03/17/20  0628  03/18/20 0218 03/18/20  1452 03/19/20  0159     --  136  --  135*   K 2.7*   < > 3.2* 3.8 4.7   CL 83*  --  81*  --  85*   CO2 41*  --  40*  --  42*   PHOS  --   --   --   --  2.3*   BUN 11  --  11  --  10   CREATININE 0.6  --  0.6  --  0.7   CALCIUM 6.8*  --  7.1*  --  7.7*    < > = values in this interval not displayed.      CBC:   Recent Labs     03/17/20 0628 03/18/20 0218 03/19/20  0159   WBC 6.6 7.0 7.5   HGB 10.5* 9.2* 9.7*   HCT 34.2* 30.4* 32.8*   MCV 87.9 89.1 90.4    305 337     LIVER PROFILE:   Recent Labs     03/17/20  0628   AST 13   ALT 25   BILITOT 0.3   ALKPHOS 111 towards left suggesting loss of left lung volume. There are interstitial changes in the right lung which may represent scarring and discoid atelectasis. No bony abnormality. The complete opacification of the left chest may be present left lung consolidation and left-sided pleural effusion. This is more progressive since the previous study. The right lung is poorly inflated with scarring and discoid atelectasis. Signed by Dr Balbir Freedman on 3/16/2020 11:58 AM       Assessment     1. Hypokalemia (due to decrease intake and increase K losses)  2. Lung cancer  3. Chest pain  4. Anemia    Plan:  Continue potassium suppl at lower dose, continue aldactone. Will sign off. Recall as needed.

## 2020-03-19 NOTE — PROGRESS NOTES
Have notified Dr Janae Gonzalez of critical CO2 value from [facility] this am.  Electronically signed by Kenji Poole RN on 3/19/2020 at 8:43 AM

## 2020-03-20 NOTE — PROGRESS NOTES
mL  10 mL Intravenous 2 times per day ROSA KimC   10 mL at 03/20/20 0815    sodium chloride flush 0.9 % injection 10 mL  10 mL Intravenous PRN Agustina Prince PA-C        acetaminophen (TYLENOL) tablet 650 mg  650 mg Oral Q6H PRN ROSA KimC        Or    acetaminophen (TYLENOL) suppository 650 mg  650 mg Rectal Q6H PRN ROSA KimC        polyethylene glycol (GLYCOLAX) packet 17 g  17 g Oral Daily PRN Agustina Prince PA-C        promethazine (PHENERGAN) tablet 12.5 mg  12.5 mg Oral Q6H PRN Agustina Prince PA-C        Or    ondansetron [facility]) injection 4 mg  4 mg Intravenous Q6H PRN POLINA Kim-C   4 mg at 03/18/20 4204    magnesium sulfate 2 g in 50 mL IVPB premix  2 g Intravenous PRN ROSA KimC   Stopped at 03/17/20 0052    nitroGLYCERIN (NITROSTAT) SL tablet 0.4 mg  0.4 mg Sublingual Q5 Min PRN Agustina Prince PA-C        aspirin EC tablet 81 mg  81 mg Oral Daily POLINA Kim-C   81 mg at 03/20/20 0824    digoxin (LANOXIN) tablet 125 mcg  125 mcg Oral Daily POLINA Kim-C   125 mcg at 03/20/20 0669    dilTIAZem (CARDIZEM CD) extended release capsule 120 mg  120 mg Oral BID POLINA Kim-C   120 mg at 03/20/20 0815    escitalopram (LEXAPRO) tablet 10 mg  10 mg Oral Daily POLINA Kim-C   10 mg at 03/20/20 7065    hydrOXYzine (ATARAX) tablet 25 mg  25 mg Oral BID PRN Agustina Prince PA-C        atorvastatin (LIPITOR) tablet 10 mg  10 mg Oral Nightly POLINA Kim-C   10 mg at 03/19/20 2140    tiotropium (SPIRIVA RESPIMAT) 2.5 MCG/ACT inhaler 2 puff  2 puff Inhalation Daily POLINA Kim-C   2 puff at 03/20/20 5527    insulin lispro (HUMALOG) injection vial 0-6 Units  0-6 Units Subcutaneous TID WC Agustina Prince PA-C   1 Units at 03/20/20 1225    insulin lispro (HUMALOG) injection vial 0-3 Units  0-3 Units Subcutaneous Nightly Agustina Prince PA-C   1 Units at 03/19/20 2141    glucose (GLUTOSE) 40 % oral gel 15 g  15 g Oral PRN Kelsey Bless, PA-C        dextrose 50 % IV solution  12.5 g Intravenous PRN Kelsey Bless, PA-C        glucagon (rDNA) injection 1 mg  1 mg Intramuscular PRN Kelsey Bless, PA-C        dextrose 5 % solution  100 mL/hr Intravenous PRN Kelsey Bless, PA-C        potassium chloride (KLOR-CON M) extended release tablet 40 mEq  40 mEq Oral PRN Logan Benito MD   40 mEq at 03/18/20 8676    Or    potassium bicarb-citric acid (EFFER-K) effervescent tablet 40 mEq  40 mEq Oral PRN Logan Benito MD        Or    potassium chloride 10 mEq/100 mL IVPB (Peripheral Line)  10 mEq Intravenous PRN Logan Benito MD        potassium chloride 10 mEq/100 mL IVPB (Peripheral Line)  10 mEq Intravenous PRN Logan Benito  mL/hr at 03/17/20 1644 10 mEq at 03/17/20 1644         dextrose        sodium polystyrene  15 g Oral Once    potassium chloride  10 mEq Oral Daily    potassium & sodium phosphates  1 packet Oral 4x Daily    nitroglycerin  1 inch Topical 4 times per day    metoprolol tartrate  12.5 mg Oral BID    isosorbide mononitrate  30 mg Oral Daily    spironolactone  50 mg Oral Daily    magnesium oxide  400 mg Oral Daily    sodium chloride flush  10 mL Intravenous 2 times per day    aspirin  81 mg Oral Daily    digoxin  125 mcg Oral Daily    dilTIAZem  120 mg Oral BID    escitalopram  10 mg Oral Daily    atorvastatin  10 mg Oral Nightly    tiotropium  2 puff Inhalation Daily    insulin lispro  0-6 Units Subcutaneous TID WC    insulin lispro  0-3 Units Subcutaneous Nightly     magic butt cream, albuterol, sodium chloride flush, acetaminophen **OR** acetaminophen, polyethylene glycol, promethazine **OR** ondansetron, magnesium sulfate, nitroGLYCERIN, hydrOXYzine, glucose, dextrose, glucagon (rDNA), dextrose, potassium chloride **OR** potassium alternative oral replacement **OR** potassium chloride, potassium chloride  DIET CARB CONTROL; Low Sodium (2 GM);  Dysphagia Minced and Moist; No Caffeine       Labs:   CBC with DIFF:  Recent Labs     03/18/20 0218 03/19/20  0159 03/20/20  0116   WBC 7.0 7.5 7.7   RBC 3.41* 3.63* 3.40*   HGB 9.2* 9.7* 9.1*   HCT 30.4* 32.8* 30.6*   MCV 89.1 90.4 90.0   MCH 27.0 26.7* 26.8*   MCHC 30.3* 29.6* 29.7*   RDW 16.7* 16.8* 16.8*    337 290   MPV 9.1* 9.5 9.2*   NEUTOPHILPCT 72.3* 73.6* 70.1*   LYMPHOPCT 12.5* 11.7* 12.8*   MONOPCT 8.9 8.7 9.1   EOSRELPCT 2.7 1.9 1.6   BASOPCT 0.6 0.5 0.8   NEUTROABS 5.0 5.5 5.4   LYMPHSABS 0.9* 0.9* 1.0*   MONOSABS 0.60 0.70 0.70   EOSABS 0.20 0.10 0.10   BASOSABS 0.00 0.00 0.10       CMP/BMP:  Recent Labs     03/18/20 0218 03/19/20  0159 03/20/20  0116 03/20/20  1040     --  135* 133*  --    K 3.2*   < > 4.7 5.4* 4.8   CL 81*  --  85* 86*  --    CO2 40*  --  42* 39*  --    ANIONGAP 15  --  8 8  --    GLUCOSE 130*  --  126* 128*  --    BUN 11  --  10 10  --    CREATININE 0.6  --  0.7 0.7  --    LABGLOM >60  --  >60 >60  --    CALCIUM 7.1*  --  7.7* 8.0*  --     < > = values in this interval not displayed. CRP:  No results for input(s): CRP in the last 72 hours. Sed Rate:  No results for input(s): SEDRATE in the last 72 hours. HgBA1c:  No components found for: HGBA1C  FLP:    Lab Results   Component Value Date    TRIG 105 09/06/2015    HDL 28 09/06/2015    LDLCALC 164 09/24/2011    LDLDIRECT 85 09/06/2015     TSH:    Lab Results   Component Value Date    TSH 0.957 02/15/2020     Troponin T:   Recent Labs     03/18/20  0909   TROPONINI 0.03     Pro-BNP: No results for input(s): BNP in the last 72 hours. INR: No results for input(s): INR in the last 72 hours.   ABGs:   Lab Results   Component Value Date    PHART 7.530 03/18/2020    PO2ART 81.0 03/18/2020    IGL4BZP 68.0 03/18/2020     UA:  Recent Labs     03/18/20  2330   COLORU YELLOW   PHUR 7.5   WBCUA 4   RBCUA 3   BACTERIA NEGATIVE*   CLARITYU Clear   SPECGRAV 1.019   LEUKOCYTESUR TRACE*   UROBILINOGEN 1.0   BILIRUBINUR SMALL*   BLOODU Postthoracentesis, cancer  Single view. COMPARISONS:  2/22/2020 FINDINGS: Left lung opacification persists with minimal aeration of the upper lobes. The right lung remains grossly clear. Simultaneous emphysema in the left lateral chest wall and upper neck also identified, essentially unchanged. No measurable pneumothorax identified. Radiographically, chest is unchanged. Stable 1 day appearance the chest. Signed by Dr Juan José Spaulding on 2/23/2020 7:22 AM    Xr Chest Portable    Result Date: 2/22/2020  EXAM: XR CHEST PORTABLE -- 2/22/2020 5:00 AM HISTORY: 64 years, Male, post thoracentesis, lung cancer COMPARISON: 2/21/2020 TECHNIQUE:  1 images. Frontal view of the chest. FINDINGS:  Subcutaneous emphysema at the left lateral chest wall, incompletely visualized, but similar compared to 2/21/2020. Suspect a small left pneumothorax. There may be a thin left-sided chest tube at the lateral chest wall. Near complete opacification of the left lung. Cardiac mediastinal silhouette obscured. Right lung appears grossly clear. No right pneumothorax or large pleural effusion. Limited evaluation of bones and upper abdomen due to technique. 1. Suspect small persistent left apical pneumothorax with otherwise near complete opacification of the left lung. Similar left chest wall subcutaneous emphysema, incompletely visualized. There may be a left-sided chest tube, but evaluation is limited by technique. Correlate with patient's lines and tubes. 2. Right lung appears clear. Signed by Dr Abril Maki on 2/22/2020 6:57 AM    Xr Chest Portable    Result Date: 2/21/2020  Examination. XR CHEST PORTABLE 2/21/2020 5:00 AM History: Post thoracentesis. History of lung cancer. Significant frontal portable upright view of the chest is compared with the previous study dated 2/20/2020. No significant interval change. There is complete consolidation of the left lung. Small pneumothorax in the left upper chest is stable.  There are scarring and atelectasis in the right lung. There is a persistent displacement of the trachea towards left. There is moderate diffuse narrowing of the mid and distal trachea. There is a persistent soft tissue emphysema along the left lateral chest wall and the left side of the neck. This is incompletely included in the study. Left-sided chest tube is not visualized in this study. No change in the cardiopulmonary status. Signed by Dr Viktor Wayne on 2/21/2020 7:13 AM    Xr Chest Portable    Result Date: 2/20/2020  Examination. XR CHEST PORTABLE 2/20/2020 5:00 AM History: Postthoracentesis. A single frontal portable upright view of the chest is compared with the previous study dated 2/19/2020. There is complete consolidation of the left lung with a small pneumothorax in the upper chest. There is displacement of the trachea towards left suggesting loss of left lung volume. There are atelectatic changes in the right lower lung. There is a small right basal pleural effusion. A poorly visualized chest tube is seen at the left base. There is extensive soft tissue emphysema along the left lateral chest wall and the left side of the neck which appears unchanged. Persistent left lung consolidation and a small left hydropneumothorax. Persistent moderate displacement of the distal trachea towards left suggesting loss of left lung volume. The persistent and unchanged soft tissue emphysema of the left lateral chest wall and left-sided of the neck. A poorly visualized due to chest tube in the left lower chest. Signed by Dr Viktor Wayne on 2/20/2020 7:11 AM    Us Extremity Left Non Vasc Limited    Result Date: 3/19/2020  US EXTREMITY LEFT NON VASC LIMITED 3/19/2020 2:00 PM HISTORY: None COMPARISON: None TECHNIQUE: Transverse and longitudinal sonographic images of the left lower extremity were obtained in the region of the patient's palpable abnormality.     There is a poorly defined, hypoechoic subcutaneous collection that demonstrates Psychiatric:         Mood and Affect: Mood normal.         Behavior: Behavior normal.         Thought Content: Thought content normal.         Judgment: Judgment normal.           Assessment/plan:         Hospital Problems           Last Modified POA    * (Principal) Acute chest pain 3/17/2020 Yes    Mixed hyperlipidemia (Chronic) 3/16/2020 Yes    Coronary artery disease involving native coronary artery of native heart without angina pectoris (Chronic) 3/16/2020 Yes    Chronic venous stasis dermatitis of both lower extremities (Chronic) 3/16/2020 Yes    Morbid obesity due to excess calories ([address] (Chronic) 3/16/2020 Yes    Essential hypertension (Chronic) 3/16/2020 Yes    Type 2 diabetes mellitus without complication (HCC) (Chronic) 3/16/2020 Yes    Hyperkalemia 3/20/2020 Yes    Chronic diastolic congestive heart failure (HCC) (Chronic) 3/16/2020 Yes    Paroxysmal A-fib ([address] 3/16/2020 Yes    Overview Signed 3/16/2020 10:01 PM by Reji Castillo MD     9/23/2011  Cath  Inferior hypo, stent OM1  11/19/2017  Echo  EF 45%  11/21/2017  lexiscan negative for myocardial ischemia, EF 51  %  2/17/2020  Echo  Normal LVFX , PA pressure 80 mmHg, large pleural effusion         Malignant neoplasm of lung ([address] 3/16/2020 Yes    Malignant pleural effusion 3/18/2020 Yes          Principal Problem:    Acute chest pain  Active Problems:    Mixed hyperlipidemia    Coronary artery disease involving native coronary artery of native heart without angina pectoris    Chronic venous stasis dermatitis of both lower extremities    Morbid obesity due to excess calories (HCC)    Essential hypertension    Type 2 diabetes mellitus without complication (HCC)    Hyperkalemia    Chronic diastolic congestive heart failure (HCC)    Paroxysmal A-fib (HCC)    Malignant neoplasm of lung (HCC)    Malignant pleural effusion  Resolved Problems:    * No resolved hospital problems.  *    Left recurrent pleural effusion, and lung collapse  · Pulmonology

## 2020-03-20 NOTE — PROGRESS NOTES
Wound Care  Follow up with Dr. Jaylene Hendricks re:ultrasound LLE 3/19/20. Order received for Vascular consult r/t hematoma LLE from one month ago fall, ultrasound LLE completed on 3/19/20, please see report.

## 2020-03-20 NOTE — PROGRESS NOTES
[facility]    Patient:  Vibha Soliz  YOB: 1955  Date of Service: 3/19/2020  MRN: [id number]   Acct: [de-identified]   Primary Care Physician: ELTON Canales  Advance Directive: DNR-CC  Admit Date: 3/16/2020       Hospital Day: 1  Portions of this note have been copied forward, however, changed to reflect the most current clinical status of this patient. CHIEF COMPLAINT Chest pain    SUBJECTIVE: Patient continues to have opacification of the left lung. Patient's left Pleurx catheter in place which is draining periodically. Oncology and pulmonary were thinking about bronchoscopy but no plans firmed up yet. His prognosis is very poor. I spoke with him about hospice care on 2 separate occasions but he does not want to consider hospice at this time. His electrolytes are being replaced! Cumulative Hospital Course: The patient is a 62 y.o. male with PMH lung cancer, atrial fibrillation, COPD, CHF, CAD, HTN, HLD who presented to [address] [place] [address] ED complaining of chest pain. He describes chest pressure/tightness occurring intermittently. He denies dyspnea. Chronically wears 4L NC oxygen. CXR concerning for complete opacification left lung. He has multiple electrolyte abnormalities. BNP and troponin both elevated. He states he has been emptying Pleurx catheter about every 3 days. It was emptied this AM in ED per patient and he did feel relief of chest pressure/tightness. He was discharged from this facility on 02/27/2020 after being treated for atrial fibrillation with rapid ventricular response as well as pleural effusion. He underwent thoracentesis that admission with Pleurx catheter insertion 02/19/2020. He was placed on digoxin and Cardizem with instructions to drain Pleurx catheter every 72 hours. He was admitted to Hospitalist service with consultation to Cardiology who recommends repeat Echo as well as addition of Imdur 30 mg daily.  Oncology consulted who agree with Palliative care but he does have a poor prognosis at this time. Should his performance status improve radiation and/or Keytruda could be considered. Pulmonology state no further recommendations for pleural effusion or obstructed airway. Avoid anticoagulation given history of severe hemoptysis. Spironolactone added per Nephrology. Review of Systems:   14 point review of systems is negative except as specifically addressed above.     Objective:   VITALS:  /63   Pulse 77   Temp 97.2 °F (36.2 °C) (Temporal)   Resp 24   Ht 5' 7\" (1.702 m)   Wt 209 lb 7 oz (95 kg)   SpO2 96%   BMI 32.80 kg/m²   24HR INTAKE/OUTPUT:      Intake/Output Summary (Last 24 hours) at 3/19/2020 2018  Last data filed at 3/19/2020 1552  Gross per 24 hour   Intake 300 ml   Output 350 ml   Net -50 ml     PHYSICAL  EXAMINATION:    MARY:  Awake, alert, oriented x 3, patient appears tired and fatigued   Head/Eyes:  Normocephalic, atraumatic, EOMI and PERRLA bilaterally   Respiratory:   Bilateral Poor air entry in left lung field, + left Pleurx catheter in place, mild B/L crackles   Cardiovascular:  Regular rate and rhythm, S1+S2+0, no murmurs/rubs   Abdomen:   Soft, non-tender, bowel sounds +ve, no organomegaly   Extremities: Moves all, decreased range of motion, no edema   Neurologic: Awake, alert, oriented x 3, cranial nerves II-XII intact, no focal neurological deficits, sensory system intact   Psychiatric: Normal mood, non-suicidal         Medications:      dextrose        potassium chloride  10 mEq Oral Daily    nitroglycerin  1 inch Topical 4 times per day    metoprolol tartrate  12.5 mg Oral BID    isosorbide mononitrate  30 mg Oral Daily    spironolactone  50 mg Oral Daily    magnesium oxide  400 mg Oral Daily    sodium chloride flush  10 mL Intravenous 2 times per day    aspirin  81 mg Oral Daily    digoxin  125 mcg Oral Daily    dilTIAZem  120 mg Oral BID    escitalopram  10 mg Oral Daily    atorvastatin 10 mg Oral Nightly    tiotropium  2 puff Inhalation Daily    insulin lispro  0-6 Units Subcutaneous TID WC    insulin lispro  0-3 Units Subcutaneous Nightly     magic butt cream, albuterol, sodium chloride flush, acetaminophen **OR** acetaminophen, polyethylene glycol, promethazine **OR** ondansetron, magnesium sulfate, nitroGLYCERIN, hydrOXYzine, glucose, dextrose, glucagon (rDNA), dextrose, potassium chloride **OR** potassium alternative oral replacement **OR** potassium chloride, potassium chloride  DIET CARB CONTROL; Low Sodium (2 GM); Dysphagia Minced and Moist; No Caffeine     Lab and other Data:     Recent Labs     03/17/20  0628 03/18/20  0218 03/19/20  0159   WBC 6.6 7.0 7.5   HGB 10.5* 9.2* 9.7*    305 337     Recent Labs     03/17/20  0628  03/18/20 0218 03/18/20  1452 03/19/20  0159     --  136  --  135*   K 2.7*   < > 3.2* 3.8 4.7   CL 83*  --  81*  --  85*   CO2 41*  --  40*  --  42*   BUN 11  --  11  --  10   CREATININE 0.6  --  0.6  --  0.7   GLUCOSE 135*  --  130*  --  126*    < > = values in this interval not displayed. Recent Labs     03/17/20  0628   AST 13   ALT 25   BILITOT 0.3   ALKPHOS 111     Troponin T:   Recent Labs     03/18/20  0909   TROPONINI 0.03     INR:   No results for input(s): INR in the last 72 hours. RAD:   Xr Chest Standard (2 Vw)  Impression: No interval changes. Signed by Dr Adry Arreaga on 3/17/2020 9:02 AM    Xr Chest Portable  The complete opacification of the left chest may be present left lung consolidation and left-sided pleural effusion. This is more progressive since the previous study. The right lung is poorly inflated with scarring and discoid atelectasis.  Signed by Dr Shelley Burnette on 3/16/2020 11:58 AM    Assessment/Plan     Principal Problem:    Acute chest pain  Active Problems:    Mixed hyperlipidemia    Coronary artery disease involving native coronary artery of native heart without angina pectoris    Chronic venous stasis dermatitis of both lower extremities    Morbid obesity due to excess calories (HCC)    Essential hypertension    Type 2 diabetes mellitus without complication (HCC)    Chronic diastolic congestive heart failure (HCC)    Paroxysmal A-fib (HCC)    Malignant neoplasm of lung (HCC)    Malignant pleural effusion  Resolved Problems:    * No resolved hospital problems. *      Principal Problem:    Chest pain-resolved, Cardiology following, not a candidate for invasive work up, Imdur added     Active Problems:    Recurrent neoplastic left pleural effusion -follow-up with pulmonary for possible bronchoscopy. Continue to drain PleurX catheter       Mixed hyperlipidemia-statin continued, stable      Coronary artery disease involving native coronary artery of native heart without angina pectoris-noted, stable, Imdur added, stable      Chronic venous stasis dermatitis of both lower extremities-stable      Morbid obesity due to excess calories (HCC)-counseled on increasing activity as much as possible      Essential hypertension-remains hypotensive this AM, decrease Lopressor and place hold parameters       Type 2 diabetes mellitus without complication (HCC)-continue insulin as ordered, stable       Chronic diastolic congestive heart failure (HCC)-continue daily weights, I/O's      Paroxysmal A-fib (HCC)-rate controlled, no anticoagulation due to hx severe hemoptysis       Malignant neoplasm of lung (HCC)-not currently a candidate for treatment      Hypokalemia    Hypomagnesemia    Hyphosphatemia  S/p Aggressive IV and p.o. replacement  Labs in a.m. and replace electrolytes as warranted    DC Planning: patient not stable for discharge at this time. He has continuous recurrence of left pleural effusion. He either needs to have bronchoscopy as per pulmonary prior to discharge or be accepted under hospice care. His prognosis remains very poor. Repeat labs in a.m. Electrolyte replacement as per protocol.  Patient will be monitored very

## 2020-03-20 NOTE — PROGRESS NOTES
Pt received medication and is resting. No complaints at this time. Will continue to monitor.  Electronically signed by Obi Larry RN on 3/19/2020 at 10:02 PM

## 2020-03-20 NOTE — PROGRESS NOTES
Vascular:    Vascular consulted to see patient with a LLE hematoma (from a fall out of bed about 6 weeks ago). Patient stating that the shin is tender to touch. Patient with history of lung cancer, COPD, CHF, CAD, hypertension, atrial fibrillation, and hyperlipidemia. Patient has a Pleurx catheter (left). Patient is a DNR-CC followed by Palliative Care Services. ULTRASOUND EXTREMITY LEFT 3/19/20[de-identified]  Impression   There is a poorly defined, hypoechoic subcutaneous collection that   demonstrates no internal vascularity and likely represents hematoma or   developing phlegmon. Correlate with history of trauma. The collection   measures 5.2 x 4.9 x 0.4 cm. Close clinical follow-up is recommended. If the area enlarges, an MRI with and without contrast could be   beneficial.   Signed by Dr Denice Pabon on 3/19/2020 3:50 PM       Photo taken today per Kristian Peterson, Wound Care Nurse:    Document Information     Wound   LLE wound photo   03/20/2020 11:24   Attached To: Hospital Encounter on 3/16/20   Source Information     Aditi Potts RN       Agree with above. See consult note.

## 2020-03-20 NOTE — PROGRESS NOTES
Wound Care  Follow up with Uvaldo Salmon RN re: vascular consult re: MASSIEL hematoma. Dr. Tariq Park to see. Order placed in carepath for wound photo. Follow up with patient. He is sitting up in recliner chair with legs elevated. Wound photo taken of LLE with written consent. Informed patient vascular to see re: MASSIEL. Patient was agreeable.

## 2020-03-20 NOTE — PLAN OF CARE
Problem: Cardiac:  Goal: Ability to maintain vital signs within normal range will improve  Description: Ability to maintain vital signs within normal range will improve  Outcome: Ongoing  Goal: Cardiovascular alteration will improve  Description: Cardiovascular alteration will improve  Outcome: Ongoing     Problem: Health Behavior:  Goal: Will modify at least one risk factor affecting health status  Description: Will modify at least one risk factor affecting health status  Outcome: Ongoing  Goal: Identification of resources available to assist in meeting health care needs will improve  Description: Identification of resources available to assist in meeting health care needs will improve  Outcome: Ongoing

## 2020-03-20 NOTE — PROGRESS NOTES
PROGRESS NOTE    Pt Name: Tran Allen  MRN: [id number]  YOB: 1955  Date of evaluation: 3/20/2020    Subjective  Feeling better. No new complaints. Mr Cb Chung is a 62years old male who has a diagnosis of non-small cell lung cancer, squamous cell subtype. He has a complex medical history including history of COPD, CHF, CAD, hypertension, atrial fibrillation and hyperlipidemia. He presented to the ER yesterday from nursing home with complaints of chest discomfort accompanied by dyspnea. The patient has not received any direct treatment for his lung cancer due to severe deconditioning. He also developed a left pleural effusion that was negative for malignancy on several instances. He had a Pleurx catheter placed and has it emptied every 3 days.         Cancer history   Participated with PT and OT yesterday.  Continues to have significant weakness and fatigue.  Unfortunately SNF declined due to lack of participation with OT/PT. Discussed possibly home with hospice until overall performance status improves and then possibly come out of hospice and reevaluate for potential treatment.  No family at bedside.       Mr. Janelle Licona was seen in initial medical oncology consultation on 2/16/2020 as an inpatient at [facility] with a recent diagnosis of metastatic lung cancer made at [facility] to establish care.     He will be followed by Juan piedra for Martin's identical twin Luz Borjas, also with lung cancer.     Jaun Barrera has been on chronic supplemental oxygen for at least 6 months prior to presentation due to his history of underlying COPD.  He wears 4 L of supplemental oxygen. His PCP is Aranza Escamilla PA-C on the [facility] side.     Jaun Barrera presented to [facility] ED on 1/25/2020 with hemoptysis increasing weakness.    He was also found to have atrial fibrillation with RVR.      CTA chest with contrast on 1/25/2020:  NO PE  Complete collapse of the left lung secondary to an occluded left main stem bronchus  Moderate size left pleural effusion  Mild cardiomegaly, trace pericardial fluid  Vascular Lab Prelim     Duplex venous scan of B/L LE on 1/25/2020 shows no evidence for dvt, svt, reflux noted at this time. Final report pending     The case was discussed with Dr Reyes Gallon with pulmonology, who stated that he can do a bronch but with the bleeding, he would not be able to intervene other than using epinephrine. Would recommend transfer to higher level of care as pt may require embolization.      Mr. Cb Chung was transferred to [facility] in [facility] where further work-up was performed.     X-ray Chest Portable 1/26/2020  Complete whiteout of the left lung with mild deviation of the trachea to the left. Findings are consistent with complete collapse of the left lung. Underlying infection or mass or effusion cannot be excluded      SIMONA at [place] on 1/27/2020 was performed. LVEF greater than 55%. Unable to comment on diastolic filling pattern due to atrial  fibrillation. RV mildly dilated. Mildly depressed RV systolic function. The left atrium is mild to moderately dilated. RA mildly dilated. Mild tricuspid regurgitation. The RV systolic pressure is calculated at 63 mmHg.     A thoracentesis was performed at [facility] on 1/27/2020.  1100 cc of pleural fluid was drained. Cytology:  REACTIVE MESOTHELIAL CELLS, HISTIOCYTES, AND LYMPHOCYTES; NEGATIVE FOR MALIGNANCY      CT C/A/P at [facility] 1/29/2020  Impression:   1.  Signs of large LEFT lung mass with postobstructive collapse of   the entire LEFT lung and mediastinal shift. 2.  Bulky subcarinal adenopathy. 3.  Nodular bilateral adrenal glands still with some adreniform   contour, suspicious for either nodular hyperplasia or bilateral   metastatic disease to the adrenal glands.      Bronchoscopy with insertion of stent was performed on 1/29/2020 at [facility].   A mass was found in the middle portion of the left mainstem bronchus with about 90% obstruction. The mass was large and bloody and friable. a 10 x 40 Aero stent was placed with good results.        Records discuss \"squamous cell lung cancer \"  however I am unable to view a pathology report.     PET at [facility] 1/31/2020  Impression   Intense FDG uptake within a ill-defined left hilar mass as well as the left upper and left lower lobes. Interval placement of a left   main bronchus stent which is occluded distally with complete collapse of the left lung. FDG uptake within the left upper and lower lobes may be related to infection versus malignancy. Small left pleural effusion. No FDG uptake is present within the subcarinal lymphadenopathy.      MRI brain w/ and w/o  2/1/2020 8:07 AM  Impression   1.  No findings of intracranial metastatic disease. 2.  Mild chronic small vessel ischemic white matter disease and tiny   old right cerebellar infarcts.      CT abdomen pelvis with contrast 2/15/2020 at [facility]  No acute abnormality seen in the abdomen  Left pleural effusion     left thoracentesis 2/17/2020: 2L of clear, straw colored fluid drained. cytology negative: benign mesothelial cells and occasional small round lymphocytes     Post-procedure CXR 2/17/20: No pneumothorax, diffuse opacification of the left chest with some residual pleural effusion  He has already been evaluated by Dr. Burch Portal     S/p left VATS/Pleur-X catheter placement on 2/19/2020.  Cytology was negative            REVIEW OF SYSTEMS:   CONSTITUTIONAL: no fever, no night sweats,  fatigue;  HEENT: no blurring of vision, no double vision, no hearing difficulty, no tinnitus, no ulceration, no dysplasia, no epistaxis;  LUNGS: no cough, no hemoptysis, no wheeze,  no shortness of breath;  CARDIOVASCULAR: no palpitation, no chest pain, no shortness of breath;  GI: no abdominal pain, no nausea, no vomiting, no diarrhea, no constipation;  RAN: no dysuria, no hematuria,

## 2020-03-21 NOTE — PLAN OF CARE
Problem: Cardiac:  Goal: Ability to maintain vital signs within normal range will improve  Description: Ability to maintain vital signs within normal range will improve  3/21/2020 0839 by eRbel Cox RN  Outcome: Ongoing     Problem: Cardiac:  Goal: Cardiovascular alteration will improve  Description: Cardiovascular alteration will improve  3/21/2020 0839 by Rebel Cox RN  Outcome: Ongoing     Problem: Health Behavior:  Goal: Will modify at least one risk factor affecting health status  Description: Will modify at least one risk factor affecting health status  3/21/2020 0839 by Rebel Cox RN  Outcome: Ongoing     Problem: Health Behavior:  Goal: Identification of resources available to assist in meeting health care needs will improve  Description: Identification of resources available to assist in meeting health care needs will improve  3/21/2020 0839 by Rebel Cox RN  Outcome: Ongoing

## 2020-03-21 NOTE — PROGRESS NOTES
75 mL of pink tinged colored pleural fluid was removed from patient's PleurX catheter using his at home collection kit. The catheter was draining slowly for 15 minutes, patient tolerated well. Catheter insertion site was dressed per instructions, with sterile technique, on at home kit with foam pad, gauze, and a Tegaderm dated with time and initials. Patient tolerated well and without complaint. Will continue to monitor.

## 2020-03-21 NOTE — PROGRESS NOTES
He was admitted to Hospitalist service with consultation to Cardiology who recommends repeat Echo as well as addition of Imdur 30 mg daily. Oncology consulted who agree with Palliative care but he does have a poor prognosis at this time. Should his performance status improve radiation and/or Keytruda could be considered. Pulmonology state no further recommendations for pleural effusion or obstructed airway. Avoid anticoagulation given history of severe hemoptysis. Spironolactone added per Nephrology. \"        Review of Systems:   Review of Systems  ROS: 14 point review of systems is negative except as specifically addressed above. DIET CARB CONTROL; Low Sodium (2 GM);  Dysphagia Minced and Moist; No Caffeine    Intake/Output Summary (Last 24 hours) at 3/21/2020 0747  Last data filed at 3/21/2020 0449  Gross per 24 hour   Intake 960 ml   Output 950 ml   Net 10 ml       Medications:   dextrose       Current Facility-Administered Medications   Medication Dose Route Frequency Provider Last Rate Last Dose    sodium polystyrene (KAYEXALATE) 15 GM/60ML suspension 15 g  15 g Oral Once Chhaya Christianson MD        potassium chloride (KLOR-CON M) extended release tablet 10 mEq  10 mEq Oral Daily Kevin Merino MD   10 mEq at 03/19/20 0901    nitroglycerin (NITRO-BID) 2 % ointment 1 inch  1 inch Topical 4 times per day Logan Benito MD   1 inch at 03/21/20 0527    metoprolol tartrate (LOPRESSOR) tablet 12.5 mg  12.5 mg Oral BID Luis Hendricks PA-C   12.5 mg at 03/20/20 2309    isosorbide mononitrate (IMDUR) extended release tablet 30 mg  30 mg Oral Daily Cooper Wylie MD   30 mg at 03/20/20 7700    magic butt cream   Topical Q4H PRN Logan Benito MD        spironolactone (ALDACTONE) tablet 50 mg  50 mg Oral Daily Kevin Merino MD   50 mg at 03/19/20 1100    magnesium oxide (MAG-OX) tablet 400 mg  400 mg Oral Daily Kevin Merino MD   400 mg at 03/20/20 0814    albuterol (PROVENTIL) potassium chloride, potassium chloride  DIET CARB CONTROL; Low Sodium (2 GM); Dysphagia Minced and Moist; No Caffeine       Labs:   CBC with DIFF:  Recent Labs     03/19/20  0159 03/20/20  0116   WBC 7.5 7.7   RBC 3.63* 3.40*   HGB 9.7* 9.1*   HCT 32.8* 30.6*   MCV 90.4 90.0   MCH 26.7* 26.8*   MCHC 29.6* 29.7*   RDW 16.8* 16.8*    290   MPV 9.5 9.2*   NEUTOPHILPCT 73.6* 70.1*   LYMPHOPCT 11.7* 12.8*   MONOPCT 8.7 9.1   EOSRELPCT 1.9 1.6   BASOPCT 0.5 0.8   NEUTROABS 5.5 5.4   LYMPHSABS 0.9* 1.0*   MONOSABS 0.70 0.70   EOSABS 0.10 0.10   BASOSABS 0.00 0.10       CMP/BMP:  Recent Labs     03/19/20  0159 03/20/20  0116 03/20/20  1040 03/20/20  1734   * 133*  --   --    K 4.7 5.4* 4.8 4.8   CL 85* 86*  --   --    CO2 42* 39*  --   --    ANIONGAP 8 8  --   --    GLUCOSE 126* 128*  --   --    BUN 10 10  --   --    CREATININE 0.7 0.7  --   --    LABGLOM >60 >60  --   --    CALCIUM 7.7* 8.0*  --   --          CRP:  No results for input(s): CRP in the last 72 hours. Sed Rate:  No results for input(s): SEDRATE in the last 72 hours. HgBA1c:  No components found for: HGBA1C  FLP:    Lab Results   Component Value Date    TRIG 105 09/06/2015    HDL 28 09/06/2015    LDLCALC 164 09/24/2011    LDLDIRECT 85 09/06/2015     TSH:    Lab Results   Component Value Date    TSH 0.957 02/15/2020     Troponin T:   Recent Labs     03/18/20  0909   TROPONINI 0.03     Pro-BNP: No results for input(s): BNP in the last 72 hours. INR: No results for input(s): INR in the last 72 hours.   ABGs:   Lab Results   Component Value Date    PHART 7.530 03/18/2020    PO2ART 81.0 03/18/2020    IOW6HSJ 68.0 03/18/2020     UA:  Recent Labs     03/18/20  2330   COLORU YELLOW   PHUR 7.5   WBCUA 4   RBCUA 3   BACTERIA NEGATIVE*   CLARITYU Clear   SPECGRAV 1.019   LEUKOCYTESUR TRACE*   UROBILINOGEN 1.0   BILIRUBINUR SMALL*   BLOODU Negative   GLUCOSEU Negative         Culture Results:    No results for input(s): CXSURG in the last 720 hours. Blood Culture Recent: No results for input(s): BC in the last 720 hours. Cultures:   No results for input(s): CULTURE in the last 72 hours. No results for input(s): BC, Jax Mansfield in the last 72 hours. No results for input(s): CXSURG in the last 72 hours. Recent Labs     03/19/20  0159 03/20/20  0116   MG 1.8 1.7   PHOS 2.3* 2.9     No results for input(s): AST, ALT, ALB, BILITOT, ALKPHOS, ALB in the last 72 hours. RAD:  Xr Chest Standard (2 Vw)    Result Date: 3/17/2020  Exam:   XR CHEST (2 VW)  Date:  3/17/2020 History:  Male, age  62 years; pleural effusion COMPARISON:  Chest x-ray dated 3/16/2020 Findings : The left heart border is indistinct. Continued complete opacification of the left hemithorax. The right lung is unchanged. No measurable pneumothorax. The bones show no acute pathology. Impression: No interval changes. Signed by Dr Amanda Hayward on 3/17/2020 9:02 AM    Xr Abdomen (kub) (single Ap View)    Result Date: 2/24/2020  Examination. XR ABDOMEN (KUB) (SINGLE AP VIEW) 2/24/2020 6:30 AM History: Abdominal pain. Frontal projection of the abdomen including the pelvis is obtained. There is no previous study for comparison. There is marked dilatation of the stomach. There is moderate dilatation of the small and large bowel loops. There is gas in the distal colon and rectum. Both kidneys are obscured by the bowel contents. No radiopaque calculi are noted. A tubular structure is seen projected over the left upper abdomen. There is soft tissue air along the left lateral chest and abdominal wall. The Chronic degenerative changes of the lumbar spine are noted. There is evidence of right hip arthroplasty which is incompletely visualized and evaluated. The Dilated stomach, small and large bowel loops may suggest an ileus. A less likely possibility of incomplete/partial distal large bowel obstruction may not be excluded. Further follow-up may be obtained.  Signed by Dr Man Lisa HISTORY: Shortness of air. COMPARISON: 2/24/2020 at 4:43 AM. FINDINGS:  A small pneumothorax in the upper lung zone laterally on the left side is larger on the current study. There is no significant aeration of the left lung. There is a left main bronchus stent. Subcutaneous emphysema in the left chest wall is stable or increased slightly. There is bronchial wall thickening on the right. There is no dense consolidation on the right side. There is minimal atelectasis in the right lung base. 1. There is a small pneumothorax on the left side. The pneumothorax is larger on the current study. There is no significant aeration of the left lung. 2. Subcutaneous emphysema in the left chest wall may be increased slightly. 3. Minimal right lung base atelectasis. Chronic bronchial wall thickening on the right. 4. Left mainstem bronchus stent. Signed by Dr Urbano Cunningham on 2/24/2020 2:38 PM    Xr Chest Portable    Result Date: 2/24/2020  Examination. XR CHEST PORTABLE 2/24/2020 5:00 AM History: Postthoracentesis. A single frontal portable upright view of the chest is compared with the previous study dated 2/23/2020. Complete opacification of the left lung and a minimally deviated left upper lung are loculated pneumothorax? Cassi Tomas The right lung is unremarkable similar to the previous study. There is a persistent narrowing of the trachea. There is no air in the left segmental bronchi noted. There is a persistent soft tissue emphysema of the left lateral chest wall left side of the neck. No change in the previous study. Persistent opacification left lung with a minimal aerated lung in the left upper chest are loculated pneumothorax. Persistent left chest wall and neck soft tissue emphysema. Signed by Dr Gio Modi on 2/24/2020 7:12 AM    Xr Chest Portable    Result Date: 2/23/2020  XR CHEST PORTABLE 2/23/2020 5:00 AM HISTORY:   Postthoracentesis, cancer  Single view.  COMPARISONS:  2/22/2020 FINDINGS: Left lung opacification persists with minimal aeration of the upper lobes. The right lung remains grossly clear. Simultaneous emphysema in the left lateral chest wall and upper neck also identified, essentially unchanged. No measurable pneumothorax identified. Radiographically, chest is unchanged. Stable 1 day appearance the chest. Signed by Dr Whit Corona on 2/23/2020 7:22 AM    Xr Chest Portable    Result Date: 2/22/2020  EXAM: XR CHEST PORTABLE -- 2/22/2020 5:00 AM HISTORY: 64 years, Male, post thoracentesis, lung cancer COMPARISON: 2/21/2020 TECHNIQUE:  1 images. Frontal view of the chest. FINDINGS:  Subcutaneous emphysema at the left lateral chest wall, incompletely visualized, but similar compared to 2/21/2020. Suspect a small left pneumothorax. There may be a thin left-sided chest tube at the lateral chest wall. Near complete opacification of the left lung. Cardiac mediastinal silhouette obscured. Right lung appears grossly clear. No right pneumothorax or large pleural effusion. Limited evaluation of bones and upper abdomen due to technique. 1. Suspect small persistent left apical pneumothorax with otherwise near complete opacification of the left lung. Similar left chest wall subcutaneous emphysema, incompletely visualized. There may be a left-sided chest tube, but evaluation is limited by technique. Correlate with patient's lines and tubes. 2. Right lung appears clear. Signed by Dr Toni Jones on 2/22/2020 6:57 AM    Xr Chest Portable    Result Date: 2/21/2020  Examination. XR CHEST PORTABLE 2/21/2020 5:00 AM History: Post thoracentesis. History of lung cancer. Significant frontal portable upright view of the chest is compared with the previous study dated 2/20/2020. No significant interval change. There is complete consolidation of the left lung. Small pneumothorax in the left upper chest is stable. There are scarring and atelectasis in the right lung. There is a persistent displacement of the trachea towards left. There is moderate diffuse narrowing of the mid and distal trachea. There is a persistent soft tissue emphysema along the left lateral chest wall and the left side of the neck. This is incompletely included in the study. Left-sided chest tube is not visualized in this study. No change in the cardiopulmonary status. Signed by Dr Low Flores on 2/21/2020 7:13 AM    Xr Chest Portable    Result Date: 2/20/2020  Examination. XR CHEST PORTABLE 2/20/2020 5:00 AM History: Postthoracentesis. A single frontal portable upright view of the chest is compared with the previous study dated 2/19/2020. There is complete consolidation of the left lung with a small pneumothorax in the upper chest. There is displacement of the trachea towards left suggesting loss of left lung volume. There are atelectatic changes in the right lower lung. There is a small right basal pleural effusion. A poorly visualized chest tube is seen at the left base. There is extensive soft tissue emphysema along the left lateral chest wall and the left side of the neck which appears unchanged. Persistent left lung consolidation and a small left hydropneumothorax. Persistent moderate displacement of the distal trachea towards left suggesting loss of left lung volume. The persistent and unchanged soft tissue emphysema of the left lateral chest wall and left-sided of the neck. A poorly visualized due to chest tube in the left lower chest. Signed by Dr Low Flores on 2/20/2020 7:11 AM    Us Extremity Left Non Vasc Limited    Result Date: 3/19/2020  US EXTREMITY LEFT NON VASC LIMITED 3/19/2020 2:00 PM HISTORY: None COMPARISON: None TECHNIQUE: Transverse and longitudinal sonographic images of the left lower extremity were obtained in the region of the patient's palpable abnormality. There is a poorly defined, hypoechoic subcutaneous collection that demonstrates no internal vascularity and likely represents hematoma or developing phlegmon.  Correlate with history of trauma. The collection measures 5.2 x 4.9 x 0.4 cm. Close clinical follow-up is recommended. If the area enlarges, an MRI with and without contrast could be beneficial. Signed by Dr Michael Mcgregor on 3/19/2020 3:50 PM      Echo: 02/15/2020  Summary   Suboptimal echo windows. LV is normal in size with normal systolic function. LV ejection fraction   estimated at 60%. RV is moderately enlarged with mildly reduced RV systolic function. Left atrium is normal in size. Right atrium appears moderately dilated. Severe pulmonary hypertension with RVSP estimated at around 80 mmHg. Mitral valve leaflets appear mildly thickened with normal leaflet   mobility. No significant stenosis. Trace mitral regurgitation. Aortic valve leaflets are not well visualized. No significant stenosis or   regurgitation noted. Mild to moderate tricuspid regurgitation. Trace anterior pericardial effusion. Large pleural effusion with large mobile structures within the pleural cavity. Signature   ----------------------------------------------------------------   Electronically signed by Lucas Michelle MD(Interpreting physician)   on 02/17/2020 10:35 PM   ----------------------------------------------------------------        Objective:   Vitals: /61   Pulse 65   Temp 96.3 °F (35.7 °C) (Temporal)   Resp 18   Ht 5' 7\" (1.702 m)   Wt 216 lb (98 kg)   SpO2 96%   BMI 33.83 kg/m²   24HR INTAKE/OUTPUT:      Intake/Output Summary (Last 24 hours) at 3/21/2020 0779  Last data filed at 3/21/2020 0443  Gross per 24 hour   Intake 960 ml   Output 950 ml   Net 10 ml       Physical Exam  Vitals signs and nursing note reviewed. Constitutional:       General: He is not in acute distress. Appearance: Normal appearance. He is obese. He is ill-appearing. He is not toxic-appearing or diaphoretic. HENT:      Head: Normocephalic and atraumatic. Right Ear: External ear normal. There is no impacted cerumen.       Left Ear: External ear normal. There is no impacted cerumen. Nose: Nose normal. No congestion or rhinorrhea. Mouth/Throat:      Mouth: Mucous membranes are moist.      Pharynx: No oropharyngeal exudate or posterior oropharyngeal erythema. Eyes:      General: No scleral icterus. Right eye: No discharge. Left eye: No discharge. Extraocular Movements: Extraocular movements intact. Conjunctiva/sclera: Conjunctivae normal.      Pupils: Pupils are equal, round, and reactive to light. Neck:      Musculoskeletal: Normal range of motion and neck supple. No neck rigidity or muscular tenderness. Vascular: No carotid bruit. Cardiovascular:      Rate and Rhythm: Normal rate and regular rhythm. Pulses: Normal pulses. Heart sounds: Normal heart sounds. No murmur. No friction rub. No gallop. Pulmonary:      Effort: Pulmonary effort is normal. No respiratory distress. Breath sounds: Rhonchi present. No rales. Comments: Decreased breath sounds bilaterally. L>>R. Left Pleurx Catheter in place  Chest:      Chest wall: No tenderness. Abdominal:      General: Bowel sounds are normal. There is no distension. Palpations: Abdomen is soft. Tenderness: There is no abdominal tenderness. Musculoskeletal: Normal range of motion. General: No swelling or tenderness. Skin:     General: Skin is warm and dry. Capillary Refill: Capillary refill takes less than 2 seconds. Coloration: Skin is not jaundiced or pale. Findings: No bruising, erythema, lesion or rash. Neurological:      General: No focal deficit present. Mental Status: He is alert and oriented to person, place, and time. Cranial Nerves: No cranial nerve deficit. Sensory: No sensory deficit. Motor: No weakness. Coordination: Coordination normal.   Psychiatric:         Mood and Affect: Mood normal.         Behavior: Behavior normal.         Thought Content:  Thought content normal.         Judgment: Judgment normal.           Assessment/plan:         Hospital Problems           Last Modified POA    * (Principal) Acute chest pain 3/17/2020 Yes    Mixed hyperlipidemia (Chronic) 3/16/2020 Yes    Coronary artery disease involving native coronary artery of native heart without angina pectoris (Chronic) 3/16/2020 Yes    Chronic venous stasis dermatitis of both lower extremities (Chronic) 3/16/2020 Yes    Morbid obesity due to excess calories (HCC) (Chronic) 3/16/2020 Yes    Essential hypertension (Chronic) 3/16/2020 Yes    Type 2 diabetes mellitus without complication (HCC) (Chronic) 3/16/2020 Yes    Hyperkalemia 3/20/2020 Yes    Chronic diastolic congestive heart failure (HCC) (Chronic) 3/16/2020 Yes    Paroxysmal A-fib ([address] 3/16/2020 Yes    Overview Signed 3/16/2020 10:01 PM by Mic Hammonds MD     9/23/2011  Cath  Inferior hypo, stent OM1  11/19/2017  Echo  EF 45%  11/21/2017  lexiscan negative for myocardial ischemia, EF 51  %  2/17/2020  Echo  Normal LVFX , PA pressure 80 mmHg, large pleural effusion         Malignant neoplasm of lung ([address] 3/16/2020 Yes    Malignant pleural effusion 3/18/2020 Yes          Principal Problem:    Acute chest pain  Active Problems:    Mixed hyperlipidemia    Coronary artery disease involving native coronary artery of native heart without angina pectoris    Chronic venous stasis dermatitis of both lower extremities    Morbid obesity due to excess calories (HCC)    Essential hypertension    Type 2 diabetes mellitus without complication (HCC)    Hyperkalemia    Chronic diastolic congestive heart failure (HCC)    Paroxysmal A-fib (HCC)    Malignant neoplasm of lung (HCC)    Malignant pleural effusion  Resolved Problems:    * No resolved hospital problems.  *    Squamous cell carcinoma of left lung, status post left mainstem bronchus stent (01/29/2020)  Left recurrent pleural effusion, and lung collapse  · Oncology following  · Pulmonology

## 2020-03-21 NOTE — PLAN OF CARE
Problem: Cardiac:  Goal: Ability to maintain vital signs within normal range will improve  Description: Ability to maintain vital signs within normal range will improve  3/21/2020 0303 by Ana Pavon RN  Outcome: Ongoing  3/20/2020 1443 by Osbaldo Friedman RN  Outcome: Ongoing  Goal: Cardiovascular alteration will improve  Description: Cardiovascular alteration will improve  3/21/2020 0303 by Ana Pavon RN  Outcome: Ongoing  3/20/2020 1443 by Osbaldo Friedman RN  Outcome: Ongoing     Problem: Health Behavior:  Goal: Will modify at least one risk factor affecting health status  Description: Will modify at least one risk factor affecting health status  3/21/2020 0303 by Ana Pavon RN  Outcome: Ongoing  3/20/2020 1443 by Osbaldo Friedman RN  Outcome: Ongoing  Goal: Identification of resources available to assist in meeting health care needs will improve  Description: Identification of resources available to assist in meeting health care needs will improve  3/21/2020 0303 by Ana Pavon RN  Outcome: Ongoing  3/20/2020 1443 by Osbaldo Friedman RN  Outcome: Ongoing

## 2020-03-22 NOTE — PROGRESS NOTES
PROGRESS NOTE    Pt Name: Chris Gtz  MRN: [id number]  YOB: 1955  Date of evaluation: 3/22/2020    Subjective  Feeling better. No new complaints. Not much progress on physical therapy although he stayed up yesterday for about 6 hours. Mr Trinity Barahona is a 62years old male who has a diagnosis of non-small cell lung cancer, squamous cell subtype. He has a complex medical history including history of COPD, CHF, CAD, hypertension, atrial fibrillation and hyperlipidemia. He presented to the ER yesterday from nursing home with complaints of chest discomfort accompanied by dyspnea. The patient has not received any direct treatment for his lung cancer due to severe deconditioning. He also developed a left pleural effusion that was negative for malignancy on several instances. He had a Pleurx catheter placed and has it emptied every 3 days.         Cancer history   Participated with PT and OT yesterday.  Continues to have significant weakness and fatigue.  Unfortunately SNF declined due to lack of participation with OT/PT. Discussed possibly home with hospice until overall performance status improves and then possibly come out of hospice and reevaluate for potential treatment.  No family at bedside.       Mr. Sonia Bueno was seen in initial medical oncology consultation on 2020 as an inpatient at [facility] with a recent diagnosis of metastatic lung cancer made at [facility] to establish care.     He will be followed by [facility] for [place]'s identical twin Giorgio Share, also with lung cancer.     Xavier Andrew has been on chronic supplemental oxygen for at least 6 months prior to presentation due to his history of underlying COPD.  He wears 4 L of supplemental oxygen. His PCP is Librado Cummins PA-C on the [address] [place] [address] side.     Xavier Andrew presented to [facility] ED on 2020 with hemoptysis increasing weakness.    He was also found to have atrial fibrillation with RVR.      CTA chest with contrast on 1/25/2020:  NO PE  Complete collapse of the left lung secondary to an occluded left main stem bronchus  Moderate size left pleural effusion  Mild cardiomegaly, trace pericardial fluid  Vascular Lab Prelim     Duplex venous scan of B/L LE on 1/25/2020 shows no evidence for dvt, svt, reflux noted at this time. Final report pending     The case was discussed with Dr Carline Mancini with pulmonology, who stated that he can do a bronch but with the bleeding, he would not be able to intervene other than using epinephrine. Would recommend transfer to higher level of care as pt may require embolization.      Mr. Brenda Moses was transferred to [facility] in [facility] where further work-up was performed.     X-ray Chest Portable 1/26/2020  Complete whiteout of the left lung with mild deviation of the trachea to the left. Findings are consistent with complete collapse of the left lung. Underlying infection or mass or effusion cannot be excluded      SIMONA at [place] on 1/27/2020 was performed. LVEF greater than 55%. Unable to comment on diastolic filling pattern due to atrial  fibrillation. RV mildly dilated. Mildly depressed RV systolic function. The left atrium is mild to moderately dilated. RA mildly dilated. Mild tricuspid regurgitation. The RV systolic pressure is calculated at 63 mmHg.     A thoracentesis was performed at [facility] on 1/27/2020.  1100 cc of pleural fluid was drained. Cytology:  REACTIVE MESOTHELIAL CELLS, HISTIOCYTES, AND LYMPHOCYTES; NEGATIVE FOR MALIGNANCY      CT C/A/P at [facility] 1/29/2020  Impression:   1.  Signs of large LEFT lung mass with postobstructive collapse of   the entire LEFT lung and mediastinal shift. 2.  Bulky subcarinal adenopathy. 3.  Nodular bilateral adrenal glands still with some adreniform   contour, suspicious for either nodular hyperplasia or bilateral   metastatic disease to the adrenal glands. breath;  GI: no abdominal pain, no nausea, no vomiting, no diarrhea, no constipation;  RAN: no dysuria, no hematuria, no frequency or urgency, no nephrolithiasis;  MUSCULOSKELETAL: no joint pain, no swelling, no stiffness;  ENDOCRINE: no polyuria, no polydipsia, no cold or heat intolerance;  HEMATOLOGY: no easy bruising or bleeding, no history of clotting disorder;  DERMATOLOGY: no skin rash, no eczema, no pruritus;  PSYCHIATRY: no depression, no anxiety, no panic attacks, no suicidal ideation, no homicidal ideation;  NEUROLOGY: no syncope, no seizures, no numbness or tingling of hands, no numbness or tingling of feet, no paresis;    Objective   /73   Pulse 79   Temp 97.4 °F (36.3 °C) (Temporal)   Resp 16   Ht 5' 7\" (1.702 m)   Wt 218 lb (98.9 kg)   SpO2 96%   BMI 34.14 kg/m²     PHYSICAL EXAM:  CONSTITUTIONAL: Alert, appropriate, no acute distress, deconditioning, chronically ill appearing  EYES: Non icteric, EOM intact, pupils equal round   ENT: Mucus membranes moist, no oral pharyngeal lesions, external inspection of ears and nose are normal  NECK: Supple, no masses.  No palpable thyroid mass  CHEST/LUNGS: Absent left lung sounds  CARDIOVASCULAR: RRR, no murmurs.  No lower extremity edema  ABDOMEN: soft non-tender, active bowel sounds, no HSM.  No palpable masses   EXTREMITIES: warm, full ROM in all 4 extremities, no focal weakness.   SKIN: warm, pressure ulcer  LYMPH: No cervical, clavicular, axillary, or inguinal lymphadenopathy  NEUROLOGIC: follows commands, non focal   PSYCH: mood and affect appropriate.  Alert and oriented to time, place, person        LABORATORY RESULTS REVIEWED BY ME:  Recent Labs     03/20/20  0116 03/19/20  0159 03/18/20  0218   WBC 7.7 7.5 7.0   HGB 9.1* 9.7* 9.2*   HCT 30.6* 32.8* 30.4*   MCV 90.0 90.4 89.1    337 305       Lab Results   Component Value Date     (L) 03/20/2020    K 4.8 03/20/2020    CL 86 (L) 03/20/2020    CO2 39 (H) 03/20/2020    BUN 10

## 2020-03-22 NOTE — CONSULTS
mg Intravenous Q6H PRN Jatin Joshua PA-C   4 mg at 03/18/20 1819    magnesium sulfate 2 g in 50 mL IVPB premix  2 g Intravenous PRN Jatin Joshua PA-C   Stopped at 03/17/20 0052    nitroGLYCERIN (NITROSTAT) SL tablet 0.4 mg  0.4 mg Sublingual Q5 Min PRN Jatin Joshua PA-C        aspirin EC tablet 81 mg  81 mg Oral Daily Jatin Joshua PA-C   81 mg at 03/22/20 1616    digoxin (LANOXIN) tablet 125 mcg  125 mcg Oral Daily Jatin Joshua PA-C   125 mcg at 03/22/20 6203    dilTIAZem (CARDIZEM CD) extended release capsule 120 mg  120 mg Oral BID Jatin Joshua PA-C   120 mg at 03/22/20 8764    escitalopram (LEXAPRO) tablet 10 mg  10 mg Oral Daily Jatin Joshua PA-C   10 mg at 03/22/20 7922    hydrOXYzine (ATARAX) tablet 25 mg  25 mg Oral BID PRN Jatin Joshua PA-C        atorvastatin (LIPITOR) tablet 10 mg  10 mg Oral Nightly Jatin Joshua PA-C   10 mg at 03/21/20 2041    tiotropium (SPIRIVA RESPIMAT) 2.5 MCG/ACT inhaler 2 puff  2 puff Inhalation Daily Jatin Joshua PA-C   2 puff at 03/22/20 9625    insulin lispro (HUMALOG) injection vial 0-6 Units  0-6 Units Subcutaneous TID WC Jatin Joshua PA-C   1 Units at 03/22/20 1242    insulin lispro (HUMALOG) injection vial 0-3 Units  0-3 Units Subcutaneous Nightly Jatin Joshua PA-C   1 Units at 03/19/20 2141    glucose (GLUTOSE) 40 % oral gel 15 g  15 g Oral PRN Jatin Joshua PA-C        dextrose 50 % IV solution  12.5 g Intravenous PRN Jatin Joshua PA-C        glucagon (rDNA) injection 1 mg  1 mg Intramuscular PRN Jatin Joshua PA-C        dextrose 5 % solution  100 mL/hr Intravenous PRN Jatin Joshua PA-C        potassium chloride (KLOR-CON M) extended release tablet 40 mEq  40 mEq Oral PRN Logan Benito MD   40 mEq at 03/18/20 5237    Or    potassium bicarb-citric acid (EFFER-K) effervescent tablet 40 mEq  40 mEq Oral PRN Logan Benito MD        Or    potassium chloride 10 mEq/100 mL IVPB (Peripheral Line)  10 mEq Intravenous PRN Logan Benito MD        potassium chloride 10 mEq/100 mL IVPB (Peripheral Line)  10 mEq Intravenous PRN Logan Beinto  mL/hr at 03/17/20 1644 10 mEq at 03/17/20 1644     Allergies: Crestor [rosuvastatin]  Past Medical History:   Diagnosis Date    Atherosclerosis     cardiovascular disease     Blood circulation, collateral     4 fingers left hand tingly    CAD (coronary artery disease)     saw dr. Zavala Danette tunnel syndrome     CHF (congestive heart failure) ([address]     COPD (chronic obstructive pulmonary disease) ([address]     Degenerative joint disease     right hip    Diabetes mellitus ([address]     Diabetic x 1-2 years    FH: chronic lung disease requiring oxygen     sees dr. Shea Hair GERD (gastroesophageal reflux disease)     Hyperlipidemia     Hypertension     Malignant neoplasm of overlapping sites of left lung ([address]     Myocardial infarction ([address]     subenodcardial    Obesity     Palliative care patient 01/29/2018    Presence of stent in coronary artery     9/23/11 BALJINDER placement 0MB of circumflex    Right carpal tunnel syndrome 9/12/2018     Past Surgical History:   Procedure Laterality Date    BACK SURGERY      in 1980's    CARDIAC CATHETERIZATION  9/23/11    with stenting of OMB of left circumflex.     CORONARY ANGIOPLASTY WITH STENT PLACEMENT      HERNIA REPAIR      63 years ago    JOINT REPLACEMENT      Right hip replacement 4-6 years ago    LUMBAR LAMINECTOMY      WA REVISE MEDIAN N/CARPAL TUNNEL SURG Left 9/13/2018    CARPAL TUNNEL RELEASE performed by Niko Henley MD at [address] THORACOSCOPY Left 2/19/2020    VIDEO ASSISTED THOROCOSCOPY WITH INSERTION OF PLEUREX CATHETER performed by Raciel Fernandez MD at [address]     Family History   Problem Relation Age of Onset    Coronary Art Dis Mother     Cancer Mother     Coronary Art Dis Father     Diabetes Father     Coronary Art Dis Sister     Depression Paternal Grandmother      Social History Tobacco Use    Smoking status: Former Smoker     Packs/day: 2.00     Years: 18.00     Pack years: 36.00     Types: Cigarettes     Last attempt to quit: 2011     Years since quittin.5    Smokeless tobacco: Never Used   Substance Use Topics    Alcohol use: Yes     Comment: rare         Review of Systems    Constitutional - no significant activity change, appetite change, or unexpected weight change. No fever or chills. No diaphoresis or significant fatigue. Eyes - no sudden vision change or amaurosis. Respiratory - no significant shortness of breath, wheezing, or stridor. No apnea, cough, or chest tightness associated with shortness of breath. Cardiovascular - no chest pain, syncope, or significant dizziness. No palpitations or significant leg swelling. Patient reports no claudication, no rest pain. Gastrointestinal - no abdominal swelling or pain. No blood in stool. No severe constipation, diarrhea, nausea, or vomiting. Genitourinary - No difficulty urinating, dysuria, frequency, or urgency. No flank pain or hematuria. Musculoskeletal - no back pain, gait disturbance, or myalgia. Skin - no color change, rash, pallor, wound/ulcer (see HPI). Neurologic - no dizziness, facial asymmetry, or light headedness. No seizures. No speech difficulty or lateralizing weakness. Hematologic - no easy bruising or excessive bleeding. Psychiatric - no severe anxiety or nervousness. No confusion. All other review of systems are negative. Physical Exam    /64   Pulse 66   Temp 97 °F (36.1 °C) (Temporal)   Resp 16   Ht 5' 7\" (1.702 m)   Wt 218 lb (98.9 kg)   SpO2 97%   BMI 34.14 kg/m²     Constitutional - well developed, well nourished. No diaphoresis or acute distress. HENT - head normocephalic, conjunctiva normal.  EOMS normal.  No exudate. No icterus. Neck- ROM appears normal, no tracheal deviation, Carotid pulses are 2+ to palpation bilaterally without bruit.     Extremities - Radial and brachial pulses are 2+ to palpation bilaterally. Right femoral pulse: present 1+;  Right DP: present 1+; Right PT present 1+; Left femoral pulse: present 2+ and present 1+;  Left DP: present 1+; Left PT: present 1+  No cyanosis, clubbing, or significant edema    Pulmonary - effort appears normal.  No respiratory distress. GI - Abdomen - soft, non tender, bowel sounds X 4 quadrants. No guarding or rebound tenderness. No distension or palpable mass. Genitourinary - deferred  Musculoskeletal - ROM appears normal.    Neurologic - alert and oriented X 3. Physiologic. Skin - hematoma left calf as in picture in media. Soft, No cellulitis. No blistering. Skin looks ok. Psychiatric - mood, affect, and behavior appear normal.  Judgment and thought processes appear normal.      Assessment    1. Acute chest pain    2. Hypokalemia    3. Elevated troponin    4. Hypomagnesemia        Hematoma left calf, seems to be improving. No gross cellulitis, skin ok, no tense hematoma, no pain.     Plan    Continue current care  OR for evacuation of hematoma only if hematoma becomes infected or expands  I discussed this with the patient

## 2020-03-22 NOTE — PROGRESS NOTES
butt cream   Topical Q4H PRN Logan MD Thong        spironolactone (ALDACTONE) tablet 50 mg  50 mg Oral Daily Orest Severance, MD   50 mg at 03/22/20 0837    magnesium oxide (MAG-OX) tablet 400 mg  400 mg Oral Daily Orest Severance, MD   400 mg at 03/22/20 0837    albuterol (PROVENTIL) nebulizer solution 2.5 mg  2.5 mg Nebulization Q4H PRN Arena Mt, PA-C   2.5 mg at 03/19/20 1139    sodium chloride flush 0.9 % injection 10 mL  10 mL Intravenous 2 times per day Arena Mt, PA-C   10 mL at 03/22/20 3261    sodium chloride flush 0.9 % injection 10 mL  10 mL Intravenous PRN Arena Mt, PA-C        acetaminophen (TYLENOL) tablet 650 mg  650 mg Oral Q6H PRN Arena Mt, PA-C   650 mg at 03/22/20 0157    Or    acetaminophen (TYLENOL) suppository 650 mg  650 mg Rectal Q6H PRN Arena Mt, PA-C        polyethylene glycol (GLYCOLAX) packet 17 g  17 g Oral Daily PRN Arena Mt, PA-C        promethazine (PHENERGAN) tablet 12.5 mg  12.5 mg Oral Q6H PRN Arena Mt, PA-C        Or    ondansetron [facility] injection 4 mg  4 mg Intravenous Q6H PRN Arena Mt, PA-C   4 mg at 03/18/20 6328    magnesium sulfate 2 g in 50 mL IVPB premix  2 g Intravenous PRN Arena Mt, PA-C   Stopped at 03/17/20 0052    nitroGLYCERIN (NITROSTAT) SL tablet 0.4 mg  0.4 mg Sublingual Q5 Min PRN Arena Mt, PA-C        aspirin EC tablet 81 mg  81 mg Oral Daily Arena Mt, PA-C   81 mg at 03/22/20 1076    digoxin (LANOXIN) tablet 125 mcg  125 mcg Oral Daily Arena Mt, PA-C   125 mcg at 03/22/20 4482    dilTIAZem (CARDIZEM CD) extended release capsule 120 mg  120 mg Oral BID Arena Mt, PA-C   120 mg at 03/22/20 0005    escitalopram (LEXAPRO) tablet 10 mg  10 mg Oral Daily Arena Mt, PA-C   10 mg at 03/22/20 8257    hydrOXYzine (ATARAX) tablet 25 mg  25 mg Oral BID PRN Arena Mt, PA-C        atorvastatin (LIPITOR) tablet 10 mg  10 mg Oral Nightly Vasquez Im BLOODU, GLUCOSEU, AMORPHOUS in the last 72 hours. Invalid input(s): Arlyce Glory      Culture Results:    No results for input(s): CXSURG in the last 720 hours. Blood Culture Recent: No results for input(s): BC in the last 720 hours. Cultures:   No results for input(s): CULTURE in the last 72 hours. No results for input(s): BC, Lei Chelita in the last 72 hours. No results for input(s): CXSURG in the last 72 hours. Recent Labs     03/20/20  0116   MG 1.7   PHOS 2.9     No results for input(s): AST, ALT, ALB, BILITOT, ALKPHOS, ALB in the last 72 hours. RAD:  Xr Chest Standard (2 Vw)    Result Date: 3/17/2020  Exam:   XR CHEST (2 VW)  Date:  3/17/2020 History:  Male, age  62 years; pleural effusion COMPARISON:  Chest x-ray dated 3/16/2020 Findings : The left heart border is indistinct. Continued complete opacification of the left hemithorax. The right lung is unchanged. No measurable pneumothorax. The bones show no acute pathology. Impression: No interval changes. Signed by Dr King Cueto on 3/17/2020 9:02 AM    Xr Abdomen (kub) (single Ap View)    Result Date: 2/24/2020  Examination. XR ABDOMEN (KUB) (SINGLE AP VIEW) 2/24/2020 6:30 AM History: Abdominal pain. Frontal projection of the abdomen including the pelvis is obtained. There is no previous study for comparison. There is marked dilatation of the stomach. There is moderate dilatation of the small and large bowel loops. There is gas in the distal colon and rectum. Both kidneys are obscured by the bowel contents. No radiopaque calculi are noted. A tubular structure is seen projected over the left upper abdomen. There is soft tissue air along the left lateral chest and abdominal wall. The Chronic degenerative changes of the lumbar spine are noted. There is evidence of right hip arthroplasty which is incompletely visualized and evaluated. The Dilated stomach, small and large bowel loops may suggest an ileus.  A less likely possibility of There are scarring and atelectasis in the right lung. There is a persistent displacement of the trachea towards left. There is moderate diffuse narrowing of the mid and distal trachea. There is a persistent soft tissue emphysema along the left lateral chest wall and the left side of the neck. This is incompletely included in the study. Left-sided chest tube is not visualized in this study. No change in the cardiopulmonary status. Signed by Dr Weinstein on 2/21/2020 7:13 AM    Xr Chest Portable    Result Date: 2/20/2020  Examination. XR CHEST PORTABLE 2/20/2020 5:00 AM History: Postthoracentesis. A single frontal portable upright view of the chest is compared with the previous study dated 2/19/2020. There is complete consolidation of the left lung with a small pneumothorax in the upper chest. There is displacement of the trachea towards left suggesting loss of left lung volume. There are atelectatic changes in the right lower lung. There is a small right basal pleural effusion. A poorly visualized chest tube is seen at the left base. There is extensive soft tissue emphysema along the left lateral chest wall and the left side of the neck which appears unchanged. Persistent left lung consolidation and a small left hydropneumothorax. Persistent moderate displacement of the distal trachea towards left suggesting loss of left lung volume. The persistent and unchanged soft tissue emphysema of the left lateral chest wall and left-sided of the neck. A poorly visualized due to chest tube in the left lower chest. Signed by Dr Weinstein on 2/20/2020 7:11 AM    Us Extremity Left Non Vasc Limited    Result Date: 3/19/2020  US EXTREMITY LEFT NON VASC LIMITED 3/19/2020 2:00 PM HISTORY: None COMPARISON: None TECHNIQUE: Transverse and longitudinal sonographic images of the left lower extremity were obtained in the region of the patient's palpable abnormality.     There is a poorly defined, hypoechoic subcutaneous HENT:      Head: Normocephalic and atraumatic. Right Ear: External ear normal. There is no impacted cerumen. Left Ear: External ear normal. There is no impacted cerumen. Nose: Nose normal. No congestion or rhinorrhea. Mouth/Throat:      Mouth: Mucous membranes are moist.      Pharynx: No oropharyngeal exudate or posterior oropharyngeal erythema. Eyes:      General: No scleral icterus. Right eye: No discharge. Left eye: No discharge. Extraocular Movements: Extraocular movements intact. Conjunctiva/sclera: Conjunctivae normal.      Pupils: Pupils are equal, round, and reactive to light. Neck:      Musculoskeletal: Normal range of motion and neck supple. No neck rigidity or muscular tenderness. Vascular: No carotid bruit. Cardiovascular:      Rate and Rhythm: Normal rate and regular rhythm. Pulses: Normal pulses. Heart sounds: Normal heart sounds. No murmur. No friction rub. No gallop. Pulmonary:      Effort: Pulmonary effort is normal. No respiratory distress. Breath sounds: Rhonchi present. No rales. Comments: Decreased breath sounds bilaterally. L>>R. Left Pleurx Catheter in place  Chest:      Chest wall: No tenderness. Abdominal:      General: Bowel sounds are normal. There is no distension. Palpations: Abdomen is soft. Tenderness: There is no abdominal tenderness. Musculoskeletal: Normal range of motion. General: No swelling or tenderness. Skin:     General: Skin is warm and dry. Capillary Refill: Capillary refill takes less than 2 seconds. Coloration: Skin is not jaundiced or pale. Findings: No bruising, erythema, lesion or rash. Neurological:      General: No focal deficit present. Mental Status: He is alert and oriented to person, place, and time. Cranial Nerves: No cranial nerve deficit. Sensory: No sensory deficit. Motor: No weakness.       Coordination: Coordination normal.   Psychiatric:         Mood and Affect: Mood normal.         Behavior: Behavior normal.         Thought Content: Thought content normal.         Judgment: Judgment normal.           Assessment/plan:         Hospital Problems           Last Modified POA    * (Principal) Acute chest pain 3/17/2020 Yes    Mixed hyperlipidemia (Chronic) 3/16/2020 Yes    Coronary artery disease involving native coronary artery of native heart without angina pectoris (Chronic) 3/16/2020 Yes    Chronic venous stasis dermatitis of both lower extremities (Chronic) 3/16/2020 Yes    Morbid obesity due to excess calories ([address] (Chronic) 3/16/2020 Yes    Essential hypertension (Chronic) 3/16/2020 Yes    Type 2 diabetes mellitus without complication (HCC) (Chronic) 3/16/2020 Yes    Hyperkalemia 3/20/2020 Yes    Chronic diastolic congestive heart failure (HCC) (Chronic) 3/16/2020 Yes    Paroxysmal A-fib ([address] 3/16/2020 Yes    Overview Signed 3/16/2020 10:01 PM by Kelsey Vega MD     9/23/2011  Cath  Inferior hypo, stent [id number]  11/19/2017  Echo  EF 45%  11/21/2017  lexiscan negative for myocardial ischemia, EF 51  %  2/17/2020  Echo  Normal LVFX , PA pressure 80 mmHg, large pleural effusion         Malignant neoplasm of lung ([address] 3/16/2020 Yes    Malignant pleural effusion 3/18/2020 Yes          Principal Problem:    Acute chest pain  Active Problems:    Mixed hyperlipidemia    Coronary artery disease involving native coronary artery of native heart without angina pectoris    Chronic venous stasis dermatitis of both lower extremities    Morbid obesity due to excess calories (HCC)    Essential hypertension    Type 2 diabetes mellitus without complication (HCC)    Hyperkalemia    Chronic diastolic congestive heart failure (HCC)    Paroxysmal A-fib (HCC)    Malignant neoplasm of lung (HCC)    Malignant pleural effusion  Resolved Problems:    * No resolved hospital problems.  *    Squamous cell carcinoma of left lung, status post left mainstem bronchus stent (01/29/2020)  Left recurrent pleural effusion, and lung collapse  · Status post left mainstem bronchus stent placement at [facility]  · Oncology following  · Followed by Pulmonology  · Oncology on board  · Plan for further management as outpatient. History of paroxysmal atrial fibrillation  · Continue diltiazem  · Continue digoxin  · Cardiology following    Deconditioning due to medical condition / Generalized weakness  · Continue PT OT    Left calf hematoma  · Area has remained stable and improving without any further worsening. · Vascular consulted  · No indication for acute or urgent evacuation at this time. · Continue symptomatic and supportive management. Continue management of other chronic medical conditions - see above and orders. Prognosis: Poor      Advance Directive: DNR-CC    DIET CARB CONTROL; Low Sodium (2 GM); Dysphagia Minced and Moist; No Caffeine     DVT prophylaxis: SCDs    Consults Made:   IP CONSULT TO CARDIOLOGY  IP CONSULT TO ONCOLOGY  IP CONSULT TO PULMONOLOGY  IP CONSULT TO PALLIATIVE CARE  IP CONSULT TO NEPHROLOGY  IP CONSULT TO VASCULAR SURGERY    Discharge planning: tbd-referral to [facility], awaiting/pending precert approval    Time Spent is 25 mins mins in the examination, evaluation, counseling and review of medications, assessment and plan.      Electronically signed by   Brie Cortes MD, MPH,   Internal Medicine Hospitalist   3/22/2020 10:26 AM

## 2020-03-23 NOTE — PROGRESS NOTES
Spoke with Juana at Dr. Qiana Friedman office for clarification on patient's dosage of Keytruda. Keytruda dosage is to be 200mg once every 21 days. Notified Mack Vela.

## 2020-03-23 NOTE — PROGRESS NOTES
Vascular Note:    Patient with hematoma on shin area from previous fall about 6 weeks ago. Area with slightly less erythema, skin not tense over the site, no new complaints. Will continue to follow, no planned intervention from vascular at present time.

## 2020-03-23 NOTE — PROGRESS NOTES
Lung cancer, Pleur-X catheter   Cardiovascular: Positive for chest pain (improved). Negative for palpitations and leg swelling. Gastrointestinal: Negative for abdominal pain, constipation, diarrhea, nausea and vomiting. Endocrine: Negative for cold intolerance, heat intolerance, polydipsia and polyuria. Genitourinary: Negative for dysuria, frequency, hematuria and urgency. Musculoskeletal: Positive for arthralgias. Negative for joint swelling and myalgias. Skin: Negative for pallor and rash. Hematoma left lower shin   Allergic/Immunologic: Negative for environmental allergies and immunocompromised state. Neurological: Negative for seizures, syncope and numbness. Hematological: Negative for adenopathy. Does not bruise/bleed easily. Psychiatric/Behavioral: Negative for agitation, behavioral problems, confusion and suicidal ideas. The patient is not nervous/anxious. Objective   BP (!) 88/57   Pulse 53   Temp 96.6 °F (35.9 °C) (Temporal)   Resp 16   Ht 5' 7\" (1.702 m)   Wt 222 lb 3.2 oz (100.8 kg)   SpO2 94%   BMI 34.80 kg/m²     PHYSICAL EXAM:  Physical Exam  Vitals signs reviewed. Constitutional:       General: He is not in acute distress. Appearance: He is well-developed. He is not diaphoretic. Comments: Appears chronically ill    HENT:      Head: Normocephalic and atraumatic. Right Ear: External ear normal.      Left Ear: External ear normal.      Ears:      Comments: Mild hard of hearing     Nose: Nose normal.      Mouth/Throat:      Mouth: Mucous membranes are moist.   Eyes:      General: No scleral icterus. Right eye: No discharge. Left eye: No discharge. Conjunctiva/sclera: Conjunctivae normal.   Neck:      Musculoskeletal: Neck supple. Trachea: No tracheal deviation. Cardiovascular:      Rate and Rhythm: Normal rate. Pulmonary:      Effort: Pulmonary effort is normal. No respiratory distress.       Breath sounds: Examination of the left-lower field reveals decreased breath sounds. Decreased breath sounds present. No wheezing or rales. Comments: Supplemental O2 at 4L via NC in use. Left Pleur-x in place  Chest:      Chest wall: No tenderness. Abdominal:      General: Bowel sounds are normal. There is no distension. Palpations: Abdomen is soft. There is no mass. Tenderness: There is no abdominal tenderness. There is no guarding. Genitourinary:     Comments: Exam deferred  Musculoskeletal:         General: No tenderness or deformity. Comments: Normal ROM all four extremities   Lymphadenopathy:      Cervical: No cervical adenopathy. Right cervical: No superficial cervical adenopathy. Left cervical: No superficial cervical adenopathy. Upper Body:      Right upper body: No supraclavicular adenopathy. Left upper body: No supraclavicular adenopathy. Comments: No bulky palpable cervical, clavicular, axillary or inguinal adenopathies on the left or right. Skin:     General: Skin is warm and dry. Findings: No rash. Comments: Left shin hematoma decreased, outlined area of erythema much improved as well. Neurological:      Mental Status: He is alert and oriented to person, place, and time. Comments: follows commands, non-focal   Psychiatric:         Behavior: Behavior normal. Behavior is cooperative. Thought Content: Thought content normal.         Judgment: Judgment normal.      Comments: Alert and oriented to person, place and time.        LABORATORY RESULTS REVIEWED BY ME:  Recent Labs     03/23/20  0305 03/22/20  1111 03/20/20  0116   WBC 9.3 8.7 7.7   HGB 9.7* 9.4* 9.1*   HCT 31.7* 30.3* 30.6*   MCV 88.5 86.8 90.0    339 290       Lab Results   Component Value Date     (L) 03/23/2020    K 5.5 (H) 03/23/2020    CL 89 (L) 03/23/2020    CO2 32 (H) 03/23/2020    BUN 10 03/23/2020    CREATININE 0.8 03/23/2020    GLUCOSE 119 (H) 03/23/2020    CALCIUM 8.3 (L) 03/23/2020    PROT 5.6 (L) 03/22/2020    LABALBU 2.1 (L) 03/22/2020    BILITOT <0.2 03/22/2020    ALKPHOS 144 (H) 03/22/2020    AST 25 03/22/2020    ALT 33 03/22/2020    LABGLOM >60 03/23/2020       Lab Results   Component Value Date    INR 1.05 03/16/2020    INR 1.15 02/17/2020    INR 1.19 (H) 02/15/2020    PROTIME 13.6 03/16/2020    PROTIME 14.1 02/17/2020    PROTIME 14.5 02/15/2020     Ct Chest W Contrast    FINDINGS: AIRWAYS/PULMONARY PARENCHYMA: The trachea and right airways patent. Left bronchus with a stent, which has intraluminal density. Atelectasis of the left lung. Left pleural fluid. Left small bore chest tube, lateral approach. Right lung clear. No right pleural effusion. No right or left pneumothorax. VASCULATURE: Thoracic aorta is normal in course and caliber. Mild calcified aortic atherosclerosis. 2 vessel arch. Celiac, superior mesenteric and partially visualized bilateral renal arteries patent. Pulmonary artery enlarged measuring 3.1 cm in transverse dimension, which can be seen with pulmonary artery hypertension. No large central pulmonary artery filling defect on this non-CTA exam. CARDIAC:  Borderline heart size. No pericardial effusion. Scattered coronary artery calcifications. MEDIASTINUM: Mediastinal adenopathy. For instance, subcarinal lymph node measures 1.7 cm in short axis, similar compared to 1/25/2020 Esophagus has normal coarse, caliber and wall thickness. EXTRATHORACIC: The visualized portions of the thyroid gland are unremarkable. No thoracic inlet or axillary adenopathy. Gynecomastia. Otherwise the visualized overlying soft tissues are within normal limits. INCLUDED UPPER ABDOMEN: Visualized portion of the liver, gallbladder, pancreas, spleen, are within normal limits. Diffuse thickening of the bilateral adrenal glands is similar compared to 1/25/2020 and nonspecific. The partially visualized upper kidneys appear within normal limits. OSSEOUS: Old left-sided rib fractures.  No acute or focally suspicious bony findings. 1. Left bronchial stent with intraluminal density and atelectasis of the left lung with left pleural fluid and small bore left chest tube. Right lung clear. 2. Borderline heart size. Enlarged pulmonary artery, which can be seen with pulmonary artery hypertension. 3. Similar mediastinal adenopathy. 4. Gynecomastia.       ASSESSMENT:  #  Squamous cell lung carcinoma left lung, status post left main stem bronchus stent 1/29/2020 - possibly stage II. Tumor Proportion Score (TPS): 99% Intensity of staining: strong     He is a candidate for single agent pembrolizumab as outpatient if performance status improves. Chest xray 3/17/2020 showed complete opacification left chest.     #Left pleural effusion/lung collapse 2/2 endobronchial obstruction  Status post left main-stem bronchus stent at [facility]. 3/20/2020-CT scan showed left bronchial stent with intraluminal density and atelectasis of the left lung with left pleural fluid and a small left chest tube. Right lung clear. Enlarged pulmonary artery. Similar mediastinal adenopathy. CT chest at [facility] 1/29/2020  Bulky subcarinal lymphadenopathy with enhancement   measures 1.6 cm in short axis. LEFT hilar and infrahilar soft tissue with bronchial narrowing, may measure as large as 7.6 x 4.8 cm. Peripheral collapse of the entire LEFT lung. Lung parenchyma and pleural space: LEFT lung collapse with LEFT hilar and infrahilar mass left-sided effusion. RIGHT lung with granuloma in the superior segment of the RIGHT lower lobe. Small noncalcified nodules in the RIGHT upper chest.     #PAF  Cardizem/Digoxin percardiology      # Generalized weakness  PT/OT-strongly recommended  Planning discharge possibly to [facility]    #GERD  Receiving Protonix.     #Mild normocytic anemia- Hgb 9.7 today, 3/23/2020      #Prognosis- very poor prognosis.  The patient has a very poor performance and concerned that he would not be able to

## 2020-03-23 NOTE — PROGRESS NOTES
Occupational Therapy  Facility/Department: [facility] PROGRESSIVE CARE  Daily Treatment Note  NAME: Jadiel Matos  : 1955  MRN: [id number]    Date of Service: 3/23/2020    Discharge Recommendations:  Patient would benefit from continued therapy after discharge       Assessment   Assessment: Pt. sat EOB several minutes, then tfered to [facility]. Max A to manage gown and dependent for toilet hygiene in standing. After sitting on BSC for several minutes, pt. felt like he needed to lie down vs going to [facility]  Activity Tolerance  Activity Tolerance: Patient limited by fatigue         Patient Diagnosis(es): The primary encounter diagnosis was Acute chest pain. Diagnoses of Hypokalemia, Elevated troponin, and Hypomagnesemia were also pertinent to this visit. has a past medical history of Atherosclerosis, Blood circulation, collateral, CAD (coronary artery disease), Carpal tunnel syndrome, CHF (congestive heart failure) ([facility] [address] COPD (chronic obstructive pulmonary disease) ([facility] [address] Degenerative joint disease, Diabetes mellitus ([facility] [address] FH: chronic lung disease requiring oxygen, GERD (gastroesophageal reflux disease), Hyperlipidemia, Hypertension, Malignant neoplasm of overlapping sites of left lung [facility]), Myocardial infarction ([facility] [address] Obesity, Palliative care patient, Presence of stent in coronary artery, and Right carpal tunnel syndrome. has a past surgical history that includes Coronary angioplasty with stent; lumbar laminectomy; back surgery; hernia repair; joint replacement; Cardiac catheterization (11); pr revise median n/carpal tunnel surg (Left, 2018); and Thoracoscopy (Left, 2020).     Restrictions  Restrictions/Precautions  Restrictions/Precautions: Fall Risk  Position Activity Restriction  Other position/activity restrictions: (P) c diff rule out, on a dysphagia diet  Subjective   General  Chart Reviewed: Yes  Patient assessed for rehabilitation services?: Yes  Family / Caregiver Present: No  General Comment  Comments: Pt. thinks they can get up. Vital Signs  Patient Currently in Pain: No   Orientation     Objective    ADL  Toileting: Maximum assistance        Balance  Sitting Balance: Supervision     Transfers  Stand Step Transfers: Minimal assistance; Moderate assistance  Sit to stand: Moderate assistance                                                                 Plan   Plan  Times per week:    G-Code     OutComes Score                                                  AM-PAC Score             Goals  Short term goals  Time Frame for Short term goals: 1-2 weeks  Short term goal 1: Supervision for dressing with AE  Short term goal 2: Supervision for toileting  Short term goal 3: Supervision for transfers  Short term goal 4: Supervision for light ambulatory ADL  Short term goal 5:  Independent with beginning level exercise program  Long term goals  Long term goal 1: upgrade to modified independent as tolerated       Therapy Time   Individual Concurrent Group Co-treatment   Time In           Time Out           Minutes                   Yasmin Ohara OTR/L

## 2020-03-23 NOTE — PLAN OF CARE
Problem: Cardiac:  Goal: Ability to maintain vital signs within normal range will improve  Description: Ability to maintain vital signs within normal range will improve  3/23/2020 0100 by Clifton Lord RN  Outcome: Ongoing  3/22/2020 1114 by Ely Cruz RN  Outcome: Ongoing  Goal: Cardiovascular alteration will improve  Description: Cardiovascular alteration will improve  3/23/2020 0100 by Clifton Lord RN  Outcome: Ongoing  3/22/2020 1114 by Ely Cruz RN  Outcome: Ongoing     Problem: Health Behavior:  Goal: Will modify at least one risk factor affecting health status  Description: Will modify at least one risk factor affecting health status  3/23/2020 0100 by Clifton Lord RN  Outcome: Ongoing  3/22/2020 1114 by Ely Cruz RN  Outcome: Ongoing  Goal: Identification of resources available to assist in meeting health care needs will improve  Description: Identification of resources available to assist in meeting health care needs will improve  3/23/2020 0100 by Clifton Lord RN  Outcome: Ongoing  3/22/2020 1114 by Ely Cruz RN  Outcome: Ongoing

## 2020-03-23 NOTE — PROGRESS NOTES
[facility] Progress Note    Patient:  Trinity Hess  YOB: 1955  Date of Service: 3/23/2020  MRN: [id number]   Acct: [de-identified]   Primary Care Physician: ELTON Guajardo  Advance Directive: DNR-CC  Admit Date: 3/16/2020       Hospital Day: 5      CHIEF COMPLAINT:    Chief Complaint   Patient presents with    Chest Pain       3/23/2020 9:01 AM  Subjective / Interval History:   03/23/2020  Doing well. Denies any acute complaints at this time. Endorses some improvement in his energy level. Laying comfortably in bed no acute distress. No acute overnight event reported. Hyperkalemia this a.m. with a potassium of 5.5. Repeat potassium of 5.7.    03/22/2020  No acute changes or acute overnight event reported. Patient laying comfortably in bed in no acute distress. Patient reportedly unable to get a full night sleep, although admittedly reports getting more sleep prior to presentation. Denies any active chest pain at this time. 03/21/2020  Doing well. Denies any active chest pain at this time. Shortness of breath improved. Endorses improvement in previously reported generalized weakness. Patient laying comfortably in bed no acute distress. Denies any other acute complaints or distress at this time. 03/20/2020  Denies any active chest pain at this time. Laying comfortably in bed, in no acute distress. Endorses overall improvement. No acute changes or acute overnight event reported. Hyperkalemia, with K: 5.4, with a repeat potassium of 4.8 this am      Cumulative Hospital History:   As per Previous Documentation, quoted below; \"The patient is a 62 y. o. male with PMH lung cancer, atrial fibrillation, COPD, CHF, CAD, HTN, HLD who presented to [facility] ED complaining of chest pain. He describes chest pressure/tightness occurring intermittently. He denies dyspnea. Chronically wears 4L NC oxygen. CXR concerning for complete opacification left lung.  He has multiple 0.957 02/15/2020     Troponin T:   No results for input(s): TROPONINI in the last 72 hours. Pro-BNP: No results for input(s): BNP in the last 72 hours. INR: No results for input(s): INR in the last 72 hours. ABGs:   Lab Results   Component Value Date    PHART 7.530 03/18/2020    PO2ART 81.0 03/18/2020    RIG0JTA 68.0 03/18/2020     UA:  No results for input(s): NITRITE, COLORU, PHUR, LABCAST, WBCUA, RBCUA, MUCUS, TRICHOMONAS, YEAST, BACTERIA, CLARITYU, SPECGRAV, LEUKOCYTESUR, UROBILINOGEN, BILIRUBINUR, BLOODU, GLUCOSEU, AMORPHOUS in the last 72 hours. Invalid input(s): Kathia Ochoa      Culture Results:    No results for input(s): CXSURG in the last 720 hours. Blood Culture Recent: No results for input(s): BC in the last 720 hours. Cultures:   No results for input(s): CULTURE in the last 72 hours. No results for input(s): BC, Josh Estrin in the last 72 hours. No results for input(s): CXSURG in the last 72 hours. Recent Labs     03/22/20  1111   MG 1.7   PHOS 3.4     Recent Labs     03/22/20  1111   AST 25   ALT 33   BILITOT <0.2   ALKPHOS 144*         RAD:  Xr Chest Standard (2 Vw)    Result Date: 3/17/2020  Exam:   XR CHEST (2 VW)  Date:  3/17/2020 History:  Male, age  62 years; pleural effusion COMPARISON:  Chest x-ray dated 3/16/2020 Findings : The left heart border is indistinct. Continued complete opacification of the left hemithorax. The right lung is unchanged. No measurable pneumothorax. The bones show no acute pathology. Impression: No interval changes. Signed by Dr Nancy Ramirez on 3/17/2020 9:02 AM    Xr Abdomen (kub) (single Ap View)    Result Date: 2/24/2020  Examination. XR ABDOMEN (KUB) (SINGLE AP VIEW) 2/24/2020 6:30 AM History: Abdominal pain. Frontal projection of the abdomen including the pelvis is obtained. There is no previous study for comparison. There is marked dilatation of the stomach. There is moderate dilatation of the small and large bowel loops.  There is gas in the motion. General: No swelling or tenderness. Skin:     General: Skin is warm and dry. Capillary Refill: Capillary refill takes less than 2 seconds. Coloration: Skin is not jaundiced or pale. Findings: No bruising, erythema, lesion or rash. Neurological:      General: No focal deficit present. Mental Status: He is alert and oriented to person, place, and time. Cranial Nerves: No cranial nerve deficit. Sensory: No sensory deficit. Motor: No weakness. Coordination: Coordination normal.   Psychiatric:         Mood and Affect: Mood normal.         Behavior: Behavior normal.         Thought Content:  Thought content normal.         Judgment: Judgment normal.           Assessment/plan:         Hospital Problems           Last Modified POA    * (Principal) Acute chest pain 3/17/2020 Yes    Mixed hyperlipidemia (Chronic) 3/16/2020 Yes    Coronary artery disease involving native coronary artery of native heart without angina pectoris (Chronic) 3/16/2020 Yes    Chronic venous stasis dermatitis of both lower extremities (Chronic) 3/16/2020 Yes    Morbid obesity due to excess calories (HCC) (Chronic) 3/16/2020 Yes    Essential hypertension (Chronic) 3/16/2020 Yes    Type 2 diabetes mellitus without complication (HCC) (Chronic) 3/16/2020 Yes    Hyperkalemia 3/20/2020 Yes    Chronic diastolic congestive heart failure (HCC) (Chronic) 3/16/2020 Yes    Paroxysmal A-fib ([address] 3/16/2020 Yes    Overview Signed 3/16/2020 10:01 PM by Annel Leung MD     9/23/2011  Cath  Inferior hypo, stent OM1  11/19/2017  Echo  EF 45%  11/21/2017  lexiscan negative for myocardial ischemia, EF 51  %  2/17/2020  Echo  Normal LVFX , PA pressure 80 mmHg, large pleural effusion         Malignant neoplasm of lung ([address] 3/16/2020 Yes    Malignant pleural effusion 3/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      Electronically signed by   Cece Jones MD, MPH,   Internal Medicine Hospitalist   3/23/2020 9:01 AM

## 2020-03-23 NOTE — CARE COORDINATION
Per Ros Kevin with [facility] admissions, the facility is ok with the costs of Keytruda. Still awaiting precert approval at this time.      L[address]: [phone] Y:[phone]

## 2020-03-24 PROBLEM — E43 SEVERE MALNUTRITION (HCC): Chronic | Status: ACTIVE | Noted: 2020-01-01

## 2020-03-24 NOTE — PROGRESS NOTES
Visited with pt to provide spiritual care. Pt says he is better than he was. Provided spiritual care with sustaining presence, nurtured hope, and prayer. Pt expressed gratitude for spiritual care. Pt expressed gratitude for spiritual care.     Electronically signed by Pastor Zuniga on 3/24/2020 at 2:27 PM

## 2020-03-24 NOTE — PLAN OF CARE
Problem: Nutrition  Goal: Optimal nutrition therapy  Description: Nutrition Problem: Severe malnutrition, In context of chronic illness  Intervention: Food and/or Nutrient Delivery: Continue current diet  Nutritional Goals: PO 75% or more, wt stable     Outcome: Ongoing

## 2020-03-24 NOTE — PROGRESS NOTES
to [facility] ED complaining of chest pain. He describes chest pressure/tightness occurring intermittently. He denies dyspnea. Chronically wears 4L NC oxygen. CXR concerning for complete opacification left lung. He has multiple electrolyte abnormalities. BNP and troponin both elevated. He states he has been emptying Pleurx catheter about every 3 days. It was emptied this AM in ED per patient and he did feel relief of chest pressure/tightness.               He was discharged from this facility on 02/27/2020 after being treated for atrial fibrillation with rapid ventricular response as well as pleural effusion. He underwent thoracentesis that admission with Pleurx catheter insertion 02/19/2020. He was placed on digoxin and Cardizem with instructions to drain Pleurx catheter every 72 hours. He was admitted to Hospitalist service with consultation to Cardiology who recommends repeat Echo as well as addition of Imdur 30 mg daily. Oncology consulted who agree with Palliative care but he does have a poor prognosis at this time. Should his performance status improve radiation and/or Keytruda could be considered. Pulmonology state no further recommendations for pleural effusion or obstructed airway. Avoid anticoagulation given history of severe hemoptysis. Spironolactone added per Nephrology. \"        Review of Systems:   Review of Systems  ROS: 14 point review of systems is negative except as specifically addressed above. DIET CARB CONTROL; Low Sodium (2 GM);  Dysphagia Minced and Moist; No Caffeine    Intake/Output Summary (Last 24 hours) at 3/24/2020 0887  Last data filed at 3/24/2020 2464  Gross per 24 hour   Intake 950 ml   Output 300 ml   Net 650 ml       Medications:   dextrose       Current Facility-Administered Medications   Medication Dose Route Frequency Provider Last Rate Last Dose    potassium chloride (KLOR-CON M) extended release tablet 10 mEq  10 mEq Oral Daily Noe Lombardi MD   10 mEq at mcg at 03/23/20 9275    dilTIAZem (CARDIZEM CD) extended release capsule 120 mg  120 mg Oral BID Juan Luis Maciel, PA-C   120 mg at 03/23/20 2149    escitalopram (LEXAPRO) tablet 10 mg  10 mg Oral Daily Juan Luis Maciel, PA-C   10 mg at 03/23/20 0827    hydrOXYzine (ATARAX) tablet 25 mg  25 mg Oral BID PRN Juan Luis Maciel, PA-C        atorvastatin (LIPITOR) tablet 10 mg  10 mg Oral Nightly Juan Luis Dyere, PA-C   10 mg at 03/23/20 2149    tiotropium (SPIRIVA RESPIMAT) 2.5 MCG/ACT inhaler 2 puff  2 puff Inhalation Daily Juan Luis Dyere, PA-C   2 puff at 03/23/20 0827    insulin lispro (HUMALOG) injection vial 0-6 Units  0-6 Units Subcutaneous TID WC Juan Luis Maciel, PA-C   1 Units at 03/22/20 1242    insulin lispro (HUMALOG) injection vial 0-3 Units  0-3 Units Subcutaneous Nightly Juan Luis Maciel, PA-C   1 Units at 03/23/20 2156    glucose (GLUTOSE) 40 % oral gel 15 g  15 g Oral PRN Juan Lius Maciel, PA-C        dextrose 50 % IV solution  12.5 g Intravenous PRN Juan Luis Maciel, PA-C        glucagon (rDNA) injection 1 mg  1 mg Intramuscular PRN Mcknight Axe, PA-C        dextrose 5 % solution  100 mL/hr Intravenous PRN Juan Luis Maciel, PA-C        potassium chloride (KLOR-CON M) extended release tablet 40 mEq  40 mEq Oral PRN Logan Benito MD   40 mEq at 03/18/20 9931    Or    potassium bicarb-citric acid (EFFER-K) effervescent tablet 40 mEq  40 mEq Oral PRN Logan Benito MD        Or    potassium chloride 10 mEq/100 mL IVPB (Peripheral Line)  10 mEq Intravenous PRN Logan Benito MD        potassium chloride 10 mEq/100 mL IVPB (Peripheral Line)  10 mEq Intravenous PRN Logan Benito  mL/hr at 03/17/20 1644 10 mEq at 03/17/20 1644         dextrose        potassium chloride  10 mEq Oral Daily    nitroglycerin  1 inch Topical 4 times per day    metoprolol tartrate  12.5 mg Oral BID    isosorbide mononitrate  30 mg Oral Daily    [Held by provider] spironolactone  50 mg Oral Daily    magnesium oxide --    ALKPHOS 144*  --   --   --   --   --    ALT 33  --   --   --   --   --    AST 25  --   --   --   --   --     < > = values in this interval not displayed. CRP:  No results for input(s): CRP in the last 72 hours. Sed Rate:  No results for input(s): SEDRATE in the last 72 hours. HgBA1c:  No components found for: HGBA1C  FLP:    Lab Results   Component Value Date    TRIG 105 09/06/2015    HDL 28 09/06/2015    LDLCALC 164 09/24/2011    LDLDIRECT 85 09/06/2015     TSH:    Lab Results   Component Value Date    TSH 0.957 02/15/2020     Troponin T:   No results for input(s): TROPONINI in the last 72 hours. Pro-BNP: No results for input(s): BNP in the last 72 hours. INR: No results for input(s): INR in the last 72 hours. ABGs:   Lab Results   Component Value Date    PHART 7.530 03/18/2020    PO2ART 81.0 03/18/2020    WGX1LAQ 68.0 03/18/2020     UA:  No results for input(s): NITRITE, COLORU, PHUR, LABCAST, WBCUA, RBCUA, MUCUS, TRICHOMONAS, YEAST, BACTERIA, CLARITYU, SPECGRAV, LEUKOCYTESUR, UROBILINOGEN, BILIRUBINUR, BLOODU, GLUCOSEU, AMORPHOUS in the last 72 hours. Invalid input(s): Arlyce Glory      Culture Results:    No results for input(s): CXSURG in the last 720 hours. Blood Culture Recent: No results for input(s): BC in the last 720 hours. Cultures:   No results for input(s): CULTURE in the last 72 hours. No results for input(s): BC, Lei Chelita in the last 72 hours. No results for input(s): CXSURG in the last 72 hours. Recent Labs     03/22/20  1111   MG 1.7   PHOS 3.4     Recent Labs     03/22/20  1111   AST 25   ALT 33   BILITOT <0.2   ALKPHOS 144*         RAD:  Xr Chest Standard (2 Vw)    Result Date: 3/17/2020  Exam:   XR CHEST (2 VW)  Date:  3/17/2020 History:  Male, age  62 years; pleural effusion COMPARISON:  Chest x-ray dated 3/16/2020 Findings : The left heart border is indistinct. Continued complete opacification of the left hemithorax. The right lung is unchanged.  No right lung is poorly inflated with scarring and discoid atelectasis. Signed by Dr Bobbi Ying on 3/16/2020 11:58 AM    Xr Chest Portable    Result Date: 2/25/2020  EXAMINATION:  XR CHEST PORTABLE  2/25/2020 7:01 AM HISTORY: Lung cancer. COMPARISON: 2/24/2020. FINDINGS:  There is no significant measurable pneumothorax on the current study. There is some aerated lung in the upper lung zone on the left laterally. There is near complete opacification of the left hemithorax otherwise. There is shifting of the heart and mediastinal structures to the left. The right lung is clear. There is decreasing subcutaneous emphysema in the left chest wall. 1. No measurable pneumothorax. Minimal aeration of the left upper lobe. 2. Near complete opacification of the left hemithorax in the mid to lower lung zone which may be due to underlying atelectasis. There is shifting the heart and mediastinal structures to the left. There is left mainstem bronchus stent. 3. Decreasing subcutaneous emphysema in the left chest wall. Signed by Dr Kristi Wall on 2/25/2020 7:03 AM    Xr Chest Portable    Result Date: 2/24/2020  EXAMINATION:  XR CHEST PORTABLE  2/24/2020 2:33 PM HISTORY: Shortness of air. COMPARISON: 2/24/2020 at 4:43 AM. FINDINGS:  A small pneumothorax in the upper lung zone laterally on the left side is larger on the current study. There is no significant aeration of the left lung. There is a left main bronchus stent. Subcutaneous emphysema in the left chest wall is stable or increased slightly. There is bronchial wall thickening on the right. There is no dense consolidation on the right side. There is minimal atelectasis in the right lung base. 1. There is a small pneumothorax on the left side. The pneumothorax is larger on the current study. There is no significant aeration of the left lung. 2. Subcutaneous emphysema in the left chest wall may be increased slightly. 3. Minimal right lung base atelectasis.  Chronic chest tube at the lateral chest wall. Near complete opacification of the left lung. Cardiac mediastinal silhouette obscured. Right lung appears grossly clear. No right pneumothorax or large pleural effusion. Limited evaluation of bones and upper abdomen due to technique. 1. Suspect small persistent left apical pneumothorax with otherwise near complete opacification of the left lung. Similar left chest wall subcutaneous emphysema, incompletely visualized. There may be a left-sided chest tube, but evaluation is limited by technique. Correlate with patient's lines and tubes. 2. Right lung appears clear. Signed by Dr Earl Singletary on 2/22/2020 6:57 AM    Xr Chest Portable    Result Date: 2/21/2020  Examination. XR CHEST PORTABLE 2/21/2020 5:00 AM History: Post thoracentesis. History of lung cancer. Significant frontal portable upright view of the chest is compared with the previous study dated 2/20/2020. No significant interval change. There is complete consolidation of the left lung. Small pneumothorax in the left upper chest is stable. There are scarring and atelectasis in the right lung. There is a persistent displacement of the trachea towards left. There is moderate diffuse narrowing of the mid and distal trachea. There is a persistent soft tissue emphysema along the left lateral chest wall and the left side of the neck. This is incompletely included in the study. Left-sided chest tube is not visualized in this study. No change in the cardiopulmonary status. Signed by Dr Viktor Wayne on 2/21/2020 7:13 AM    Xr Chest Portable    Result Date: 2/20/2020  Examination. XR CHEST PORTABLE 2/20/2020 5:00 AM History: Postthoracentesis. A single frontal portable upright view of the chest is compared with the previous study dated 2/19/2020.  There is complete consolidation of the left lung with a small pneumothorax in the upper chest. There is displacement of the trachea towards left suggesting loss of left lung volume. There are atelectatic changes in the right lower lung. There is a small right basal pleural effusion. A poorly visualized chest tube is seen at the left base. There is extensive soft tissue emphysema along the left lateral chest wall and the left side of the neck which appears unchanged. Persistent left lung consolidation and a small left hydropneumothorax. Persistent moderate displacement of the distal trachea towards left suggesting loss of left lung volume. The persistent and unchanged soft tissue emphysema of the left lateral chest wall and left-sided of the neck. A poorly visualized due to chest tube in the left lower chest. Signed by Dr Margie Hugo on 2/20/2020 7:11 AM    Us Extremity Left Non Vasc Limited    Result Date: 3/19/2020  US EXTREMITY LEFT NON VASC LIMITED 3/19/2020 2:00 PM HISTORY: None COMPARISON: None TECHNIQUE: Transverse and longitudinal sonographic images of the left lower extremity were obtained in the region of the patient's palpable abnormality. There is a poorly defined, hypoechoic subcutaneous collection that demonstrates no internal vascularity and likely represents hematoma or developing phlegmon. Correlate with history of trauma. The collection measures 5.2 x 4.9 x 0.4 cm. Close clinical follow-up is recommended. If the area enlarges, an MRI with and without contrast could be beneficial. Signed by Dr Sammi Wong on 3/19/2020 3:50 PM      Echo: 02/15/2020  Summary   Suboptimal echo windows. LV is normal in size with normal systolic function. LV ejection fraction   estimated at 60%. RV is moderately enlarged with mildly reduced RV systolic function. Left atrium is normal in size. Right atrium appears moderately dilated. Severe pulmonary hypertension with RVSP estimated at around 80 mmHg. Mitral valve leaflets appear mildly thickened with normal leaflet   mobility. No significant stenosis. Trace mitral regurgitation.    Aortic valve leaflets are not well visualized. No significant stenosis or   regurgitation noted. Mild to moderate tricuspid regurgitation. Trace anterior pericardial effusion. Large pleural effusion with large mobile structures within the pleural cavity. Signature   ----------------------------------------------------------------   Electronically signed by Sly Trinh MD(Interpreting physician)   on 02/17/2020 10:35 PM   ----------------------------------------------------------------        Objective:   Vitals: BP 94/64   Pulse 66   Temp 96.5 °F (35.8 °C) (Temporal)   Resp 16   Ht 5' 7\" (1.702 m)   Wt 217 lb 14.4 oz (98.8 kg)   SpO2 93%   BMI 34.13 kg/m²   24HR INTAKE/OUTPUT:      Intake/Output Summary (Last 24 hours) at 3/24/2020 0843  Last data filed at 3/24/2020 6496  Gross per 24 hour   Intake 950 ml   Output 300 ml   Net 650 ml       Physical Exam  Vitals signs and nursing note reviewed. Constitutional:       General: He is not in acute distress. Appearance: Normal appearance. He is obese. He is ill-appearing. He is not toxic-appearing or diaphoretic. HENT:      Head: Normocephalic and atraumatic. Right Ear: External ear normal. There is no impacted cerumen. Left Ear: External ear normal. There is no impacted cerumen. Nose: Nose normal. No congestion or rhinorrhea. Mouth/Throat:      Mouth: Mucous membranes are moist.      Pharynx: No oropharyngeal exudate or posterior oropharyngeal erythema. Eyes:      General: No scleral icterus. Right eye: No discharge. Left eye: No discharge. Extraocular Movements: Extraocular movements intact. Conjunctiva/sclera: Conjunctivae normal.      Pupils: Pupils are equal, round, and reactive to light. Neck:      Musculoskeletal: Normal range of motion and neck supple. No neck rigidity or muscular tenderness. Vascular: No carotid bruit. Cardiovascular:      Rate and Rhythm: Normal rate and regular rhythm. Pulses: Normal pulses. polystyrene 15 g p.o. x1 dose - 03/24/2020  · Monitor potassium every 12 hours  · Discontinue spironolactone (03/2/2020), initially started by nephrology, on account of persistent hypokalemia    Squamous cell carcinoma of left lung, status post left mainstem bronchus stent (01/29/2020)  Left recurrent pleural effusion, and lung collapse  · Status post left mainstem bronchus stent placement at [address] [place] [address]  · Oncology following  · Followed by Pulmonology  · Oncology on board  · Plan for further management as outpatient. History of paroxysmal atrial fibrillation  · Continue diltiazem  · Continue digoxin  · Cardiology following    Deconditioning due to medical condition / Generalized weakness  · Continue PT OT    Left calf hematoma  · Area has remained stable and improving without any further worsening. · Vascular following  · No indication for acute or urgent evacuation at this time. · Continue symptomatic and supportive management. Continue management of other chronic medical conditions - see above and orders. Prognosis: Poor      Advance Directive: DNR-CC    DIET CARB CONTROL; Low Sodium (2 GM); Dysphagia Minced and Moist; No Caffeine     DVT prophylaxis: SCDs    Consults Made:   IP CONSULT TO CARDIOLOGY  IP CONSULT TO ONCOLOGY  IP CONSULT TO PULMONOLOGY  IP CONSULT TO PALLIATIVE CARE  IP CONSULT TO NEPHROLOGY  IP CONSULT TO VASCULAR SURGERY  IP CONSULT TO SOCIAL WORK    Discharge planning: tbd-referral to [facility], awaiting/pending precert approval    Time Spent is 25 mins mins in the examination, evaluation, counseling and review of medications, assessment and plan.      Electronically signed by   Cece Jones MD, MPH,   Internal Medicine Hospitalist   3/24/2020 8:43 AM

## 2020-03-24 NOTE — CARE COORDINATION
Informed by Aroldo with admissions at [facility] the pt was denied d/t lack of clinicals. CM for the facility reports faxing updated notes for review yesterday and has refaxed the notes at this time.  Awaiting review and response from insurance regarding approval/denial.     Lona Abrams 30: [phone]

## 2020-03-24 NOTE — PROGRESS NOTES
Physical Therapy  Facility/Department: [facility] PROGRESSIVE CARE  Daily Treatment Note  NAME: Modesto Stone  : 1955  MRN: [id number]    Date of Service: 3/24/2020    Discharge Recommendations:  Continue to assess pending progress, Patient would benefit from continued therapy after discharge        Assessment   Assessment: (P) Patient tolerated treatment well. He was able to particpate in bed mobility, exercises, gait and transfers. During therapy he did have a bowel movement which bedside commode was used. Patient will benefit from continued therapy to address functional limitations   Activity Tolerance  Activity Tolerance: (P) Patient limited by endurance;Treatment limited secondary to medical complications (free text)     Patient Diagnosis(es): The primary encounter diagnosis was Acute chest pain. Diagnoses of Hypokalemia, Elevated troponin, and Hypomagnesemia were also pertinent to this visit. has a past medical history of Atherosclerosis, Blood circulation, collateral, CAD (coronary artery disease), Carpal tunnel syndrome, CHF (congestive heart failure) ([facility] [address] COPD (chronic obstructive pulmonary disease) ([facility] [address] Degenerative joint disease, Diabetes mellitus ([address] FH: chronic lung disease requiring oxygen, GERD (gastroesophageal reflux disease), Hyperlipidemia, Hypertension, Malignant neoplasm of overlapping sites of left lung [facility]), Myocardial infarction ([facility] [address] Obesity, Palliative care patient, Presence of stent in coronary artery, and Right carpal tunnel syndrome. has a past surgical history that includes Coronary angioplasty with stent; lumbar laminectomy; back surgery; hernia repair; joint replacement; Cardiac catheterization (11); pr revise median n/carpal tunnel surg (Left, 2018); and Thoracoscopy (Left, 2020).     Restrictions  Restrictions/Precautions  Restrictions/Precautions: Fall Risk  Position Activity Restriction  Other position/activity restrictions: c diff rule out, on a amb. 100' with RW SBA  Patient Goals   Patient goals : get stronger    Plan    Plan  Times per week: 3-7  Times per day: Daily  Plan weeks: 2  Current Treatment Recommendations: Strengthening, ROM, Balance Training, Functional Mobility Training, Transfer Training, Endurance Training, Gait Training, Safety Education & Training, Positioning, Equipment Evaluation, Education, & procurement, Patient/Caregiver Education & Training  Plan Comment: cont. PT per POC.   Safety Devices  Type of devices: (P) Left in bed, Patient at risk for falls, Left in chair     Therapy Time   Individual Concurrent Group Co-treatment   Time In           Time Out           Minutes                   Gus Gomez, PT

## 2020-03-25 NOTE — PROGRESS NOTES
overall improvement. No acute changes or acute overnight event reported. Hyperkalemia, with K: 5.4, with a repeat potassium of 4.8 this am      Cum[facility] Hospital History:   As per Previous Documentation, quoted below; \"The patient is a 62 y. o. male with PMH lung cancer, atrial fibrillation, COPD, CHF, CAD, HTN, HLD who presented to [facility] ED complaining of chest pain. He describes chest pressure/tightness occurring intermittently. He denies dyspnea. Chronically wears 4L NC oxygen. CXR concerning for complete opacification left lung. He has multiple electrolyte abnormalities. BNP and troponin both elevated. He states he has been emptying Pleurx catheter about every 3 days. It was emptied this AM in ED per patient and he did feel relief of chest pressure/tightness.               He was discharged from this facility on 02/27/2020 after being treated for atrial fibrillation with rapid ventricular response as well as pleural effusion. He underwent thoracentesis that admission with Pleurx catheter insertion 02/19/2020. He was placed on digoxin and Cardizem with instructions to drain Pleurx catheter every 72 hours. He was admitted to Hospitalist service with consultation to Cardiology who recommends repeat Echo as well as addition of Imdur 30 mg daily. Oncology consulted who agree with Palliative care but he does have a poor prognosis at this time. Should his performance status improve radiation and/or Keytruda could be considered. Pulmonology state no further recommendations for pleural effusion or obstructed airway. Avoid anticoagulation given history of severe hemoptysis. Spironolactone added per Nephrology. \"        Review of Systems:   Review of Systems  ROS: 14 point review of systems is negative except as specifically addressed above. DIET CARB CONTROL; Low Sodium (2 GM);  Dysphagia Minced and Moist; No Caffeine    Intake/Output Summary (Last 24 hours) at 3/25/2020 1010  Last data filed at digoxin (LANOXIN) tablet 125 mcg  125 mcg Oral Daily Maddison Spotted, PA-C   125 mcg at 03/25/20 7567    dilTIAZem (CARDIZEM CD) extended release capsule 120 mg  120 mg Oral BID Maddison Spotted, PA-C   120 mg at 03/25/20 1022    escitalopram (LEXAPRO) tablet 10 mg  10 mg Oral Daily Maddison Spotted, PA-C   10 mg at 03/25/20 2566    hydrOXYzine (ATARAX) tablet 25 mg  25 mg Oral BID PRN Maddison Spotted, PA-C        atorvastatin (LIPITOR) tablet 10 mg  10 mg Oral Nightly Maddison Spotted, PA-C   10 mg at 03/24/20 2025    tiotropium (SPIRIVA RESPIMAT) 2.5 MCG/ACT inhaler 2 puff  2 puff Inhalation Daily Maddison Spotted, PA-C   2 puff at 03/25/20 0908    insulin lispro (HUMALOG) injection vial 0-6 Units  0-6 Units Subcutaneous TID WC Maddison Spotted, PA-C   Stopped at 03/25/20 9600    insulin lispro (HUMALOG) injection vial 0-3 Units  0-3 Units Subcutaneous Nightly Maddison Spotted, PA-C   1 Units at 03/24/20 2025    glucose (GLUTOSE) 40 % oral gel 15 g  15 g Oral PRN Maddison Spotted, PA-C        dextrose 50 % IV solution  12.5 g Intravenous PRN Maddison Spotted, PA-C        glucagon (rDNA) injection 1 mg  1 mg Intramuscular PRN Maddison Spotted, PA-C        dextrose 5 % solution  100 mL/hr Intravenous PRN Maddison Spotted, PA-C        potassium chloride (KLOR-CON M) extended release tablet 40 mEq  40 mEq Oral PRN Loagn Benito MD   40 mEq at 03/18/20 3476    Or    potassium bicarb-citric acid (EFFER-K) effervescent tablet 40 mEq  40 mEq Oral PRN Logan Benito MD        Or    potassium chloride 10 mEq/100 mL IVPB (Peripheral Line)  10 mEq Intravenous PRN Logan Benito MD        potassium chloride 10 mEq/100 mL IVPB (Peripheral Line)  10 mEq Intravenous PRN Logan Benito  mL/hr at 03/17/20 1644 10 mEq at 03/17/20 1644         dextrose        nitroglycerin  1 inch Topical 4 times per day    metoprolol tartrate  12.5 mg Oral BID    isosorbide mononitrate  30 mg Oral Daily    magnesium oxide  400 mg Oral Daily --   --   --   --   --    LABALBU 2.1*  --   --   --   --   --   --   --    BILITOT <0.2  --   --   --   --   --   --   --    ALKPHOS 144*  --   --   --   --   --   --   --    ALT 33  --   --   --   --   --   --   --    AST 25  --   --   --   --   --   --   --     < > = values in this interval not displayed. CRP:  No results for input(s): CRP in the last 72 hours. Sed Rate:  No results for input(s): SEDRATE in the last 72 hours. HgBA1c:  No components found for: HGBA1C  FLP:    Lab Results   Component Value Date    TRIG 105 09/06/2015    HDL 28 09/06/2015    LDLCALC 164 09/24/2011    LDLDIRECT 85 09/06/2015     TSH:    Lab Results   Component Value Date    TSH 0.957 02/15/2020     Troponin T:   No results for input(s): TROPONINI in the last 72 hours. Pro-BNP: No results for input(s): BNP in the last 72 hours. INR: No results for input(s): INR in the last 72 hours. ABGs:   Lab Results   Component Value Date    PHART 7.530 03/18/2020    PO2ART 81.0 03/18/2020    JSO6SCG 68.0 03/18/2020     UA:  No results for input(s): NITRITE, COLORU, PHUR, LABCAST, WBCUA, RBCUA, MUCUS, TRICHOMONAS, YEAST, BACTERIA, CLARITYU, SPECGRAV, LEUKOCYTESUR, UROBILINOGEN, BILIRUBINUR, BLOODU, GLUCOSEU, AMORPHOUS in the last 72 hours. Invalid input(s): Manda Jeans      Culture Results:    No results for input(s): CXSURG in the last 720 hours. Blood Culture Recent: No results for input(s): BC in the last 720 hours. Cultures:   No results for input(s): CULTURE in the last 72 hours. No results for input(s): BC, Fred [place] in the last 72 hours. No results for input(s): CXSURG in the last 72 hours.       Recent Labs     03/22/20  1111   MG 1.7   PHOS 3.4     Recent Labs     03/22/20  1111   AST 25   ALT 33   BILITOT <0.2   ALKPHOS 144*         RAD:  Xr Chest Standard (2 Vw)    Result Date: 3/17/2020  Exam:   XR CHEST (2 VW)  Date:  3/17/2020 History:  Male, age  62 years; pleural effusion COMPARISON:  Chest x-ray dated left lateral chest wall, incompletely visualized, but similar compared to 2/21/2020. Suspect a small left pneumothorax. There may be a thin left-sided chest tube at the lateral chest wall. Near complete opacification of the left lung. Cardiac mediastinal silhouette obscured. Right lung appears grossly clear. No right pneumothorax or large pleural effusion. Limited evaluation of bones and upper abdomen due to technique. 1. Suspect small persistent left apical pneumothorax with otherwise near complete opacification of the left lung. Similar left chest wall subcutaneous emphysema, incompletely visualized. There may be a left-sided chest tube, but evaluation is limited by technique. Correlate with patient's lines and tubes. 2. Right lung appears clear. Signed by Dr Sydni Merino on 2/22/2020 6:57 AM    Xr Chest Portable    Result Date: 2/21/2020  Examination. XR CHEST PORTABLE 2/21/2020 5:00 AM History: Post thoracentesis. History of lung cancer. Significant frontal portable upright view of the chest is compared with the previous study dated 2/20/2020. No significant interval change. There is complete consolidation of the left lung. Small pneumothorax in the left upper chest is stable. There are scarring and atelectasis in the right lung. There is a persistent displacement of the trachea towards left. There is moderate diffuse narrowing of the mid and distal trachea. There is a persistent soft tissue emphysema along the left lateral chest wall and the left side of the neck. This is incompletely included in the study. Left-sided chest tube is not visualized in this study. No change in the cardiopulmonary status. Signed by Dr Sarah Tovar on 2/21/2020 7:13 AM    Xr Chest Portable    Result Date: 2/20/2020  Examination. XR CHEST PORTABLE 2/20/2020 5:00 AM History: Postthoracentesis. A single frontal portable upright view of the chest is compared with the previous study dated 2/19/2020.  There is complete neck supple. No neck rigidity or muscular tenderness. Vascular: No carotid bruit. Cardiovascular:      Rate and Rhythm: Normal rate and regular rhythm. Pulses: Normal pulses. Heart sounds: Normal heart sounds. No murmur. No friction rub. No gallop. Pulmonary:      Effort: Pulmonary effort is normal. No respiratory distress. Breath sounds: Normal breath sounds. No rhonchi or rales. Comments: Decreased breath sounds bilaterally. L>>R. Left Pleurx Catheter in place  Chest:      Chest wall: No tenderness. Abdominal:      General: Bowel sounds are normal. There is no distension. Palpations: Abdomen is soft. Tenderness: There is no abdominal tenderness. Musculoskeletal: Normal range of motion. General: No swelling or tenderness. Skin:     General: Skin is warm and dry. Capillary Refill: Capillary refill takes less than 2 seconds. Coloration: Skin is not jaundiced or pale. Findings: No bruising, erythema, lesion or rash. Neurological:      General: No focal deficit present. Mental Status: He is alert and oriented to person, place, and time. Cranial Nerves: No cranial nerve deficit. Sensory: No sensory deficit. Motor: No weakness. Coordination: Coordination normal.   Psychiatric:         Mood and Affect: Mood normal.         Behavior: Behavior normal.         Thought Content:  Thought content normal.         Judgment: Judgment normal.           Assessment/plan:           Principal Problem:    Acute chest pain  Active Problems:    Mixed hyperlipidemia    Coronary artery disease involving native coronary artery of native heart without angina pectoris    Chronic venous stasis dermatitis of both lower extremities    Essential hypertension    Type 2 diabetes mellitus without complication (HCC)    Hyperkalemia    Chronic diastolic congestive heart failure (HCC)    Paroxysmal A-fib (HCC)    Malignant neoplasm of lung (HCC)    Malignant pleural effusion    Severe malnutrition (HCC)  Resolved Problems:    * No resolved hospital problems. *    Persistent Hyperkalemia  · Sodium polystyrene 15 g p.o. x2 dose - 03/24/2020 and  03/25/2020  · Monitor potassium every 12 hours  · Discontinued spironolactone (03/2/2020), initially started by nephrology, on account of persistent hypokalemia    Squamous cell carcinoma of left lung, status post left mainstem bronchus stent (01/29/2020)  Left recurrent pleural effusion, and lung collapse  · Status post left mainstem bronchus stent placement at [facility]  · Oncology following  · Followed by Pulmonology  · Oncology on board  · Plan for further management as outpatient. History of paroxysmal atrial fibrillation  · Continue diltiazem  · Continue digoxin  · Cardiology following    Deconditioning due to medical condition / Generalized weakness  · Continue PT OT    Left calf hematoma  · Area has remained stable and improving without any further worsening. · Vascular following  · No indication for acute or urgent evacuation at this time. · Continue symptomatic and supportive management. Of note  · Patient started on escitalopram, on admission, for management of depression  · Patient currently having vivid unusual dreams/nightmares, since initiation of this medication  · Patient started on escitalopram 10 mg p.o. daily since 03/27/2020  · Will taper off medication for now, given risk of prompt cessation. · Reduce dose to escitalopram 5 mg p.o. daily        Continue management of other chronic medical conditions - see above and orders. Prognosis: Poor      Advance Directive: DNR-CC    DIET CARB CONTROL; Low Sodium (2 GM);  Dysphagia Minced and Moist; No Caffeine     DVT prophylaxis: SCDs    Consults Made:   IP CONSULT TO CARDIOLOGY  IP CONSULT TO ONCOLOGY  IP CONSULT TO PULMONOLOGY  IP CONSULT TO PALLIATIVE CARE  IP CONSULT TO NEPHROLOGY  IP CONSULT TO VASCULAR SURGERY  IP CONSULT TO SOCIAL WORK    Discharge

## 2020-03-25 NOTE — PROGRESS NOTES
03/24/20 0756   Restrictions/Precautions   Restrictions/Precautions Fall Risk   Subjective   Subjective Patient reports he did not sleep well. He also started having cramping in his lower legs    Pain Assessment   Pain Assessment 0-10   Pain Level 3   Pain Type Acute pain   Pain Location Leg   Pain Orientation Left   Pre Treatment Pain Screening   Intervention List Patient able to continue with treatment   Bed Mobility   Rolling Modified independent   Supine to Sit Stand by assistance   Sit to Supine Stand by assistance   Scooting Minimal assistance   Transfers   Sit to Stand Minimal Assistance   Stand to sit Minimal Assistance   Bed to Chair Minimal assistance   Stand Pivot Transfers Minimal Assistance   Ambulation   Ambulation? Yes   WB Status no restrictions   Ambulation 1   Surface level tile   Device Rolling Walker   Assistance Minimal assistance;2 Person assistance   Quality of Gait unsteady, knees feel like they would buckle, so therapists remained close, ready to block knees if needed. Gait Deviations   (impulsive)   Distance 7ft   Comments Patient was able to ambulate from the side of the bed to the recliner chair with Min A. He is impulsive and unsteady when ambulating   Exercises   Straight Leg Raise 2x5  (Patient has difficulty with this activity)   Quad Sets 15x   Heelslides 2x10   Gluteal Sets 15x   Hip Abduction x15   Knee Short Arc Quad x12   Ankle Pumps x30   Comments Patient instructed in supine exercises. He required rest breaks in between sets and required verbal cues to perform activities correctly and  to engage correct muscle groups    Conditions Requiring Skilled Therapeutic Intervention   Assessment Patient tolerated treatment well. He was able to particpate in bed mobility, exercises, gait and transfers. During therapy he did have a bowel movement which bedside commode was used.  Patient will benefit from continued therapy to address functional limitations    Activity Tolerance Activity Tolerance Patient limited by endurance;Treatment limited secondary to medical complications (free text)   Safety Devices   Type of devices Left in bed;Patient at risk for falls; Left in chair

## 2020-03-25 NOTE — PROGRESS NOTES
Occupational Therapy  Facility/Department: [facility] PROGRESSIVE CARE  Daily Treatment Note  NAME: Kadi Garsia  : 1955  MRN: [id number]    Date of Service: 3/25/2020    Discharge Recommendations:  Patient would benefit from continued therapy after discharge       Assessment   Assessment: Pt. sat EOB several minutes, then walked to room door and back to recliner. Activity Tolerance  Activity Tolerance: Patient limited by fatigue         Patient Diagnosis(es): The primary encounter diagnosis was Acute chest pain. Diagnoses of Hypokalemia, Elevated troponin, and Hypomagnesemia were also pertinent to this visit. has a past medical history of Atherosclerosis, Blood circulation, collateral, CAD (coronary artery disease), Carpal tunnel syndrome, CHF (congestive heart failure) ([facility] [address] COPD (chronic obstructive pulmonary disease) ([facility] [address] Degenerative joint disease, Diabetes mellitus ([facility] [address] FH: chronic lung disease requiring oxygen, GERD (gastroesophageal reflux disease), Hyperlipidemia, Hypertension, Malignant neoplasm of overlapping sites of left lung [facility]), Myocardial infarction ([facility] [address] Obesity, Palliative care patient, Presence of stent in coronary artery, and Right carpal tunnel syndrome. has a past surgical history that includes Coronary angioplasty with stent; lumbar laminectomy; back surgery; hernia repair; joint replacement; Cardiac catheterization (11); pr revise median n/carpal tunnel surg (Left, 2018); and Thoracoscopy (Left, 2020). Restrictions  Restrictions/Precautions  Restrictions/Precautions: Fall Risk  Position Activity Restriction  Other position/activity restrictions: c diff rule out, on a dysphagia diet  Subjective   General  Chart Reviewed: Yes  Patient assessed for rehabilitation services?: Yes  Family / Caregiver Present: No  General Comment  Comments: Pt. thinks they can get up.       Orientation     Objective                Bed mobility  Supine to Sit: Minimal assistance  Transfers  Stand Step Transfers: Minimal assistance  Sit to stand: Minimal assistance  Transfer Comments: Mary Jo with r.w. and 4 liters O2                                                                 Plan   Plan  Times per week: 3-5x/week  G-Code     OutComes Score                                                  AM-PAC Score             Goals  Short term goals  Time Frame for Short term goals: 1-2 weeks  Short term goal 1: Supervision for dressing with AE  Short term goal 2: Supervision for toileting  Short term goal 3: Supervision for transfers  Short term goal 4: Supervision for light ambulatory ADL  Short term goal 5:  Independent with beginning level exercise program  Long term goals  Long term goal 1: upgrade to modified independent as tolerated       Therapy Time   Individual Concurrent Group Co-treatment   Time In           Time Out           Minutes                   Clint Connolly, OT

## 2020-03-25 NOTE — PROGRESS NOTES
Report called to blayne at [address]. Sent Pantryex canister also with ems.

## 2020-03-25 NOTE — PROGRESS NOTES
Physical Therapy  Facility/Department: [facility] PROGRESSIVE CARE  Daily Treatment Note  NAME: Ann Lofton  : 1955  MRN: [id number]    Date of Service: 3/25/2020    Discharge Recommendations:  Continue to assess pending progress, Patient would benefit from continued therapy after discharge        Assessment   Body structures, Functions, Activity limitations: Decreased functional mobility ; Decreased ROM; Decreased safe awareness;Decreased strength;Decreased endurance;Decreased balance; Increased pain;Decreased posture  Assessment: Patient tolerated treatment well. He was able to increase gait distance during treatment with Mod A  x2 using RW. Patient would benefit from continued therapy services to increase functional activity. Activity Tolerance  Activity Tolerance: Patient limited by fatigue;Patient limited by endurance     Patient Diagnosis(es): The primary encounter diagnosis was Acute chest pain. Diagnoses of Hypokalemia, Elevated troponin, and Hypomagnesemia were also pertinent to this visit. has a past medical history of Atherosclerosis, Blood circulation, collateral, CAD (coronary artery disease), Carpal tunnel syndrome, CHF (congestive heart failure) ([facility] [address] COPD (chronic obstructive pulmonary disease) ([address] Degenerative joint disease, Diabetes mellitus ([address] FH: chronic lung disease requiring oxygen, GERD (gastroesophageal reflux disease), Hyperlipidemia, Hypertension, Malignant neoplasm of overlapping sites of left lung [facility]), Myocardial infarction ([facility] [address] Obesity, Palliative care patient, Presence of stent in coronary artery, and Right carpal tunnel syndrome. has a past surgical history that includes Coronary angioplasty with stent; lumbar laminectomy; back surgery; hernia repair; joint replacement; Cardiac catheterization (11); pr revise median n/carpal tunnel surg (Left, 2018); and Thoracoscopy (Left, 2020).     Restrictions  Restrictions/Precautions  Restrictions/Precautions: Fall Risk  Position Activity Restriction  Other position/activity restrictions: c diff rule out, on a dysphagia diet  Subjective   General  Chart Reviewed: Yes  Subjective  Subjective: Patient reports he is not having any pain and agreed to participate in therapy  Pain Assessment  Pain Assessment: 0-10  Pain Level: 0  Oxygen Therapy  SpO2: 93 %  O2 Device: Nasal cannula  O2 Flow Rate (L/min): 4 L/min       Orientation     Cognition      Objective   Bed mobility  Scooting: Stand by assistance  Transfers  Sit to Stand: Contact guard assistance  Stand to sit: Minimal Assistance  Bed to Chair: Minimal assistance  Ambulation  Ambulation?: Yes  WB Status: no restrictions  Ambulation 1  Surface: level tile  Device: Rolling Walker  Other Apparatus: O2  Assistance: Minimal assistance;2 Person assistance  Quality of Gait: impulsive, unsteady  Gait Deviations: Shuffles;Decreased step length;Decreased step height;Decreased head and trunk rotation;Deviated path  Distance: 20ft  Comments: Patient was able to ambulate from the bed to her daughter and to the recliner  requiring  Min-Mod A x2  while managing portable O2 and walker.       Balance  Sitting - Static: Fair;+  Sitting - Dynamic: Fair;-  Exercises  Heelslides: 2x10  Knee Short Arc Quad: x12 with 3 sec hold  Ankle Pumps: x30  Other exercises  Other exercises 1: heel raises x20  Other exercises 2: toe raises x20                        G-Code     OutComes Score                                                     AM-PAC Score             Goals  Short term goals  Time Frame for Short term goals: 2 wks  Short term goal 1: supine to sit indep  Short term goal 2: sit to stand indep  Short term goal 3: amb. 100' with RW SBA  Patient Goals   Patient goals : get stronger    Plan    Plan  Times per week: 3-7  Times per day: Daily  Plan weeks: 2  Current Treatment Recommendations: Strengthening, ROM, Balance Training, Functional Mobility Training, Transfer Training, Endurance Training, Gait Training, Safety Education & Training, Positioning, Equipment Evaluation, Education, & procurement, Patient/Caregiver Education & Training  Plan Comment: cont. PT per POC.   Safety Devices  Type of devices: Call light within reach, Patient at risk for falls, Left in chair     Therapy Time   Individual Concurrent Group Co-treatment   Time In           Time Out           Minutes                   Miguel Tinsley, PT

## 2020-03-25 NOTE — DISCHARGE SUMMARY
indistinct. Continued complete opacification of the left hemithorax. The right lung is unchanged. No measurable pneumothorax. The bones show no acute pathology. Impression: No interval changes. Signed by Dr Patricia Hunt on 3/17/2020 9:02 AM    Xr Chest Portable    Result Date: 3/16/2020  Examination. XR CHEST PORTABLE 3/16/2020 10:15 AM History: Chest pain. A single frontal portable upright view of the chest is compared with the previous study dated 2/25/2020. There is complete opacification of the left chest which may represent a combination of lung consolidation or pleural effusion. There is moderate displacement of the trachea towards left suggesting loss of left lung volume. There are interstitial changes in the right lung which may represent scarring and discoid atelectasis. No bony abnormality. The complete opacification of the left chest may be present left lung consolidation and left-sided pleural effusion. This is more progressive since the previous study. The right lung is poorly inflated with scarring and discoid atelectasis. Signed by Dr Preet Sahu on 3/16/2020 11:58 AM    Echo: 02/15/2020  Summary   Suboptimal echo windows.      LV is normal in size with normal systolic function. LV ejection fraction   estimated at 60%.  RV is moderately enlarged with mildly reduced RV systolic function.   Left atrium is normal in size.   Right atrium appears moderately dilated.   Severe pulmonary hypertension with RVSP estimated at around 80 mmHg.   Mitral valve leaflets appear mildly thickened with normal leaflet   mobility. No significant stenosis. Trace mitral regurgitation.   Aortic valve leaflets are not well visualized.  No significant stenosis or   regurgitation noted. Mild to moderate tricuspid regurgitation.   Trace anterior pericardial effusion.   Large pleural effusion with large mobile structures within the pleural cavity.      Signature   ----------------------------------------------------------------   Electronically signed by Ashvin Trinh MD(Interpreting physician)   on 02/17/2020 10:35 PM   ----------------------------------------------------------------      Hospital Course: As per Previous Documentation, quoted below; \"The patient is a 62 y. o. male with PMH lung cancer, atrial fibrillation, COPD, CHF, CAD, HTN, HLD who presented to [facility] ED complaining of chest pain. He describes chest pressure/tightness occurring intermittently. He denies dyspnea. Chronically wears 4L NC oxygen. CXR concerning for complete opacification left lung. He has multiple electrolyte abnormalities. BNP and troponin both elevated. He states he has been emptying Pleurx catheter about every 3 days. It was emptied this AM in ED per patient and he did feel relief of chest pressure/tightness.               He was discharged from this facility on 02/27/2020 after being treated for atrial fibrillation with rapid ventricular response as well as pleural effusion. He underwent thoracentesis that admission with Pleurx catheter insertion 02/19/2020. He was placed on digoxin and Cardizem with instructions to drain Pleurx catheter every 72 hours. He was admitted to Hospitalist service with consultation to Cardiology who recommends repeat Echo as well as addition of Imdur 30 mg daily. Oncology consulted who agree with Palliative care but he does have a poor prognosis at this time. Should his performance status improve radiation and/or Keytruda could be considered. Pulmonology state no further recommendations for pleural effusion or obstructed airway. Avoid anticoagulation given history of severe hemoptysis. Spironolactone added per Nephrology. \"       Chest pain - resolved  · -Clinically unable to determine specific etiology of chest pain. · Etiology likely multifactorial, history of CAD, squamous cell carcinoma of left lung.   · Initial troponin 0.04 --> 0.03 --> 0.03 --> 0.03  · -Followed by  Cardiology  · No further work-up recommended, given stable troponin, and resolution of chest pain. · Okay for discharge from cardiology standpoint        Persistent Hyperkalemia  · Sodium polystyrene 15 g p.o. x2 dose - 03/24/2020 and  03/25/2020  · Monitor potassium every 12 hours  · Discontinued spironolactone (03/2/2020), initially started by nephrology, on account of persistent hypokalemia  · Potassium of 4.9 (03/25/2020)  · Patient being discharged to SNF, recommendation for monitoring of BMP/potassium, and continued/further management as needed.     Squamous cell carcinoma of left lung, status post left mainstem bronchus stent (01/29/2020)  Left recurrent pleural effusion, and lung collapse  · Status post left mainstem bronchus stent placement at [facility]  · Followed by Pulmonology  · Followed by Oncology   · Okay for discharge from oncology standpoint  · Plan for further management as outpatient.        History of paroxysmal atrial fibrillation  · Continued diltiazem  · Continued digoxin  · Followed by Cardiology      Deconditioning due to medical condition / Generalized weakness  · Continued PT OT     Left calf hematoma  · Area has remained stable and improving without any further worsening. · Followed by Vascular   · No indication for acute or urgent evacuation at this time.   · Continued symptomatic and supportive management.     History of depression  · Continued with patient's home regimen  · Patient currently having vivid unusual dreams/nightmares,   · Patient started on escitalopram 10 mg p.o. daily since 03/27/2020  · Follow-up with PCP outpatient, for further evaluation.     Severe malnutrition  · As per dietitian malnutrition assessment, quoted below   Malnutrition Assessment  Context: Chronic illness (03/24/20 1026)  Energy Intake %: Less than or equal to 50% of estimated energy requirement (03/24/20 1026)  Energy Intake Time: Greater than or equal to 3 months (03/24/20 1026)  Interpretation of Weight Loss %: 20% loss or greater (03/24/20 1026)  Interpretation of Weight Loss Time: in 1 year (03/24/20 1026)  Body Fat Status: Moderate subcutaneous fat loss (03/24/20 1026)  Body Fat Loss Location: Triceps (03/24/20 1026)  Muscle Mass Status: Unable to assess (03/24/20 1026)  Fluid Accumulation Status: Mild fluid accumulation (03/20/20 1231)  Fluid Accumulation Location: Extremities (03/20/20 1231)  Reduced  Strength: Not measured(pt reports weakness, hx of falls) (03/24/20 1026)  Malnutrition Status: Meets the criteria for severe malnutrition (03/24/20 1026)  · Management as per dietitian recommendation      Continued management of other chronic medical conditions       Physical Exam:  Vital Signs: /64   Pulse 78   Temp 97.3 °F (36.3 °C) (Temporal)   Resp 16   Ht 5' 7\" (1.702 m)   Wt 219 lb 6.4 oz (99.5 kg)   SpO2 90%   BMI 34.36 kg/m²   Physical Exam  Vitals signs and nursing note reviewed. Constitutional:       General: He is not in acute distress. Appearance: Normal appearance. He is obese. He is not ill-appearing, toxic-appearing or diaphoretic. HENT:      Head: Normocephalic and atraumatic. Right Ear: External ear normal.      Left Ear: External ear normal.      Nose: Nose normal. No congestion or rhinorrhea. Mouth/Throat:      Mouth: Mucous membranes are moist.      Pharynx: No oropharyngeal exudate or posterior oropharyngeal erythema. Eyes:      General: No scleral icterus. Right eye: No discharge. Left eye: No discharge. Extraocular Movements: Extraocular movements intact. Conjunctiva/sclera: Conjunctivae normal.      Pupils: Pupils are equal, round, and reactive to light. Neck:      Musculoskeletal: Normal range of motion and neck supple. No neck rigidity or muscular tenderness. Vascular: No carotid bruit.    Cardiovascular:      Rate and Rhythm: Normal rate and regular [phone]  Mobile Phone: [phone]  Relation: Brother/Sister       Time Spent on discharge is more than 36 mins in the examination, evaluation, counseling and review of medications and discharge plan. Electronically signed by   Regis Quan MD, MPH,   Internal Medicine Hospitalist   3/25/2020 4:43 PM      Thank you ELTON Degroot for the opportunity to be involved in this patient's care. If you have any questions or concerns please feel free to contact me at [phone]        EMR Dragon/Transcription disclaimer:   Much of this encounter note is an electronic transcription/translation of spoken language to printed text.  The electronic translation of spoken language may permit erroneous, or at times, nonsensical words or phrases to be inadvertently transcribed; although attempts have made to review the note for such errors, some may still exist.

## 2020-03-26 NOTE — TELEPHONE ENCOUNTER
Called and left message for patient letting them know that Humaira May is out of the office and they will need to RS with another NP.

## 2020-04-02 NOTE — TELEPHONE ENCOUNTER
Spoke to Estephania Kearney, she asked to move the pt out a few weeks,    Called ced spoke to Jesika, we moved this apt out.
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
## 2020-06-05 PROBLEM — R53.83 OTHER FATIGUE: Status: ACTIVE | Noted: 2020-01-01

## 2020-06-08 NOTE — TELEPHONE ENCOUNTER
Left voicemail for pt to return telephone call to our office to start his treatment. I have provided pt a direct number to call me back in order to get his treatment scheduled.

## 2020-06-08 NOTE — TELEPHONE ENCOUNTER
Called pt and left a second voicemail advising pt we will need a call back in order to set up a new consult. Provided pt direct number to reach me in order to do this.

## 2020-06-09 NOTE — PROGRESS NOTES
[facility] Yolette Bynum BSN, PA-C  ____________________________________________________________               [facility]  Department of Radiation Oncology  [address] [place] [id number]  Office:  [phone]  Fax: [phone]    DATE:  06/11/2020  PATIENT: Tramaine Perera 1955                                 MEDICAL RECORD #: [id number]                                                       REASON FOR CONSULTATION:  Tramaine Perera is a very pleasant 64 y.o. male that has been referred to our clinic to discuss radiotherapy recommendations for carcinoma of the lung. 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 Radiation therapy was recommend and he was referred to  for evaluation of systemic therapy. Patient was seen per  on 06/05/2020 where he was offered frontline palliative immunotherapy with Keytruda. He is planning to begin Keytruda under the care of  in the near future. Upon exam patient reports activity change, appetite change, fatigue, unexpected weight change, cough, SOB, and wheezing. Uses O2 at 3L. Left chest tube remains in place with no drainage noted. Denies choking, chest tightness, stridor, nausea/vomiting, diarrhea, light-headedness, weakness, and headaches. He follows .            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  • Thoracentesis at [facility] on 01/27/2020 was negative for malignancy.   • Underwent bronchoscopy and left main bronchus stenting on 01/29/2020, revealed a proximal large, bloody and friable tumor with 90% invasion/obstruction.   • PET scan on 02/01/2020 revealed FDG uptake within a ill-defined left hilar mass as well as the left upper and left lower lobes with interval placement of left main bronchus stent which is occluded distally with complete collapse of left lung.   • Discharged home to follow with medical oncology, Dr. Valenzuela and radiation oncology and recommendations for possible outpatient bronchoscopy to evaluate the lymphadenopathy given it wasn't avid on PET CT. FEV1 30% and O2 dependent.     01/25/2020 - Presented to the [facility] emergency department coughing up blood. The patient states it started as blood-streaked sputum this morning and the last 2 episodes have been completely bloody, possible clots. Now coming out of his nose. Scan ordered for further evaluation. Chest x-ray demonstrated collapsed lung and likely new lung mass.     01/25/2020 - CTA Lung:  • Large left effusion contained within the entire left hemithorax.   • Fluid measures simple fluid.   • There is cut off of the proximal left main stem bronchus.   • Inferiorly to the bronchus cut off there is a 37 mm x 49 mm infrahilar soft tissue mass likely indicating neoplasm.   • There is compressive atelectasis. No right effusion. No pneumothorax. Lung is clear. Trachea is midline.  • There is evidence of right heart strain. Pulmonary artery is mildly dilated measuring 2.2 cm. No pulmonary embolism.   • Thoracic aorta is normal caliber.   • Heart size is normal. No pericardial effusion.  • Subcarinal space there is potentially a 15 mm enlarged lymph node.  Impression:  • There is complete collapse of the left lung secondary to an occluded left main stem bronchus. This could be due to mucous plugging, however endobronchial  neoplasm must also be considered. Moderate size left pleural effusion.  • Mild cardiomegaly. Trace pericardial fluid.  • No pulmonary emboli.   • No thoracic aortic aneurysm or dissection.    01/26/2020 - Transferred to [facility] for a higher level of care.     01/26/2020 X-ray Chest Portable- [facility]  • Complete whiteout of the left lung with mild deviation of the trachea to the left.   • Findings are consistent with complete collapse of the left lung. Underlying infection or mass or effusion cannot be excluded     01/26/2020 - In patient with  (Pulmonary at [facility]):  • Anticipate diagnostic thoracentesis tomorrow AM; no need for NPO or other precautions  • Consider PET scan to help with staging, vs discussing with onc/rad onc as well once tissue available. Doubt he is a surgical candidate given co-morbidities and poor baseline pulmonary function (FEV1 30% and O2 dependent prior to this illness) even if staging is early enough.  • Will discuss potential advanced airway interventions with IP   • Consider gentle diuresis and echocardiogram for ? RV dysfunction/PH     01/26/2020 [place] CT Interpretation Of Outside Films:  • Left infrahilar mass with a likely endobronchial extension resulting in Cut off of the proximal left mainstem bronchus with a large left pleural effusion and total collapse of the left lung. Bronchoscopy may be useful to determine a pathologic diagnosis. Likewise thoracentesis with cytology may be useful as well.   • Negative for pulmonary embolism   • There is evidence of right heart strain, while the potential that this is related to the postobstructive pneumonitis and collapse of the LEFT lung is substantial. The enlargement of the RIGHT pulmonary artery may indicate pre-existing chronic pulmonary hypertension.   • Please note outside report was not available time of dictation, please follow up for discrepancies when outside report is available.     01/27/2020 - Transthoracic  Echo  TTE Conclusions:   • LVEF greater than 55%. Unable to comment on diastolic filling pattern due to atrial fibrillation.  • RV mildly dilated. Mildly depressed RV systolic function.  • The left atrium is mild to moderately dilated. RA mildly dilated.  • Mild tricuspid regurgitation.  • The RV systolic pressure is calculated at 63 mmHg.    01/27/2020 - THORACENTESIS FLUID WITH CELL BLOCK:    •   • Protein: 2.5  • Cell Counts: 18% Neutrophils, 50% Lymph, 16% large Cell   • Culture: Gram Stain negative  • Albumin 1.6  • Flow: Polytypic B cells and CD4-skewed T cells  • Cytology:  REACTIVE MESOTHELIAL CELLS, HISTIOCYTES, AND LYMPHOCYTES; NEGATIVE FOR MALIGNANCY     01/28/2020 - PORTABLE Chest x-ray:  • There continues to be complete opacification of the left lung related to known large left pleural effusion and collapse of left lung.   • The right lung as no new consolidation.   • There is minimal atelectasis the right lung base.   • The cardiac shadow is obscured.   • No new osseous abnormality is seen.  • No significant interval change.    01/28/2020 - In patient with  ([place]):  • left main obstruction,   • reviewed CT chest, obstruction appears to be related to mass.   • plan for bronchoscopy Wednesday with possible debulking, dilation and stent placement  • NPO at midnight Tuesday  • hold AC   • discussed with team   • repeat CXR after thora.    01/29/2020 - In patient with  ([place]):  • ETT - Flexible Possible rigid and stenting Possible ebus for tissue   • Will need to avoid hypercarbia, hypoxia, and metabolic derangements given significant comorbid conditions.   • The risks, benefits, and alternatives of rigid bronchoscopy with possible rigid dilatation, coring, hot and cold therapies, as well as stent placement have also been discussed. These risks include, but are not limited to chipped tooth, lip laceration, vocal cord injury, infection, airway tear, bleeding,  anesthetic complication, airway fire, respiratory failure, stroke, heart attack and death. This procedure has been fully reviewed with the patient and written informed consent has been obtained.    01/29/2020 - Bronchoscopy with biopsy in [place]:  FINDINGS:  • Demonstrated a partially obstructing (about 90% obstructed) mass was found in the middle portion in the left mainstem bronchus.   • The mass was large and bloody and friable.   PATHOLOGY:  LUNG, MAINSTEM BRONCHUS, BIOPSY:   • INVASIVE SQUAMOUS CELL CARCINOMA, KERATINIZING TYPE.   LUNG, LEFT, BRONCHIAL WASHING:   • ATYPICAL CELLS, REACTIVE BRONCHIAL CELLS, ALVEOLAR MACROPHAGES, AND INFLAMMATION; SUSPICIOUS FOR SQUAMOUS CELL CARCINOMA (SEE [id number]).    01/29/2020 - Portable chest radiograph, HISTORY: Post bronchoscopy; bronchial studies. COMPARISON: CT from the same day and chest radiograph from 1/28/2020  • The left hemithorax is completely opacified as a result of atelectasis, and there is leftward shift the mediastinum.   • Right lung mild prominence of the bronchovascular markings, but no new focal consolidations or overt pulmonary.   • No pneumothorax.   • Persistent left lung atelectasis and underlying pleural effusion, size is difficult to assess due to atelectasis.     01/30/2020 - Portable chest radiograph, History: s/p bronch for left lung mass wtih concomitant lung collapse; daily ; Lung Ca Staging, Small Nodule With Nsc On Perc Bx.  Comparison: Chest radiograph and CT dated 1/29/2020   • The left hemithorax remains completely opacified due to known endobronchial mass lesion seen on yesterday's CT.   • There has been no increase in the mild leftward mediastinal shift.   • There is low inspiratory effort on the right with basilar atelectasis. Otherwise, no significant interval change since the prior study.   • On the right, there is no focal consolidation, pleural effusion or pneumothorax.   • The cardiomediastinal silhouette is  obscured.    01/31/2020 - In patient with  (Oncology at [facility]):  • Tramaine Perera is a 64 y.o. male with history of HTN, HLD, T2DM, CAD s/p PCI, HFrEF, COPD on home 4L of O2 who was admitted for complete collapse of left lung with imaging findings concerning for new obstructive lung mass. He underwent biopsy of the lung mass which revealed squamous cell carcinoma. Oncology consulted for further recommendations.   • Squamous Cell Carcinoma of Lung: with concern for possible adrenal mets. Agree with PET/CT and MRI brain. If there is increased FDG uptake of the adrenal glands, then this would warrant a biopsy to establish metastatic disease. Agree with radiation oncology consult for possible radiation to the left lung given collapse and inability to place stent by interventional pulmonology   • Recommendations:  o Await results of PET/CT and MRI brain. If concern for metastatic disease, would need biopsy to establish that  o Agree with radiation oncology consult   o He wants to follow in [place] for medical oncology, therefore case management can place referral to [facility] cancer center in [place]    01/31/2020 - In patient with  (Radiation Oncology at [place]):  • Tramaine Perera is a 64 y.o. year-old male with a newly diagnosed NSCLC (SqCC) of the LEFT lung causing complete collapse. Bronchoscopy with biopsy shows a proximal tumor with invasion/obstruction of the LMB which has been stented; pleural effusion was tapped x1 and negative for malignancy.   • Symptomatically he is at baseline O2 needs still with scant L lung breath sounds.   • We discussed the biopsy results and that with his early imaging (CT scans), this appears to be a localized process but that complete staging would be necessary to offer a formal treatment plan.   • We discussed two possible routes: if the disease has spread beyond his lungs, a short course of palliative radiotherapy to the chest may reduce some of the airway  compression and improve breathing, though as he is currently at baseline O2 needs this may not be noticeable; if the tumor is localized to the lung, he may be a candidate for curative therapy involving resection or a long course of chemoradiation. If the latter, a short course of radiotherapy now may prevent an optimal plan for curative intent after completion of staging. We discussed his current living situation in [place], and that should he decide to pursue treatment here would need a caretaker for 6 weeks while staying at the [facility].   • Alternatively, we could refer him locally to Dr. Keron Gandhi in [facility].   • Imaging update: staging PET shows localized disease, would be candidate for definitive therapy; will discuss w/patient to see if he desires care at [facility] or locally; if locally, will refer to Dr. Alexei Gandhi on Monday.  PLAN:  • Will continue to follow and update plan accordingly with imaging results  • Tentative CT sim scheduled for Monday pending staging and patient's desire for location of care    02/01/2020 - PET Scan:  • Intense FDG uptake within a ill-defined left hilar mass as well as the left upper and left lower lobes. Interval placement of a left main bronchus stent which is occluded distally with complete collapse of the left lung. FDG uptake within the left upper and lower lobes may be related to infection versus malignancy. Small left pleural effusion.  • No FDG uptake is present within the subcarinal Lymphadenopathy.    02/01/2020 - MRI Brain:  • No findings of intracranial metastatic disease.  • Mild chronic small vessel ischemic white matter disease and tiny old right cerebellar infarcts.    02/04/2020 - Discharged from [place] with follow up appointment scheduled.   Hospital Course copied from [place] discharge note:   Mr. Perera is a 63 Yo male with a PMHx significant for HTN, HLD, T2DM, CAD s/p PCI, HFrEF, COPD on home 4L of O2 who was admitted for complete collapse of left  "lung concerning for new obstructive lung mass on CT.    Patient reports he has felt short of breath for \"quite some time\" however this acutely worsened over the past week PTA. Also had increased frequency of cough and blood streaked sputum, so he initially presented to an OSH. He was found to be in A fib with RVR and CXR demonstrated collapsed left lung and likely new lung mass. He was subsequently transferred to [facility] for a higher level of care.    In the ED he was AF, HR 60-90s on dilt gtt, BP 100s/70s, on 4L NC. Work up significant for WBC 9.6, Hct 44, , CXR demonstrated complete collapse of left lung. He was admitted to medicine for further work up. CT read here significant for large obstructive mass as well as large pleural effusion. Pulmonary was consulted who performed a thoracentesis. Pleural fluid studies demonstrated a lymphocytic predominant effusion, but cytology negative for malignancy. A CT C/A/P w + w/o contrast was done for further work up of malignancy which demonstrated L lung mass with post-obstructive collapse of the entire L lung and mediastinal shift, bulky subcarinal adenopathy, and nodular bilateral adrenal glands. PET CT Intense FDG uptake within a ill-defined left hilar mass as well as the left upper and left lower lobes. Interval placement of a left main bronchus stent which is occluded distally with complete collapse of the left lung. FDG uptake within the left upper and lower lobes may be related to infection versus malignancy. Small left pleural effusion. No FDG uptake is present within the subcarinal lymphadenopathy. MRI brain with/without negative for metastatic disease.   On 1/29 he underwent Bronchoscopy with Interventional Pulmonary which demonstrated a partially obstructing (about 90% obstructed) mass was found in the middle portion in the left mainstem bronchus. The mass was large and bloody and friable. Bronchoscopy was c/b bleeding requiring TXA and a 10 x 40 Aero stent " was placed with good results. He was initiated on hypertonic saline nebulizers. Radiation oncology was consulted and recommended outpatient initiation of therapy since patient may qualify for definitive treatment if cancer is localized. Oncology recommends possible outpatient bronchoscopy to evaluate the lymphadenopathy given it wasn't avid on PET CT, which may be performed by his specialists in [place].     Instructed pt to F/u with PCP, oncology ([facility] at [phone]), and radiation oncology (Dr. Gandhi on 2/7)- Consider outpatient bronchoscopy for evaluation of lymphadenopathy, Consider starting anticoagulation for Afib after patient undergoes radiation therapy     02/07/2020 - Consult with :  • Regarding the lymphadenopathy, referral for outpatient bronchoscopy referral will only postpone treatment in this patient with a collapsed lung and history of significant hemoptysis with this lesion, we will simulation treatment fields today to the lung and to the nodes to facilitate the planning process.   • I anticipate a course of 6600 cGy over 33 fractions. I have staged him as IIIA (T2N2M0) due to bulky subcarinal adenopathy, and plan definitive chemoradiation.  • [facility] discharge note states a medical oncology referral had been made although there is not an appt made with either office, Mr. Perera requests Dr. Holbrook. Urgent referral placed so that he may evaluate him for systemic therapy concurrent with radiation.   • We anticipate beginning radiation next week.      02/12/2020 - Documentation per :  • I have discussed this patient with Dr. Holbrook and his he is a clinical stage IIIa (T2 N2 M0) we will plan definitive concomitant chemoradiation.    • He does have an obstructive left mainstem bronchus will initiate radiotherapy as soon as possible and coordinate chemotherapy with Dr. Holbrook.    02/17/2020 - Left thoracentesis:  • 2L of clear, straw colored fluid  drained.   • Cytology negative: benign mesothelial cells and occasional small round lymphocytes    02/17/2020 - Chest x-ray:  • No pneumothorax, diffuse opacification of the left chest with some residual pleural effusion    02/19/2020 - S/p left VATS/Pleur-X catheter placement.  • Cytology was negative    03/20/2020 - CT Chest:  • Left bronchial stent with intraluminal density and atelectasis of the left lung with left pleural fluid and small bore left chest tube.  • Right lung clear.  • Borderline heart size. Enlarged pulmonary artery, which can be seen with pulmonary artery hypertension.  • Similar mediastinal adenopathy.  • Gynecomastia.    06/05/2020 - Appointment with :  • Squamous cell lung carcinoma left lung,possibly stage II.  o Tumor Proportion Score (TPS): 99% Intensity of staining: strong  o The patient was counseled today about diagnosis, staging, prognosis, diagnostic tests, medications, side effects and disease management. The method of counseling included verbal explanation. The patient verbalized understanding.  o Essentially, the patient performs that has improved somewhat. The patient was offered frontline palliative immunotherapy with Keytruda.  • Left pleural effusion/lung collapse 2/2 endobronchial obstruction  o Status post left main-stem bronchus stent at [facility].  o 3/20/2020-CT scan showed left bronchial stent with intraluminal density and atelectasis of the left lung with left pleural fluid and a small left chest tube. Right lung clear. Enlarged pulmonary artery. Similar mediastinal adenopathy.  • CT chest at [facility] 1/29/2020  o Bulky subcarinal lymphadenopathy with enhancement measures 1.6 cm in short axis. LEFT hilar and infrahilar soft tissue with bronchial narrowing, may measure as large as 7.6 x 4.8 cm.   o Peripheral collapse of the entire LEFT lung.   o Lung parenchyma and pleural space: LEFT lung collapse with LEFT hilar and infrahilar mass left-sided  effusion.  o RIGHT lung with granuloma in the superior segment of the RIGHT lower lobe. Small noncalcified nodules in the RIGHT upper chest.  • PLAN:  o Referred to outpatient physical therapy  o CBC chest x-ray today  o Start Keytruda in 10 days   o Repeat CT chest abdomen pelvis  o Follow-up MD treatment  o Discussed with Dr. Gandhi regarding RT  • Follow Up:   o Return for schedule keytruda here at [facility] after scans complete  o Chest XR downstairs today; scans within 10 days. Treatment start within 10 days from today         History obtained from  PATIENT and CHART    PAST MEDICAL HISTORY  Past Medical History:   Diagnosis Date   • Arthritis    • Back pain    • GERD (gastroesophageal reflux disease)    • Hypertension       PAST SURGICAL HISTORY  Past Surgical History:   Procedure Laterality Date   • BACK SURGERY     • CARDIAC VALVE REPLACEMENT     • HIP SURGERY        FAMILY HISTORY  family history includes Diabetes in his father; Hypertension in his father and mother.     SOCIAL HISTORY  Social History     Tobacco Use   • Smoking status: Former Smoker     Packs/day: 2.00     Years: 30.00     Pack years: 60.00     Types: Cigarettes     Last attempt to quit: 2009     Years since quittin.4   • Smokeless tobacco: Never Used   Substance Use Topics   • Alcohol use: Yes   • Drug use: No      ALLERGIES  Crestor [rosuvastatin calcium]     MEDICATIONS  Current Outpatient Medications   Medication Sig Dispense Refill   • albuterol (PROVENTIL) (2.5 MG/3ML) 0.083% nebulizer solution Take 2.5 mg by nebulization Every 4 (Four) Hours As Needed for Wheezing.     • albuterol sulfate  (90 Base) MCG/ACT inhaler Inhale 2 puffs Every 4 (Four) Hours As Needed for Wheezing.     • aspirin 81 MG chewable tablet Chew 81 mg Daily.     • digoxin (LANOXIN) 125 MCG tablet Take 125 mcg by mouth Daily.     • dilTIAZem CD (CARDIZEM CD) 120 MG 24 hr capsule Take 120 mg by mouth Daily.     • furosemide (LASIX) 40 MG tablet Take 40 mg by  mouth 2 (Two) Times a Day As Needed.     • guaiFENesin (MUCINEX) 600 MG 12 hr tablet Take 600 mg by mouth 2 (Two) Times a Day.     • metFORMIN (GLUCOPHAGE) 1000 MG tablet Take 1,000 mg by mouth 2 (Two) Times a Day With Meals.     • metoprolol tartrate (LOPRESSOR) 25 MG tablet      • naproxen sodium (Aleve) 220 MG tablet Take 1 tablet by mouth Every 6 (Six) Hours As Needed.     • pantoprazole (PROTONIX) 20 MG EC tablet Take  by mouth Daily.     • rOPINIRole (REQUIP) 0.5 MG tablet Take 0.5 mg by mouth Every Night.     • simvastatin (ZOCOR) 10 MG tablet Take 10 mg by mouth Every Night.     • escitalopram (LEXAPRO) 10 MG tablet Take 10 mg by mouth Daily.     • hydrOXYzine (ATARAX) 25 MG tablet Take 25 mg by mouth 2 (two) times a day.       No current facility-administered medications for this visit.       Current outpatient and discharge medications have been reconciled for the patient.  Reviewed by: Alexei Gandhi III, MD    The following portions of the patient's history were reviewed and updated as appropriate: allergies, current medications, past family history, past medical history, past social history, past surgical history and problem list.    REVIEW OF SYSTEMS  Review of Systems   Constitutional: Positive for activity change, appetite change, fatigue and unexpected weight change. Negative for chills, diaphoresis and fever.   HENT: Negative.    Eyes: Negative.    Respiratory: Positive for cough, shortness of breath and wheezing. Negative for apnea, choking, chest tightness and stridor.         Oxygen at 3 lpm  Has a left chest tube  Wife states it is red at site  Has not been draining   Cardiovascular: Negative.    Gastrointestinal: Negative.    Endocrine: Negative.    Genitourinary: Negative.    Musculoskeletal: Negative.         Is not active  Requires 2 assists to stand  Could not be weighed due to weakness   Skin: Negative.    Allergic/Immunologic: Negative.    Neurological: Negative.    Hematological:  "Negative.    Psychiatric/Behavioral: Negative.      PHYSICAL EXAM  VITAL SIGNS:   Vitals:    06/11/20 1442   BP: 90/62   Pulse: (!) 130   Resp: 28   SpO2: (!) 83%  Comment: 3 lpm   Weight: 92.5 kg (204 lb)   Height: 170.2 cm (67\")   PainSc: 0-No pain      General:  Alert and oriented, no acute distress, pale, chronic ill appearing. Vitals reviewed.  Head:  Normocephalic, without obvious abnormality    Nose/Sinuses:  Nares normal externally, no sinus tenderness.  Mouth/Throat:  Mucosa moist, pharynx without erythema  Neck:  supple, No evidence of adenopathy in the cervical or supraclavicular areas.  Eyes: No gross abnormalities, glasses on   Ears: Ears intact with no external abnormalities noted  Chest:  Respiratory efforts are normal and unlabored, diminished throughout, especially on left. 02 on. Pluerx type catheter in place, dressing on with old drainage  Cardiovascular: HR tachycardic but regular at this time  Abdomen:  Soft, non-tender, normal bowel sounds  Extremities:  DC well, warm to touch, mild to moderate edema bilaterally  Skin: No suspicious lesions or rashes of concern  Neurologic:  Alert and oriented, non focal exam, strength and sensation grossly weak  Psych: Mood and affect are appropriate    Performance Status: ECOG (0) Fully active, able to carry on all predisease performance without restriction    Clinical Quality Measures  -Pain Documented by Standardized Tool, FPS Tramaine Perera reports a pain score of 0.  Given his pain assessment as noted, treatment options were discussed and the following options were decided upon as a follow-up plan to address the patient's pain: No pain, no plan given.     Pain Medications             aspirin 81 MG chewable tablet Chew 81 mg Daily.    naproxen sodium (Aleve) 220 MG tablet Take 1 tablet by mouth Every 6 (Six) Hours As Needed.    escitalopram (LEXAPRO) 10 MG tablet Take 10 mg by mouth Daily.        -Advanced Care Planning Advance Care Planning   ACP " discussion was held with the patient during this visit. Patient does not have an advance directive, information provided.    -Body Mass Index Screening and Follow-Up Plan Patient's Body mass index is 31.95 kg/m². BMI is above normal parameters. Recommendations include: educational material.  -Tobacco Use: Screening and Cessation Intervention Social History    Tobacco Use      Smoking status: Former Smoker        Packs/day: 2.00        Years: 30.00        Pack years: 60        Types: Cigarettes        Quit date:         Years since quittin.4      Smokeless tobacco: Never Used     ASSESSMENT AND PLAN  1. Malignant neoplasm of overlapping sites of left lung (CMS/HCC)    2. Stage 4 very severe COPD by GOLD classification (CMS/HCC)    3. Chronic respiratory failure with hypoxia and hypercapnia (CMS/HCC)    4. Former smoker      RECOMMENDATIONS: Tramaine Perera has been referred back to our office today to discuss initiating radiotherapy for carcinoma of the lung, last seen in 2020.   Mr. Perera was admitted shortly after our consultation for a significant failure to thrive, he has been to rehab and now back home, while he is still very weak, he has seen Dr. Holbrook who has ordered restaging scans and referred to outpatient physical therapy. Dr. Holbrook plans to initiate Keytruda and has referred back to discuss radiation.     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  • Thoracentesis at [facility] on 2020 was negative for malignancy.   • Underwent bronchoscopy and left main bronchus stenting on 2020, revealed a proximal large, bloody and friable tumor with 90% invasion/obstruction.   • PET scan on 2020 revealed FDG  uptake within a ill-defined left hilar mass as well as the left upper and left lower lobes with interval placement of left main bronchus stent which is occluded distally with complete collapse of left lung.   • Discharged home to follow with medical oncology, Dr. Valenzuela and radiation oncology and recommendations for possible outpatient bronchoscopy to evaluate the lymphadenopathy given it wasn't avid on PET CT. FEV1 30% and O2 dependent.     The indications and rationale of radiation therapy according to the NCCN Guidelines to the lung has been discussed today. I have extensively reviewed the risks, benefits and alternatives of therapy with this diagnosis. The risks of radiation therapy includes but is not limited to radiation induced pulmonary fibrosis, progression of disease in spite of therapy with either local or systemic failure. I have seen, examined and reviewed this patient's medication list, appropriate labs and imaging studies as well as other physician notes.    We discussed the goals and plans of care with the patient and family and answered all questions. Following this discussion and in consideration of the diagnostic data/evaluation of the patient, I am recommending a course of radiation therapy, definitive vs palliative treatment based on restaging scans. He and his wife verbalize understanding.    We will simulation treatment fields today, I will coordinate care with Dr. Holbrook.  Thank you for allowing me to assist in this patients care.     Todays appointment time was spent in counseling and coordination of care as follows: diagnosis, intent of treatment discussing radiation therapy specifics: logistics, possible and probable side effects and after effects, staging of cancer, standard of care in for this stage of this cancer and treatment options  Alexei Gandhi III, MD  06/11/2020

## 2020-06-11 NOTE — PATIENT INSTRUCTIONS
Lung Cancer  Lung cancer is an abnormal growth of cancerous cells that forms a mass (malignant tumor) in a lung. There are several types of lung cancer. The types are based on the appearance of the tumor cells. The two most common types are:  · Non-small cell lung cancer. This type of lung cancer is the most common type. Non-small cell lung cancers include squamous cell carcinoma, adenocarcinoma, and large cell carcinoma.  · Small cell lung cancer. In this type of lung cancer, abnormal cells are smaller than those of non-small cell lung cancer. Small cell lung cancer gets worse (progresses) faster than non-small cell lung cancer.  What are the causes?  The most common cause of lung cancer is smoking tobacco. The second most common cause is exposure to a chemical called radon.  What increases the risk?  You are more likely to develop this condition if:  · You smoke tobacco.  · You have been exposed to:  ? Secondhand tobacco smoke.  ? Radon gas.  ? Uranium.  ? Asbestos.  ? Arsenic in drinking water.  ? Air pollution.  · You have a family or personal history of lung cancer.  · You have had lung radiation therapy in the past.  · You are older than age 65.  What are the signs or symptoms?  In the early stages, you may not have any symptoms. As the cancer progresses, symptoms may include:  · A lasting cough, possibly with blood.  · Fatigue.  · Unexplained weight loss.  · Shortness of breath.  · Loud breathing (wheezing).  · Chest pain.  · Loss of appetite.  Symptoms of advanced lung cancer include:  · Hoarseness.  · Bone or joint pain.  · Weakness.  · Change in the structure of the fingernails (clubbing), so that the nail looks like an upside-down spoon.  · Swelling of the face or arms.  · Inability to move the face (paralysis).  · Drooping eyelids.  How is this diagnosed?  This condition may be diagnosed based on:  · Your symptoms and medical history.  · A physical exam.  · A chest X-ray.  · A CT scan.  · Blood  tests.  · Sputum tests.  · Removal of a sample of lung tissue (lung biopsy) for testing.  Your cancer will be assessed (staged) to determine how severe it is and how much it has spread (metastasized).  How is this treated?  Treatment depends on the type and stage of your cancer. Treatment may include one or more of the following:  · Surgery to remove as much of the cancer as possible. Lymph nodes in the area may be removed and tested for cancer as well.  · Medicines that kill cancer cells (chemotherapy).  · High-energy rays that kill cancer cells (radiation therapy).  · Chemotherapy. This treatment uses medicines to destroy cancer cells.  · Targeted therapy. This targets specific parts of cancer cells and the area around them to block the growth and spread of the cancer. Targeted therapy can help limit the damage to healthy cells.  Follow these instructions at home:  Eating and drinking  · Some of your treatments might affect your appetite. If you are having problems eating, or if you do not have an appetite, meet with a dietitian.  · If you have side effects that affect your appetite, it may help to:  ? Eat smaller meals and snacks often.  ? Drink high-nutrition and high-calorie shakes or supplements.  ? Eat bland and soft foods that are easy to eat.  ? Avoid eating foods that are hot, spicy, or hard to swallow.  General instructions    · Do not use any products that contain nicotine or tobacco, such as cigarettes and e-cigarettes. If you need help quitting, ask your health care provider.  · Do not drink alcohol.  · If you are admitted to the hospital, make sure your cancer specialist (oncologist) is aware. Your cancer may affect your treatment for other conditions.  · Take over-the-counter and prescription medicines only as told by your health care provider.  · Consider joining a support group for people who have been diagnosed with lung cancer.  · Work with your health care provider to manage any side effects of  treatment.  · Keep all follow-up visits as told by your health care provider. This is important.  Where to find more information  · American Cancer Society: https://www.cancer.org  · National Cancer [place] (NCI): https://www.cancer.gov  Contact a health care provider if you:  · Lose weight without trying.  · Have a persistent cough and wheezing.  · Feel short of breath.  · Get tired easily.  · Have bone or joint pain.  · Have difficulty swallowing.  · Notice that your voice is changing or getting hoarse.  · Have pain that does not get better with medicine.  Get help right away if you:  · Cough up blood.  · Have new breathing problems.  · Have chest pain.  · Have a fever.  · Have swelling in an ankle, leg, or arm, or the face or neck.  · Have paralysis in your face.  · Are very confused.  · Have a drooping eyelid.  Summary  · Lung cancer is an abnormal growth of cancerous cells that forms a mass (malignant tumor) in a lung.  · There are several types of lung cancer. The types are based on the appearance of the tumor cells. The two most common types are non-small cell and small cell.  · The most common cause of lung cancer is smoking tobacco.  · Early symptoms include a lasting cough, possibly with blood, and fatigue, unexplained weight loss, and shortness of breath.  · After diagnosis, treatment depends on the type and stage of your cancer.  This information is not intended to replace advice given to you by your health care provider. Make sure you discuss any questions you have with your health care provider.  Document Released: 03/26/2002 Document Revised: 11/30/2018 Document Reviewed: 10/25/2018  [employer] Patient Education © 2020 [employer]    External Beam Radiation Therapy  External beam radiation therapy is a type of radiation treatment. This type of radiation therapy can deliver radiation to a fairly large area. This is the most common type of radiation therapy for cancer. This therapy may be done  to:  · Treat cancer by:  ? Destroying cancer cells. Radiation delivered during the treatment damages cancer cells. It may also damage normal cells, but normal cells have the DNA to repair themselves while cancer cells do not.  ? Helping with symptoms of your cancer.  ? Stopping the growth of any remaining cancer cells after surgery.  ? Preventing cancer cells from growing in areas that do not have cancer (prophylactic radiation therapy).  · Treat or shrink a tumor.  · Reduce pain (palliative therapy).  The amount of radiation you will receive and the length of therapy depend on your medical condition. You should not feel the radiation being delivered or any pain during your therapy.  Let your health care provider know about:  · Any allergies you have.  · All medicines you are taking, including vitamins, herbs, eye drops, creams, and over-the-counter medicines.  · Any problems you or family members have had with anesthetic medicines.  · Any blood disorders you have.  · Any surgeries you have had.  · Any medical conditions you have.  · Whether you are pregnant or may be pregnant.  What are the risks?  Generally, this is a safe procedure. However, radiation therapy can place a person at a higher risk for a second cancer later in life.  Most people experience side effects from the therapy. Side effects depend on the amount of radiation and the part of the body that was exposed to radiation. The most common side effects include:  · Skin changes.  · Hair loss.  · Fatigue.  · Nausea and vomiting.  What happens before the procedure?  · There will be a planning session (simulation). During the session:  ? Your health care provider will plan exactly where the radiation will be delivered (treatment field).  ? You will be positioned for your therapy. The goal is to have a position that can be reproduced for each therapy session.  ? Temporary marks may be drawn on your body. Permanent marks may also be drawn on your body in  order for you to be positioned the same way for each therapy session.  ? A tool that holds a body part in place (immobilization device) may be used to keep the area of treatment in the correct position.  · Follow instructions from your health care provider about eating or drinking restrictions.  · Ask your health care provider about changing or stopping your regular medicines. This is especially important if you are taking diabetes medicines or blood thinners.  What happens during the procedure?    · You will either lie on a table or sit in a chair in the position determined for your therapy.  · You may have a heavy shield placed on you to protect tissues and organs that are not being treated.  · The radiation machine (linear accelerator) will move around you to deliver the radiation in exact doses from different angles. The machine will not touch you.  The procedure may vary among health care providers and hospitals.  What happens after the procedure?  · You may return to your normal routine including diet, activities, and medicines as told by your health care provider.  · You may want to have someone take you home from the hospital or clinic.  Summary  · External beam radiation therapy is a type of radiation treatment for cancer.  · The amount of radiation you will receive and the length of therapy depend on your medical condition.  · Most people experience side effects from the therapy. Side effects depend on the amount of radiation and the part of the body that was exposed to radiation.  This information is not intended to replace advice given to you by your health care provider. Make sure you discuss any questions you have with your health care provider.  Document Released: 05/06/2010 Document Revised: 12/19/2018 Document Reviewed: 12/18/2017  [employer] Patient Education © 2020 [employer]

## 2020-06-12 NOTE — PROGRESS NOTES
Lety Jimenez   1955  6/15/2020     Chief Complaint   Patient presents with    Lung Cancer        INTERVAL HISTORY/HISTORY OF PRESENT ILLNESS:    Main diagnosis   · Non-small cell lung cancer, squamous cell subtype  · PD-L1 99%  · T2N2M0  Associated comorbidities  · COPD  · CHF  · CAD  · Hypertension  · Atrial fibrillation  · Left lung opacification    Treatment summary  · 6/15/2020-Keytruda palliative frontline      Mr Nix is a 62year old male who has a diagnosis of non-small cell lung cancer, squamous cell subtype. He has a complex medical history including history of COPD, CHF, CAD, hypertension, atrial fibrillation and hyperlipidemia. Andria Aldridge has also been diagnosed with non-small cell lung cancer, squamous cell subtype with high PDL 1 expression. He had a bronchoscopy and stent placed to the left mainstem bronchus at [facility] in February 2020. The patient had 2 prior hospitalization at [facility] due to severe deconditioning and pulmonary complications. He has improved somewhat. He is undergoing physical therapy at home. He presented to initiate treatment with [address] history   Participated with PT and OT yesterday.  Continues to have significant weakness and fatigue.  Unfortunately SNF declined due to lack of participation with OT/PT. Discussed possibly home with hospice until overall performance status improves and then possibly come out of hospice and reevaluate for potential treatment.  No family at bedside.       Mr. Mickie Prado was seen in initial medical oncology consultation on 2/16/2020 as an inpatient at [facility] with a recent diagnosis of metastatic lung cancer made at [facility] to establish care.     He will be followed by Deanna piedra for Martin's identical twin Jason Schaefer, also with lung cancer.     Ladonna Little has been on chronic supplemental oxygen for at least 6 months prior to presentation due to his leukocytosis    Normocytic anemia    Moderate protein-calorie malnutrition (HCC)    Screening for thyroid disorder    Hypokalemia    Coordination of complex care          #  Squamous cell lung carcinoma left lung,possibly stage II. Tumor Proportion Score (TPS): 99% Intensity of staining: strong  The patient was counseled today about diagnosis, staging, prognosis, diagnostic tests, medications, side effects and disease management. The method of counseling included verbal explanation. The patient verbalized understanding. Essentially, the patient performs that has improved somewhat. The patient was offered frontline palliative immunotherapy with Keytruda.     #Left pleural effusion/lung collapse 2/2 endobronchial obstruction  Status post left main-stem bronchus stent at [facility]. 3/20/2020-CT scan showed left bronchial stent with intraluminal density and atelectasis of the left lung with left pleural fluid and a small left chest tube. Right lung clear. Enlarged pulmonary artery. Similar mediastinal adenopathy.     CT chest at [facility] 1/29/2020  Bulky subcarinal lymphadenopathy with enhancement   measures 1.6 cm in short axis. LEFT hilar and infrahilar soft tissue with bronchial narrowing, may measure as large as 7.6 x 4.8 cm. Peripheral collapse of the entire LEFT lung. Lung parenchyma and pleural space: LEFT lung collapse with LEFT hilar and infrahilar mass left-sided effusion. RIGHT lung with granuloma in the superior segment of the RIGHT lower lobe. Small noncalcified nodules in the RIGHT upper chest.    Patient Pleurx catheter has been not functioning. The patient wants the Pleurx catheter to be discontinued. Discussed with pulmonary Fowler and also with general surgery here today.     #Neutrophil leukocytosis-improving. Patient has no fever chills. Like secondary to malignancy. We will continue to monitor.     #PAF  Cardizem/Digoxin per cardiology     #Hypokalemia- replace potassium chloride

## 2020-06-16 PROBLEM — J91.0 MALIGNANT PLEURAL EFFUSION: Status: ACTIVE | Noted: 2020-01-01

## 2020-06-22 ENCOUNTER — TELEPHONE (OUTPATIENT)
Dept: RADIATION ONCOLOGY | Facility: HOSPITAL | Age: 65
End: 2020-06-22

## 2020-06-22 NOTE — TELEPHONE ENCOUNTER
DARREN called to follow up on a distress score. Ms. Perera picked up and informed this writer that Mr. Perera passed away yesterday.

## 2020-07-01 ENCOUNTER — APPOINTMENT (OUTPATIENT)
Dept: RADIATION ONCOLOGY | Facility: HOSPITAL | Age: 65
End: 2020-07-01

## 2020-07-01 NOTE — PROGRESS NOTES
needed for Itching anxiety      dilTIAZem (CARDIZEM CD) 120 MG extended release capsule Take 1 capsule by mouth 2 times daily 30 capsule 3    digoxin (LANOXIN) 125 MCG tablet Take 1 tablet by mouth daily 30 tablet 3    albuterol (PROVENTIL) (2.5 MG/3ML) 0.083% nebulizer solution Take 2.5 mg by nebulization every 6 hours as needed for Wheezing      escitalopram (LEXAPRO) 10 MG tablet Take 10 mg by mouth daily      albuterol sulfate HFA (PROAIR HFA) 108 (90 Base) MCG/ACT inhaler Inhale 2 puffs into the lungs every 4 hours as needed for Wheezing or Shortness of Breath 1 Inhaler 1    metFORMIN (GLUCOPHAGE) 1000 MG tablet Take 1 tablet by mouth 2 times daily (with meals) 60 tablet 0    simvastatin (ZOCOR) 10 MG tablet Take 1 tablet by mouth nightly 30 tablet 0    hydrALAZINE (APRESOLINE) 10 MG tablet Take 1 tablet by mouth every 8 hours 90 tablet 0    furosemide (LASIX) 40 MG tablet Take 1 tablet by mouth daily 30 tablet 0    aspirin 81 MG EC tablet Take 81 mg by mouth daily. No current facility-administered medications for this visit.       Allergies: Crestor [rosuvastatin]  Past Medical History:   Diagnosis Date    Atherosclerosis     cardiovascular disease     Blood circulation, collateral     4 fingers left hand tingly    CAD (coronary artery disease)     saw dr. Cline Mo tunnel syndrome     CHF (congestive heart failure) (HCC)     COPD (chronic obstructive pulmonary disease) ([address]     Degenerative joint disease     right hip    Diabetes mellitus ([address]     Diabetic x 1-2 years    FH: chronic lung disease requiring oxygen     sees dr. Isidoro Alvarado GERD (gastroesophageal reflux disease)     Hyperlipidemia     Hypertension     Malignant neoplasm of overlapping sites of left lung ([address]     Myocardial infarction ([address]     subenodcardial    Obesity     Palliative care patient 01/29/2018    Presence of stent in coronary artery     9/23/11 BALJINDER placement 0MB of circumflex    Right in its entirety. He tolerated the procedure and a dressing was applied. We will have his family change the dressing daily for 2 to 3 days until the site has healed.     Keyon Latham PA-C

## 2020-08-03 ENCOUNTER — APPOINTMENT (OUTPATIENT)
Dept: RADIATION ONCOLOGY | Facility: HOSPITAL | Age: 65
End: 2020-08-03

## 2024-04-08 NOTE — PROGRESS NOTES
diltiazem (CARDIZEM) 125 mg in dextrose 5% 125 mL infusion Stopped (02/18/20 2889)     Current Facility-Administered Medications   Medication Dose Route Frequency Provider Last Rate Last Dose    glucose (GLUTOSE) 40 % oral gel 15 g  15 g Oral PRN Marissa Quijano MD        dextrose 50 % IV solution  12.5 g Intravenous PRN Marissa Quijano MD        glucagon (rDNA) injection 1 mg  1 mg Intramuscular PRN Marissa Quijano MD        dextrose 5 % solution  100 mL/hr Intravenous PRN Marissa Quijano MD        promethazine (PHENERGAN) tablet 25 mg  25 mg Oral Q6H PRN Champ Lopez MD   25 mg at 02/24/20 0935    oxyCODONE-acetaminophen (PERCOCET)  MG per tablet 1 tablet  1 tablet Oral Q6H PRN Champ Lopez MD   1 tablet at 02/24/20 0943    morphine injection 2 mg  2 mg Intravenous Q4H PRN Champ Lopez MD   2 mg at 02/24/20 0925    amoxicillin-clavulanate (AUGMENTIN) 500-125 MG per tablet 1 tablet  1 tablet Oral 3 times per day Marissa Quijano MD   1 tablet at 02/25/20 7323    sodium chloride tablet 1 g  1 g Oral TID  Marissa Quijano MD   1 g at 02/25/20 0928    glucose (GLUTOSE) 40 % oral gel 15 g  15 g Oral PRN Marissa Quijano MD        dextrose 50 % IV solution  12.5 g Intravenous PRN Marissa Quijano MD        glucagon (rDNA) injection 1 mg  1 mg Intramuscular PRN Marissa Quijano MD        dextrose 5 % solution  100 mL/hr Intravenous PRN Marissa Quijano MD        insulin lispro (HUMALOG) injection vial 9 Units  0.08 Units/kg Subcutaneous TID  Marissa Quijano MD   Stopped at 02/24/20 1109    insulin lispro (HUMALOG) injection vial 0-12 Units  0-12 Units Subcutaneous TID  Marissa Quijano MD   4 Units at 02/24/20 1655    insulin lispro (HUMALOG) injection vial 0-6 Units  0-6 Units Subcutaneous Nightly Marissa Quijano MD        hydrOXYzine (VISTARIL) capsule 25 mg  25 mg Oral BID PRN Marissa Quijano MD   25 mg at 02/24/20 0043    escitalopram (LEXAPRO) tablet 10 mg  10 mg Oral Daily Kimberly Reddy MD   10 mg at 02/25/20 1026    furosemide (LASIX) tablet 40 mg  40 mg Oral Daily Kimberly Reddy MD   40 mg at 02/25/20 1706    metoprolol tartrate (LOPRESSOR) tablet 25 mg  25 mg Oral BID Kimberly Reddy MD   25 mg at 02/25/20 9884    melatonin tablet 2 mg  2 mg Oral Nightly PRN Lc Mayes MD   2 mg at 02/23/20 2046    pantoprazole (PROTONIX) injection 40 mg  40 mg Intravenous Daily Kimberly Reddy MD   40 mg at 02/25/20 7246    And    sodium chloride (PF) 0.9 % injection 10 mL  10 mL Intravenous Daily Kimberly Reddy MD   10 mL at 02/25/20 0929    aluminum & magnesium hydroxide-simethicone (MAALOX) 200-200-20 MG/5ML suspension 30 mL  30 mL Oral Q6H PRN Kimberly Reddy MD   30 mL at 02/22/20 1416    diltiazem (CARDIZEM CD) extended release capsule 120 mg  120 mg Oral BID Kimberly Reddy MD   120 mg at 02/25/20 9596    digoxin (LANOXIN) tablet 125 mcg  125 mcg Oral Daily Kimberly Reddy MD   125 mcg at 02/25/20 8311    acetaminophen (TYLENOL) tablet 1,000 mg  1,000 mg Oral Nightly PRN Kimberly Reddy MD   650 mg at 02/18/20 0620    diltiazem 125 mg in dextrose 5 % 125 mL infusion  5 mg/hr Intravenous Continuous Kimberly Reddy MD   Stopped at 02/18/20 1584    diltiazem injection 10 mg  10 mg Intravenous Q30 Min PRN Kimberly Reddy MD   10 mg at 02/15/20 2117    sodium chloride flush 0.9 % injection 10 mL  10 mL Intravenous 2 times per day Kimberly Reddy MD   10 mL at 02/25/20 0929    sodium chloride flush 0.9 % injection 10 mL  10 mL Intravenous PRN Kimberly Reddy MD        magnesium hydroxide (MILK OF MAGNESIA) 400 MG/5ML suspension 30 mL  30 mL Oral Daily PRN Kimberly Reddy MD        ondansetron [facility]) injection 4 mg  4 mg Intravenous Q6H PRN Kimberly Reddy MD   4 mg at 02/23/20 2126    acetaminophen (TYLENOL) tablet 650 mg  650 mg Oral Q4H PRN Kimberly Reddy MD   650 mg at 02/18/20 1657    ipratropium-albuterol (DUONEB) nebulizer solution 1 ampule  1 ampule Inhalation Q4H WA Urszula Ruiz MD   1 ampule at 02/25/20 1033    lactulose (CHRONULAC) 10 GM/15ML solution 20 g  20 g Oral TID Urszula Ruiz MD   20 g at 02/25/20 3867    enoxaparin (LOVENOX) injection 40 mg  40 mg Subcutaneous Daily Urszula Ruiz MD   40 mg at 02/25/20 7008        Labs:     Recent Labs     02/23/20 0349 02/24/20  0347 02/25/20  0211   WBC 19.2* 24.1* 20.8*   RBC 5.10 5.13 5.29   HGB 13.5* 13.7* 14.1   HCT 44.5 45.2 46.9   MCV 87.3 88.1 88.7   MCH 26.5* 26.7* 26.7*   MCHC 30.3* 30.3* 30.1*    387 464*     Recent Labs     02/23/20 0349 02/24/20  0347 02/25/20  0211   * 132* 134*   K 4.6 4.7 5.5*   ANIONGAP 12 12 13   CL 88* 89* 91*   CO2 31* 31* 30*   BUN 17 18 33*   CREATININE 0.5 0.5 0.8   GLUCOSE 159* 213* 193*   CALCIUM 8.3* 8.4* 8.6*     No results for input(s): MG, PHOS in the last 72 hours. Recent Labs     02/23/20 0349 02/24/20 0347 02/25/20  0211   AST 24 19 20   ALT 20 20 18   BILITOT 0.4 0.3 0.4   ALKPHOS 169* 188* 173*     ABGs:No results for input(s): PH, PO2, PCO2, HCO3, BE, O2SAT in the last 72 hours. Troponin T:   No results for input(s): TROPONINI in the last 72 hours. INR:   No results for input(s): INR in the last 72 hours. Lactic Acid:   No results for input(s): LACTA in the last 72 hours. Objective:   Vitals: BP 99/69   Pulse 106   Temp 96.3 °F (35.7 °C) (Temporal)   Resp 22   Ht 5' 8\" (1.727 m)   Wt 230 lb 9.6 oz (104.6 kg)   SpO2 92%   BMI 35.06 kg/m²   24HR INTAKE/OUTPUT:      Intake/Output Summary (Last 24 hours) at 2/25/2020 1122  Last data filed at 2/25/2020 1000  Gross per 24 hour   Intake 240 ml   Output 300 ml   Net -60 ml     Physical Exam  Vitals signs and nursing note reviewed. Constitutional:       General: He is not in acute distress. Appearance: He is not ill-appearing or toxic-appearing. HENT:      Head: Normocephalic and atraumatic. Nose: Nose normal.   Eyes:      General: No scleral icterus. Quality 226: Preventive Care And Screening: Tobacco Use: Screening And Cessation Intervention: Patient screened for tobacco use and is an ex/non-smoker Detail Level: Detailed Quality 431: Preventive Care And Screening: Unhealthy Alcohol Use - Screening: Patient not identified as an unhealthy alcohol user when screened for unhealthy alcohol use using a systematic screening method Quality 130: Documentation Of Current Medications In The Medical Record: Current Medications Documented

## 2024-10-30 NOTE — PROGRESS NOTES
PROGRESS NOTE    Pt Name: Tran Allen  MRN: [id number]  YOB: 1955  Date of evaluation: 2/24/2020    Subjective Had complaints of epigastric and diffuse abdominal pain yesterday. Received IV morphine 2 mg. Overall quite weak and can barely move out of the bed. Also had some issues with his heart rate. Discussed with the nurse at bedside today. Very poor performance. Mr. Janelle Licona was seen in initial medical oncology consultation on 2/16/2020 as an inpatient at [facility] with a recent diagnosis of metastatic lung cancer made at [facility] to establish care.     He will be followed by Juan piedra for Martin's identical twin Luz Borjas, also with lung cancer.     Jaun Barrera has been on chronic supplemental oxygen for at least 6 months prior to presentation due to his history of underlying COPD.  He wears 4 L of supplemental oxygen. His PCP is Aranza Escamilla PA-C on the [address].     Jaun Barrera presented to [facility] ED on 1/25/2020 with hemoptysis increasing weakness. He was also found to have atrial fibrillation with RVR.     CTA PULMONARY W CONTRAST   Final Result   1. There is complete collapse of the left lung secondary to an   occluded left main stem bronchus. This could be due to mucous   plugging, however endobronchial neoplasm must also be considered. Moderate size left pleural effusion. 2. Mild cardiomegaly. Trace pericardial fluid. 3. No pulmonary emboli. 4. No thoracic aortic aneurysm or dissection. Signed by Dr Fide Fitzpatrick on 1/25/2020 9:27 PM       VL DUP LOWER EXTREMITY VENOUS BILATERAL              Vascular Lab Prelim  Duplex venous scan of B/L LE shows no evidence for dvt, svt, reflux noted at this time. Final report pending     The case was discussed with Dr Reyes Gallon with pulmonology, who stated that he can do a bronch but with the bleeding, he would not be able to intervene other than using epinephrine.  Would recommend transfer to higher level of care as pt may require embolization.      Mr. Liliana Cantu was transferred to [facility] in [facility] where further work-up was performed.     X-ray Chest Portable 1/26/2020  Complete whiteout of the left lung with mild deviation of the trachea to the left. Findings are consistent with complete collapse of the left lung. Underlying infection or mass or effusion cannot be excluded      SIOMNA at [place] on 1/27/2020 was performed. LVEF greater than 55%. Unable to comment on diastolic filling pattern due to atrial  fibrillation. RV mildly dilated. Mildly depressed RV systolic function. The left atrium is mild to moderately dilated. RA mildly dilated. Mild tricuspid regurgitation. The RV systolic pressure is calculated at 63 mmHg.     A thoracentesis was performed at [facility] on 1/27/2020.  1100 cc of pleural fluid was drained. Cytology:  REACTIVE MESOTHELIAL CELLS, HISTIOCYTES, AND LYMPHOCYTES; NEGATIVE FOR MALIGNANCY      CT C/A/P at [facility] 1/29/2020  Impression:   1.  Signs of large LEFT lung mass with postobstructive collapse of   the entire LEFT lung and mediastinal shift. 2.  Bulky subcarinal adenopathy. 3.  Nodular bilateral adrenal glands still with some adreniform   contour, suspicious for either nodular hyperplasia or bilateral   metastatic disease to the adrenal glands.      Bronchoscopy with insertion of stent was performed on 1/29/2020 at [facility]. A mass was found in the middle portion of the left mainstem bronchus with about 90% obstruction. The mass was large and bloody and friable. a 10 x 40 Aero stent was placed with good results.        Records discuss \"squamous cell lung cancer \"  however I am unable to view a pathology report.       REVIEW OF SYSTEMS:   CONSTITUTIONAL: no fever, no night sweats, fatigue;  HEENT: no blurring of vision, no double vision, no hearing difficulty, no tinnitus, no ulceration, no dysplasia, no 02/24/2020    CREATININE 0.5 02/24/2020    GLUCOSE 213 (H) 02/24/2020    CALCIUM 8.4 (L) 02/24/2020    PROT 5.4 (L) 02/24/2020    LABALBU 2.4 (L) 02/24/2020    BILITOT 0.3 02/24/2020    ALKPHOS 188 (H) 02/24/2020    AST 19 02/24/2020    ALT 20 02/24/2020    LABGLOM >60 02/24/2020       Lab Results   Component Value Date    INR 1.15 02/17/2020    INR 1.19 (H) 02/15/2020    INR 1.09 01/25/2020    PROTIME 14.1 02/17/2020    PROTIME 14.5 02/15/2020    PROTIME 13.5 01/25/2020       RADIOLOGY STUDIES REVIEWED BY ME:    ASSESSMENT:  #1  Squamous cell lung carcinoma left lung, status post left main stem bronchus stent 1/29/2020 - possibly stage II     Al Oats presented to [facility] ED on 1/25/2020 with hemoptysis increasing weakness. He was also found to have atrial fibrillation with RVR.     CTA chest with contrast:  NO PE  Complete collapse of the left lung secondary to an occluded left main stem bronchus  Moderate size left pleural effusion  Mild cardiomegaly, trace pericardial fluid     Duplex venous scan of B/L LE: NO evidence for dvt, svt, reflux noted at this time.      The case was discussed with Dr Kristi Lewis with pulmonology, who stated that he can do a bronch but with the bleeding, he would not be able to intervene other than using epinephrine. Would recommend transfer to higher level of care as pt may require embolization.      Mr. Ryan ghosh was transferred to [facility] in [facility] where further work-up was performed.     X-ray Chest Portable 1/26/2020  Complete whiteout of the left lung with mild deviation of the trachea to the left. Findings are consistent with complete collapse of the left lung. Underlying infection or mass or effusion cannot be excluded      SIMONA at [place] on 1/27/2020 was performed. LVEF greater than 55%. Unable to comment on diastolic filling pattern due to atrial  fibrillation. RV mildly dilated. Mildly depressed RV systolic function.   The left atrium is mild to moderately dilated. RA mildly dilated. Mild tricuspid regurgitation. The RV systolic pressure is calculated at 63 mmHg.     A thoracentesis was performed at [facility] on 1/27/2020.  1100 cc of pleural fluid was drained. Cytology:  REACTIVE MESOTHELIAL CELLS, HISTIOCYTES, AND LYMPHOCYTES;   NEGATIVE FOR MALIGNANCY      CT C/A/P at [facility] 1/29/2020  Impression:   1.  Signs of large LEFT lung mass with postobstructive collapse of   the entire LEFT lung and mediastinal shift. 2.  Bulky subcarinal adenopathy. 3.  Nodular bilateral adrenal glands still with some adreniform   contour, suspicious for either nodular hyperplasia or bilateral   metastatic disease to the adrenal glands.      Bronchoscopy with insertion of stent was performed on 1/29/2020 at [facility]. A mass was found in the middle portion of the left mainstem bronchus with about 90% obstruction. The mass was large and bloody and friable. a 10 x 40 Aero stent was placed with good results.     Pathology was positive for squamous cell lung cancer.      PET at [facility] 1/31/2020  Impression   Intense FDG uptake within a ill-defined left hilar mass as well as the left upper and left lower lobes. Interval placement of a left   main bronchus stent which is occluded distally with complete collapse of the left lung. FDG uptake within the left upper and lower lobes may be related to infection versus malignancy. Small left pleural effusion. No FDG uptake is present within the subcarinal lymphadenopathy.      MRI brain w/ and w/o  2/1/2020 8:07 AM  Impression   1.  No findings of intracranial metastatic disease. 2.  Mild chronic small vessel ischemic white matter disease and tiny   old right cerebellar infarcts.      CT abdomen pelvis with contrast 2/15/2020 at [facility]  No acute abnormality seen in the abdomen  Left pleural effusion     left thoracentesis 2/17/2020: 2L of clear, straw colored fluid drained.    cytology negative: benign mesothelial cells and occasional small round lymphocytes     Post-procedure CXR 2/17/20: No pneumothorax, diffuse opacification of the left chest with some residual pleural effusion  He has already been evaluated by Dr. Carlo Gamboa     S/p left VATS/Pleur-X catheter placement on 2/19/2020. Cytology was negative     #2 PAF  Cardizem/Digoxin per cardiology      #3 Hyponatremia  Na 132 today 2/22/2020     #4 generalized weakness  Consider PT/OT, defer to attending  Consult case management for disposition     #5 GERD  Receiving Protonix    #Neutrophilic leukocytosis- likely reactive-continue antibiotics as per your discretion. #Prognosis- very poor prognosis. The patient has a very poor performance and I am concerned that he would not be able to tolerate any therapy, unless his performance status improves greatly. He could tolerate radiation and/or Keytruda if he becomes ambulatory. Unfortunately, at this point right now the patient can barely move out of the bed due to overall weakness. I have placed a palliative care consult to discuss goals.     PLAN  Await PD-L1 on tissue biopsy 1/29/2020 from [facility]. Al Pierce is aware however his performance status must first be improved.   SS assisting with potential transfer to [facility]  Drain Pleurx catheter today.  Nadira Wong MD    02/24/20  7:05 AM 36.7

## (undated) DEVICE — PUMP SUC IRR TBNG L10FT W/ HNDPC ASSEMB STRYKEFLOW 2

## (undated) DEVICE — EXCEL 10FT (3.05 M) INSUFFLATION TUBING SET WITH 0.1 MICRON FILTER: Brand: EXCEL

## (undated) DEVICE — TROCAR SURG L75MM OD5MM BLDELSS XCEL

## (undated) DEVICE — KIT,ANTI FOG,W/SPONGE & FLUID,SOFT PACK: Brand: MEDLINE

## (undated) DEVICE — TOWEL,OR,DSP,ST,BLUE,DLX,4/PK,20PK/CS: Brand: MEDLINE

## (undated) DEVICE — TUBE ET 39FR DIA5.3MM L POLYUR TRACH CUF SNAP LOK CONN DISP

## (undated) DEVICE — SOLUTION IV IRRIG 1000ML POUR BTL 2F7114

## (undated) DEVICE — GLOVE SURG SZ 65 CRM LTX FREE POLYISOPRENE POLYMER BEAD ANTI

## (undated) DEVICE — TUBING, SUCTION, 1/4" X 20', STRAIGHT: Brand: MEDLINE INDUSTRIES, INC.

## (undated) DEVICE — SUTURE ETHLN SZ 3-0 L18IN NONABSORBABLE BLK L24MM FS-1 3/8 663G

## (undated) DEVICE — ADHESIVE SKIN CLOSURE 0.7CC TOP MICROBIAL APPL DERMBND ADV

## (undated) DEVICE — PADDING UNDERCAST W4INXL4YD 100% COT CRIMPED FINISH WBRL II

## (undated) DEVICE — PACK,UNIVERSAL,NO GOWNS: Brand: MEDLINE

## (undated) DEVICE — DRAIN SURG SGL COLL PT TB FOR ATS BG OASIS

## (undated) DEVICE — MINOR CDS: Brand: MEDLINE INDUSTRIES, INC.

## (undated) DEVICE — SUTURE PERMAHAND SZ 0 L30IN NONABSORBABLE BLK SILK BRAID A306H

## (undated) DEVICE — GAUZE,SPONGE,FLUFF,6"X6.75",STRL,10/TRAY: Brand: MEDLINE

## (undated) DEVICE — DRESSING FOAM W4XL5CM THN ABSRB SELF ADH W/ SAFETAC MEPILEX

## (undated) DEVICE — CHLORAPREP 26ML ORANGE

## (undated) DEVICE — U-DRAPE: Brand: CONVERTORS

## (undated) DEVICE — GLOVE SURG SZ 7 L12IN FNGR THK94MIL TRNSLUC YEL LTX HYDRGEL

## (undated) DEVICE — CURAVIEW LED LARYNSCP BLDE

## (undated) DEVICE — SOLUTION IV IRRIG WATER 1000ML POUR BRL 2F7114

## (undated) DEVICE — E-Z CLEAN, NON-STICK, PTFE COATED, ELECTROSURGICAL BLADE ELECTRODE, MODIFIED EXTENDED INSULATION, 4 INCH (10.2 CM): Brand: MEGADYNE

## (undated) DEVICE — STERILE POLYISOPRENE POWDER-FREE SURGICAL GLOVES: Brand: PROTEXIS

## (undated) DEVICE — GOWN,PRECEPT,XLNG/XXLARGE,STRL: Brand: MEDLINE

## (undated) DEVICE — STYLET INTUB 5FR L25CM ORAL DESIGNED RET DESIRED CRV SMOOTH

## (undated) DEVICE — TROCAR: Brand: KII® SLEEVE

## (undated) DEVICE — SUTURE PERMAHAND SZ 2-0 L30IN NONABSORBABLE BLK SILK W/O A305H

## (undated) DEVICE — SYRINGE 20ML LL S/C 50

## (undated) DEVICE — ZIMMER® STERILE DISPOSABLE TOURNIQUET CUFF WITH PLC, DUAL PORT, SINGLE BLADDER, 18 IN. (46 CM)

## (undated) DEVICE — SUTURE ETHLN SZ 3-0 L18IN NONABSORBABLE BLK FS-1 L24MM 3/8 663H

## (undated) DEVICE — SUTURE VCRL SZ 4-0 L27IN ABSRB UD L19MM PS-2 3/8 CIR PRIM J426H

## (undated) DEVICE — BANDAGE COMPR W4INXL15FT BGE E SGL LAYERED CLP CLSR

## (undated) DEVICE — TUBE ET 8MM NSL ORAL BASIC CUF INTMED MURPHY EYE RADPQ MRK

## (undated) DEVICE — TROCAR: Brand: KII FIOS FIRST ENTRY

## (undated) DEVICE — DEFOGGER!" ANTI FOG KIT: Brand: DEROYAL

## (undated) DEVICE — GLOVE SURG SZ 8 L11.6IN THK9.8MIL STRW LTX POLYMER BEAD CUF

## (undated) DEVICE — 3M™ IOBAN™ 2 ANTIMICROBIAL INCISE DRAPE 6650EZ: Brand: IOBAN™ 2

## (undated) DEVICE — SUTURE SZ 0 27IN 5/8 CIR UR-6  TAPER PT VIOLET ABSRB VICRYL J603H

## (undated) DEVICE — SOLUTION IV IRRIG POUR BRL 0.9% SODIUM CHL 2F7124

## (undated) DEVICE — BLANKET WRM W40.2XL55.9IN IORT LO BODY + MISTRAL AIR

## (undated) DEVICE — BANDAGE COMPR W6INXL12FT SMOOTH FOR LIMB EXSANG ESMARCH

## (undated) DEVICE — PAD,ARMBOARD,CONV,FOAM,2X8X20",12PR/CS: Brand: MEDLINE

## (undated) DEVICE — ROYAL SILK SURGICAL GOWN, XXL: Brand: CONVERTORS

## (undated) DEVICE — TOTAL TRAY, 16FR 10ML SIL FOLEY, URN: Brand: MEDLINE

## (undated) DEVICE — GLOVE SURG SZ 8 L12IN FNGR THK94MIL TRNSLUC YEL LTX HYDRGEL

## (undated) DEVICE — BLADE OPHTH 180DEG CUT SURF BLU STR SHRP DBL BVL GRINDLESS

## (undated) DEVICE — DRAPE,EXTREMITY,89X128,STERILE: Brand: MEDLINE

## (undated) DEVICE — YANKAUER,TAPERED BULBOUS TIP,W/O VENT: Brand: MEDLINE

## (undated) DEVICE — PLATE ES AD W 9FT CRD 2

## (undated) DEVICE — KIT STRT 1000ML PT DISCHRG BTL DSG GZ PD BLU WRAPPING INFO

## (undated) DEVICE — 3M™ IOBAN™ 2 ANTIMICROBIAL INCISE DRAPE 6651EZ: Brand: IOBAN™ 2

## (undated) DEVICE — STERILE LATEX POWDER FREE SURGICAL GLOVES WITH HYDROGEL COATING: Brand: PROTEXIS

## (undated) DEVICE — DRAPE,UTILITY,XL,4/PK,STERILE: Brand: MEDLINE

## (undated) DEVICE — KIT CATH PLEUR CHLORAPREP FEN DRP FLTR STRW FOAM CATH PD